# Patient Record
Sex: MALE | Race: OTHER | NOT HISPANIC OR LATINO
[De-identification: names, ages, dates, MRNs, and addresses within clinical notes are randomized per-mention and may not be internally consistent; named-entity substitution may affect disease eponyms.]

---

## 2017-08-11 PROBLEM — Z00.00 ENCOUNTER FOR PREVENTIVE HEALTH EXAMINATION: Status: ACTIVE | Noted: 2017-08-11

## 2017-08-22 ENCOUNTER — APPOINTMENT (OUTPATIENT)
Dept: HOME HEALTH SERVICES | Facility: HOME HEALTH | Age: 82
End: 2017-08-22

## 2017-08-23 ENCOUNTER — APPOINTMENT (OUTPATIENT)
Dept: HOME HEALTH SERVICES | Facility: HOME HEALTH | Age: 82
End: 2017-08-23
Payer: MEDICARE

## 2017-08-23 VITALS
HEART RATE: 96 BPM | SYSTOLIC BLOOD PRESSURE: 110 MMHG | RESPIRATION RATE: 16 BRPM | OXYGEN SATURATION: 99 % | DIASTOLIC BLOOD PRESSURE: 60 MMHG

## 2017-08-23 DIAGNOSIS — Z87.19 PERSONAL HISTORY OF OTHER DISEASES OF THE DIGESTIVE SYSTEM: ICD-10-CM

## 2017-08-23 PROCEDURE — 99345 HOME/RES VST NEW HIGH MDM 75: CPT

## 2017-08-28 DIAGNOSIS — I35.0 NONRHEUMATIC AORTIC (VALVE) STENOSIS: ICD-10-CM

## 2017-09-07 PROBLEM — I35.0 AORTIC STENOSIS, MILD: Status: RESOLVED | Noted: 2017-09-07 | Resolved: 2017-09-07

## 2017-09-27 ENCOUNTER — APPOINTMENT (OUTPATIENT)
Dept: HOME HEALTH SERVICES | Facility: HOME HEALTH | Age: 82
End: 2017-09-27
Payer: MEDICARE

## 2017-09-27 VITALS
HEART RATE: 79 BPM | DIASTOLIC BLOOD PRESSURE: 60 MMHG | TEMPERATURE: 97.8 F | RESPIRATION RATE: 17 BRPM | OXYGEN SATURATION: 98 % | SYSTOLIC BLOOD PRESSURE: 90 MMHG

## 2017-09-27 DIAGNOSIS — Z87.448 PERSONAL HISTORY OF OTHER DISEASES OF URINARY SYSTEM: ICD-10-CM

## 2017-09-27 PROCEDURE — 99349 HOME/RES VST EST MOD MDM 40: CPT | Mod: 25

## 2017-09-27 PROCEDURE — 90686 IIV4 VACC NO PRSV 0.5 ML IM: CPT

## 2017-09-27 PROCEDURE — G0438: CPT

## 2017-09-27 PROCEDURE — G0506: CPT

## 2017-09-27 PROCEDURE — G0008: CPT

## 2017-09-28 ENCOUNTER — LABORATORY RESULT (OUTPATIENT)
Age: 82
End: 2017-09-28

## 2017-10-30 ENCOUNTER — APPOINTMENT (OUTPATIENT)
Dept: HOME HEALTH SERVICES | Facility: HOME HEALTH | Age: 82
End: 2017-10-30

## 2017-11-22 ENCOUNTER — CLINICAL ADVICE (OUTPATIENT)
Age: 82
End: 2017-11-22

## 2017-12-14 ENCOUNTER — APPOINTMENT (OUTPATIENT)
Dept: HOME HEALTH SERVICES | Facility: HOME HEALTH | Age: 82
End: 2017-12-14
Payer: MEDICARE

## 2017-12-14 VITALS
RESPIRATION RATE: 18 BRPM | SYSTOLIC BLOOD PRESSURE: 110 MMHG | TEMPERATURE: 97.2 F | OXYGEN SATURATION: 97 % | DIASTOLIC BLOOD PRESSURE: 70 MMHG | HEART RATE: 75 BPM

## 2017-12-14 PROCEDURE — 99349 HOME/RES VST EST MOD MDM 40: CPT

## 2017-12-15 ENCOUNTER — LABORATORY RESULT (OUTPATIENT)
Age: 82
End: 2017-12-15

## 2017-12-15 LAB
BASOPHILS # BLD AUTO: 0.04 K/UL
BASOPHILS NFR BLD AUTO: 0.7 %
EOSINOPHIL # BLD AUTO: 0.22 K/UL
EOSINOPHIL NFR BLD AUTO: 3.8 %
HCT VFR BLD CALC: 26.9 %
HGB BLD-MCNC: 8.2 G/DL
IMM GRANULOCYTES NFR BLD AUTO: 0.2 %
LYMPHOCYTES # BLD AUTO: 1.24 K/UL
LYMPHOCYTES NFR BLD AUTO: 21.3 %
MAN DIFF?: NORMAL
MCHC RBC-ENTMCNC: 27.8 PG
MCHC RBC-ENTMCNC: 30.5 GM/DL
MCV RBC AUTO: 91.2 FL
MONOCYTES # BLD AUTO: 0.52 K/UL
MONOCYTES NFR BLD AUTO: 8.9 %
NEUTROPHILS # BLD AUTO: 3.79 K/UL
NEUTROPHILS NFR BLD AUTO: 65.1 %
PLATELET # BLD AUTO: 166 K/UL
RBC # BLD: 2.95 M/UL
RBC # FLD: 15.1 %
WBC # FLD AUTO: 5.82 K/UL

## 2017-12-18 LAB
ALBUMIN SERPL ELPH-MCNC: 3.3 G/DL
ALP BLD-CCNC: 112 U/L
ALT SERPL-CCNC: 5 U/L
ANION GAP SERPL CALC-SCNC: 14 MMOL/L
AST SERPL-CCNC: 13 U/L
BILIRUB SERPL-MCNC: 1.1 MG/DL
BUN SERPL-MCNC: 39 MG/DL
CALCIUM SERPL-MCNC: 8.9 MG/DL
CHLORIDE SERPL-SCNC: 103 MMOL/L
CO2 SERPL-SCNC: 23 MMOL/L
CREAT SERPL-MCNC: 2.02 MG/DL
POTASSIUM SERPL-SCNC: 4.9 MMOL/L
PROT SERPL-MCNC: 6.6 G/DL
SODIUM SERPL-SCNC: 140 MMOL/L
TSH SERPL-ACNC: 1.5 UIU/ML

## 2018-01-04 ENCOUNTER — RX RENEWAL (OUTPATIENT)
Age: 83
End: 2018-01-04

## 2018-01-16 ENCOUNTER — INPATIENT (INPATIENT)
Facility: HOSPITAL | Age: 83
LOS: 16 days | Discharge: HOME | DRG: 698 | End: 2018-02-02
Attending: STUDENT IN AN ORGANIZED HEALTH CARE EDUCATION/TRAINING PROGRAM | Admitting: INTERNAL MEDICINE
Payer: MEDICARE

## 2018-01-16 ENCOUNTER — CLINICAL ADVICE (OUTPATIENT)
Age: 83
End: 2018-01-16

## 2018-01-16 ENCOUNTER — APPOINTMENT (OUTPATIENT)
Dept: HOME HEALTH SERVICES | Facility: HOME HEALTH | Age: 83
End: 2018-01-16

## 2018-01-16 VITALS
HEART RATE: 67 BPM | OXYGEN SATURATION: 90 % | SYSTOLIC BLOOD PRESSURE: 113 MMHG | DIASTOLIC BLOOD PRESSURE: 62 MMHG | TEMPERATURE: 90 F | RESPIRATION RATE: 10 BRPM

## 2018-01-16 DIAGNOSIS — G40.901 EPILEPSY, UNSPECIFIED, NOT INTRACTABLE, WITH STATUS EPILEPTICUS: ICD-10-CM

## 2018-01-16 LAB
ALBUMIN SERPL ELPH-MCNC: 3.2 G/DL — LOW (ref 3.3–5)
ALBUMIN SERPL ELPH-MCNC: 3.6 G/DL — SIGNIFICANT CHANGE UP (ref 3.3–5)
ALP SERPL-CCNC: 116 U/L — SIGNIFICANT CHANGE UP (ref 40–120)
ALP SERPL-CCNC: 130 U/L — HIGH (ref 40–120)
ALT FLD-CCNC: 11 U/L RC — SIGNIFICANT CHANGE UP (ref 10–45)
ALT FLD-CCNC: 11 U/L RC — SIGNIFICANT CHANGE UP (ref 10–45)
ANION GAP SERPL CALC-SCNC: 16 MMOL/L — SIGNIFICANT CHANGE UP (ref 5–17)
ANION GAP SERPL CALC-SCNC: 22 MMOL/L — HIGH (ref 5–17)
APPEARANCE UR: ABNORMAL
APTT BLD: 33.4 SEC — SIGNIFICANT CHANGE UP (ref 27.5–37.4)
APTT BLD: 34.7 SEC — SIGNIFICANT CHANGE UP (ref 27.5–37.4)
AST SERPL-CCNC: 15 U/L — SIGNIFICANT CHANGE UP (ref 10–40)
AST SERPL-CCNC: 18 U/L — SIGNIFICANT CHANGE UP (ref 10–40)
BACTERIA # UR AUTO: ABNORMAL /HPF
BASE EXCESS BLDV CALC-SCNC: -17.3 MMOL/L — LOW (ref -2–2)
BASOPHILS # BLD AUTO: 0 K/UL — SIGNIFICANT CHANGE UP (ref 0–0.2)
BASOPHILS # BLD AUTO: 0 K/UL — SIGNIFICANT CHANGE UP (ref 0–0.2)
BASOPHILS NFR BLD AUTO: 0.2 % — SIGNIFICANT CHANGE UP (ref 0–2)
BASOPHILS NFR BLD AUTO: 0.6 % — SIGNIFICANT CHANGE UP (ref 0–2)
BILIRUB SERPL-MCNC: 1.7 MG/DL — HIGH (ref 0.2–1.2)
BILIRUB SERPL-MCNC: 1.8 MG/DL — HIGH (ref 0.2–1.2)
BILIRUB UR-MCNC: NEGATIVE — SIGNIFICANT CHANGE UP
BLD GP AB SCN SERPL QL: NEGATIVE — SIGNIFICANT CHANGE UP
BUN SERPL-MCNC: 31 MG/DL — HIGH (ref 7–23)
BUN SERPL-MCNC: 34 MG/DL — HIGH (ref 7–23)
CA-I SERPL-SCNC: 1.35 MMOL/L — HIGH (ref 1.12–1.3)
CALCIUM SERPL-MCNC: 9.6 MG/DL — SIGNIFICANT CHANGE UP (ref 8.4–10.5)
CALCIUM SERPL-MCNC: 9.6 MG/DL — SIGNIFICANT CHANGE UP (ref 8.4–10.5)
CHLORIDE BLDV-SCNC: 113 MMOL/L — HIGH (ref 96–108)
CHLORIDE SERPL-SCNC: 108 MMOL/L — SIGNIFICANT CHANGE UP (ref 96–108)
CHLORIDE SERPL-SCNC: 108 MMOL/L — SIGNIFICANT CHANGE UP (ref 96–108)
CK MB CFR SERPL CALC: 5.9 NG/ML — SIGNIFICANT CHANGE UP (ref 0–6.7)
CK SERPL-CCNC: 39 U/L — SIGNIFICANT CHANGE UP (ref 30–200)
CO2 BLDV-SCNC: 18 MMOL/L — LOW (ref 22–30)
CO2 SERPL-SCNC: 16 MMOL/L — LOW (ref 22–31)
CO2 SERPL-SCNC: 19 MMOL/L — LOW (ref 22–31)
COLOR SPEC: YELLOW — SIGNIFICANT CHANGE UP
CREAT SERPL-MCNC: 1.73 MG/DL — HIGH (ref 0.5–1.3)
CREAT SERPL-MCNC: 2.04 MG/DL — HIGH (ref 0.5–1.3)
DIFF PNL FLD: NEGATIVE — SIGNIFICANT CHANGE UP
EOSINOPHIL # BLD AUTO: 0 K/UL — SIGNIFICANT CHANGE UP (ref 0–0.5)
EOSINOPHIL # BLD AUTO: 0.1 K/UL — SIGNIFICANT CHANGE UP (ref 0–0.5)
EOSINOPHIL NFR BLD AUTO: 0.1 % — SIGNIFICANT CHANGE UP (ref 0–6)
EOSINOPHIL NFR BLD AUTO: 1.4 % — SIGNIFICANT CHANGE UP (ref 0–6)
GAS PNL BLDA: SIGNIFICANT CHANGE UP
GAS PNL BLDV: 144 MMOL/L — SIGNIFICANT CHANGE UP (ref 136–145)
GAS PNL BLDV: SIGNIFICANT CHANGE UP
GAS PNL BLDV: SIGNIFICANT CHANGE UP
GLUCOSE BLDV-MCNC: 159 MG/DL — HIGH (ref 70–99)
GLUCOSE SERPL-MCNC: 151 MG/DL — HIGH (ref 70–99)
GLUCOSE SERPL-MCNC: 172 MG/DL — HIGH (ref 70–99)
GLUCOSE UR QL: NEGATIVE — SIGNIFICANT CHANGE UP
GRAM STN FLD: SIGNIFICANT CHANGE UP
HCO3 BLDV-SCNC: 16 MMOL/L — LOW (ref 21–29)
HCT VFR BLD CALC: 25.2 % — LOW (ref 39–50)
HCT VFR BLD CALC: 30.3 % — LOW (ref 39–50)
HCT VFR BLDA CALC: 28 % — LOW (ref 39–50)
HGB BLD CALC-MCNC: 9.1 G/DL — LOW (ref 13–17)
HGB BLD-MCNC: 8.2 G/DL — LOW (ref 13–17)
HGB BLD-MCNC: 9.2 G/DL — LOW (ref 13–17)
INR BLD: 1.27 RATIO — HIGH (ref 0.88–1.16)
INR BLD: 1.29 RATIO — HIGH (ref 0.88–1.16)
KETONES UR-MCNC: NEGATIVE — SIGNIFICANT CHANGE UP
LACTATE BLDV-MCNC: 12.8 MMOL/L — CRITICAL HIGH (ref 0.7–2)
LEUKOCYTE ESTERASE UR-ACNC: ABNORMAL
LYMPHOCYTES # BLD AUTO: 0.3 K/UL — LOW (ref 1–3.3)
LYMPHOCYTES # BLD AUTO: 1.4 K/UL — SIGNIFICANT CHANGE UP (ref 1–3.3)
LYMPHOCYTES # BLD AUTO: 24.4 % — SIGNIFICANT CHANGE UP (ref 13–44)
LYMPHOCYTES # BLD AUTO: 5.2 % — LOW (ref 13–44)
MAGNESIUM SERPL-MCNC: 2.6 MG/DL — SIGNIFICANT CHANGE UP (ref 1.6–2.6)
MCHC RBC-ENTMCNC: 28.8 PG — SIGNIFICANT CHANGE UP (ref 27–34)
MCHC RBC-ENTMCNC: 29.2 PG — SIGNIFICANT CHANGE UP (ref 27–34)
MCHC RBC-ENTMCNC: 30.3 GM/DL — LOW (ref 32–36)
MCHC RBC-ENTMCNC: 32.4 GM/DL — SIGNIFICANT CHANGE UP (ref 32–36)
MCV RBC AUTO: 90.1 FL — SIGNIFICANT CHANGE UP (ref 80–100)
MCV RBC AUTO: 95.2 FL — SIGNIFICANT CHANGE UP (ref 80–100)
MONOCYTES # BLD AUTO: 0.1 K/UL — SIGNIFICANT CHANGE UP (ref 0–0.9)
MONOCYTES # BLD AUTO: 0.4 K/UL — SIGNIFICANT CHANGE UP (ref 0–0.9)
MONOCYTES NFR BLD AUTO: 2.1 % — SIGNIFICANT CHANGE UP (ref 2–14)
MONOCYTES NFR BLD AUTO: 7.7 % — SIGNIFICANT CHANGE UP (ref 2–14)
NEUTROPHILS # BLD AUTO: 3.8 K/UL — SIGNIFICANT CHANGE UP (ref 1.8–7.4)
NEUTROPHILS # BLD AUTO: 6.1 K/UL — SIGNIFICANT CHANGE UP (ref 1.8–7.4)
NEUTROPHILS NFR BLD AUTO: 65.9 % — SIGNIFICANT CHANGE UP (ref 43–77)
NEUTROPHILS NFR BLD AUTO: 92.4 % — HIGH (ref 43–77)
NITRITE UR-MCNC: NEGATIVE — SIGNIFICANT CHANGE UP
NT-PROBNP SERPL-SCNC: 4325 PG/ML — HIGH (ref 0–300)
OTHER CELLS CSF MANUAL: 3 ML/DL — LOW (ref 18–22)
PCO2 BLDV: 85 MMHG — HIGH (ref 35–50)
PH BLDV: 6.9 — CRITICAL LOW (ref 7.35–7.45)
PH UR: 8.5 — HIGH (ref 5–8)
PHOSPHATE SERPL-MCNC: 2.8 MG/DL — SIGNIFICANT CHANGE UP (ref 2.5–4.5)
PLATELET # BLD AUTO: 135 K/UL — LOW (ref 150–400)
PLATELET # BLD AUTO: 146 K/UL — LOW (ref 150–400)
PO2 BLDV: 29 MMHG — SIGNIFICANT CHANGE UP (ref 25–45)
POTASSIUM BLDV-SCNC: 4.1 MMOL/L — SIGNIFICANT CHANGE UP (ref 3.5–5)
POTASSIUM SERPL-MCNC: 4.2 MMOL/L — SIGNIFICANT CHANGE UP (ref 3.5–5.3)
POTASSIUM SERPL-MCNC: 4.4 MMOL/L — SIGNIFICANT CHANGE UP (ref 3.5–5.3)
POTASSIUM SERPL-SCNC: 4.2 MMOL/L — SIGNIFICANT CHANGE UP (ref 3.5–5.3)
POTASSIUM SERPL-SCNC: 4.4 MMOL/L — SIGNIFICANT CHANGE UP (ref 3.5–5.3)
PROT SERPL-MCNC: 7.1 G/DL — SIGNIFICANT CHANGE UP (ref 6–8.3)
PROT SERPL-MCNC: 7.5 G/DL — SIGNIFICANT CHANGE UP (ref 6–8.3)
PROT UR-MCNC: 300 MG/DL
PROTHROM AB SERPL-ACNC: 13.9 SEC — HIGH (ref 9.8–12.7)
PROTHROM AB SERPL-ACNC: 14.1 SEC — HIGH (ref 9.8–12.7)
RAPID RVP RESULT: SIGNIFICANT CHANGE UP
RBC # BLD: 2.79 M/UL — LOW (ref 4.2–5.8)
RBC # BLD: 3.18 M/UL — LOW (ref 4.2–5.8)
RBC # FLD: 13.5 % — SIGNIFICANT CHANGE UP (ref 10.3–14.5)
RBC # FLD: 13.6 % — SIGNIFICANT CHANGE UP (ref 10.3–14.5)
RBC CASTS # UR COMP ASSIST: SIGNIFICANT CHANGE UP /HPF (ref 0–2)
RH IG SCN BLD-IMP: POSITIVE — SIGNIFICANT CHANGE UP
SAO2 % BLDV: 22 % — LOW (ref 67–88)
SODIUM SERPL-SCNC: 143 MMOL/L — SIGNIFICANT CHANGE UP (ref 135–145)
SODIUM SERPL-SCNC: 146 MMOL/L — HIGH (ref 135–145)
SP GR SPEC: 1.02 — SIGNIFICANT CHANGE UP (ref 1.01–1.02)
SPECIMEN SOURCE: SIGNIFICANT CHANGE UP
TRI-PHOS CRY UR QL COMP ASSIST: ABNORMAL
TROPONIN T SERPL-MCNC: <0.01 NG/ML — SIGNIFICANT CHANGE UP (ref 0–0.06)
TSH SERPL-MCNC: 4.5 UIU/ML — HIGH (ref 0.27–4.2)
UROBILINOGEN FLD QL: NEGATIVE — SIGNIFICANT CHANGE UP
WBC # BLD: 5.7 K/UL — SIGNIFICANT CHANGE UP (ref 3.8–10.5)
WBC # BLD: 6.6 K/UL — SIGNIFICANT CHANGE UP (ref 3.8–10.5)
WBC # FLD AUTO: 5.7 K/UL — SIGNIFICANT CHANGE UP (ref 3.8–10.5)
WBC # FLD AUTO: 6.6 K/UL — SIGNIFICANT CHANGE UP (ref 3.8–10.5)
WBC UR QL: SIGNIFICANT CHANGE UP /HPF (ref 0–5)

## 2018-01-16 PROCEDURE — 99291 CRITICAL CARE FIRST HOUR: CPT

## 2018-01-16 PROCEDURE — 71045 X-RAY EXAM CHEST 1 VIEW: CPT | Mod: 26,77

## 2018-01-16 PROCEDURE — 93010 ELECTROCARDIOGRAM REPORT: CPT

## 2018-01-16 PROCEDURE — 70450 CT HEAD/BRAIN W/O DYE: CPT | Mod: 26

## 2018-01-16 PROCEDURE — 71250 CT THORAX DX C-: CPT | Mod: 26

## 2018-01-16 PROCEDURE — 72125 CT NECK SPINE W/O DYE: CPT | Mod: 26

## 2018-01-16 PROCEDURE — 95819 EEG AWAKE AND ASLEEP: CPT | Mod: 26

## 2018-01-16 PROCEDURE — 99291 CRITICAL CARE FIRST HOUR: CPT | Mod: 25

## 2018-01-16 PROCEDURE — 31500 INSERT EMERGENCY AIRWAY: CPT

## 2018-01-16 PROCEDURE — 71045 X-RAY EXAM CHEST 1 VIEW: CPT | Mod: 26

## 2018-01-16 RX ORDER — LEVOTHYROXINE SODIUM 125 MCG
75 TABLET ORAL AT BEDTIME
Qty: 0 | Refills: 0 | Status: COMPLETED | OUTPATIENT
Start: 2018-01-16 | End: 2018-01-16

## 2018-01-16 RX ORDER — ETOMIDATE 2 MG/ML
20 INJECTION INTRAVENOUS ONCE
Qty: 0 | Refills: 0 | Status: COMPLETED | OUTPATIENT
Start: 2018-01-16 | End: 2018-01-16

## 2018-01-16 RX ORDER — AMIODARONE HYDROCHLORIDE 400 MG/1
150 TABLET ORAL ONCE
Qty: 0 | Refills: 0 | Status: COMPLETED | OUTPATIENT
Start: 2018-01-16 | End: 2018-01-16

## 2018-01-16 RX ORDER — VALPROIC ACID (AS SODIUM SALT) 250 MG/5ML
500 SOLUTION, ORAL ORAL EVERY 12 HOURS
Qty: 0 | Refills: 0 | Status: DISCONTINUED | OUTPATIENT
Start: 2018-01-16 | End: 2018-01-17

## 2018-01-16 RX ORDER — CEFEPIME 1 G/1
2000 INJECTION, POWDER, FOR SOLUTION INTRAMUSCULAR; INTRAVENOUS ONCE
Qty: 0 | Refills: 0 | Status: COMPLETED | OUTPATIENT
Start: 2018-01-16 | End: 2018-01-16

## 2018-01-16 RX ORDER — HEPARIN SODIUM 5000 [USP'U]/ML
5000 INJECTION INTRAVENOUS; SUBCUTANEOUS EVERY 8 HOURS
Qty: 0 | Refills: 0 | Status: DISCONTINUED | OUTPATIENT
Start: 2018-01-16 | End: 2018-02-02

## 2018-01-16 RX ORDER — CALCIUM GLUCONATE 100 MG/ML
2 VIAL (ML) INTRAVENOUS ONCE
Qty: 0 | Refills: 0 | Status: DISCONTINUED | OUTPATIENT
Start: 2018-01-16 | End: 2018-01-16

## 2018-01-16 RX ORDER — MAGNESIUM SULFATE 500 MG/ML
2 VIAL (ML) INJECTION ONCE
Qty: 0 | Refills: 0 | Status: COMPLETED | OUTPATIENT
Start: 2018-01-16 | End: 2018-01-16

## 2018-01-16 RX ORDER — HYDROCORTISONE 20 MG
100 TABLET ORAL ONCE
Qty: 0 | Refills: 0 | Status: COMPLETED | OUTPATIENT
Start: 2018-01-16 | End: 2018-01-16

## 2018-01-16 RX ORDER — DIVALPROEX SODIUM 500 MG/1
500 TABLET, DELAYED RELEASE ORAL EVERY 12 HOURS
Qty: 0 | Refills: 0 | Status: DISCONTINUED | OUTPATIENT
Start: 2018-01-16 | End: 2018-01-16

## 2018-01-16 RX ORDER — PROPOFOL 10 MG/ML
5 INJECTION, EMULSION INTRAVENOUS
Qty: 500 | Refills: 0 | Status: DISCONTINUED | OUTPATIENT
Start: 2018-01-16 | End: 2018-01-16

## 2018-01-16 RX ORDER — NOREPINEPHRINE BITARTRATE/D5W 8 MG/250ML
0.05 PLASTIC BAG, INJECTION (ML) INTRAVENOUS
Qty: 8 | Refills: 0 | Status: DISCONTINUED | OUTPATIENT
Start: 2018-01-16 | End: 2018-01-17

## 2018-01-16 RX ORDER — SODIUM BICARBONATE 1 MEQ/ML
50 SYRINGE (ML) INTRAVENOUS ONCE
Qty: 0 | Refills: 0 | Status: COMPLETED | OUTPATIENT
Start: 2018-01-16 | End: 2018-01-16

## 2018-01-16 RX ORDER — PIPERACILLIN AND TAZOBACTAM 4; .5 G/20ML; G/20ML
3.38 INJECTION, POWDER, LYOPHILIZED, FOR SOLUTION INTRAVENOUS ONCE
Qty: 0 | Refills: 0 | Status: DISCONTINUED | OUTPATIENT
Start: 2018-01-16 | End: 2018-01-16

## 2018-01-16 RX ORDER — SODIUM CHLORIDE 9 MG/ML
500 INJECTION INTRAMUSCULAR; INTRAVENOUS; SUBCUTANEOUS ONCE
Qty: 0 | Refills: 0 | Status: COMPLETED | OUTPATIENT
Start: 2018-01-16 | End: 2018-01-16

## 2018-01-16 RX ORDER — VALPROIC ACID (AS SODIUM SALT) 250 MG/5ML
1500 SOLUTION, ORAL ORAL ONCE
Qty: 0 | Refills: 0 | Status: DISCONTINUED | OUTPATIENT
Start: 2018-01-16 | End: 2018-01-16

## 2018-01-16 RX ORDER — AMIODARONE HYDROCHLORIDE 400 MG/1
1 TABLET ORAL
Qty: 900 | Refills: 0 | Status: DISCONTINUED | OUTPATIENT
Start: 2018-01-16 | End: 2018-01-16

## 2018-01-16 RX ORDER — LEVOTHYROXINE SODIUM 125 MCG
150 TABLET ORAL
Qty: 0 | Refills: 0 | Status: DISCONTINUED | OUTPATIENT
Start: 2018-01-16 | End: 2018-01-17

## 2018-01-16 RX ORDER — SODIUM CHLORIDE 9 MG/ML
2000 INJECTION INTRAMUSCULAR; INTRAVENOUS; SUBCUTANEOUS ONCE
Qty: 0 | Refills: 0 | Status: COMPLETED | OUTPATIENT
Start: 2018-01-16 | End: 2018-01-16

## 2018-01-16 RX ORDER — VANCOMYCIN HCL 1 G
1000 VIAL (EA) INTRAVENOUS EVERY 24 HOURS
Qty: 0 | Refills: 0 | Status: DISCONTINUED | OUTPATIENT
Start: 2018-01-16 | End: 2018-01-17

## 2018-01-16 RX ORDER — MIDAZOLAM HYDROCHLORIDE 1 MG/ML
1 INJECTION, SOLUTION INTRAMUSCULAR; INTRAVENOUS
Qty: 100 | Refills: 0 | Status: DISCONTINUED | OUTPATIENT
Start: 2018-01-16 | End: 2018-01-16

## 2018-01-16 RX ORDER — ROCURONIUM BROMIDE 10 MG/ML
80 VIAL (ML) INTRAVENOUS ONCE
Qty: 0 | Refills: 0 | Status: COMPLETED | OUTPATIENT
Start: 2018-01-16 | End: 2018-01-16

## 2018-01-16 RX ORDER — VALPROIC ACID (AS SODIUM SALT) 250 MG/5ML
500 SOLUTION, ORAL ORAL EVERY 12 HOURS
Qty: 0 | Refills: 0 | Status: DISCONTINUED | OUTPATIENT
Start: 2018-01-16 | End: 2018-01-16

## 2018-01-16 RX ORDER — DEXAMETHASONE 0.5 MG/5ML
2 ELIXIR ORAL EVERY 6 HOURS
Qty: 0 | Refills: 0 | Status: DISCONTINUED | OUTPATIENT
Start: 2018-01-16 | End: 2018-01-17

## 2018-01-16 RX ORDER — DIVALPROEX SODIUM 500 MG/1
1500 TABLET, DELAYED RELEASE ORAL ONCE
Qty: 0 | Refills: 0 | Status: DISCONTINUED | OUTPATIENT
Start: 2018-01-16 | End: 2018-01-16

## 2018-01-16 RX ORDER — CEFEPIME 1 G/1
2000 INJECTION, POWDER, FOR SOLUTION INTRAMUSCULAR; INTRAVENOUS EVERY 12 HOURS
Qty: 0 | Refills: 0 | Status: DISCONTINUED | OUTPATIENT
Start: 2018-01-16 | End: 2018-01-18

## 2018-01-16 RX ORDER — CALCIUM CHLORIDE
1000 POWDER (GRAM) MISCELLANEOUS ONCE
Qty: 0 | Refills: 0 | Status: COMPLETED | OUTPATIENT
Start: 2018-01-16 | End: 2018-01-16

## 2018-01-16 RX ORDER — VANCOMYCIN HCL 1 G
1000 VIAL (EA) INTRAVENOUS ONCE
Qty: 0 | Refills: 0 | Status: COMPLETED | OUTPATIENT
Start: 2018-01-16 | End: 2018-01-16

## 2018-01-16 RX ORDER — VALPROIC ACID (AS SODIUM SALT) 250 MG/5ML
1500 SOLUTION, ORAL ORAL ONCE
Qty: 0 | Refills: 0 | Status: COMPLETED | OUTPATIENT
Start: 2018-01-16 | End: 2018-01-16

## 2018-01-16 RX ADMIN — ETOMIDATE 20 MILLIGRAM(S): 2 INJECTION INTRAVENOUS at 12:20

## 2018-01-16 RX ADMIN — Medication 50 MILLIEQUIVALENT(S): at 13:20

## 2018-01-16 RX ADMIN — MIDAZOLAM HYDROCHLORIDE 1 MG/HR: 1 INJECTION, SOLUTION INTRAMUSCULAR; INTRAVENOUS at 14:47

## 2018-01-16 RX ADMIN — Medication 250 MILLIGRAM(S): at 16:47

## 2018-01-16 RX ADMIN — Medication 80 MILLIGRAM(S): at 12:22

## 2018-01-16 RX ADMIN — AMIODARONE HYDROCHLORIDE 600 MILLIGRAM(S): 400 TABLET ORAL at 12:51

## 2018-01-16 RX ADMIN — PROPOFOL 2.25 MICROGRAM(S)/KG/MIN: 10 INJECTION, EMULSION INTRAVENOUS at 13:00

## 2018-01-16 RX ADMIN — Medication 50 MILLIEQUIVALENT(S): at 13:05

## 2018-01-16 RX ADMIN — Medication 2 MILLIGRAM(S): at 18:07

## 2018-01-16 RX ADMIN — Medication 1500 MILLIGRAM(S): at 18:07

## 2018-01-16 RX ADMIN — SODIUM CHLORIDE 2000 MILLILITER(S): 9 INJECTION INTRAMUSCULAR; INTRAVENOUS; SUBCUTANEOUS at 12:20

## 2018-01-16 RX ADMIN — Medication 100 MILLIGRAM(S): at 13:05

## 2018-01-16 RX ADMIN — HEPARIN SODIUM 5000 UNIT(S): 5000 INJECTION INTRAVENOUS; SUBCUTANEOUS at 22:20

## 2018-01-16 RX ADMIN — SODIUM CHLORIDE 500 MILLILITER(S): 9 INJECTION INTRAMUSCULAR; INTRAVENOUS; SUBCUTANEOUS at 13:30

## 2018-01-16 RX ADMIN — Medication 75 MICROGRAM(S): at 22:19

## 2018-01-16 RX ADMIN — Medication 1000 MILLIGRAM(S): at 13:00

## 2018-01-16 RX ADMIN — AMIODARONE HYDROCHLORIDE 33.33 MG/MIN: 400 TABLET ORAL at 14:46

## 2018-01-16 RX ADMIN — CEFEPIME 100 MILLIGRAM(S): 1 INJECTION, POWDER, FOR SOLUTION INTRAMUSCULAR; INTRAVENOUS at 13:04

## 2018-01-16 RX ADMIN — Medication 50 GRAM(S): at 13:50

## 2018-01-16 RX ADMIN — Medication 50 MILLIEQUIVALENT(S): at 13:00

## 2018-01-16 NOTE — CONSULT NOTE ADULT - SUBJECTIVE AND OBJECTIVE BOX
Neurology Consult    Patient is a 89y old  Male who presents with a chief complaint of seizure. Patient today found unresponsive by EMS had 3 witnessed events of GTC without return to baseline. Patient brought to ED and intubated on arrival, given 10mg IV verset, placed on propfol. Neurology consult called for likely status epilepticus.         PAST MEDICAL & SURGICAL HISTORY:      Allergies    No Known Allergies    Intolerances        MEDICATIONS  (STANDING):  amiodarone IVPB 150 milliGRAM(s) IV Intermittent once  calcium gluconate IVPB 2 Gram(s) IV Intermittent Once  vancomycin  IVPB 1000 milliGRAM(s) IV Intermittent once    MEDICATIONS  (PRN):      Social History: Denies tob, EtOH use     FAMILY HISTORY:      Review of Systems:  patient intubated      Physical Exam:   Vital Signs Last 24 Hrs  T(C): --  T(F): --  HR: --  BP: --  BP(mean): --  RR: --  SpO2: --    General Exam:     Labs:     CBC Full  -  ( 16 Jan 2018 12:34 )  WBC Count : 5.7 K/uL  Hemoglobin : 9.2 g/dL  Hematocrit : 30.3 %  Platelet Count - Automated : 146 K/uL  Mean Cell Volume : 95.2 fl  Mean Cell Hemoglobin : 28.8 pg  Mean Cell Hemoglobin Concentration : 30.3 gm/dL  Auto Neutrophil # : 3.8 K/uL  Auto Lymphocyte # : 1.4 K/uL  Auto Monocyte # : 0.4 K/uL  Auto Eosinophil # : 0.1 K/uL  Auto Basophil # : 0.0 K/uL  Auto Neutrophil % : 65.9 %  Auto Lymphocyte % : 24.4 %  Auto Monocyte % : 7.7 %  Auto Eosinophil % : 1.4 %  Auto Basophil % : 0.6 %            PT/INR - ( 16 Jan 2018 12:34 )   PT: 13.9 sec;   INR: 1.27 ratio         PTT - ( 16 Jan 2018 12:34 )  PTT:34.7 sec Neurology Consult    Patient is a 89 yo M presenting to ED for possible seizure and unresponsiveness. As per family and ED, patient was doing relatively well until earlier today when he began to complain of SOB. Patient then had total of 5 to 6 witnessed episodes of seizure like activity (3 of which witnessed by EMS and 1 witnessed in ED). Patient was given Versed 10mg x1 by EMS prior to arrival. Witnessed episode in ED was described as 30 seconds of bilateral UE tonic clonic with head turning to Left lasting for about 30 seconds. Patient remained unresponsive afterwards and was intubated. Patient was found to be hypothermic at this point and started on rewarming blanket. Patient was then noted to become bradycardic to 40s-50s with ectopy and was given Amiodarone IVP.         PAST MEDICAL & SURGICAL HISTORY:      Allergies    No Known Allergies    Intolerances        MEDICATIONS  (STANDING):  amiodarone IVPB 150 milliGRAM(s) IV Intermittent once  calcium gluconate IVPB 2 Gram(s) IV Intermittent Once  vancomycin  IVPB 1000 milliGRAM(s) IV Intermittent once    MEDICATIONS  (PRN):      Social History: Denies tob, EtOH use     FAMILY HISTORY:      Review of Systems:  patient intubated      Physical Exam:   Vital Signs Last 24 Hrs  T(C): --  T(F): --  HR: --  BP: --  BP(mean): --  RR: --  SpO2: --    General Exam:   Neurology  Patient non alert, non oriented, intubated, not opening eyes to command, unable to follow commands  Pupils sluggish 3-->2  Flaccid tone  Mute plantar reponse         Labs:     CBC Full  -  ( 16 Jan 2018 12:34 )  WBC Count : 5.7 K/uL  Hemoglobin : 9.2 g/dL  Hematocrit : 30.3 %  Platelet Count - Automated : 146 K/uL  Mean Cell Volume : 95.2 fl  Mean Cell Hemoglobin : 28.8 pg  Mean Cell Hemoglobin Concentration : 30.3 gm/dL  Auto Neutrophil # : 3.8 K/uL  Auto Lymphocyte # : 1.4 K/uL  Auto Monocyte # : 0.4 K/uL  Auto Eosinophil # : 0.1 K/uL  Auto Basophil # : 0.0 K/uL  Auto Neutrophil % : 65.9 %  Auto Lymphocyte % : 24.4 %  Auto Monocyte % : 7.7 %  Auto Eosinophil % : 1.4 %  Auto Basophil % : 0.6 %            PT/INR - ( 16 Jan 2018 12:34 )   PT: 13.9 sec;   INR: 1.27 ratio         PTT - ( 16 Jan 2018 12:34 )  PTT:34.7 sec

## 2018-01-16 NOTE — H&P ADULT - ATTENDING COMMENTS
Acute hypoxemic resp  failure from unclear etiology. Sepsis vs hypothyroidism. Pt  remains hypothermic in MICU.  Continue current AC vent a setting. Check abg.  2. Metabolic acidosis from lactic acidosis of unclear etiology. Sepsis, hypotension. Pt on antibiotics and bld cx drawn. Pt given hydrocortisone . Will change  to decadron so can perform  Cortrosyn stim test . Check TSH.  3.Possible seizure. Start on Depakote. Monitor EEG.  4.Bradycardia from hypothermia most likely Currently on levophed for septic shock. Consider adding dopamine if remains bradycardic.  5. Hypotension requiring levophed. COntinue abx and steroids as mentioned above.   5. Baseline cks 3 with creatine of ~2. SO far stable.    CC time 35 min.

## 2018-01-16 NOTE — H&P ADULT - PMH
Anemia    CKD (chronic kidney disease)    Dementia    Hyperlipidemia    Hypothyroid    Neurogenic bladder

## 2018-01-16 NOTE — ED PROVIDER NOTE - OBJECTIVE STATEMENT
89 y.o. male presents from EMS after multiple seizures at home and enroute, lasting 30 sec and then resolving, given 10mg versed IM by EMS enroute for seziure. Episodes include tonic-clonic movement with LOC. Family states he has been feeling well in [facility] until today when he complained of SOB and then started seizing. No recent illness.

## 2018-01-16 NOTE — H&P ADULT - ASSESSMENT
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 CTH showed extensive microvascular disease but no acute pathology. On Trazadone at home for sleep  - Will order 24h EEG to monitor  - Seizure precautions  - f/u Neuro recs  - Neuro check q1h  - MRI/MRA when stable  - Will check TSH/Free T4    #CV - Bradycardic to 30s to 50s. Cardiac enzymes negative x1. BNP elevated to 4k. EKG with prolonged QTc, interpreted as afib with slow ventricular response and bifascicular block. Maintaining BP  - Trend cardiac enzymes  - Monitor on Tele  - Transcutaneous pacer pads at bedside  - Chronotropic support as needed    #Resp - Intubated for airway protection in setting of possible status epilepticus/myxedema coma. CT chest showed moderate b/l pleural effusions  - Daily CPAP trials  - Check RVP    #GI  - Monitor BM    #Endo - History of hypothyroid, on Synthroid 150mcg daily at home. Last TSH in 12/2017 1.2. Concern for myxedema coma given AMS, hypothermia, bradycardia, peripheral edema and b/l pleural effusions. 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 CTH showed extensive microvascular disease but no acute pathology. On Trazadone at home for sleep  - Will order 24h EEG to monitor  - Seizure precautions  - f/u Neuro recs  - Neuro check q1h  - MRI/MRA when stable  - Will check TSH/Free T4    #CV - Bradycardic to 30s to 50s. Cardiac enzymes negative x1. BNP elevated to 4k. EKG with prolonged QTc, interpreted as afib with slow ventricular response and bifascicular block. Maintaining BP  - Trend cardiac enzymes  - Monitor on Tele  - Transcutaneous pacer pads and atropine at bedside  - Chronotropic support as needed    #Resp - Intubated for airway protection in setting of possible status epilepticus/myxedema coma. CT chest showed moderate b/l pleural effusions  - Daily CPAP trials  - Check RVP    #GI  - Monitor BM    #Endo - History of hypothyroid, on Synthroid 150mcg daily at home. Last TSH in 12/2017 1.2. Concern for myxedema coma given AMS, hypothermia, bradycardia, peripheral edema and b/l pleural effusions. Received Hydrocortisone 100mg IV x1 in ED  - Will change to Decadron 2mg IV q6  - Will check cortisol levels in 24 hours  - f/u TSH and free T4    #ID - Will need to rule out underlying infection/sepsis  - f/u blood cultures and UA  - Will c/w empiric Vancomycin and Cefepime    #Renal - Creatinine at baseline  - Monitor BMP and UOP    # - Suprapubic catheter  - Monitor UOP    #Heme - Anemia at baseline Hb  - Monitor CBC    #DVT ppx  - HSQ

## 2018-01-16 NOTE — ED PROVIDER NOTE - CARE PLAN
Principal Discharge DX:	Status epilepticus Principal Discharge DX:	Status epilepticus  Secondary Diagnosis:	Acidosis, lactic  Secondary Diagnosis:	Hypothermia

## 2018-01-16 NOTE — ED PROVIDER NOTE - MEDICAL DECISION MAKING DETAILS
89 y.o. male pw multiple seizures at home. Ill appearing, unresponsive. Will check labs, rectal temp, cultures, xray, ct head, intubation and admission to ICU.

## 2018-01-16 NOTE — H&P ADULT - HISTORY OF PRESENT ILLNESS
Patient is a 89 yo M presenting to ED for possible seizure and unresponsiveness.     On arrival to the ED, VS were  Labs notable for no leukocytosis WBC 5.7, Hb 9.2, platelets 146, INR 1.27, Creatinine 2.04, , T Bili 1.7, Alk P 130, , CE negative x1, BNP 4325. Initial VBG 6.9/85/29/16 with Lactate of 12.8. Most recent ABG was 7.29/42/297/19 with Lactate of 7.3.   In the ED, patient received Vanco x1, Cefepime x1, Bicarb 50mEQ x2, Hydrocortisone 100mg x1, Calcium chloride 1000mg x1, Amiodarone 150mg IV x1 and started on Amiodarone gtt. Patient is a 89 yo M presenting to ED for possible seizure and unresponsiveness. As per family and ED, patient was doing relatively well until earlier today when he began to complain of SOB. Patient then had total of 5 to 6 witnessed episodes of seizure like activity (3 of which witnessed by EMS and 1 witnessed in ED). Patient was given Versed 10mg x1 by EMS prior to arrival. Witnessed episode in ED was described as 30 seconds of bilateral UE tonic clonic with head turning to Left lasting for about 30 seconds. Patient remained unresponsive afterwards and was intubated. Patient was found to be hypothermic at this point and started on rewarming blanket. Patient was then noted to become bradycardic to 40s-50s with ectopy and was given Amiodarone IVP.     On arrival to the ED, VS were  Labs notable for no leukocytosis WBC 5.7, Hb 9.2, platelets 146, INR 1.27, Creatinine 2.04, , T Bili 1.7, Alk P 130, , CE negative x1, BNP 4325. Initial VBG 6.9/85/29/16 with Lactate of 12.8. Most recent ABG was 7.29/42/297/19 with Lactate of 7.3. In the ED, patient received Vanco x1, Cefepime x1, Bicarb 50mEQ x2, Hydrocortisone 100mg x1, Calcium chloride 1000mg x1, Amiodarone 150mg IV x1 and started on Amiodarone gtt. 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 As per family and ED, patient was doing relatively well until earlier today when he was noted to be very lethargic. Patient then had total of 4 to 5 witnessed episodes of seizure like activity (3 of which witnessed by EMS and 1 witnessed in ED). Patient was given Versed 10mg x1 by EMS prior to arrival. Witnessed episode in ED was described as 30 seconds of bilateral UE tonic clonic with head turning to Left lasting for about 30 seconds. Family also reports that patient turned red in the face during these episodes until shaking stopped. Also noted violent coughing after these episodes. Patient remained unresponsive afterwards and was intubated. Patient was found to be hypothermic at this point and started on rewarming blanket. Patient was then noted to become bradycardic to 40s-50s with ectopy and was given Amiodarone IVP.     Labs notable for no leukocytosis WBC 5.7, Hb 9.2, platelets 146, INR 1.27, Creatinine 2.04, , T Bili 1.7, Alk P 130, , CE negative x1, BNP 4325. Initial VBG 6.9/85/29/16 with Lactate of 12.8. Most recent ABG was 7.29/42/297/19 with Lactate of 7.3. In the ED, patient received Vanco x1, Cefepime x1, Bicarb 50mEQ x2, Hydrocortisone 100mg x1, Calcium chloride 1000mg x1, Amiodarone 150mg IV x1 and started on Amiodarone gtt.     On chart review, patient was last sen by house calls doctor on 12/14/17 during which it was noted that patient had been very confused for the past 3 weeks as per regular HHA with improvement of symptoms after changing of suprapubic catheter. 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 As per family and ED, patient was doing relatively well until earlier today when he was noted to be very lethargic. Patient then had total of 4 to 5 witnessed episodes of seizure like activity (3 of which witnessed by EMS and 1 witnessed in ED). Patient was given Versed 10mg x1 by EMS prior to arrival. Witnessed episode in ED was described as 30 seconds of bilateral UE tonic clonic with head turning to Left lasting for about 30 seconds. Family also reports that patient turned red in the face during these episodes until shaking stopped. Also noted violent coughing after these episodes. Patient remained unresponsive afterwards and was intubated. Patient was found to be hypothermic at this point and started on rewarming blanket. Patient was then noted to become bradycardic to 40s-50s with ectopy and was given Amiodarone IVP.     Labs notable for no leukocytosis WBC 5.7, Hb 9.2, platelets 146, INR 1.27, Creatinine 2.04, , T Bili 1.7, Alk P 130, , CE negative x1, BNP 4325. Initial VBG 6.9/85/29/16 with Lactate of 12.8. Most recent ABG was 7.29/42/297/19 with Lactate of 7.3. In the ED, patient received Vanco x1, Cefepime x1, Bicarb 50mEQ x2, Hydrocortisone 100mg x1, Calcium chloride 1000mg x1, Amiodarone 150mg IV x1 and started on Amiodarone gtt.     On chart review, patient was last sen by house calls doctor on 12/14/17 during which it was noted that patient had been very confused for the past 3 weeks prior to that visit as per regular HHA with improvement of symptoms after changing of suprapubic catheter.

## 2018-01-16 NOTE — ED PROVIDER NOTE - ATTENDING CONTRIBUTION TO CARE
89M relatively healthy as per family on trazadone, suprapubic catheter in place presents via EMS from home after multiple seizures both at home and en route.  Seizures witnessed both by grandson and EMS.  Lasting approx 30 sec and then resolving, given 10mg versed IM by EMS en route for second seizure.  According to EMS, pt had a third episode that self-resolved.  Episodes include tonic-clonic movement with LOC.  As per family, pt was not feeling well today was in [facility] prior to today.  C/o sob shortly before first episode.  Decreased mental status after episodes.  No known recent illnesses.  No known recent trauma.    Hypoxic to mid-80s on RA improvement with NRB.  Hemodynamically stable  (Attending - Faith) Pt unresponsive to painful and verbal stimuli, NCAT, sluggish 3mm pupils b/l, Trachea midline, CTAB, Non-tachy, Normal perfusion, soft, ND, suprapubic catheter in place draining.  No deformity of extremities.  Pt intubated for airway protection and to be begin propofol for status epilepticus.  Neurology consulted and EEG requested/ordered.  Hypotensive s/p intubation requiring levophed and simultaneous propofol treatment.  pt found to be hypothermic to 90F NE.  External warming began with subsequent bradycardia and transient runs of wide comple tachycardia.  amiodarone load administered.  Calcium and magensium given for prolonged QT.  Thyroid panel sent.  Hydrocortisone administered.  No electrolyte abn.  pt acidotic with pH <7 with lactate >12.  multiple amps of bicarb given with improvement in pH, lactate and hemodynamics.  MICU consulted and will accept patient with CTH to be performed during transport to MICU.

## 2018-01-16 NOTE — CONSULT NOTE ADULT - PROBLEM SELECTOR RECOMMENDATION 9
CT Head w/o contrast  MRI brain w and w/o contrast   C/w propofol/sedation  Once CT head performed will determine further medications  Neurochecks q1h, will need ICU level of care  fall/seizure precautions advised CT Head w/o contrast  MRI brain w and w/o contrast   VEEG monitoring  C/w propofol/sedation  Once CT head performed will determine further medications  Neurochecks q1h, will need ICU level of care  fall/seizure precautions advised

## 2018-01-16 NOTE — ED ADULT NURSE NOTE - OBJECTIVE STATEMENT
89 y/o male brought in by EMS from home "c/o seizures". On arrival pt was unresponsive, pale, on 15Liters O2 on NRB. Pt was intubated in ED with 7.5 ETT and 23 lipline, confirmed via xray and capnometry, +bilateral breath sounds with equal chest rise. Pt has a lower suprapubic catheter in place attached to a leg bag on the left leg. +bilateral pedal edema +1. 4 peripheral IV's established in the ED, no signs of infiltration, +blood return. Vitals- pt became bradycardic and had several runs of VTACH, started on Amiodarone. Pt had a rectal temp of 90.3F, was started on warm normal saline x2 liters. Upon repeat EKG's - found prolonged QTC, was started on Magnesium. Pt is a full code as per Family at the bed side, no DNR in place.

## 2018-01-16 NOTE — H&P ADULT - NSHPPHYSICALEXAM_GEN_ALL_CORE
Gen: Sedated  HEENT: PERRLA  Neck: Soft, Supple  Resp: Intubated, diffuse coarse breath sounds  GI: Soft, NTND, BS+   MSK: b/l +2 LE edema  Neuro: Sedated Gen: Sedated  HEENT: PERRLA  Neck: Soft, Supple  Resp: Intubated, diffuse coarse breath sounds  Cards: Bradycardic irregular rhythym. S1, soft S2, 3/6 Systolic murmur  GI: Soft, NTND, BS+   MSK: b/l +2 LE edema  Neuro: Sedated

## 2018-01-16 NOTE — ED PROVIDER NOTE - PHYSICAL EXAMINATION
Gen: Ill appearing, unresponsive   Head: NCAT  HEENT: MM dry, Normal conjunctiva  Lung: CTAB, no rales, rhonchi or wheezing  CV: RRR, systolic murmur noted   Abd: soft, NTND, no rebound or guarding  MSK: B/L 2+ pitting edema of LE  Neuro: moving all extremities, not responsive to pain or voice  Skin: Warm and dry, no evidence of rash  Psych: unresponsive

## 2018-01-17 LAB
ALBUMIN SERPL ELPH-MCNC: 3.1 G/DL — LOW (ref 3.3–5)
ALP SERPL-CCNC: 105 U/L — SIGNIFICANT CHANGE UP (ref 40–120)
ALT FLD-CCNC: 11 U/L RC — SIGNIFICANT CHANGE UP (ref 10–45)
ANION GAP SERPL CALC-SCNC: 10 MMOL/L — SIGNIFICANT CHANGE UP (ref 5–17)
ANION GAP SERPL CALC-SCNC: 12 MMOL/L — SIGNIFICANT CHANGE UP (ref 5–17)
AST SERPL-CCNC: 25 U/L — SIGNIFICANT CHANGE UP (ref 10–40)
BILIRUB SERPL-MCNC: 1.3 MG/DL — HIGH (ref 0.2–1.2)
BUN SERPL-MCNC: 35 MG/DL — HIGH (ref 7–23)
BUN SERPL-MCNC: 36 MG/DL — HIGH (ref 7–23)
CALCIUM SERPL-MCNC: 9.3 MG/DL — SIGNIFICANT CHANGE UP (ref 8.4–10.5)
CALCIUM SERPL-MCNC: 9.4 MG/DL — SIGNIFICANT CHANGE UP (ref 8.4–10.5)
CHLORIDE SERPL-SCNC: 109 MMOL/L — HIGH (ref 96–108)
CHLORIDE SERPL-SCNC: 109 MMOL/L — HIGH (ref 96–108)
CK SERPL-CCNC: 66 U/L — SIGNIFICANT CHANGE UP (ref 30–200)
CO2 SERPL-SCNC: 21 MMOL/L — LOW (ref 22–31)
CO2 SERPL-SCNC: 24 MMOL/L — SIGNIFICANT CHANGE UP (ref 22–31)
CREAT SERPL-MCNC: 1.86 MG/DL — HIGH (ref 0.5–1.3)
CREAT SERPL-MCNC: 2.03 MG/DL — HIGH (ref 0.5–1.3)
GAS PNL BLDA: SIGNIFICANT CHANGE UP
GLUCOSE BLDC GLUCOMTR-MCNC: 125 MG/DL — HIGH (ref 70–99)
GLUCOSE SERPL-MCNC: 118 MG/DL — HIGH (ref 70–99)
GLUCOSE SERPL-MCNC: 133 MG/DL — HIGH (ref 70–99)
MAGNESIUM SERPL-MCNC: 2.3 MG/DL — SIGNIFICANT CHANGE UP (ref 1.6–2.6)
MAGNESIUM SERPL-MCNC: 2.4 MG/DL — SIGNIFICANT CHANGE UP (ref 1.6–2.6)
PHOSPHATE SERPL-MCNC: 3.7 MG/DL — SIGNIFICANT CHANGE UP (ref 2.5–4.5)
PHOSPHATE SERPL-MCNC: 4.2 MG/DL — SIGNIFICANT CHANGE UP (ref 2.5–4.5)
POTASSIUM SERPL-MCNC: 5.2 MMOL/L — SIGNIFICANT CHANGE UP (ref 3.5–5.3)
POTASSIUM SERPL-MCNC: 5.7 MMOL/L — HIGH (ref 3.5–5.3)
POTASSIUM SERPL-SCNC: 5.2 MMOL/L — SIGNIFICANT CHANGE UP (ref 3.5–5.3)
POTASSIUM SERPL-SCNC: 5.7 MMOL/L — HIGH (ref 3.5–5.3)
PROT SERPL-MCNC: 6.5 G/DL — SIGNIFICANT CHANGE UP (ref 6–8.3)
SODIUM SERPL-SCNC: 142 MMOL/L — SIGNIFICANT CHANGE UP (ref 135–145)
SODIUM SERPL-SCNC: 143 MMOL/L — SIGNIFICANT CHANGE UP (ref 135–145)
TROPONIN T SERPL-MCNC: <0.01 NG/ML — SIGNIFICANT CHANGE UP (ref 0–0.06)

## 2018-01-17 PROCEDURE — 95951: CPT | Mod: 26

## 2018-01-17 PROCEDURE — 93010 ELECTROCARDIOGRAM REPORT: CPT

## 2018-01-17 PROCEDURE — 76775 US EXAM ABDO BACK WALL LIM: CPT | Mod: 26

## 2018-01-17 PROCEDURE — 93010 ELECTROCARDIOGRAM REPORT: CPT | Mod: 77

## 2018-01-17 PROCEDURE — 99291 CRITICAL CARE FIRST HOUR: CPT

## 2018-01-17 RX ORDER — SODIUM CHLORIDE 9 MG/ML
250 INJECTION INTRAMUSCULAR; INTRAVENOUS; SUBCUTANEOUS ONCE
Qty: 0 | Refills: 0 | Status: COMPLETED | OUTPATIENT
Start: 2018-01-17 | End: 2018-01-17

## 2018-01-17 RX ORDER — VALPROIC ACID (AS SODIUM SALT) 250 MG/5ML
500 SOLUTION, ORAL ORAL EVERY 12 HOURS
Qty: 0 | Refills: 0 | Status: DISCONTINUED | OUTPATIENT
Start: 2018-01-17 | End: 2018-01-18

## 2018-01-17 RX ORDER — LEVOTHYROXINE SODIUM 125 MCG
1 TABLET ORAL
Qty: 0 | Refills: 0 | COMMUNITY

## 2018-01-17 RX ORDER — LEVOTHYROXINE SODIUM 125 MCG
150 TABLET ORAL
Qty: 0 | Refills: 0 | Status: DISCONTINUED | OUTPATIENT
Start: 2018-01-17 | End: 2018-01-21

## 2018-01-17 RX ORDER — ASPIRIN/CALCIUM CARB/MAGNESIUM 324 MG
81 TABLET ORAL DAILY
Qty: 0 | Refills: 0 | Status: DISCONTINUED | OUTPATIENT
Start: 2018-01-17 | End: 2018-01-29

## 2018-01-17 RX ORDER — DOPAMINE HYDROCHLORIDE 40 MG/ML
5 INJECTION, SOLUTION, CONCENTRATE INTRAVENOUS
Qty: 800 | Refills: 0 | Status: DISCONTINUED | OUTPATIENT
Start: 2018-01-17 | End: 2018-01-17

## 2018-01-17 RX ORDER — DOPAMINE HYDROCHLORIDE 40 MG/ML
2.5 INJECTION, SOLUTION, CONCENTRATE INTRAVENOUS
Qty: 400 | Refills: 0 | Status: DISCONTINUED | OUTPATIENT
Start: 2018-01-17 | End: 2018-01-18

## 2018-01-17 RX ADMIN — CEFEPIME 100 MILLIGRAM(S): 1 INJECTION, POWDER, FOR SOLUTION INTRAMUSCULAR; INTRAVENOUS at 12:14

## 2018-01-17 RX ADMIN — Medication 150 MICROGRAM(S): at 05:43

## 2018-01-17 RX ADMIN — DOPAMINE HYDROCHLORIDE 18.21 MICROGRAM(S)/KG/MIN: 40 INJECTION, SOLUTION, CONCENTRATE INTRAVENOUS at 22:50

## 2018-01-17 RX ADMIN — Medication 2 MILLIGRAM(S): at 00:40

## 2018-01-17 RX ADMIN — SODIUM CHLORIDE 250 MILLILITER(S): 9 INJECTION INTRAMUSCULAR; INTRAVENOUS; SUBCUTANEOUS at 22:05

## 2018-01-17 RX ADMIN — HEPARIN SODIUM 5000 UNIT(S): 5000 INJECTION INTRAVENOUS; SUBCUTANEOUS at 21:54

## 2018-01-17 RX ADMIN — Medication 81 MILLIGRAM(S): at 13:41

## 2018-01-17 RX ADMIN — CEFEPIME 100 MILLIGRAM(S): 1 INJECTION, POWDER, FOR SOLUTION INTRAMUSCULAR; INTRAVENOUS at 00:40

## 2018-01-17 RX ADMIN — Medication 500 MILLIGRAM(S): at 05:43

## 2018-01-17 RX ADMIN — Medication 500 MILLIGRAM(S): at 18:43

## 2018-01-17 RX ADMIN — HEPARIN SODIUM 5000 UNIT(S): 5000 INJECTION INTRAVENOUS; SUBCUTANEOUS at 13:41

## 2018-01-17 RX ADMIN — Medication 2 MILLIGRAM(S): at 05:43

## 2018-01-17 RX ADMIN — HEPARIN SODIUM 5000 UNIT(S): 5000 INJECTION INTRAVENOUS; SUBCUTANEOUS at 05:44

## 2018-01-17 NOTE — CHART NOTE - NSCHARTNOTEFT_GEN_A_CORE
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 As per family and ED, patient was doing relatively well until earlier today when he was noted to be very lethargic. Patient then had total of 4 to 5 witnessed episodes of seizure like activity (3 of which witnessed by EMS and 1 witnessed in ED). Patient was given Versed 10mg x1 by EMS prior to arrival. Witnessed episode in ED was described as 30 seconds of bilateral UE tonic clonic with head turning to Left lasting for about 30 seconds. Family also reports that patient turned red in the face during these episodes until shaking stopped. Also noted violent coughing after these episodes. Patient remained unresponsive afterwards and was intubated. Patient was found to be hypothermic at this point and started on rewarming blanket. Patient was then noted to become bradycardic to 40s-50s with ectopy and was given Amiodarone IVP.     Labs notable for no leukocytosis WBC 5.7, Hb 9.2, platelets 146, INR 1.27, Creatinine 2.04, , T Bili 1.7, Alk P 130, , CE negative x1, BNP 4325. Initial VBG 6.9/85/29/16 with Lactate of 12.8. Most recent ABG was 7.29/42/297/19 with Lactate of 7.3. In the ED, patient received Vanco x1, Cefepime x1, Bicarb 50mEQ x2, Hydrocortisone 100mg x1, Calcium chloride 1000mg x1, Amiodarone 150mg IV x1 and started on Amiodarone gtt.     On chart review, patient was last sen by house calls doctor on 12/14/17 during which it was noted that patient had been very confused for the past 3 weeks prior to that visit as per regular HHA with improvement of symptoms after changing of suprapubic catheter.     Extubated 1/17 AM, quickly weaned to room air. Generally bradycardic as low as 30s-50s -- A-fib per EKG. During preliminary GOC conversation, daughter Rosas reaffirmed full-code status.     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 Extubated 1/17. Intermittently bradycardic as low as 30s, EKG suggesting Afib w slow ventricular response (monitor sometimes looks like Aflutter). Stable for transfer to floor.    #Neuro - Baseline Dementia with new onset ?seizures described as b/l UE tonic clonic lasting 20 to 30 seconds total 4 to 5 episodes. CTH showed extensive microvascular disease but no acute pathology. On Trazadone at home for sleep  - d/c'd EEG (report 1/17 am w no epileptic activity)  - d/c valproic acid given low suspicion for ongoing seizure risk  - f/u Neuro recs  - Neuro check q1h  - MRI/MRA when stable    #CV - Bradycardic to 30s to 50s. Cardiac enzymes negative x1. BNP elevated to 4k. EKG with prolonged QTc, interpreted as afib with slow ventricular response and bifascicular block. Maintaining BP  - cardiac enzymes negative x2  - Monitor on Tele  - Transcutaneous pacer pads and atropine at bedside  - Chronotropic support as needed  - Consult patient's outpatient cardiologist, Anselmo Pickard of [facility] [phone]  - Consider electrophysiology consult    #Resp - Intubated for airway protection in setting of possible status epilepticus/myxedema coma. CT chest showed moderate b/l pleural effusions  - Breathing comfortably on room air  - RVP negative     #GI  - Monitor BM    #Endo - History of hypothyroid, on Synthroid 150mcg daily at home. Last TSH in 12/2017 1.2. Concern for myxedema coma given AMS, hypothermia, bradycardia, peripheral edema and b/l pleural effusions. Received Hydrocortisone 100mg IV x1 in ED  - TSH 4.50 (only mildly elevated, suggesting against myxedema coma)  - f/u free T3 and free T4    #ID - Will need to rule out underlying infection/sepsis  - f/u blood cultures (NGTD), bronchial cultures (Gram stain w/o organisms), UCx (no growth)  - Suspect Proteus mirabilis or Klebsiella given triple phosphate crystals  - c/w cefepime    #Renal - Creatinine approx at baseline  - Monitor BMP and UOP    # - Suprapubic catheter  - Monitor UOP  - Consult patient's outpatient urologist Gary Goldberg [phone], who was due to change suprapubic catheter soon as outpt    #Heme - Anemia at baseline Hb, PMHx of transfusions most recently 6/2017, no history of transfusion reactions per daughter Rosas  - Monitor CBC    # Podiatry - thick, overgrown toenails; pt in pain when family tries to trim  - Consult podiatry to trim nails    #DVT ppx  - HSQ      For Followup:  - Call patient's outpatient cardiologistAnselmo of [facility] [phone]  --- ask group whether they provide their own EP consults or if primary team should call house electrophysiology  - Call outpatient urologist Gary Goldberg [phone], who was due to change suprapubic catheter soon as outpt  --- will he/someone from his group see Mr. Hays inpatient?   - Consult podiatry to trim nails  - f/u free T3, free T4  - confirm healthcare proxy paperwork; revisit goals of care      Vital Signs Last 24 Hrs  T(C): 36.5 (17 Jan 2018 19:00), Max: 36.7 (17 Jan 2018 06:00)  T(F): 97.7 (17 Jan 2018 19:00), Max: 98.1 (17 Jan 2018 06:00)  HR: 40 (17 Jan 2018 20:00) (38 - 77)  BP: 107/54 (17 Jan 2018 20:00) (91/50 - 167/58)  BP(mean): 74 (17 Jan 2018 20:00) (68 - 95)  RR: 26 (17 Jan 2018 20:00) (16 - 40)  SpO2: 100% (17 Jan 2018 20:00) (92% - 100%)  I&O's Summary    16 Jan 2018 07:01  -  17 Jan 2018 07:00  --------------------------------------------------------  IN: 416.4 mL / OUT: 115 mL / NET: 301.4 mL    17 Jan 2018 07:01  -  17 Jan 2018 20:39  --------------------------------------------------------  IN: 0 mL / OUT: 5 mL / NET: -5 mL        MEDICATIONS  (STANDING):  aspirin enteric coated 81 milliGRAM(s) Oral daily  cefepime  IVPB 2000 milliGRAM(s) IV Intermittent every 12 hours  heparin  Injectable 5000 Unit(s) SubCutaneous every 8 hours  levothyroxine 150 MICROGram(s) Oral <User Schedule>  valproic  acid Syrup 500 milliGRAM(s) Oral every 12 hours    MEDICATIONS  (PRN):        LABS                                            8.2                   Neurophils% (auto):   92.4   (01-16 @ 23:28):    6.6  )-----------(135          Lymphocytes% (auto):  5.2                                           25.2                   Eosinphils% (auto):   0.1      Manual%: Neutrophils x    ; Lymphocytes x    ; Eosinophils x    ; Bands%: x    ; Blasts x                                    143    |  109    |  36                  Calcium: 9.3   / iCa: x      (01-17 @ 09:22)    ----------------------------<  133       Magnesium: 2.4                              5.2     |  24     |  2.03             Phosphorous: 4.2      TPro  6.5    /  Alb  3.1    /  TBili  1.3    /  DBili  x      /  AST  25     /  ALT  11     /  AlkPhos  105    16 Jan 2018 23:28 MICU Transfer Note    Transfer from: MICU    Transfer to: (x) Telemetry        Accepting physician: Eduarda Wild  Signout given to:    MICU COURSE:  Patient is a 89 yo M with ?Afib, Dementia, Hypothyroid, HLD, CKD, anemia w/ history of 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 As per family and ED, patient was doing relatively well until earlier today when he was noted to be very lethargic. Patient then had total of 4 to 5 witnessed episodes of seizure like activity (3 of which witnessed by EMS and 1 witnessed in ED). Patient was given Versed 10mg x1 by EMS prior to arrival. Witnessed episode in ED was described as 30 seconds of bilateral UE tonic clonic with head turning to Left lasting for about 30 seconds. Family also reports that patient turned red in the face during these episodes until shaking stopped. Also noted violent coughing after these episodes. Patient remained unresponsive afterwards and was intubated. Patient was found to be hypothermic at this point and started on rewarming blanket. Patient was then noted to become bradycardic to 40s-50s with ectopy and was given Amiodarone IVP.     Labs notable for no leukocytosis WBC 5.7, Hb 9.2, platelets 146, INR 1.27, Creatinine 2.04, , T Bili 1.7, Alk P 130, , CE negative x1, BNP 4325. Initial VBG 6.9/85/29/16 with Lactate of 12.8. Most recent ABG was 7.29/42/297/19 with Lactate of 7.3. In the ED, patient received Vanco x1, Cefepime x1, Bicarb 50mEQ x2, Hydrocortisone 100mg x1, Calcium chloride 1000mg x1, Amiodarone 150mg IV x1 and started on Amiodarone gtt.     On chart review, patient was last sen by house calls doctor on 12/14/17 during which it was noted that patient had been very confused for the past 3 weeks prior to that visit as per regular HHA with improvement of symptoms after changing of suprapubic catheter.     Extubated 1/17 AM, quickly weaned to room air. Generally bradycardic as low as 30s-50s -- A-fib per EKG. During preliminary GOC conversation, daughter Rosas reaffirmed full-code status.     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 Extubated 1/17. Intermittently bradycardic as low as 30s, EKG suggesting Afib w slow ventricular response (monitor sometimes looks like Aflutter). Stable for transfer to floor.    #Neuro - Baseline Dementia with new onset ?seizures described as b/l UE tonic clonic lasting 20 to 30 seconds total 4 to 5 episodes. CTH showed extensive microvascular disease but no acute pathology. On Trazadone at home for sleep  - d/c'd EEG (report 1/17 am w no epileptic activity)  - d/c valproic acid given low suspicion for ongoing seizure risk  - f/u Neuro recs  - Neuro check q1h  - MRI/MRA when stable    #CV - Bradycardic to 30s to 50s. Cardiac enzymes negative x1. BNP elevated to 4k. EKG with prolonged QTc, interpreted as afib with slow ventricular response and bifascicular block. Maintaining BP  - cardiac enzymes negative x2  - Monitor on Tele  - Transcutaneous pacer pads and atropine at bedside  - Chronotropic support as needed  - Consult patient's outpatient cardiologist, Anselmo Pickard of [facility] [phone]  - Consider electrophysiology consult    #Resp - Intubated for airway protection in setting of possible status epilepticus/myxedema coma. CT chest showed moderate b/l pleural effusions  - Breathing comfortably on room air  - RVP negative     #GI  - Monitor BM    #Endo - History of hypothyroid, on Synthroid 150mcg daily at home. Last TSH in 12/2017 1.2. Concern for myxedema coma given AMS, hypothermia, bradycardia, peripheral edema and b/l pleural effusions. Received Hydrocortisone 100mg IV x1 in ED  - TSH 4.50 (only mildly elevated, suggesting against myxedema coma)  - f/u free T3 and free T4    #ID - Will need to rule out underlying infection/sepsis  - f/u blood cultures (NGTD), bronchial cultures (Gram stain w/o organisms), UCx (no growth)  - Suspect Proteus mirabilis or Klebsiella given triple phosphate crystals  - c/w cefepime    #Renal - Creatinine approx at baseline  - Monitor BMP and UOP    # - Suprapubic catheter  - Monitor UOP  - Renal US 1/17 with no hydronephrosis  - Consult patient's outpatient urologist Gary Goldberg [phone], who was due to change suprapubic catheter soon as outpt    #Heme - Anemia at baseline Hb, PMHx of transfusions most recently 6/2017, no history of transfusion reactions per daughter Rosas  - Monitor CBC    # Podiatry - thick, overgrown toenails; pt in pain when family tries to trim  - Consult podiatry to trim nails    #DVT ppx  - HSQ      For Followup:  - Call patient's outpatient cardiologistAnselmo of [facility] [phone]  --- ask group whether they provide their own EP consults or if primary team should call house electrophysiology  - Call outpatient urologist Gary Goldberg [phone], who was due to change suprapubic catheter soon as outpt  --- will he/someone from his group see Mr. Hays inpatient?   - Consult podiatry to trim nails  - f/u free T3, free T4  - confirm healthcare proxy paperwork; revisit goals of care      Vital Signs Last 24 Hrs  T(C): 36.5 (17 Jan 2018 19:00), Max: 36.7 (17 Jan 2018 06:00)  T(F): 97.7 (17 Jan 2018 19:00), Max: 98.1 (17 Jan 2018 06:00)  HR: 40 (17 Jan 2018 20:00) (38 - 77)  BP: 107/54 (17 Jan 2018 20:00) (91/50 - 167/58)  BP(mean): 74 (17 Jan 2018 20:00) (68 - 95)  RR: 26 (17 Jan 2018 20:00) (16 - 40)  SpO2: 100% (17 Jan 2018 20:00) (92% - 100%)  I&O's Summary    16 Jan 2018 07:01  -  17 Jan 2018 07:00  --------------------------------------------------------  IN: 416.4 mL / OUT: 115 mL / NET: 301.4 mL    17 Jan 2018 07:01  -  17 Jan 2018 20:39  --------------------------------------------------------  IN: 0 mL / OUT: 5 mL / NET: -5 mL        MEDICATIONS  (STANDING):  aspirin enteric coated 81 milliGRAM(s) Oral daily  cefepime  IVPB 2000 milliGRAM(s) IV Intermittent every 12 hours  heparin  Injectable 5000 Unit(s) SubCutaneous every 8 hours  levothyroxine 150 MICROGram(s) Oral <User Schedule>  valproic  acid Syrup 500 milliGRAM(s) Oral every 12 hours    MEDICATIONS  (PRN):        LABS                                            8.2                   Neurophils% (auto):   92.4   (01-16 @ 23:28):    6.6  )-----------(135          Lymphocytes% (auto):  5.2                                           25.2                   Eosinphils% (auto):   0.1      Manual%: Neutrophils x    ; Lymphocytes x    ; Eosinophils x    ; Bands%: x    ; Blasts x                                    143    |  109    |  36                  Calcium: 9.3   / iCa: x      (01-17 @ 09:22)    ----------------------------<  133       Magnesium: 2.4                              5.2     |  24     |  2.03             Phosphorous: 4.2      TPro  6.5    /  Alb  3.1    /  TBili  1.3    /  DBili  x      /  AST  25     /  ALT  11     /  AlkPhos  105    16 Jan 2018 23:28 MICU Transfer Note    Transfer from: MICU    Transfer to: (x) Telemetry        Accepting physician: Eduarda Wild  Signout given to:    MICU COURSE:  Patient is a 89 yo M with ?Afib, Dementia, Hypothyroid, HLD, CKD, anemia w/ history of 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 As per family and ED, patient was doing relatively well until earlier today when he was noted to be very lethargic. Patient then had total of 4 to 5 witnessed episodes of seizure like activity (3 of which witnessed by EMS and 1 witnessed in ED). Patient was given Versed 10mg x1 by EMS prior to arrival. Witnessed episode in ED was described as 30 seconds of bilateral UE tonic clonic with head turning to Left lasting for about 30 seconds. Family also reports that patient turned red in the face during these episodes until shaking stopped. Also noted violent coughing after these episodes. Patient remained unresponsive afterwards and was intubated. Patient was found to be hypothermic at this point and started on rewarming blanket. Patient was then noted to become bradycardic to 40s-50s with ectopy and was given Amiodarone IVP.     Labs notable for no leukocytosis WBC 5.7, Hb 9.2, platelets 146, INR 1.27, Creatinine 2.04, , T Bili 1.7, Alk P 130, , CE negative x1, BNP 4325. Initial VBG 6.9/85/29/16 with Lactate of 12.8. Most recent ABG was 7.29/42/297/19 with Lactate of 7.3. In the ED, patient received Vanco x1, Cefepime x1, Bicarb 50mEQ x2, Hydrocortisone 100mg x1, Calcium chloride 1000mg x1, Amiodarone 150mg IV x1 and started on Amiodarone gtt.     On chart review, patient was last sen by house calls doctor on 12/14/17 during which it was noted that patient had been very confused for the past 3 weeks prior to that visit as per regular HHA with improvement of symptoms after changing of suprapubic catheter.     Extubated 1/17 AM, quickly weaned to room air. Generally bradycardic as low as 30s-50s -- A-fib per EKG. During preliminary GOC conversation, daughter Rosas reaffirmed full-code status.     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 Extubated 1/17. Intermittently bradycardic as low as 30s, EKG suggesting Afib w slow ventricular response (monitor sometimes looks like Aflutter). Stable for transfer to floor.    #Neuro - Baseline Dementia with new onset ?seizures described as b/l UE tonic clonic lasting 20 to 30 seconds total 4 to 5 episodes. CTH showed extensive microvascular disease but no acute pathology. On Trazadone at home for sleep  - d/c'd EEG (report 1/17 am w no epileptic activity)  - d/c valproic acid given low suspicion for ongoing seizure risk  - f/u Neuro recs  - Neuro check q4h  - MRI/MRA when stable    #CV - Bradycardic to 30s to 50s. Cardiac enzymes negative x1. BNP elevated to 4k. EKG with prolonged QTc, interpreted as afib with slow ventricular response and bifascicular block. Maintaining BP  - cardiac enzymes negative x2  - Monitor on Tele  - Transcutaneous pacer pads and atropine at bedside  - Chronotropic support as needed  - Consult patient's outpatient cardiologist, Anselmo Pickard of [facility] [phone]  - Consider electrophysiology consult    #Resp - Intubated for airway protection in setting of possible status epilepticus/myxedema coma. CT chest showed moderate b/l pleural effusions  - Breathing comfortably on room air  - RVP negative     #GI  - Monitor BM    #Endo - History of hypothyroid, on Synthroid 150mcg daily at home. Last TSH in 12/2017 1.2. Concern for myxedema coma given AMS, hypothermia, bradycardia, peripheral edema and b/l pleural effusions. Received Hydrocortisone 100mg IV x1 in ED  - TSH 4.50 (only mildly elevated, suggesting against myxedema coma)  - f/u free T3 and free T4    #ID - Will need to rule out underlying infection/sepsis  - f/u blood cultures (NGTD), bronchial cultures (Gram stain w/o organisms), UCx (no growth)  - Suspect Proteus mirabilis or Klebsiella given triple phosphate crystals  - c/w cefepime    #Renal - Creatinine approx at baseline  - Monitor BMP and UOP    # - Suprapubic catheter  - Monitor UOP  - Renal US 1/17 with no hydronephrosis  - Consult patient's outpatient urologist Gary Goldberg [phone], who was due to change suprapubic catheter soon as outpt    #Heme - Anemia at baseline Hb, PMHx of transfusions most recently 6/2017, no history of transfusion reactions per daughter Rosas  - Monitor CBC    # Podiatry - thick, overgrown toenails; pt in pain when family tries to trim  - Consult podiatry to trim nails    #DVT ppx  - HSQ      For Followup:  - [phone]  --- ask group whether they provide their own EP consults or if primary team should call house electrophysiology  - Call outpatient urologist Gary Goldberg [phone], who was due to change suprapubic catheter soon as outpt  --- will he/someone from his group see Mr. Hays inpatient?   - Consult podiatry to trim nails  - f/u free T3, free T4  - confirm healthcare proxy paperwork; revisit goals of care      Vital Signs Last 24 Hrs  T(C): 36.5 (17 Jan 2018 19:00), Max: 36.7 (17 Jan 2018 06:00)  T(F): 97.7 (17 Jan 2018 19:00), Max: 98.1 (17 Jan 2018 06:00)  HR: 40 (17 Jan 2018 20:00) (38 - 77)  BP: 107/54 (17 Jan 2018 20:00) (91/50 - 167/58)  BP(mean): 74 (17 Jan 2018 20:00) (68 - 95)  RR: 26 (17 Jan 2018 20:00) (16 - 40)  SpO2: 100% (17 Jan 2018 20:00) (92% - 100%)  I&O's Summary    16 Jan 2018 07:01  -  17 Jan 2018 07:00  --------------------------------------------------------  IN: 416.4 mL / OUT: 115 mL / NET: 301.4 mL    17 Jan 2018 07:01  -  17 Jan 2018 20:39  --------------------------------------------------------  IN: 0 mL / OUT: 5 mL / NET: -5 mL        MEDICATIONS  (STANDING):  aspirin enteric coated 81 milliGRAM(s) Oral daily  cefepime  IVPB 2000 milliGRAM(s) IV Intermittent every 12 hours  heparin  Injectable 5000 Unit(s) SubCutaneous every 8 hours  levothyroxine 150 MICROGram(s) Oral <User Schedule>  valproic  acid Syrup 500 milliGRAM(s) Oral every 12 hours    MEDICATIONS  (PRN):        LABS                                            8.2                   Neurophils% (auto):   92.4   (01-16 @ 23:28):    6.6  )-----------(135          Lymphocytes% (auto):  5.2                                           25.2                   Eosinphils% (auto):   0.1      Manual%: Neutrophils x    ; Lymphocytes x    ; Eosinophils x    ; Bands%: x    ; Blasts x                                    143    |  109    |  36                  Calcium: 9.3   / iCa: x      (01-17 @ 09:22)    ----------------------------<  133       Magnesium: 2.4                              5.2     |  24     |  2.03             Phosphorous: 4.2      TPro  6.5    /  Alb  3.1    /  TBili  1.3    /  DBili  x      /  AST  25     /  ALT  11     /  AlkPhos  105    16 Jan 2018 23:28 MICU Transfer Note    Transfer from: MICU    Transfer to: (x) Telemetry        Accepting physician: Eduarda Wild  Signout given to:    MICU COURSE:  Patient is a 89 yo M with ?Afib, Dementia, Hard of Hearing, Hypothyroid, HLD, CKD, anemia w/ history of 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 As per family and ED, patient was doing relatively well until earlier today when he was noted to be very lethargic. Patient then had total of 4 to 5 witnessed episodes of seizure like activity (3 of which witnessed by EMS and 1 witnessed in ED). Patient was given Versed 10mg x1 by EMS prior to arrival. Witnessed episode in ED was described as 30 seconds of bilateral UE tonic clonic with head turning to Left lasting for about 30 seconds. Family also reports that patient turned red in the face during these episodes until shaking stopped. Also noted violent coughing after these episodes. Patient remained unresponsive afterwards and was intubated. Patient was found to be hypothermic at this point and started on rewarming blanket. Patient was then noted to become bradycardic to 40s-50s with ectopy and was given Amiodarone IVP.     Labs notable for no leukocytosis WBC 5.7, Hb 9.2, platelets 146, INR 1.27, Creatinine 2.04, , T Bili 1.7, Alk P 130, , CE negative x1, BNP 4325. Initial VBG 6.9/85/29/16 with Lactate of 12.8. Most recent ABG was 7.29/42/297/19 with Lactate of 7.3. In the ED, patient received Vanco x1, Cefepime x1, Bicarb 50mEQ x2, Hydrocortisone 100mg x1, Calcium chloride 1000mg x1, Amiodarone 150mg IV x1 and started on Amiodarone gtt.     On chart review, patient was last sen by house calls doctor on 12/14/17 during which it was noted that patient had been very confused for the past 3 weeks prior to that visit as per regular HHA with improvement of symptoms after changing of suprapubic catheter.     Extubated 1/17 AM, quickly weaned to room air. Generally bradycardic as low as 30s-50s -- A-fib per EKG. During preliminary GOC conversation, daughter Rosas reaffirmed full-code status.     ASSESSMENT & PLAN:   Patient is a 89 yo M with ?Afib, Dementia, Hypothyroid, hard of hearing, HLD, CKD, anemia w/ history of GIB 2/2 H Pylori Gastritis, Urinary retention d/t neurogenic bladder s/p suprapubic catheter presenting to ED for possible seizure and unresponsiveness in setting of sepsis (more likely in setting of UA w Leuk Est large, Triple Phosphate Crystals moderate, US Renal w stones) vs myxedema coma (less likely in setting of TSH 4.50). Extubated 1/17. Intermittently bradycardic as low as 30s, EKG suggesting Afib w slow ventricular response (monitor sometimes looks like Aflutter). Stable for transfer to floor.    #Neuro - Baseline Dementia with new onset ?seizures described as b/l UE tonic clonic lasting 20 to 30 seconds total 4 to 5 episodes. CTH showed extensive microvascular disease but no acute pathology. On Trazadone at home for sleep  - d/c'd EEG (report 1/17 am w no epileptic activity)  - d/c valproic acid given low suspicion for ongoing seizure risk  - f/u Neuro recs  - Neuro check q4h  - MRI/MRA when stable    #CV - Bradycardic to 30s to 50s. Cardiac enzymes negative x1. BNP elevated to 4k. EKG with prolonged QTc, interpreted as afib with slow ventricular response and bifascicular block. Maintaining BP  - cardiac enzymes negative x2  - Monitor on Tele  - Transcutaneous pacer pads and atropine at bedside  - Chronotropic support as needed  - Consult patient's outpatient cardiologist, Anselmo Pickard of [facility] [phone]  - Consider electrophysiology consult    #Resp - Intubated for airway protection in setting of possible status epilepticus/myxedema coma. CT chest showed moderate b/l pleural effusions  - Breathing comfortably on room air  - RVP negative     #GI  - Monitor BM    #Endo - History of hypothyroid, on Synthroid 150mcg daily at home. Last TSH in 12/2017 1.2. Concern for myxedema coma given AMS, hypothermia, bradycardia, peripheral edema and b/l pleural effusions. Received Hydrocortisone 100mg IV x1 in ED  - TSH 4.50 (only mildly elevated, suggesting against myxedema coma)  - f/u free T3 and free T4    #ID - Will need to rule out underlying infection/sepsis  - f/u blood cultures (NGTD), bronchial cultures (Gram stain w/o organisms), UCx (no growth)  - Suspect Proteus mirabilis or Klebsiella given triple phosphate crystals  - c/w cefepime    #Renal - Creatinine approx at baseline  - Monitor BMP and UOP    # - Suprapubic catheter  - Monitor UOP  - Renal US 1/17 with no hydronephrosis  - Consult patient's outpatient urologist Gary Goldberg [phone], who was due to change suprapubic catheter soon as outpt    #Heme - Anemia at baseline Hb, PMHx of transfusions most recently 6/2017, no history of transfusion reactions per daughter Rosas  - Monitor CBC    # Podiatry - thick, overgrown toenails; pt in pain when family tries to trim  - Consult podiatry to trim nails    #DVT ppx  - HSQ      For Followup:  - [facility] [phone]  --- ask group whether they provide their own EP consults or if primary team should call house electrophysiology  - Call outpatient urologist Gary Goldberg [phone], who was due to change suprapubic catheter soon as outpt  --- will he/someone from his group see Mr. Hays inpatient?   - Consult podiatry to trim nails  - f/u free T3, free T4  - confirm healthcare proxy paperwork; revisit goals of care      Vital Signs Last 24 Hrs  T(C): 36.5 (17 Jan 2018 19:00), Max: 36.7 (17 Jan 2018 06:00)  T(F): 97.7 (17 Jan 2018 19:00), Max: 98.1 (17 Jan 2018 06:00)  HR: 40 (17 Jan 2018 20:00) (38 - 77)  BP: 107/54 (17 Jan 2018 20:00) (91/50 - 167/58)  BP(mean): 74 (17 Jan 2018 20:00) (68 - 95)  RR: 26 (17 Jan 2018 20:00) (16 - 40)  SpO2: 100% (17 Jan 2018 20:00) (92% - 100%)  I&O's Summary    16 Jan 2018 07:01 - 17 Jan 2018 07:00  --------------------------------------------------------  IN: 416.4 mL / OUT: 115 mL / NET: 301.4 mL    17 Jan 2018 07:01  -  17 Jan 2018 20:39  --------------------------------------------------------  IN: 0 mL / OUT: 5 mL / NET: -5 mL        MEDICATIONS  (STANDING):  aspirin enteric coated 81 milliGRAM(s) Oral daily  cefepime  IVPB 2000 milliGRAM(s) IV Intermittent every 12 hours  heparin  Injectable 5000 Unit(s) SubCutaneous every 8 hours  levothyroxine 150 MICROGram(s) Oral <User Schedule>  valproic  acid Syrup 500 milliGRAM(s) Oral every 12 hours    MEDICATIONS  (PRN):        LABS                                            8.2                   Neurophils% (auto):   92.4   (01-16 @ 23:28):    6.6  )-----------(135          Lymphocytes% (auto):  5.2                                           25.2                   Eosinphils% (auto):   0.1      Manual%: Neutrophils x    ; Lymphocytes x    ; Eosinophils x    ; Bands%: x    ; Blasts x                                    143    |  109    |  36                  Calcium: 9.3   / iCa: x      (01-17 @ 09:22)    ----------------------------<  133       Magnesium: 2.4                              5.2     |  24     |  2.03             Phosphorous: 4.2      TPro  6.5    /  Alb  3.1    /  TBili  1.3    /  DBili  x      /  AST  25     /  ALT  11     /  AlkPhos  105    16 Jan 2018 23:28 MICU Transfer Note    Transfer from: MICU    Transfer to: (x) Telemetry        Accepting physician: Eduarda Wild  Signout given to:    MICU COURSE:  Patient is a 89 yo M with ?Afib, Dementia, Hard of Hearing, Hypothyroid, HLD, CKD, anemia w/ history of 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 As per family and ED, patient was doing relatively well until earlier today when he was noted to be very lethargic. Patient then had total of 4 to 5 witnessed episodes of seizure like activity (3 of which witnessed by EMS and 1 witnessed in ED). Patient was given Versed 10mg x1 by EMS prior to arrival. Witnessed episode in ED was described as 30 seconds of bilateral UE tonic clonic with head turning to Left lasting for about 30 seconds. Family also reports that patient turned red in the face during these episodes until shaking stopped. Also noted violent coughing after these episodes. Patient remained unresponsive afterwards and was intubated. Patient was found to be hypothermic at this point and started on rewarming blanket. Patient was then noted to become bradycardic to 40s-50s with ectopy and was given Amiodarone IVP.     Labs notable for no leukocytosis WBC 5.7, Hb 9.2, platelets 146, INR 1.27, Creatinine 2.04, , T Bili 1.7, Alk P 130, , CE negative x1, BNP 4325. Initial VBG 6.9/85/29/16 with Lactate of 12.8. Most recent ABG was 7.29/42/297/19 with Lactate of 7.3. In the ED, patient received Vanco x1, Cefepime x1, Bicarb 50mEQ x2, Hydrocortisone 100mg x1, Calcium chloride 1000mg x1, Amiodarone 150mg IV x1 and started on Amiodarone gtt.     On chart review, patient was last sen by house calls doctor on 12/14/17 during which it was noted that patient had been very confused for the past 3 weeks prior to that visit as per regular HHA with improvement of symptoms after changing of suprapubic catheter.     Extubated 1/17 AM, quickly weaned to room air. Generally bradycardic as low as 30s-50s -- A-fib per EKG. During preliminary GOC conversation, daughter Rosas reaffirmed full-code status.     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 Extubated 1/17. Intermittently bradycardic as low as 30s, EKG suggesting Afib w slow ventricular response (monitor sometimes looks like Aflutter). Stable for transfer to floor.    #Neuro - Baseline Dementia with new onset ?seizures described as b/l UE tonic clonic lasting 20 to 30 seconds total 4 to 5 episodes. CTH showed extensive microvascular disease but no acute pathology. On Trazadone at home for sleep  - d/c'd EEG (report 1/17 am w no epileptic activity)  - d/c valproic acid given low suspicion for ongoing seizure risk  - f/u Neuro recs  - Neuro check q4h  - MRI/MRA when stable    #CV - Bradycardic to 30s to 50s. Cardiac enzymes negative x1. BNP elevated to 4k. EKG with prolonged QTc, interpreted as afib with slow ventricular response and bifascicular block. Maintaining BP  - cardiac enzymes negative x2  - Monitor on Tele  - Transcutaneous pacer pads and atropine at bedside  - Chronotropic support as needed  - Consult patient's outpatient cardiologist, Anselmo Pickard of [facility] [phone]  - Consider electrophysiology consult    #Resp - Intubated for airway protection in setting of possible status epilepticus/myxedema coma. CT chest showed moderate b/l pleural effusions  - Breathing comfortably on room air  - RVP negative     #GI  - Monitor BM    #Endo - History of hypothyroid, on Synthroid 150mcg daily at home. Last TSH in 12/2017 1.2. Concern for myxedema coma given AMS, hypothermia, bradycardia, peripheral edema and b/l pleural effusions. Received Hydrocortisone 100mg IV x1 in ED  - TSH 4.50 (only mildly elevated, suggesting against myxedema coma)  - f/u free T3 and free T4    #ID - Will need to rule out underlying infection/sepsis  - f/u blood cultures (NGTD), bronchial cultures (Gram stain w/o organisms), UCx (no growth)  - Suspect Proteus mirabilis or Klebsiella given triple phosphate crystals  - c/w cefepime    #Renal - Creatinine approx at baseline  - Monitor BMP and UOP    # - Suprapubic catheter  - Monitor UOP  - Renal US 1/17 with no hydronephrosis  - Consult patient's outpatient urologist Gary Goldberg [phone], who was due to change suprapubic catheter soon as outpt    #Heme - Anemia at baseline Hb, PMHx of transfusions most recently 6/2017, no history of transfusion reactions per daughter Rosas  - Monitor CBC  - Keep type and screen up to date (next due 1/19)    # Podiatry - thick, overgrown toenails; pt in pain when family tries to trim  - Consult podiatry to trim nails    #DVT ppx  - HSQ      For Followup:  - Call patient's outpatient cardiologistAnselmo of [facility] [phone]  --- ask group whether they provide their own EP consults or if primary team should call house electrophysiology  - Call outpatient urologist Gary Goldberg [phone], who was due to change suprapubic catheter soon as outpt  --- will he/someone from his group see Mr. Hays inpatient?   - Consult podiatry to trim nails  - f/u free T3, free T4  - confirm healthcare proxy paperwork; revisit goals of care      Vital Signs Last 24 Hrs  T(C): 36.5 (17 Jan 2018 19:00), Max: 36.7 (17 Jan 2018 06:00)  T(F): 97.7 (17 Jan 2018 19:00), Max: 98.1 (17 Jan 2018 06:00)  HR: 40 (17 Jan 2018 20:00) (38 - 77)  BP: 107/54 (17 Jan 2018 20:00) (91/50 - 167/58)  BP(mean): 74 (17 Jan 2018 20:00) (68 - 95)  RR: 26 (17 Jan 2018 20:00) (16 - 40)  SpO2: 100% (17 Jan 2018 20:00) (92% - 100%)  I&O's Summary    16 Jan 2018 07:01  -  17 Jan 2018 07:00  --------------------------------------------------------  IN: 416.4 mL / OUT: 115 mL / NET: 301.4 mL    17 Jan 2018 07:01  -  17 Jan 2018 20:39  --------------------------------------------------------  IN: 0 mL / OUT: 5 mL / NET: -5 mL        MEDICATIONS  (STANDING):  aspirin enteric coated 81 milliGRAM(s) Oral daily  cefepime  IVPB 2000 milliGRAM(s) IV Intermittent every 12 hours  heparin  Injectable 5000 Unit(s) SubCutaneous every 8 hours  levothyroxine 150 MICROGram(s) Oral <User Schedule>  valproic  acid Syrup 500 milliGRAM(s) Oral every 12 hours    MEDICATIONS  (PRN):        LABS                                            8.2                   Neurophils% (auto):   92.4   (01-16 @ 23:28):    6.6  )-----------(135          Lymphocytes% (auto):  5.2                                           25.2                   Eosinphils% (auto):   0.1      Manual%: Neutrophils x    ; Lymphocytes x    ; Eosinophils x    ; Bands%: x    ; Blasts x                                    143    |  109    |  36                  Calcium: 9.3   / iCa: x      (01-17 @ 09:22)    ----------------------------<  133       Magnesium: 2.4                              5.2     |  24     |  2.03             Phosphorous: 4.2      TPro  6.5    /  Alb  3.1    /  TBili  1.3    /  DBili  x      /  AST  25     /  ALT  11     /  AlkPhos  105    16 Jan 2018 23:28

## 2018-01-17 NOTE — CHART NOTE - NSCHARTNOTEFT_GEN_A_CORE
Briefly discussed [facility] w daughter Rosas, who says that she is healthcare proxy (she or  will bring documents tmrw) and says that patient for now remains full code.

## 2018-01-17 NOTE — PROGRESS NOTE ADULT - SUBJECTIVE AND OBJECTIVE BOX
CHIEF COMPLAINT:    Interval Events:    REVIEW OF SYSTEMS:  Constitutional: [ ] negative [ ] fevers [ ] chills [ ] weight loss [ ] weight gain  HEENT: [ ] negative [ ] dry eyes [ ] eye irritation [ ] postnasal drip [ ] nasal congestion  CV: [ ] negative  [ ] chest pain [ ] orthopnea [ ] palpitations [ ] murmur  Resp: [ ] negative [ ] cough [ ] shortness of breath [ ] dyspnea [ ] wheezing [ ] sputum [ ] hemoptysis  GI: [ ] negative [ ] nausea [ ] vomiting [ ] diarrhea [ ] constipation [ ] abd pain [ ] dysphagia   : [ ] negative [ ] dysuria [ ] nocturia [ ] hematuria [ ] increased urinary frequency  Musculoskeletal: [ ] negative [ ] back pain [ ] myalgias [ ] arthralgias [ ] fracture  Skin: [ ] negative [ ] rash [ ] itch  Neurological: [ ] negative [ ] headache [ ] dizziness [ ] syncope [ ] weakness [ ] numbness  Psychiatric: [ ] negative [ ] anxiety [ ] depression  Endocrine: [ ] negative [ ] diabetes [ ] thyroid problem  Hematologic/Lymphatic: [ ] negative [ ] anemia [ ] bleeding problem  Allergic/Immunologic: [ ] negative [ ] itchy eyes [ ] nasal discharge [ ] hives [ ] angioedema  [ ] All other systems negative  [ ] Unable to assess ROS because ________    OBJECTIVE:  ICU Vital Signs Last 24 Hrs  T(C): 36.7 (2018 06:00), Max: 36.7 (2018 06:00)  T(F): 98.1 (2018 06:00), Max: 98.1 (2018 06:00)  HR: 70 (2018 06:00) (42 - 75)  BP: 115/64 (2018 06:00) (66/40 - 162/75)  BP(mean): 85 (2018 06:00) (68 - 108)  ABP: --  ABP(mean): --  RR: 21 (2018 06:00) (10 - 40)  SpO2: 100% (2018 06:00) (90% - 100%)    Mode: CPAP with PS, FiO2: 40, PEEP: 5, PS: 5, MAP: 8, PIP: 11  I&O's Detail    2018 07:01  -  2018 06:56  --------------------------------------------------------  IN:    Enteral Tube Flush: 100 mL    norepinephrine Infusion: 16.4 mL    Solution: 250 mL    Solution: 50 mL  Total IN: 416.4 mL    OUT:    Indwelling Catheter - Suprapubic: 110 mL  Total OUT: 110 mL    Total NET: 306.4 mL        CAPILLARY BLOOD GLUCOSE      POCT Blood Glucose.: 125 mg/dL (2018 05:46)      PHYSICAL EXAM:  General:   HEENT:   Lymph Nodes:  Neck:   Respiratory:   Cardiovascular:   Abdomen:   Extremities:   Skin:   Neurological:  Psychiatry:    LINES:    HOSPITAL MEDICATIONS:  heparin  Injectable 5000 Unit(s) SubCutaneous every 8 hours    cefepime  IVPB 2000 milliGRAM(s) IV Intermittent every 12 hours  vancomycin  IVPB 1000 milliGRAM(s) IV Intermittent every 24 hours    norepinephrine Infusion 0.05 MICROgram(s)/kG/Min IV Continuous <Continuous>    dexamethasone  Injectable 2 milliGRAM(s) IV Push every 6 hours  levothyroxine 150 MICROGram(s) Oral <User Schedule>      valproic  acid Syrup 500 milliGRAM(s) Oral every 12 hours                    LABS:                        8.2    6.6   )-----------( 135      ( 2018 23:28 )             25.2     Hgb Trend: 8.2<--, 9.2<--      142  |  109<H>  |  35<H>  ----------------------------<  118<H>  5.7<H>   |  21<L>  |  1.86<H>    Ca    9.4      2018 23:28  Phos  3.7       Mg     2.3         TPro  6.5  /  Alb  3.1<L>  /  TBili  1.3<H>  /  DBili  x   /  AST  25  /  ALT  11  /  AlkPhos  105      Creatinine Trend: 1.86<--, 1.73<--, 2.04<--  PT/INR - ( 2018 15:10 )   PT: 14.1 sec;   INR: 1.29 ratio         PTT - ( 2018 15:10 )  PTT:33.4 sec  Urinalysis Basic - ( 2018 16:14 )    Color: Yellow / Appearance: SL Turbid / S.017 / pH: x  Gluc: x / Ketone: Negative  / Bili: Negative / Urobili: Negative   Blood: x / Protein: 300 mg/dL / Nitrite: Negative   Leuk Esterase: Large / RBC: 0-2 /HPF / WBC 0-2 /HPF   Sq Epi: x / Non Sq Epi: x / Bacteria: Few /HPF      Arterial Blood Gas:   @ 23:08  7.41/36/181/23/100/-1.3  ABG lactate: --  Arterial Blood Gas:   @ 15:02  7.37/37/317/20/100/-3.8  ABG lactate: --  Arterial Blood Gas:   @ 13:16  7.29/42/297/19/100/-6.4  ABG lactate: --  Arterial Blood Gas:   @ 13:06  7.10/50/167/15/98/-13.6  ABG lactate: --    Venous Blood Gas:   @ 12:34  6.90/85/29/16/22  VBG Lactate: 12.8      MICROBIOLOGY:     RADIOLOGY:  [ ] Reviewed and interpreted by me    EKG: CHIEF COMPLAINT: seizure-like activity, unresponsiveness    Interval Events:  extubated this AM, quickly weaned to room air    REVIEW OF SYSTEMS:  Unable to assess ROS because hard of hearing, groggy/demented    OBJECTIVE:  ICU Vital Signs Last 24 Hrs  T(C): 36.7 (2018 06:00), Max: 36.7 (2018 06:00)  T(F): 98.1 (2018 06:00), Max: 98.1 (2018 06:00)  HR: 70 (2018 06:00) (42 - 75)  BP: 115/64 (2018 06:00) (66/40 - 162/75)  BP(mean): 85 (2018 06:00) (68 - 108)  ABP: --  ABP(mean): --  RR: 21 (2018 06:00) (10 - 40)  SpO2: 100% (2018 06:00) (90% - 100%)    Mode: CPAP with PS, FiO2: 40, PEEP: 5, PS: 5, MAP: 8, PIP: 11  I&O's Detail    2018 07:01  -  2018 06:56  --------------------------------------------------------  IN:    Enteral Tube Flush: 100 mL    norepinephrine Infusion: 16.4 mL    Solution: 250 mL    Solution: 50 mL  Total IN: 416.4 mL    OUT:    Indwelling Catheter - Suprapubic: 110 mL  Total OUT: 110 mL    Total NET: 306.4 mL        CAPILLARY BLOOD GLUCOSE      POCT Blood Glucose.: 125 mg/dL (2018 05:46)      PHYSICAL EXAM:  General: lying in bed calmly, breathing comfortably on room air  Respiratory: coarse breath sounds b/l  Cardiovascular: RRR  Back: No dramatic CVAT  Abdomen: soft. non-tender, including around suprapubic catheter  Extremities: toenails long, thick  Neurological: moves all four extremities  Psychiatry: pleasant, follows basic commands    LINES: no central lines    HOSPITAL MEDICATIONS:  heparin  Injectable 5000 Unit(s) SubCutaneous every 8 hours    cefepime  IVPB 2000 milliGRAM(s) IV Intermittent every 12 hours  vancomycin  IVPB 1000 milliGRAM(s) IV Intermittent every 24 hours    norepinephrine Infusion 0.05 MICROgram(s)/kG/Min IV Continuous <Continuous>    dexamethasone  Injectable 2 milliGRAM(s) IV Push every 6 hours  levothyroxine 150 MICROGram(s) Oral <User Schedule>      valproic  acid Syrup 500 milliGRAM(s) Oral every 12 hours                    LABS:                        8.2    6.6   )-----------( 135      ( 2018 23:28 )             25.2     Hgb Trend: 8.2<--, 9.2<--      142  |  109<H>  |  35<H>  ----------------------------<  118<H>  5.7<H>   |  21<L>  |  1.86<H>    Ca    9.4      2018 23:28  Phos  3.7       Mg     2.3         TPro  6.5  /  Alb  3.1<L>  /  TBili  1.3<H>  /  DBili  x   /  AST  25  /  ALT  11  /  AlkPhos  105      Creatinine Trend: 1.86<--, 1.73<--, 2.04<--  PT/INR - ( 2018 15:10 )   PT: 14.1 sec;   INR: 1.29 ratio         PTT - ( 2018 15:10 )  PTT:33.4 sec  Urinalysis Basic - ( 2018 16:14 )    Color: Yellow / Appearance: SL Turbid / S.017 / pH: x  Gluc: x / Ketone: Negative  / Bili: Negative / Urobili: Negative   Blood: x / Protein: 300 mg/dL / Nitrite: Negative   Leuk Esterase: Large / RBC: 0-2 /HPF / WBC 0-2 /HPF   Sq Epi: x / Non Sq Epi: x / Bacteria: Few /HPF  Triple Phosphate Crystals: Moderate (18 @ 16:14)        Arterial Blood Gas:   @ 23:08  7.41/36/181/23/100/-1.3  ABG lactate: --  Arterial Blood Gas:   15:02  7.37/37/317/20/100/-3.8  ABG lactate: --  Arterial Blood Gas:   @ 13:16  7.29/42/297/19/100/-6.4  ABG lactate: --  Arterial Blood Gas:   @ 13:06  7.10/50/167/15/98/-13.6  ABG lactate: --    Venous Blood Gas:   @ 12:34  6.90/85/29/16/22  VBG Lactate: 12.8      MICROBIOLOGY:     RADIOLOGY:  [ ] Reviewed and interpreted by me  IMPRESSION:   Limited portable ultrasound. The left kidney is not well seen and   evaluation of the bladder is limited.  No hydronephrosis. Increased cortical echogenicity bilaterally, which may   be secondary to medical renal disease.  Incidental note is made of cholelithiasis.      EKG: CHIEF COMPLAINT: seizure-like activity, unresponsiveness    Interval Events:  extubated this AM, quickly weaned to room air    REVIEW OF SYSTEMS:  Unable to assess ROS because hard of hearing, groggy/demented    OBJECTIVE:  ICU Vital Signs Last 24 Hrs  T(C): 36.7 (2018 06:00), Max: 36.7 (2018 06:00)  T(F): 98.1 (2018 06:00), Max: 98.1 (2018 06:00)  HR: 70 (2018 06:00) (42 - 75)  BP: 115/64 (2018 06:00) (66/40 - 162/75)  BP(mean): 85 (2018 06:00) (68 - 108)  ABP: --  ABP(mean): --  RR: 21 (2018 06:00) (10 - 40)  SpO2: 100% (2018 06:00) (90% - 100%)    Mode: CPAP with PS, FiO2: 40, PEEP: 5, PS: 5, MAP: 8, PIP: 11  I&O's Detail    2018 07:01  -  2018 06:56  --------------------------------------------------------  IN:    Enteral Tube Flush: 100 mL    norepinephrine Infusion: 16.4 mL    Solution: 250 mL    Solution: 50 mL  Total IN: 416.4 mL    OUT:    Indwelling Catheter - Suprapubic: 110 mL  Total OUT: 110 mL    Total NET: 306.4 mL        CAPILLARY BLOOD GLUCOSE      POCT Blood Glucose.: 125 mg/dL (2018 05:46)      PHYSICAL EXAM:  General: lying in bed calmly, breathing comfortably on room air  Respiratory: coarse breath sounds b/l  Cardiovascular: RRR  Back: No CVAT  Abdomen: soft. non-tender, including around suprapubic catheter  Extremities: toenails long, thick  Neurological: moves all four extremities  Psychiatry: pleasant, follows basic commands    LINES: no central lines    HOSPITAL MEDICATIONS:  heparin  Injectable 5000 Unit(s) SubCutaneous every 8 hours    cefepime  IVPB 2000 milliGRAM(s) IV Intermittent every 12 hours  vancomycin  IVPB 1000 milliGRAM(s) IV Intermittent every 24 hours    norepinephrine Infusion 0.05 MICROgram(s)/kG/Min IV Continuous <Continuous>    dexamethasone  Injectable 2 milliGRAM(s) IV Push every 6 hours  levothyroxine 150 MICROGram(s) Oral <User Schedule>      valproic  acid Syrup 500 milliGRAM(s) Oral every 12 hours                    LABS:                        8.2    6.6   )-----------( 135      ( 2018 23:28 )             25.2     Hgb Trend: 8.2<--, 9.2<--      142  |  109<H>  |  35<H>  ----------------------------<  118<H>  5.7<H>   |  21<L>  |  1.86<H>    Ca    9.4      2018 23:28  Phos  3.7       Mg     2.3         TPro  6.5  /  Alb  3.1<L>  /  TBili  1.3<H>  /  DBili  x   /  AST  25  /  ALT  11  /  AlkPhos  105      Creatinine Trend: 1.86<--, 1.73<--, 2.04<--  PT/INR - ( 2018 15:10 )   PT: 14.1 sec;   INR: 1.29 ratio         PTT - ( 2018 15:10 )  PTT:33.4 sec  Urinalysis Basic - ( 2018 16:14 )    Color: Yellow / Appearance: SL Turbid / S.017 / pH: x  Gluc: x / Ketone: Negative  / Bili: Negative / Urobili: Negative   Blood: x / Protein: 300 mg/dL / Nitrite: Negative   Leuk Esterase: Large / RBC: 0-2 /HPF / WBC 0-2 /HPF   Sq Epi: x / Non Sq Epi: x / Bacteria: Few /HPF  Triple Phosphate Crystals: Moderate (18 @ 16:14)        Arterial Blood Gas:   @ 23:08  7.41/36/181/23/100/-1.3  ABG lactate: --  Arterial Blood Gas:   @ 15:02  7.37/37/317/20/100/-3.8  ABG lactate: --  Arterial Blood Gas:   @ 13:16  7.29/42/297/19/100/-6.4  ABG lactate: --  Arterial Blood Gas:   @ 13:06  7.10/50/167/15/98/-13.6  ABG lactate: --    Venous Blood Gas:   @ 12:34  6.90/85/29/16/22  VBG Lactate: 12.8      MICROBIOLOGY:     RADIOLOGY:  [ ] Reviewed and interpreted by me  IMPRESSION:   Limited portable ultrasound. The left kidney is not well seen and   evaluation of the bladder is limited.  No hydronephrosis. Increased cortical echogenicity bilaterally, which may   be secondary to medical renal disease.  Incidental note is made of cholelithiasis.      EKG: CHIEF COMPLAINT: convulsions, unresponsiveness    Interval Events:  extubated this AM, quickly weaned to room air    REVIEW OF SYSTEMS:  Unable to assess ROS because hard of hearing, groggy/demented    OBJECTIVE:  ICU Vital Signs Last 24 Hrs  T(C): 36.7 (2018 06:00), Max: 36.7 (2018 06:00)  T(F): 98.1 (2018 06:00), Max: 98.1 (2018 06:00)  HR: 70 (2018 06:00) (42 - 75)  BP: 115/64 (2018 06:00) (66/40 - 162/75)  BP(mean): 85 (2018 06:00) (68 - 108)  ABP: --  ABP(mean): --  RR: 21 (2018 06:00) (10 - 40)  SpO2: 100% (2018 06:00) (90% - 100%)    Mode: CPAP with PS, FiO2: 40, PEEP: 5, PS: 5, MAP: 8, PIP: 11  I&O's Detail    2018 07:01  -  2018 06:56  --------------------------------------------------------  IN:    Enteral Tube Flush: 100 mL    norepinephrine Infusion: 16.4 mL    Solution: 250 mL    Solution: 50 mL  Total IN: 416.4 mL    OUT:    Indwelling Catheter - Suprapubic: 110 mL  Total OUT: 110 mL    Total NET: 306.4 mL        CAPILLARY BLOOD GLUCOSE      POCT Blood Glucose.: 125 mg/dL (2018 05:46)      PHYSICAL EXAM:  General: lying in bed calmly, breathing comfortably on room air  Respiratory: coarse breath sounds b/l  Cardiovascular: RRR  Back: No CVAT  Abdomen: soft. non-tender, including around suprapubic catheter  Extremities: toenails long, thick  Neurological: moves all four extremities  Psychiatry: pleasant, follows basic commands    LINES: no central lines    HOSPITAL MEDICATIONS:  heparin  Injectable 5000 Unit(s) SubCutaneous every 8 hours    cefepime  IVPB 2000 milliGRAM(s) IV Intermittent every 12 hours  vancomycin  IVPB 1000 milliGRAM(s) IV Intermittent every 24 hours    norepinephrine Infusion 0.05 MICROgram(s)/kG/Min IV Continuous <Continuous>    dexamethasone  Injectable 2 milliGRAM(s) IV Push every 6 hours  levothyroxine 150 MICROGram(s) Oral <User Schedule>      valproic  acid Syrup 500 milliGRAM(s) Oral every 12 hours                    LABS:                        8.2    6.6   )-----------( 135      ( 2018 23:28 )             25.2     Hgb Trend: 8.2<--, 9.2<--      142  |  109<H>  |  35<H>  ----------------------------<  118<H>  5.7<H>   |  21<L>  |  1.86<H>    Ca    9.4      2018 23:28  Phos  3.7       Mg     2.3         TPro  6.5  /  Alb  3.1<L>  /  TBili  1.3<H>  /  DBili  x   /  AST  25  /  ALT  11  /  AlkPhos  105      Creatinine Trend: 1.86<--, 1.73<--, 2.04<--  PT/INR - ( 2018 15:10 )   PT: 14.1 sec;   INR: 1.29 ratio         PTT - ( 2018 15:10 )  PTT:33.4 sec  Urinalysis Basic - ( 2018 16:14 )    Color: Yellow / Appearance: SL Turbid / S.017 / pH: x  Gluc: x / Ketone: Negative  / Bili: Negative / Urobili: Negative   Blood: x / Protein: 300 mg/dL / Nitrite: Negative   Leuk Esterase: Large / RBC: 0-2 /HPF / WBC 0-2 /HPF   Sq Epi: x / Non Sq Epi: x / Bacteria: Few /HPF  Triple Phosphate Crystals: Moderate (18 @ 16:14)        Arterial Blood Gas:   @ 23:08  7.41/36/181/23/100/-1.3  ABG lactate: --  Arterial Blood Gas:   @ 15:02  7.37/37/317/20/100/-3.8  ABG lactate: --  Arterial Blood Gas:  01-16 @ 13:16  7.29/42/297/19/100/-6.4  ABG lactate: --  Arterial Blood Gas:   @ 13:06  7.10/50/167/15/98/-13.6  ABG lactate: --    Venous Blood Gas:   @ 12:34  6.90/85/29/16/22  VBG Lactate: 12.8      MICROBIOLOGY:     RADIOLOGY:  [ ] Reviewed and interpreted by me  IMPRESSION:   Limited portable ultrasound. The left kidney is not well seen and   evaluation of the bladder is limited.  No hydronephrosis. Increased cortical echogenicity bilaterally, which may   be secondary to medical renal disease.  Incidental note is made of cholelithiasis.      EKG:

## 2018-01-17 NOTE — PROGRESS NOTE ADULT - ATTENDING COMMENTS
1. Acute hypoxemia respiratory failure  due to severe sepsis with septic shock. Pt recovering from shock. Ph back to normal and lactic acid has cleared. Pt extubated this am and doing well.  2. Severe sepsis with shock resolving. Pt no longer on pressors. Pt with UTI  with suprapubic tube in place. Continue cefepime and vancomycin.  3. Hypothermia resolved. TSH WNL. Hypothermia from sepsis. Resolved now with temp 97.1  4. Dementia moderate at baseline.  4. Cardiac. Bradycardia due to hypothermia has resolved.    cc time 35 min

## 2018-01-17 NOTE — AIRWAY REMOVAL NOTE  ADULT & PEDS - ARTIFICAL AIRWAY REMOVAL COMMENTS
Written order for extubation verified.The patient was identified by full name and birth date compared to the identification band. Present during the procedure was Jacquelnie SIMPSON.

## 2018-01-17 NOTE — EEG REPORT - NS EEG TEXT BOX
1/16/2018    Hx: Concern for Seizures      Study Interpretation:    FINDINGS:  The background was continuous, spontaneously variable and reactive.  The fastest prevalent posterior dominant rhythm was 6-7 Hz activity, with an amplitude to 40 uV.  The background mainly consisted of diffuse irregular theta and polymorphic delta activity with intermixed faster frequencies.    Sleep Background:  Stages of sleep not delineated.    Other Paroxysmal Non-Epileptiform Findings:    None.    Epileptiform Activity:   No epileptiform discharges were present.    Events:  No clinical events were recorded.  No seizures were recorded.    Activation Procedures:   -Hyperventilation was not performed.    -Photic stimulation was not performed.    Artifacts:  Intermittent myogenic and movement artifacts were noted.    EEG Classification:  Abnormal study  - moderate diffuse slowing    Impression:  Findings indicate non-specific moderate diffuse or multifocal cerebral dysfunction. There were no epileptiform abnormalities recorded
1/16-1/17/2018    Hx: Concern for Seizures      Study Interpretation:    FINDINGS:  The background was continuous, spontaneously variable and reactive.  The fastest prevalent posterior dominant rhythm was 6-7 Hz activity, with an amplitude to 40 uV.  The background mainly consisted of diffuse irregular theta and polymorphic delta activity with intermixed faster frequencies.    Sleep Background:  Stages of sleep not delineated.    Other Paroxysmal Non-Epileptiform Findings:    None.    Epileptiform Activity:   No epileptiform discharges were present.    Events:  No clinical events were recorded.  No seizures were recorded.    Activation Procedures:   -Hyperventilation was not performed.    -Photic stimulation was not performed.    Artifacts:  Intermittent myogenic and movement artifacts were noted.    EEG Classification:  Abnormal study  - moderate diffuse slowing    Impression:  Findings indicate non-specific moderate diffuse or multifocal cerebral dysfunction. There were no epileptiform abnormalities recorded

## 2018-01-17 NOTE — PROCEDURE NOTE - ADDITIONAL PROCEDURE DETAILS
Post extubation a pediatric bronchoscope was inserted through the right naris. Purulent secretions were noted in the trachea.   Narrowing of the trachea was noted at the level of the 2nd tracheal ring

## 2018-01-17 NOTE — PROGRESS NOTE ADULT - ASSESSMENT
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 CTH showed extensive microvascular disease but no acute pathology. On Trazadone at home for sleep  - Will order 24h EEG to monitor  - Seizure precautions  - f/u Neuro recs  - Neuro check q1h  - MRI/MRA when stable  - Will check TSH/Free T4    #CV - Bradycardic to 30s to 50s. Cardiac enzymes negative x1. BNP elevated to 4k. EKG with prolonged QTc, interpreted as afib with slow ventricular response and bifascicular block. Maintaining BP  - Trend cardiac enzymes  - Monitor on Tele  - Transcutaneous pacer pads and atropine at bedside  - Chronotropic support as needed    #Resp - Intubated for airway protection in setting of possible status epilepticus/myxedema coma. CT chest showed moderate b/l pleural effusions  - Daily CPAP trials  - Check RVP    #GI  - Monitor BM    #Endo - History of hypothyroid, on Synthroid 150mcg daily at home. Last TSH in 12/2017 1.2. Concern for myxedema coma given AMS, hypothermia, bradycardia, peripheral edema and b/l pleural effusions. 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 CTH showed extensive microvascular disease but no acute pathology. On Trazadone at home for sleep  - Will order 24h EEG to monitor  - Seizure precautions  - f/u Neuro recsontinue   - Neuro check q1h  - MRI/MRA when stable  - Will check TSH/Free T4    #CV - Bradycardic to 30s to 50s. Cardiac enzymes negative x1. BNP elevated to 4k. EKG with prolonged QTc, interpreted as afib with slow ventricular response and bifascicular block. Maintaining BP  - Trend cardiac enzymes  - Monitor on Tele  - Transcutaneous pacer pads and atropine at bedside  - Chronotropic support as needed    #Resp - Intubated for airway protection in setting of possible status epilepticus/myxedema coma. CT chest showed moderate b/l pleural effusions  - Daily CPAP trials  - Check RVP    #GI  - Monitor BM    #Endo - History of hypothyroid, on Synthroid 150mcg daily at home. Last TSH in 12/2017 1.2. Concern for myxedema coma given AMS, hypothermia, bradycardia, peripheral edema and b/l pleural effusions. Received Hydrocortisone 100mg IV x1 in ED  - Will change to Decadron 2mg IV q6  - Will check cortisol levels in 24 hours  - f/u TSH and free T4    #ID - Will need to rule out underlying infection/sepsis  - f/u blood cultures and UA  - Will c/w empiric Vancomycin and Cefepime    #Renal - Creatinine at baseline  - Monitor BMP and UOP    # - Suprapubic catheter  - Monitor UOP    #Heme - Anemia at baseline Hb  - Monitor CBC    #DVT ppx  - HSQ Patient is a 89 yo M with ?Afib, Dementia, Hypothyroid, HLD, CKD, anemia w/ history of GIB 2/2 H Pylori Gastritis, Urinary retention d/t neurogenic bladder s/p suprapubic catheter presenting to ED for possible seizure and unresponsiveness in setting of sepsis (more likely in setting of UA w Leuk Est large, Triple Phosphate Crystals moderate, US Renal w stones) vs myxedema coma (less likely in setting of TSH 4.50). Extubated 1/17. Intermittently bradycardic as low as 30s, EKG suggesting Afib w slow ventricular response (monitor sometimes looks like Aflutter). Stable for transfer to floor.    #Neuro - Baseline Dementia with new onset ?seizures described as b/l UE tonic clonic lasting 20 to 30 seconds total 4 to 5 episodes. CTH showed extensive microvascular disease but no acute pathology. On Trazadone at home for sleep  - d/c'd EEG (report 1/17 am w no epileptic activity)  - d/c valproic acid given low suspicion for ongoing seizure risk  - f/u Neuro recs  - Neuro check q1h  - MRI/MRA when stable      #CV - Bradycardic to 30s to 50s. Cardiac enzymes negative x1. BNP elevated to 4k. EKG with prolonged QTc, interpreted as afib with slow ventricular response and bifascicular block. Maintaining BP  - cardiac enzymes negative x2  - Monitor on Tele  - Transcutaneous pacer pads and atropine at bedside  - Chronotropic support as needed  - Consult patient's outpatient cardiologist, Anselmo Pickard of [facility] [phone]  - Consider electrophysiology consult    #Resp - Intubated for airway protection in setting of possible status epilepticus/myxedema coma. CT chest showed moderate b/l pleural effusions  - Breathing comfortably on room air  - RVP negative     #GI  - Monitor BM    #Endo - History of hypothyroid, on Synthroid 150mcg daily at home. Last TSH in 12/2017 1.2. Concern for myxedema coma given AMS, hypothermia, bradycardia, peripheral edema and b/l pleural effusions. Received Hydrocortisone 100mg IV x1 in ED  - TSH 4.50 (only mildly elevated, suggesting against myxedema coma)  - D/c steroids  - f/u free T3 and free T4    #ID - Will need to rule out underlying infection/sepsis  - f/u blood cultures (NGTD), bronchial cultures (Gram stain neg), UCx (no growth)  - Suspect Proteus mirabilis or Klebsiella given triple phosphate crystals  - d/c vanco  - c/w Cefepime    #Renal - Creatinine approx at baseline  - Monitor BMP and UOP    # - Suprapubic catheter  - Monitor UOP  - Consult patient's outpatient urologist Gary Goldberg [phone]    #Heme - Anemia at baseline Hb, PMHx of transfusions most recently 6/2017, no history of transfusion reactions per daughter Rosas  - Monitor CBC    # Podiatry - thick, overgrown toenails; pt in pain when family tries to trim  - Consult podiatry to trim nails    #DVT ppx  - HSQ Patient is a 89 yo M with ?Afib, Dementia, Hypothyroid, HLD, CKD, anemia w/ history of GIB 2/2 H Pylori Gastritis, Urinary retention d/t neurogenic bladder s/p suprapubic catheter presenting to ED for possible seizure and unresponsiveness in setting of sepsis (more likely in setting of UA w Leuk Est large, Triple Phosphate Crystals moderate, US Renal w stones) vs myxedema coma (less likely in setting of TSH 4.50). Extubated 1/17. Intermittently bradycardic as low as 30s, EKG suggesting Afib w slow ventricular response (monitor sometimes looks like Aflutter). Stable for transfer to floor.    #Neuro - Baseline Dementia with new onset ?seizures described as b/l UE tonic clonic lasting 20 to 30 seconds total 4 to 5 episodes. CTH showed extensive microvascular disease but no acute pathology. On Trazadone at home for sleep  - d/c'd EEG (report 1/17 am w no epileptic activity)  - d/c valproic acid given low suspicion for ongoing seizure risk  - f/u Neuro recs  - Neuro check q1h  - MRI/MRA when stable    #CV - Bradycardic to 30s to 50s. Cardiac enzymes negative x1. BNP elevated to 4k. EKG with prolonged QTc, interpreted as afib with slow ventricular response and bifascicular block. Maintaining BP  - cardiac enzymes negative x2  - Monitor on Tele  - Transcutaneous pacer pads and atropine at bedside  - Chronotropic support as needed  - Consult patient's outpatient cardiologist, Anselmo Pickard of [facility] [phone]  - Consider electrophysiology consult    #Resp - Intubated for airway protection in setting of possible status epilepticus/myxedema coma. CT chest showed moderate b/l pleural effusions  - Breathing comfortably on room air  - RVP negative     #GI  - Monitor BM    #Endo - History of hypothyroid, on Synthroid 150mcg daily at home. Last TSH in 12/2017 1.2. Concern for myxedema coma given AMS, hypothermia, bradycardia, peripheral edema and b/l pleural effusions. Received Hydrocortisone 100mg IV x1 in ED  - TSH 4.50 (only mildly elevated, suggesting against myxedema coma)  - D/c steroids  - f/u free T3 and free T4    #ID - Will need to rule out underlying infection/sepsis  - f/u blood cultures (NGTD), bronchial cultures (Gram stain neg), UCx (no growth)  - Suspect Proteus mirabilis or Klebsiella given triple phosphate crystals  - d/c vanco  - c/w Cefepime    #Renal - Creatinine approx at baseline  - Monitor BMP and UOP    # - Suprapubic catheter  - Monitor UOP  - Consult patient's outpatient urologist Gary Goldberg [phone], who was due to change suprapubic catheter soon as outpt    #Heme - Anemia at baseline Hb, PMHx of transfusions most recently 6/2017, no history of transfusion reactions per daughter Rosas  - Monitor CBC    # Podiatry - thick, overgrown toenails; pt in pain when family tries to trim  - Consult podiatry to trim nails    #DVT ppx  - HSQ Patient is a 89 yo M with ?Afib, Dementia, Hypothyroid, HLD, CKD, anemia w/ history of GIB 2/2 H Pylori Gastritis, Urinary retention d/t neurogenic bladder s/p suprapubic catheter presenting to ED for possible seizure and unresponsiveness in setting of sepsis (more likely in setting of UA w Leuk Est large, Triple Phosphate Crystals moderate, US Renal w stones) vs myxedema coma (less likely in setting of TSH 4.50). Extubated 1/17. Intermittently bradycardic as low as 30s, EKG suggesting Afib w slow ventricular response (monitor sometimes looks like Aflutter). Stable for transfer to floor.    #Neuro - Baseline Dementia with new onset ?seizures described as b/l UE tonic clonic lasting 20 to 30 seconds total 4 to 5 episodes. CTH showed extensive microvascular disease but no acute pathology. On Trazadone at home for sleep  - d/c'd EEG (report 1/17 am w no epileptic activity)  - d/c valproic acid given low suspicion for ongoing seizure risk  - f/u Neuro recs  - Neuro check q4h  - MRI/MRA when stable    #CV - Bradycardic to 30s to 50s. Cardiac enzymes negative x1. BNP elevated to 4k. EKG with prolonged QTc, interpreted as afib with slow ventricular response and bifascicular block. Maintaining BP  - cardiac enzymes negative x2  - Monitor on Tele  - Transcutaneous pacer pads and atropine at bedside  - Chronotropic support as needed  - Consult patient's outpatient cardiologist, Anselmo Pickard of [facility] [phone]  - Consider electrophysiology consult    #Resp - Intubated for airway protection in setting of possible status epilepticus/myxedema coma. CT chest showed moderate b/l pleural effusions  - Breathing comfortably on room air  - RVP negative     #GI  - Monitor BM    #Endo - History of hypothyroid, on Synthroid 150mcg daily at home. Last TSH in 12/2017 1.2. Concern for myxedema coma given AMS, hypothermia, bradycardia, peripheral edema and b/l pleural effusions. Received Hydrocortisone 100mg IV x1 in ED  - TSH 4.50 (only mildly elevated, suggesting against myxedema coma)  - D/c steroids  - f/u free T3 and free T4    #ID - Will need to rule out underlying infection/sepsis  - f/u blood cultures (NGTD), bronchial cultures (Gram stain neg), UCx (no growth)  - Suspect Proteus mirabilis or Klebsiella given triple phosphate crystals  - d/c vanco  - c/w Cefepime    #Renal - Creatinine approx at baseline  - Monitor BMP and UOP    # - Suprapubic catheter  - Monitor UOP  - Consult patient's outpatient urologist Gary Goldberg [phone], who was due to change suprapubic catheter soon as outpt    #Heme - Anemia at baseline Hb, PMHx of transfusions most recently 6/2017, no history of transfusion reactions per daughter Rosas  - Monitor CBC    # Podiatry - thick, overgrown toenails; pt in pain when family tries to trim  - Consult podiatry to trim nails    #DVT ppx  - HSQ Patient is a 89 yo M with ?Afib, Dementia, Hypothyroid, HLD, CKD, anemia w/ history of GIB 2/2 H Pylori Gastritis, Urinary retention d/t neurogenic bladder s/p suprapubic catheter presenting to ED for possible seizure and unresponsiveness in setting of sepsis (more likely in setting of UA w Leuk Est large, Triple Phosphate Crystals moderate, US Renal w stones) vs myxedema coma (less likely in setting of TSH 4.50). Extubated 1/17. Intermittently bradycardic as low as 30s, EKG suggesting Afib w slow ventricular response (monitor sometimes looks like Aflutter). Stable for transfer to floor.    #Neuro - Baseline Dementia with new onset ?seizures described as b/l UE tonic clonic lasting 20 to 30 seconds total 4 to 5 episodes. CTH showed extensive microvascular disease but no acute pathology. On Trazadone at home for sleep  - d/c'd EEG (report 1/17 am w no epileptic activity)  - d/c valproic acid given low suspicion for ongoing seizure risk  - f/u Neuro recs  - Neuro check q4h  - MRI/MRA when stable    #CV - Bradycardic to 30s to 50s. Cardiac enzymes negative x1. BNP elevated to 4k. EKG with prolonged QTc, interpreted as afib with slow ventricular response and bifascicular block. Maintaining BP  - cardiac enzymes negative x2  - Monitor on Tele  - Transcutaneous pacer pads and atropine at bedside  - Chronotropic support as needed  - Consult patient's outpatient cardiologist, Anselmo Pickard of [facility] [phone]  - Consider electrophysiology consult    #Resp - Intubated for airway protection in setting of possible status epilepticus/myxedema coma. CT chest showed moderate b/l pleural effusions  - Breathing comfortably on room air  - RVP negative     #GI  - Monitor BM    #Endo - History of hypothyroid, on Synthroid 150mcg daily at home. Last TSH in 12/2017 1.2. Concern for myxedema coma given AMS, hypothermia, bradycardia, peripheral edema and b/l pleural effusions. Received Hydrocortisone 100mg IV x1 in ED  - TSH 4.50 (only mildly elevated, suggesting against myxedema coma)  - D/c steroids  - f/u free T3 and free T4    #ID - Will need to rule out underlying infection/sepsis  - f/u blood cultures (NGTD), bronchial cultures (Gram stain neg), UCx (no growth)  - Suspect Proteus mirabilis or Klebsiella given triple phosphate crystals  - d/c vanco  - c/w Cefepime    #Renal - Creatinine approx at baseline  - Monitor BMP and UOP    # - Suprapubic catheter  - Monitor UOP  - Consult patient's outpatient urologist Gary Goldberg [phone], who was due to change suprapubic catheter soon as outpt    #Heme - Anemia at baseline Hb, PMHx of transfusions most recently 6/2017, no history of transfusion reactions per daughter Rosas  - Monitor CBC  - keep type and screen up to date (next due 1/19)    # Podiatry - thick, overgrown toenails; pt in pain when family tries to trim  - Consult podiatry to trim nails    #DVT ppx  - HSQ

## 2018-01-18 DIAGNOSIS — I48.91 UNSPECIFIED ATRIAL FIBRILLATION: ICD-10-CM

## 2018-01-18 LAB
ALBUMIN SERPL ELPH-MCNC: 3.8 G/DL — SIGNIFICANT CHANGE UP (ref 3.3–5)
ALP SERPL-CCNC: 118 U/L — SIGNIFICANT CHANGE UP (ref 40–120)
ALT FLD-CCNC: 10 U/L RC — SIGNIFICANT CHANGE UP (ref 10–45)
ANION GAP SERPL CALC-SCNC: 16 MMOL/L — SIGNIFICANT CHANGE UP (ref 5–17)
APTT BLD: 33.3 SEC — SIGNIFICANT CHANGE UP (ref 27.5–37.4)
AST SERPL-CCNC: 16 U/L — SIGNIFICANT CHANGE UP (ref 10–40)
BASOPHILS # BLD AUTO: 0 K/UL — SIGNIFICANT CHANGE UP (ref 0–0.2)
BASOPHILS NFR BLD AUTO: 0.1 % — SIGNIFICANT CHANGE UP (ref 0–2)
BILIRUB SERPL-MCNC: 1.3 MG/DL — HIGH (ref 0.2–1.2)
BUN SERPL-MCNC: 39 MG/DL — HIGH (ref 7–23)
CALCIUM SERPL-MCNC: 9.5 MG/DL — SIGNIFICANT CHANGE UP (ref 8.4–10.5)
CHLORIDE SERPL-SCNC: 105 MMOL/L — SIGNIFICANT CHANGE UP (ref 96–108)
CO2 SERPL-SCNC: 21 MMOL/L — LOW (ref 22–31)
CREAT SERPL-MCNC: 2.08 MG/DL — HIGH (ref 0.5–1.3)
CULTURE RESULTS: SIGNIFICANT CHANGE UP
EOSINOPHIL # BLD AUTO: 0 K/UL — SIGNIFICANT CHANGE UP (ref 0–0.5)
EOSINOPHIL NFR BLD AUTO: 0 % — SIGNIFICANT CHANGE UP (ref 0–6)
GLUCOSE SERPL-MCNC: 110 MG/DL — HIGH (ref 70–99)
HCT VFR BLD CALC: 27.6 % — LOW (ref 39–50)
HGB BLD-MCNC: 9.4 G/DL — LOW (ref 13–17)
INR BLD: 1.24 RATIO — HIGH (ref 0.88–1.16)
LYMPHOCYTES # BLD AUTO: 0.7 K/UL — LOW (ref 1–3.3)
LYMPHOCYTES # BLD AUTO: 5.6 % — LOW (ref 13–44)
MAGNESIUM SERPL-MCNC: 2.4 MG/DL — SIGNIFICANT CHANGE UP (ref 1.6–2.6)
MCHC RBC-ENTMCNC: 30.3 PG — SIGNIFICANT CHANGE UP (ref 27–34)
MCHC RBC-ENTMCNC: 33.9 GM/DL — SIGNIFICANT CHANGE UP (ref 32–36)
MCV RBC AUTO: 89.5 FL — SIGNIFICANT CHANGE UP (ref 80–100)
MONOCYTES # BLD AUTO: 0.7 K/UL — SIGNIFICANT CHANGE UP (ref 0–0.9)
MONOCYTES NFR BLD AUTO: 5.7 % — SIGNIFICANT CHANGE UP (ref 2–14)
NEUTROPHILS # BLD AUTO: 10.8 K/UL — HIGH (ref 1.8–7.4)
NEUTROPHILS NFR BLD AUTO: 88.6 % — HIGH (ref 43–77)
PHOSPHATE SERPL-MCNC: 4.3 MG/DL — SIGNIFICANT CHANGE UP (ref 2.5–4.5)
PLATELET # BLD AUTO: 166 K/UL — SIGNIFICANT CHANGE UP (ref 150–400)
POTASSIUM SERPL-MCNC: 5.1 MMOL/L — SIGNIFICANT CHANGE UP (ref 3.5–5.3)
POTASSIUM SERPL-SCNC: 5.1 MMOL/L — SIGNIFICANT CHANGE UP (ref 3.5–5.3)
PROT SERPL-MCNC: 7.7 G/DL — SIGNIFICANT CHANGE UP (ref 6–8.3)
PROTHROM AB SERPL-ACNC: 13.5 SEC — HIGH (ref 9.8–12.7)
RBC # BLD: 3.09 M/UL — LOW (ref 4.2–5.8)
RBC # FLD: 13.9 % — SIGNIFICANT CHANGE UP (ref 10.3–14.5)
SODIUM SERPL-SCNC: 142 MMOL/L — SIGNIFICANT CHANGE UP (ref 135–145)
SPECIMEN SOURCE: SIGNIFICANT CHANGE UP
T3FREE SERPL-MCNC: 1.11 PG/ML — LOW (ref 1.8–4.6)
T4 FREE SERPL-MCNC: 1.5 NG/DL — SIGNIFICANT CHANGE UP (ref 0.9–1.8)
WBC # BLD: 12.1 K/UL — HIGH (ref 3.8–10.5)
WBC # FLD AUTO: 12.1 K/UL — HIGH (ref 3.8–10.5)

## 2018-01-18 PROCEDURE — 99223 1ST HOSP IP/OBS HIGH 75: CPT | Mod: GC

## 2018-01-18 PROCEDURE — 99233 SBSQ HOSP IP/OBS HIGH 50: CPT | Mod: GC

## 2018-01-18 PROCEDURE — 99223 1ST HOSP IP/OBS HIGH 75: CPT | Mod: AI

## 2018-01-18 RX ORDER — CEFEPIME 1 G/1
2000 INJECTION, POWDER, FOR SOLUTION INTRAMUSCULAR; INTRAVENOUS EVERY 12 HOURS
Qty: 0 | Refills: 0 | Status: DISCONTINUED | OUTPATIENT
Start: 2018-01-18 | End: 2018-01-20

## 2018-01-18 RX ORDER — LEVETIRACETAM 250 MG/1
500 TABLET, FILM COATED ORAL
Qty: 0 | Refills: 0 | Status: DISCONTINUED | OUTPATIENT
Start: 2018-01-18 | End: 2018-01-21

## 2018-01-18 RX ADMIN — CEFEPIME 100 MILLIGRAM(S): 1 INJECTION, POWDER, FOR SOLUTION INTRAMUSCULAR; INTRAVENOUS at 01:39

## 2018-01-18 RX ADMIN — HEPARIN SODIUM 5000 UNIT(S): 5000 INJECTION INTRAVENOUS; SUBCUTANEOUS at 22:08

## 2018-01-18 RX ADMIN — LEVETIRACETAM 500 MILLIGRAM(S): 250 TABLET, FILM COATED ORAL at 17:50

## 2018-01-18 RX ADMIN — Medication 150 MICROGRAM(S): at 05:07

## 2018-01-18 RX ADMIN — Medication 500 MILLIGRAM(S): at 05:07

## 2018-01-18 RX ADMIN — CEFEPIME 100 MILLIGRAM(S): 1 INJECTION, POWDER, FOR SOLUTION INTRAMUSCULAR; INTRAVENOUS at 13:07

## 2018-01-18 RX ADMIN — HEPARIN SODIUM 5000 UNIT(S): 5000 INJECTION INTRAVENOUS; SUBCUTANEOUS at 05:07

## 2018-01-18 RX ADMIN — Medication 81 MILLIGRAM(S): at 13:03

## 2018-01-18 RX ADMIN — HEPARIN SODIUM 5000 UNIT(S): 5000 INJECTION INTRAVENOUS; SUBCUTANEOUS at 14:45

## 2018-01-18 NOTE — SWALLOW BEDSIDE ASSESSMENT ADULT - SLP PERTINENT HISTORY OF CURRENT PROBLEM
89 yo M with ?Afib, Dementia, Hypothyroid, HLD, CKD, anemia w/ h/o 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 As per family and ED, pt was doing relatively well until earlier today when he was noted to be very lethargic. Pt then had total of 4 to 5 witnessed episodes of seizure like activity (3 of which witnessed by EMS and 1 witnessed in ED). Pt was given Versed 10mg x1 by EMS prior to arrival. Witnessed episode in ED was described as 30 seconds of b/l UE tonic clonic with head turning to L lasting for ~30 seconds. Family also reports that pt turned red in the face during these episodes until shaking stopped. Also noted violent coughing after these episodes. Pt remained unresponsive afterwards and was intubated. Pt found to be hypothermic at this point and started on rewarming blanket. Pt then noted to become bradycardic to 40s-50s with ectopy and was given Amiodarone IVP.

## 2018-01-18 NOTE — DIETITIAN INITIAL EVALUATION ADULT. - ENERGY NEEDS
Ht: 72"  Wt: 214  BMI: 29 kg/m2   IBW: 178  (+/-10%)     120% IBW  Edema: 2+ generalized, dependent   Skin: Ht: 72"  Wt: 214  BMI: 29 kg/m2   IBW: 178  (+/-10%)     120% IBW  Edema: 2+ generalized, dependent   Skin: no pressure injuries

## 2018-01-18 NOTE — PROCEDURE NOTE - NSURITECHNIQUE_GU_A_CORE
Proper hand hygiene was performed/Sterile gloves were worn for the duration of the procedure/The catheter was appropriately lubricated/The catheter was secured with a securement device (e.g. StatLock)

## 2018-01-18 NOTE — PROGRESS NOTE ADULT - SUBJECTIVE AND OBJECTIVE BOX
CHIEF COMPLAINT: convulsions, unresponsive    Interval Events: persistent bradycardia to 30's throughout day yesterady into last night. Became hypotensive overnight sbp 90's down to 80's, received 250 cc bolus, started on dopamine.     REVIEW OF SYSTEMS:  Constitutional: [ ] negative [ ] fevers [ ] chills [ ] weight loss [ ] weight gain  HEENT: [ ] negative [ ] dry eyes [ ] eye irritation [ ] postnasal drip [ ] nasal congestion  CV: [ ] negative  [ ] chest pain [ ] orthopnea [ ] palpitations [ ] murmur  Resp: [ ] negative [ ] cough [ ] shortness of breath [ ] dyspnea [ ] wheezing [ ] sputum [ ] hemoptysis  GI: [ ] negative [ ] nausea [ ] vomiting [ ] diarrhea [ ] constipation [ ] abd pain [ ] dysphagia   : [ ] negative [ ] dysuria [ ] nocturia [ ] hematuria [ ] increased urinary frequency  Musculoskeletal: [ ] negative [ ] back pain [ ] myalgias [ ] arthralgias [ ] fracture  Skin: [ ] negative [ ] rash [ ] itch  Neurological: [ ] negative [ ] headache [ ] dizziness [ ] syncope [ ] weakness [ ] numbness  Psychiatric: [ ] negative [ ] anxiety [ ] depression  Endocrine: [ ] negative [ ] diabetes [ ] thyroid problem  Hematologic/Lymphatic: [ ] negative [ ] anemia [ ] bleeding problem  Allergic/Immunologic: [ ] negative [ ] itchy eyes [ ] nasal discharge [ ] hives [ ] angioedema  [ ] All other systems negative  [ ] Unable to assess ROS because ________    OBJECTIVE:  ICU Vital Signs Last 24 Hrs  T(C): 36.4 (2018 04:00), Max: 36.7 (2018 12:00)  T(F): 97.6 (2018 04:00), Max: 98.1 (2018 12:00)  HR: 57 (2018 06:00) (38 - 91)  BP: 145/67 (2018 06:00) (84/46 - 167/58)  BP(mean): 97 (2018 06:00) (59 - 102)  ABP: --  ABP(mean): --  RR: 20 (2018 06:00) (17 - 34)  SpO2: 97% (2018 06:00) (92% - 100%)    Mode: CPAP with PS, FiO2: 40, PEEP: 5, PS: 5, MAP: 8     @ 07: @ 07:00  --------------------------------------------------------  IN: 416.4 mL / OUT: 115 mL / NET: 301.4 mL     @ 07: @ 06:57  --------------------------------------------------------  IN: 520.1 mL / OUT: 50 mL / NET: 470.1 mL      CAPILLARY BLOOD GLUCOSE      POCT Blood Glucose.: 125 mg/dL (2018 05:46)      PHYSICAL EXAM:  General:   HEENT:   Lymph Nodes:  Neck:   Respiratory:   Cardiovascular:   Abdomen:   Extremities:   Skin:   Neurological:  Psychiatry:    LINES:    HOSPITAL MEDICATIONS:  Standing Meds:  aspirin enteric coated 81 milliGRAM(s) Oral daily  cefepime  IVPB 2000 milliGRAM(s) IV Intermittent every 12 hours  DOPamine Infusion 2.5 MICROgram(s)/kG/Min IV Continuous <Continuous>  heparin  Injectable 5000 Unit(s) SubCutaneous every 8 hours  levothyroxine 150 MICROGram(s) Oral <User Schedule>  valproic  acid Syrup 500 milliGRAM(s) Oral every 12 hours      PRN Meds:      LABS:                        9.4    12.1  )-----------( 166      ( 2018 01:01 )             27.6     Hgb Trend: 9.4<--, 8.2<--, 9.2<--  18    142  |  105  |  39<H>  ----------------------------<  110<H>  5.1   |  21<L>  |  2.08<H>    Ca    9.5      2018 01:01  Phos  4.3     18  Mg     2.4         TPro  7.7  /  Alb  3.8  /  TBili  1.3<H>  /  DBili  x   /  AST  16  /  ALT  10  /  AlkPhos  118      Creatinine Trend: 2.08<--, 2.03<--, 1.86<--, 1.73<--, 2.04<--  PT/INR - ( 2018 01:01 )   PT: 13.5 sec;   INR: 1.24 ratio         PTT - ( 2018 01:01 )  PTT:33.3 sec  Urinalysis Basic - ( 2018 16:14 )    Color: Yellow / Appearance: SL Turbid / S.017 / pH: x  Gluc: x / Ketone: Negative  / Bili: Negative / Urobili: Negative   Blood: x / Protein: 300 mg/dL / Nitrite: Negative   Leuk Esterase: Large / RBC: 0-2 /HPF / WBC 0-2 /HPF   Sq Epi: x / Non Sq Epi: x / Bacteria: Few /HPF      Arterial Blood Gas:   @ 07:08  7.39/37/146/22/100/-1.8  ABG lactate: --  Arterial Blood Gas:   @ 23:08  7.41/36/181/23/100/-1.3  ABG lactate: --  Arterial Blood Gas:   @ 15:02  7.37/37/317/20/100/-3.8  ABG lactate: --  Arterial Blood Gas:   @ 13:16  7.29/42/297/19/100/-6.4  ABG lactate: --  Arterial Blood Gas:   @ 13:06  7.10/50/167/15/98/-13.6  ABG lactate: --    Venous Blood Gas:   @ 12:34  6.90/85/29/16/22  VBG Lactate: 12.8      MICROBIOLOGY:     Culture - Urine (collected 2018 21:45)  Source: .Urine Suprapubic  Preliminary Report (2018 16:23):    No growth    Culture - Bronchial (collected 2018 21:32)  Source: .Broncial Combicath  Gram Stain (2018 23:17):    No polymorphonuclear cells seen    No squamous epithelial cells    No organisms seen  Preliminary Report (2018 22:23):    No growth to date.    Culture - Blood (collected 2018 16:47)  Source: .Blood Blood  Preliminary Report (2018 17:01):    No growth to date.    Culture - Blood (collected 2018 16:47)  Source: .Blood Blood  Preliminary Report (2018 17:01):    No growth to date.      RADIOLOGY:  [ ] Reviewed and interpreted by me    EKG: CHIEF COMPLAINT: convulsions, unresponsive    Interval Events: persistent bradycardia to 30's throughout day yesterady into last night. Became hypotensive overnight sbp 90's down to 80's, received 250 cc bolus, started on dopamine.     REVIEW OF SYSTEMS:  Constitutional: [ ] negative [ ] fevers [ ] chills [ ] weight loss [ ] weight gain  HEENT: [ ] negative [ ] dry eyes [ ] eye irritation [ ] postnasal drip [ ] nasal congestion  CV: [ ] negative  [ ] chest pain [ ] orthopnea [ ] palpitations [ ] murmur  Resp: [ ] negative [ ] cough [ ] shortness of breath [ ] dyspnea [ ] wheezing [ ] sputum [ ] hemoptysis  GI: [ ] negative [ ] nausea [ ] vomiting [ ] diarrhea [ ] constipation [ ] abd pain [ ] dysphagia   : [ ] negative [ ] dysuria [ ] nocturia [ ] hematuria [ ] increased urinary frequency  Musculoskeletal: [ ] negative [ ] back pain [ ] myalgias [ ] arthralgias [ ] fracture  Skin: [ ] negative [ ] rash [ ] itch  Neurological: [ ] negative [ ] headache [ ] dizziness [ ] syncope [ ] weakness [ ] numbness  Psychiatric: [ ] negative [ ] anxiety [ ] depression  Endocrine: [ ] negative [ ] diabetes [ ] thyroid problem  Hematologic/Lymphatic: [ ] negative [ ] anemia [ ] bleeding problem  Allergic/Immunologic: [ ] negative [ ] itchy eyes [ ] nasal discharge [ ] hives [ ] angioedema  [ ] All other systems negative  [ ] Unable to assess ROS because ___intubated_____    OBJECTIVE:  ICU Vital Signs Last 24 Hrs  T(C): 36.4 (2018 04:00), Max: 36.7 (2018 12:00)  T(F): 97.6 (2018 04:00), Max: 98.1 (2018 12:00)  HR: 57 (2018 06:00) (38 - 91)  BP: 145/67 (2018 06:00) (84/46 - 167/58)  BP(mean): 97 (2018 06:00) (59 - 102)  ABP: --  ABP(mean): --  RR: 20 (2018 06:00) (17 - 34)  SpO2: 97% (2018 06:00) (92% - 100%)    Mode: CPAP with PS, FiO2: 40, PEEP: 5, PS: 5, MAP: 8     @ 07: @ 07:00  --------------------------------------------------------  IN: 416.4 mL / OUT: 115 mL / NET: 301.4 mL     @ 07: @ 06:57  --------------------------------------------------------  IN: 520.1 mL / OUT: 50 mL / NET: 470.1 mL      CAPILLARY BLOOD GLUCOSE      POCT Blood Glucose.: 125 mg/dL (2018 05:46)      PHYSICAL EXAM:  General:   HEENT:   Lymph Nodes:  Neck:   Respiratory:   Cardiovascular:   Abdomen:   Extremities:   Skin:   Neurological:  Psychiatry:    LINES:    HOSPITAL MEDICATIONS:  Standing Meds:  aspirin enteric coated 81 milliGRAM(s) Oral daily  cefepime  IVPB 2000 milliGRAM(s) IV Intermittent every 12 hours  DOPamine Infusion 2.5 MICROgram(s)/kG/Min IV Continuous <Continuous>  heparin  Injectable 5000 Unit(s) SubCutaneous every 8 hours  levothyroxine 150 MICROGram(s) Oral <User Schedule>  valproic  acid Syrup 500 milliGRAM(s) Oral every 12 hours      PRN Meds:      LABS:                        9.4    12.1  )-----------( 166      ( 2018 01:01 )             27.6     Hgb Trend: 9.4<--, 8.2<--, 9.2<--      142  |  105  |  39<H>  ----------------------------<  110<H>  5.1   |  21<L>  |  2.08<H>    Ca    9.5      2018 01:01  Phos  4.3       Mg     2.4         TPro  7.7  /  Alb  3.8  /  TBili  1.3<H>  /  DBili  x   /  AST  16  /  ALT  10  /  AlkPhos  118      Creatinine Trend: 2.08<--, 2.03<--, 1.86<--, 1.73<--, 2.04<--  PT/INR - ( 2018 01:01 )   PT: 13.5 sec;   INR: 1.24 ratio         PTT - ( 2018 01:01 )  PTT:33.3 sec  Urinalysis Basic - ( 2018 16:14 )    Color: Yellow / Appearance: SL Turbid / S.017 / pH: x  Gluc: x / Ketone: Negative  / Bili: Negative / Urobili: Negative   Blood: x / Protein: 300 mg/dL / Nitrite: Negative   Leuk Esterase: Large / RBC: 0-2 /HPF / WBC 0-2 /HPF   Sq Epi: x / Non Sq Epi: x / Bacteria: Few /HPF      Arterial Blood Gas:   @ 07:08  7.39/37/146/22/100/-1.8  ABG lactate: --  Arterial Blood Gas:   @ 23:08  7.41/36/181/23/100/-1.3  ABG lactate: --  Arterial Blood Gas:   @ 15:02  7.37/37/317/20/100/-3.8  ABG lactate: --  Arterial Blood Gas:   @ 13:16  7.29/42/297/19/100/-6.4  ABG lactate: --  Arterial Blood Gas:   @ 13:06  7.10/50/167/15/98/-13.6  ABG lactate: --    Venous Blood Gas:   @ 12:34  6.90/85/29/16/22  VBG Lactate: 12.8      MICROBIOLOGY:     Culture - Urine (collected 2018 21:45)  Source: .Urine Suprapubic  Preliminary Report (2018 16:23):    No growth    Culture - Bronchial (collected 2018 21:32)  Source: .Broncial Combicath  Gram Stain (2018 23:17):    No polymorphonuclear cells seen    No squamous epithelial cells    No organisms seen  Preliminary Report (2018 22:23):    No growth to date.    Culture - Blood (collected 2018 16:47)  Source: .Blood Blood  Preliminary Report (2018 17:01):    No growth to date.    Culture - Blood (collected 2018 16:47)  Source: .Blood Blood  Preliminary Report (2018 17:01):    No growth to date.      RADIOLOGY:  [ ] Reviewed and interpreted by me    EKG: CHIEF COMPLAINT: convulsions, unresponsive    Interval Events: persistent bradycardia to 30's throughout day yesterady into last night. Became hypotensive overnight sbp 90's down to 80's, received 250 cc bolus, started on dopamine.     REVIEW OF SYSTEMS:  Constitutional: [x ] negative [ ] fevers [ ] chills [ ] weight loss [ ] weight gain  HEENT: [x ] negative [ ] dry eyes [ ] eye irritation [ ] postnasal drip [ ] nasal congestion  CV: [x ] negative  [ ] chest pain [ ] orthopnea [ ] palpitations [ ] murmur  Resp: [x ] negative [ ] cough [ ] shortness of breath [ ] dyspnea [ ] wheezing [ ] sputum [ ] hemoptysis  GI: [x ] negative [ ] nausea [ ] vomiting [ ] diarrhea [ ] constipation [ ] abd pain [ ] dysphagia   : [ ] negative [ ] dysuria [ ] nocturia [ ] hematuria [ ] increased urinary frequency  Musculoskeletal: [x ] negative [ ] back pain [ ] myalgias [ ] arthralgias [ ] fracture  Skin: [x ] negative [ ] rash [ ] itch  Neurological: [x ] negative [ ] headache [ ] dizziness [ ] syncope [ ] weakness [ ] numbness  Psychiatric: [x ] negative [ ] anxiety [ ] depression  Endocrine: [x ] negative [ ] diabetes [ ] thyroid problem  Hematologic/Lymphatic: [x ] negative [ ] anemia [ ] bleeding problem  Allergic/Immunologic: [ x] negative [ ] itchy eyes [ ] nasal discharge [ ] hives [ ] angioedema  [ ] All other systems negative  [ ] Unable to assess ROS because ________    OBJECTIVE:  ICU Vital Signs Last 24 Hrs  T(C): 36.4 (2018 04:00), Max: 36.7 (2018 12:00)  T(F): 97.6 (2018 04:00), Max: 98.1 (2018 12:00)  HR: 57 (2018 06:00) (38 - 91)  BP: 145/67 (2018 06:00) (84/46 - 167/58)  BP(mean): 97 (2018 06:00) (59 - 102)  ABP: --  ABP(mean): --  RR: 20 (2018 06:00) (17 - 34)  SpO2: 97% (2018 06:00) (92% - 100%)    Mode: CPAP with PS, FiO2: 40, PEEP: 5, PS: 5, MAP: 8     @ 07: @ 07:00  --------------------------------------------------------  IN: 416.4 mL / OUT: 115 mL / NET: 301.4 mL     @ 07: @ 06:57  --------------------------------------------------------  IN: 520.1 mL / OUT: 50 mL / NET: 470.1 mL      CAPILLARY BLOOD GLUCOSE      POCT Blood Glucose.: 125 mg/dL (2018 05:46)      PHYSICAL EXAM:   General: NAD, well appearing  HEENT: NC/AT, PERRL  Neck: supple  Respiratory: CTAB  Cardiovascular: systolic murmur, RRR  Abdomen: soft, NT/ND, suprapubic catheter in place  Extremities: 2+ pitting edema, WWP, pulses intact b/l  Skin: warm and dry  Neurological: alert, oriented to place    LINES:    HOSPITAL MEDICATIONS:  Standing Meds:  aspirin enteric coated 81 milliGRAM(s) Oral daily  cefepime  IVPB 2000 milliGRAM(s) IV Intermittent every 12 hours  DOPamine Infusion 2.5 MICROgram(s)/kG/Min IV Continuous <Continuous>  heparin  Injectable 5000 Unit(s) SubCutaneous every 8 hours  levothyroxine 150 MICROGram(s) Oral <User Schedule>  valproic  acid Syrup 500 milliGRAM(s) Oral every 12 hours      PRN Meds:      LABS:                        9.4    12.1  )-----------( 166      ( 2018 01:01 )             27.6     Hgb Trend: 9.4<--, 8.2<--, 9.2<--      142  |  105  |  39<H>  ----------------------------<  110<H>  5.1   |  21<L>  |  2.08<H>    Ca    9.5      2018 01:01  Phos  4.3     18  Mg     2.4     18    TPro  7.7  /  Alb  3.8  /  TBili  1.3<H>  /  DBili  x   /  AST  16  /  ALT  10  /  AlkPhos  118  18    Creatinine Trend: 2.08<--, 2.03<--, 1.86<--, 1.73<--, 2.04<--  PT/INR - ( 2018 01:01 )   PT: 13.5 sec;   INR: 1.24 ratio         PTT - ( 2018 01:01 )  PTT:33.3 sec  Urinalysis Basic - ( 2018 16:14 )    Color: Yellow / Appearance: SL Turbid / S.017 / pH: x  Gluc: x / Ketone: Negative  / Bili: Negative / Urobili: Negative   Blood: x / Protein: 300 mg/dL / Nitrite: Negative   Leuk Esterase: Large / RBC: 0-2 /HPF / WBC 0-2 /HPF   Sq Epi: x / Non Sq Epi: x / Bacteria: Few /HPF      Arterial Blood Gas:   @ 07:08  7.39/37/146/22/100/-1.8  ABG lactate: --  Arterial Blood Gas:   @ 23:08  7.41/36/181/23/100/-1.3  ABG lactate: --  Arterial Blood Gas:   @ 15:02  7.37/37/317/20/100/-3.8  ABG lactate: --  Arterial Blood Gas:   @ 13:16  7.29/42/297/19/100/-6.4  ABG lactate: --  Arterial Blood Gas:   @ 13:06  7.10/50/167/15/98/-13.6  ABG lactate: --    Venous Blood Gas:   @ 12:34  6.90/85/29/16/22  VBG Lactate: 12.8      MICROBIOLOGY:     Culture - Urine (collected 2018 21:45)  Source: .Urine Suprapubic  Preliminary Report (2018 16:23):    No growth    Culture - Bronchial (collected 2018 21:32)  Source: .Broncial Combicath  Gram Stain (2018 23:17):    No polymorphonuclear cells seen    No squamous epithelial cells    No organisms seen  Preliminary Report (2018 22:23):    No growth to date.    Culture - Blood (collected 2018 16:47)  Source: .Blood Blood  Preliminary Report (2018 17:01):    No growth to date.    Culture - Blood (collected 2018 16:47)  Source: .Blood Blood  Preliminary Report (2018 17:01):    No growth to date.      RADIOLOGY:  [ ] Reviewed and interpreted by me    EKG: CHIEF COMPLAINT: convulsions, unresponsive    Interval Events: persistent bradycardia to 30's throughout day yesterady into last night. Became hypotensive overnight sbp 90's down to 80's, received 250 cc bolus, started on dopamine.     REVIEW OF SYSTEMS:  Constitutional: [x ] negative [ ] fevers [ ] chills [ ] weight loss [ ] weight gain  HEENT: [x ] negative [ ] dry eyes [ ] eye irritation [ ] postnasal drip [ ] nasal congestion  CV: [x ] negative  [ ] chest pain [ ] orthopnea [ ] palpitations [ ] murmur  Resp: [x ] negative [ ] cough [ ] shortness of breath [ ] dyspnea [ ] wheezing [ ] sputum [ ] hemoptysis  GI: [x ] negative [ ] nausea [ ] vomiting [ ] diarrhea [ ] constipation [ ] abd pain [ ] dysphagia   : [ ] negative [ ] dysuria [ ] nocturia [ ] hematuria [ ] increased urinary frequency  Musculoskeletal: [x ] negative [ ] back pain [ ] myalgias [ ] arthralgias [ ] fracture  Skin: [x ] negative [ ] rash [ ] itch  Neurological: [x ] negative [ ] headache [ ] dizziness [ ] syncope [ ] weakness [ ] numbness  Psychiatric: [x ] negative [ ] anxiety [ ] depression  Endocrine: [x ] negative [ ] diabetes [ ] thyroid problem  Hematologic/Lymphatic: [x ] negative [ ] anemia [ ] bleeding problem  Allergic/Immunologic: [ x] negative [ ] itchy eyes [ ] nasal discharge [ ] hives [ ] angioedema  [ ] All other systems negative  [ ] Unable to assess ROS because ________    OBJECTIVE:  ICU Vital Signs Last 24 Hrs  T(C): 36.4 (2018 04:00), Max: 36.7 (2018 12:00)  T(F): 97.6 (2018 04:00), Max: 98.1 (2018 12:00)  HR: 57 (2018 06:00) (38 - 91)  BP: 145/67 (2018 06:00) (84/46 - 167/58)  BP(mean): 97 (2018 06:00) (59 - 102)  ABP: --  ABP(mean): --  RR: 20 (2018 06:00) (17 - 34)  SpO2: 97% (2018 06:00) (92% - 100%)    Mode: CPAP with PS, FiO2: 40, PEEP: 5, PS: 5, MAP: 8     @ 07: @ 07:00  --------------------------------------------------------  IN: 416.4 mL / OUT: 115 mL / NET: 301.4 mL     @ 07: @ 06:57  --------------------------------------------------------  IN: 520.1 mL / OUT: 50 mL / NET: 470.1 mL      CAPILLARY BLOOD GLUCOSE      POCT Blood Glucose.: 125 mg/dL (2018 05:46)      PHYSICAL EXAM:   General: NAD, well appearing  HEENT: NC/AT, PERRL  Neck: supple  Respiratory: rales LLL  Cardiovascular: systolic murmur, RRR  Abdomen: soft, NT/ND, suprapubic catheter in place  Extremities: 2+ pitting edema, WWP, pulses intact b/l  Skin: warm and dry  Neurological: alert, oriented to place    LINES:    HOSPITAL MEDICATIONS:  Standing Meds:  aspirin enteric coated 81 milliGRAM(s) Oral daily  cefepime  IVPB 2000 milliGRAM(s) IV Intermittent every 12 hours  DOPamine Infusion 2.5 MICROgram(s)/kG/Min IV Continuous <Continuous>  heparin  Injectable 5000 Unit(s) SubCutaneous every 8 hours  levothyroxine 150 MICROGram(s) Oral <User Schedule>  valproic  acid Syrup 500 milliGRAM(s) Oral every 12 hours      PRN Meds:      LABS:                        9.4    12.1  )-----------( 166      ( 2018 01:01 )             27.6     Hgb Trend: 9.4<--, 8.2<--, 9.2<--      142  |  105  |  39<H>  ----------------------------<  110<H>  5.1   |  21<L>  |  2.08<H>    Ca    9.5      2018 01:01  Phos  4.3     18  Mg     2.4     18    TPro  7.7  /  Alb  3.8  /  TBili  1.3<H>  /  DBili  x   /  AST  16  /  ALT  10  /  AlkPhos  118  18    Creatinine Trend: 2.08<--, 2.03<--, 1.86<--, 1.73<--, 2.04<--  PT/INR - ( 2018 01:01 )   PT: 13.5 sec;   INR: 1.24 ratio         PTT - ( 2018 01:01 )  PTT:33.3 sec  Urinalysis Basic - ( 2018 16:14 )    Color: Yellow / Appearance: SL Turbid / S.017 / pH: x  Gluc: x / Ketone: Negative  / Bili: Negative / Urobili: Negative   Blood: x / Protein: 300 mg/dL / Nitrite: Negative   Leuk Esterase: Large / RBC: 0-2 /HPF / WBC 0-2 /HPF   Sq Epi: x / Non Sq Epi: x / Bacteria: Few /HPF      Arterial Blood Gas:   @ 07:08  7.39/37/146/22/100/-1.8  ABG lactate: --  Arterial Blood Gas:   @ 23:08  7.41/36/181/23/100/-1.3  ABG lactate: --  Arterial Blood Gas:   @ 15:02  7.37/37/317/20/100/-3.8  ABG lactate: --  Arterial Blood Gas:   @ 13:16  7.29/42/297/19/100/-6.4  ABG lactate: --  Arterial Blood Gas:   @ 13:06  7.10/50/167/15/98/-13.6  ABG lactate: --    Venous Blood Gas:   @ 12:34  6.90/85/29/16/22  VBG Lactate: 12.8      MICROBIOLOGY:     Culture - Urine (collected 2018 21:45)  Source: .Urine Suprapubic  Preliminary Report (2018 16:23):    No growth    Culture - Bronchial (collected 2018 21:32)  Source: .Broncial Combicath  Gram Stain (2018 23:17):    No polymorphonuclear cells seen    No squamous epithelial cells    No organisms seen  Preliminary Report (2018 22:23):    No growth to date.    Culture - Blood (collected 2018 16:47)  Source: .Blood Blood  Preliminary Report (2018 17:01):    No growth to date.    Culture - Blood (collected 2018 16:47)  Source: .Blood Blood  Preliminary Report (2018 17:01):    No growth to date.      RADIOLOGY:  [ ] Reviewed and interpreted by me    EKG: CHIEF COMPLAINT: convulsions, unresponsive    Interval Events: suprapubic cathether clotted. persistent bradycardia to 30's throughout day yesterady into last night. Became hypotensive overnight sbp 90's down to 80's, received 250 cc bolus, started on dopamine.     REVIEW OF SYSTEMS:  Constitutional: [x ] negative [ ] fevers [ ] chills [ ] weight loss [ ] weight gain  HEENT: [x ] negative [ ] dry eyes [ ] eye irritation [ ] postnasal drip [ ] nasal congestion  CV: [x ] negative  [ ] chest pain [ ] orthopnea [ ] palpitations [ ] murmur  Resp: [x ] negative [ ] cough [ ] shortness of breath [ ] dyspnea [ ] wheezing [ ] sputum [ ] hemoptysis  GI: [x ] negative [ ] nausea [ ] vomiting [ ] diarrhea [ ] constipation [ ] abd pain [ ] dysphagia   : [ ] negative [ ] dysuria [ ] nocturia [ ] hematuria [ ] increased urinary frequency  Musculoskeletal: [x ] negative [ ] back pain [ ] myalgias [ ] arthralgias [ ] fracture  Skin: [x ] negative [ ] rash [ ] itch  Neurological: [x ] negative [ ] headache [ ] dizziness [ ] syncope [ ] weakness [ ] numbness  Psychiatric: [x ] negative [ ] anxiety [ ] depression  Endocrine: [x ] negative [ ] diabetes [ ] thyroid problem  Hematologic/Lymphatic: [x ] negative [ ] anemia [ ] bleeding problem  Allergic/Immunologic: [ x] negative [ ] itchy eyes [ ] nasal discharge [ ] hives [ ] angioedema  [ ] All other systems negative  [ ] Unable to assess ROS because ________    OBJECTIVE:  ICU Vital Signs Last 24 Hrs  T(C): 36.4 (2018 04:00), Max: 36.7 (2018 12:00)  T(F): 97.6 (2018 04:00), Max: 98.1 (2018 12:00)  HR: 57 (2018 06:00) (38 - 91)  BP: 145/67 (2018 06:00) (84/46 - 167/58)  BP(mean): 97 (2018 06:00) (59 - 102)  ABP: --  ABP(mean): --  RR: 20 (2018 06:00) (17 - 34)  SpO2: 97% (2018 06:00) (92% - 100%)    Mode: CPAP with PS, FiO2: 40, PEEP: 5, PS: 5, MAP: 8     @ 07: @ 07:00  --------------------------------------------------------  IN: 416.4 mL / OUT: 115 mL / NET: 301.4 mL     @ 07: @ 06:57  --------------------------------------------------------  IN: 520.1 mL / OUT: 50 mL / NET: 470.1 mL      CAPILLARY BLOOD GLUCOSE      POCT Blood Glucose.: 125 mg/dL (2018 05:46)      PHYSICAL EXAM:   General: NAD, well appearing  HEENT: NC/AT, PERRL  Neck: supple  Respiratory: rales LLL  Cardiovascular: systolic murmur, RRR  Abdomen: soft, NT/ND, suprapubic catheter in place  Extremities: 2+ pitting edema, WWP, pulses intact b/l  Skin: warm and dry  Neurological: alert, oriented to place    LINES:    HOSPITAL MEDICATIONS:  Standing Meds:  aspirin enteric coated 81 milliGRAM(s) Oral daily  cefepime  IVPB 2000 milliGRAM(s) IV Intermittent every 12 hours  DOPamine Infusion 2.5 MICROgram(s)/kG/Min IV Continuous <Continuous>  heparin  Injectable 5000 Unit(s) SubCutaneous every 8 hours  levothyroxine 150 MICROGram(s) Oral <User Schedule>  valproic  acid Syrup 500 milliGRAM(s) Oral every 12 hours      PRN Meds:      LABS:                        9.4    12.1  )-----------( 166      ( 2018 01:01 )             27.6     Hgb Trend: 9.4<--, 8.2<--, 9.2<--      142  |  105  |  39<H>  ----------------------------<  110<H>  5.1   |  21<L>  |  2.08<H>    Ca    9.5      2018 01:01  Phos  4.3     18  Mg     2.4     18    TPro  7.7  /  Alb  3.8  /  TBili  1.3<H>  /  DBili  x   /  AST  16  /  ALT  10  /  AlkPhos  118      Creatinine Trend: 2.08<--, 2.03<--, 1.86<--, 1.73<--, 2.04<--  PT/INR - ( 2018 01:01 )   PT: 13.5 sec;   INR: 1.24 ratio         PTT - ( 2018 01:01 )  PTT:33.3 sec  Urinalysis Basic - ( 2018 16:14 )    Color: Yellow / Appearance: SL Turbid / S.017 / pH: x  Gluc: x / Ketone: Negative  / Bili: Negative / Urobili: Negative   Blood: x / Protein: 300 mg/dL / Nitrite: Negative   Leuk Esterase: Large / RBC: 0-2 /HPF / WBC 0-2 /HPF   Sq Epi: x / Non Sq Epi: x / Bacteria: Few /HPF      Arterial Blood Gas:   @ 07:08  7.39/37/146/22/100/-1.8  ABG lactate: --  Arterial Blood Gas:   @ 23:08  7.41/36/181/23/100/-1.3  ABG lactate: --  Arterial Blood Gas:   @ 15:02  7.37/37/317/20/100/-3.8  ABG lactate: --  Arterial Blood Gas:   @ 13:16  7.29/42/297/19/100/-6.4  ABG lactate: --  Arterial Blood Gas:   @ 13:06  7.10/50/167/15/98/-13.6  ABG lactate: --    Venous Blood Gas:   @ 12:34  6.90/85/29/16/22  VBG Lactate: 12.8      MICROBIOLOGY:     Culture - Urine (collected 2018 21:45)  Source: .Urine Suprapubic  Preliminary Report (2018 16:23):    No growth    Culture - Bronchial (collected 2018 21:32)  Source: .Broncial Combicath  Gram Stain (2018 23:17):    No polymorphonuclear cells seen    No squamous epithelial cells    No organisms seen  Preliminary Report (2018 22:23):    No growth to date.    Culture - Blood (collected 2018 16:47)  Source: .Blood Blood  Preliminary Report (2018 17:01):    No growth to date.    Culture - Blood (collected 2018 16:47)  Source: .Blood Blood  Preliminary Report (2018 17:01):    No growth to date.      RADIOLOGY:  [ ] Reviewed and interpreted by me    EKG:

## 2018-01-18 NOTE — PROGRESS NOTE ADULT - ASSESSMENT
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 Extubated 1/17. Intermittently bradycardic as low as 30s, EKG suggesting Afib w slow ventricular response (monitor sometimes looks like Aflutter). Stable for transfer to floor.    #Neuro - Baseline Dementia with new onset ?seizures described as b/l UE tonic clonic lasting 20 to 30 seconds total 4 to 5 episodes. CTH showed extensive microvascular disease but no acute pathology. On Trazadone at home for sleep  - d/c'd EEG (report 1/17 am w no epileptic activity)  - d/c valproic acid given low suspicion for ongoing seizure risk  - f/u Neuro recs  - Neuro check q4h  - MRI/MRA when stable    #CV - Bradycardic to 30s to 50s. Cardiac enzymes negative x1. BNP elevated to 4k. EKG with prolonged QTc, interpreted as afib with slow ventricular response and bifascicular block. Maintaining BP  - cardiac enzymes negative x2  - Monitor on Tele  - Transcutaneous pacer pads and atropine at bedside  - Chronotropic support as needed  - Consult patient's outpatient cardiologist, Anselmo Pickard of [facility] [phone]  - Consider electrophysiology consult  - bradycardic and hypotensive, started on dopamine 5 mcg/kg/min now decreased to 2.5mcg/kg/min and stable.    #Resp - Intubated for airway protection in setting of possible status epilepticus/myxedema coma. CT chest showed moderate b/l pleural effusions  - Breathing comfortably on room air  - RVP negative     #GI  - Monitor BM    #Endo - History of hypothyroid, on Synthroid 150mcg daily at home. Last TSH in 12/2017 1.2. Concern for myxedema coma given AMS, hypothermia, bradycardia, peripheral edema and b/l pleural effusions. Received Hydrocortisone 100mg IV x1 in ED  - TSH 4.50 (only mildly elevated, suggesting against myxedema coma)  - D/c steroids  - f/u free T3 and free T4    #ID - Will need to rule out underlying infection/sepsis  - f/u blood cultures (NGTD), bronchial cultures (Gram stain neg), UCx (no growth)  - Suspect Proteus mirabilis or Klebsiella given triple phosphate crystals  - d/c vanco  - c/w Cefepime    #Renal - Creatinine approx at baseline  - Monitor BMP and UOP    # - Suprapubic catheter  - Monitor UOP  - Consult patient's outpatient urologist Gary Goldberg [phone], who was due to change suprapubic catheter soon as outpt    #Heme - Anemia at baseline Hb, PMHx of transfusions most recently 6/2017, no history of transfusion reactions per daughter Rosas  - Monitor CBC  - keep type and screen up to date (next due 1/19)    # Podiatry - thick, overgrown toenails; pt in pain when family tries to trim  - Consult podiatry to trim nails    #DVT ppx  - HSQ    #Dispo:  - transfer to tele

## 2018-01-18 NOTE — CONSULT NOTE ADULT - ATTENDING COMMENTS
89 y/o M here with ams with episodes of shaking movements of UE with head turn to left, s/p versed and depakote load.  Currently already extubated, awake, o to name, follows commands.  Concern for sepsis.  CT head without acute changes with current nonfocal exam.  Though seizures reported it would be unusual to have status epilepticus in first place and to be recovered and extubated already.  Convulsive syncope possible with sepsis, bradycardia documented.      - f/u VEEG  - c/w depakote for now  - depending on clinical course may consider stopping depakote   - can hold on MRI imaging at this time.
seen and examined with NP. I agree with H & P, A & P.  Evidence of advanced conduction disease with RBBB and rate controlled AF off medications.  However he does not have symptoms.  Would defer on pacing for asymptomatic bradycardia at this time.

## 2018-01-18 NOTE — CONSULT NOTE ADULT - PROBLEM SELECTOR RECOMMENDATION 9
-RBBB and slow Afib in the setting of sepsis  -Avoid AVN agent   -Hold off on PPM at this point  -c/w telemetry monitor   -Will follow    [id number]

## 2018-01-18 NOTE — CONSULT NOTE ADULT - SUBJECTIVE AND OBJECTIVE BOX
INTERVAL HPI/OVERNIGHT EVENTS:    MEDICATIONS  (STANDING):  aspirin enteric coated 81 milliGRAM(s) Oral daily  cefepime  IVPB 2000 milliGRAM(s) IV Intermittent every 12 hours  DOPamine Infusion 2.5 MICROgram(s)/kG/Min (9.103 mL/Hr) IV Continuous <Continuous>  heparin  Injectable 5000 Unit(s) SubCutaneous every 8 hours  levETIRAcetam 500 milliGRAM(s) Oral two times a day  levothyroxine 150 MICROGram(s) Oral <User Schedule>    MEDICATIONS  (PRN):      Allergies    No Known Allergies    Intolerances      ROS:  General: Pt denies recent weight loss/fever/chills    Neurological: denies numbness or  sensation loss    HEENT: denies visual changes, no hearing loss, denies sore throat    Cardiovascular: denies chest pain/palpitations/leg edema    Respiratory and Thorax: denies SOB/cough/wheezing    Gastrointestinal: denies abdominal pain/diarrhea/constipation/bloody stool    Genitourinary: denies urinary frequency/urgency/ dysuria    Musculoskeletal: denies joint pain or swelling, denies restricted motion    Skin: denies rashes/sores    Endocrine: denies heat or cold intolerance/excessive thirst    Hematologic: denies abnormal bleeding  	    	  	    		        	    	            Vital Signs Last 24 Hrs  T(C): 36.7 (2018 12:00), Max: 36.7 (2018 12:00)  T(F): 98 (2018 12:00), Max: 98 (2018 12:00)  HR: 63 (2018 14:00) (38 - 91)  BP: 159/70 (2018 14:00) (84/46 - 165/74)  BP(mean): 101 (2018 14:00) (59 - 106)  RR: 23 (2018 14:00) (13 - 33)  SpO2: 94% (2018 14:00) (92% - 100%)  Physical Exam:  Vital Signs : BP        HR       RR    Constitutional: well developed, well nourished, no deformities and no acute distress    Neurological: Alert & Oriented x 3, ROUSSEAU, no focal deficits    HEENT: NC/AT, PERRLA, EOMI,  Neck supple.    Respiratory: CTA B/L, No wheezing/crackles/rhonchi    Cardiovascular: (+) S1 & S2, RRR, No m/r/g    Gastrointestinal: soft, NT, nondistended, (+) BS    Genitourinary: non distended bladder, voiding freely    Extremities: No pedal edema, No clubbing, No cyanosis    Skin:  normal skin color and pigmentation, no skin lesions            LABS:                        9.4    12.1  )-----------( 166      ( 2018 01:01 )             27.6     -18    142  |  105  |  39<H>  ----------------------------<  110<H>  5.1   |  21<L>  |  2.08<H>    Ca    9.5      2018 01:01  Phos  4.3     18  Mg     2.4     18    TPro  7.7  /  Alb  3.8  /  TBili  1.3<H>  /  DBili  x   /  AST  16  /  ALT  10  /  AlkPhos  118  -18    PT/INR - ( 2018 01:01 )   PT: 13.5 sec;   INR: 1.24 ratio         PTT - ( 2018 01:01 )  PTT:33.3 sec  Urinalysis Basic - ( 2018 16:14 )    Color: Yellow / Appearance: SL Turbid / S.017 / pH: x  Gluc: x / Ketone: Negative  / Bili: Negative / Urobili: Negative   Blood: x / Protein: 300 mg/dL / Nitrite: Negative   Leuk Esterase: Large / RBC: 0-2 /HPF / WBC 0-2 /HPF   Sq Epi: x / Non Sq Epi: x / Bacteria: Few /HPF        RADIOLOGY & ADDITIONAL TESTS:    TELE:    EKG: INTERVAL HPI/OVERNIGHT EVENTS: Afib with bradycardia    MEDICATIONS  (STANDING):  aspirin enteric coated 81 milliGRAM(s) Oral daily  cefepime  IVPB 2000 milliGRAM(s) IV Intermittent every 12 hours  heparin  Injectable 5000 Unit(s) SubCutaneous every 8 hours  levETIRAcetam 500 milliGRAM(s) Oral two times a day  levothyroxine 150 MICROGram(s) Oral <User Schedule>      Allergies    No Known Allergies      ROS:  General: Pt denies recent weight loss/fever/chills    Neurological: denies numbness or  sensation loss    HEENT: denies visual changes, no hearing loss, denies sore throat    Cardiovascular: denies chest pain/palpitations/leg edema    Respiratory and Thorax: denies SOB/cough/wheezing    Gastrointestinal: denies abdominal pain/diarrhea/constipation/bloody stool    Genitourinary: denies urinary frequency/urgency/ dysuria    Musculoskeletal: denies joint pain or swelling, denies restricted motion    Skin: denies rashes/sores    Endocrine: denies heat or cold intolerance/excessive thirst    Hematologic: denies abnormal bleeding  	    Vital Signs Last 24 Hrs  T(C): 36.7 (18 Jan 2018 12:00), Max: 36.7 (18 Jan 2018 12:00)  T(F): 98 (18 Jan 2018 12:00), Max: 98 (18 Jan 2018 12:00)  HR: 63 (18 Jan 2018 14:00) (38 - 91)  BP: 159/70 (18 Jan 2018 14:00) (84/46 - 165/74)  BP(mean): 101 (18 Jan 2018 14:00) (59 - 106)  RR: 23 (18 Jan 2018 14:00) (13 - 33)  SpO2: 94% (18 Jan 2018 14:00) (92% - 100%)      Physical Exam:    Constitutional: well developed, well nourished, no deformities and no acute distress    Neurological: Alert & Oriented x 3, ROUSSEAU, no focal deficits    HEENT: NC/AT, PERRLA, EOMI,  Neck supple.    Respiratory: CTA B/L, No wheezing/crackles/rhonchi    Cardiovascular: (+) S1 & S2, RRR, No m/r/g    Gastrointestinal: soft, NT, nondistended, (+) BS    Genitourinary: non distended bladder, voiding freely    Extremities: No pedal edema, No clubbing, No cyanosis    Skin:  normal skin color and pigmentation, no skin lesions      LABS:                        9.4    12.1  )-----------( 166      ( 18 Jan 2018 01:01 )             27.6     01-18    142  |  105  |  39<H>  ----------------------------<  110<H>  5.1   |  21<L>  |  2.08<H>    Ca    9.5      18 Jan 2018 01:01  Phos  4.3     01-18  Mg     2.4     01-18    TPro  7.7  /  Alb  3.8  /  TBili  1.3<H>  /  DBili  x   /  AST  16  /  ALT  10  /  AlkPhos  118  01-18    PT/INR - ( 18 Jan 2018 01:01 )   PT: 13.5 sec;   INR: 1.24 ratio         PTT - ( 18 Jan 2018 01:01 )  PTT:33.3 sec      TELE: Afib with rate 55-70s ([place] to 37 bpm on 1/17/18)    EKG: Afib, RBBB, QRSD 146 ms INTERVAL HPI/OVERNIGHT EVENTS: Afib with bradycardia    MEDICATIONS  (STANDING):  aspirin enteric coated 81 milliGRAM(s) Oral daily  cefepime  IVPB 2000 milliGRAM(s) IV Intermittent every 12 hours  heparin  Injectable 5000 Unit(s) SubCutaneous every 8 hours  levETIRAcetam 500 milliGRAM(s) Oral two times a day  levothyroxine 150 MICROGram(s) Oral <User Schedule>      Allergies    No Known Allergies      ROS:  General: Pt denies recent weight loss/fever/chills    Neurological: denies numbness or  sensation loss    HEENT: denies visual changes, no hearing loss, denies sore throat    Cardiovascular: denies chest pain/palpitations/leg edema    Respiratory and Thorax: denies SOB/cough/wheezing    Gastrointestinal: denies abdominal pain/diarrhea/constipation/bloody stool    Genitourinary: denies urinary frequency/urgency/ dysuria    Musculoskeletal: denies joint pain or swelling, denies restricted motion    Skin: denies rashes/sores    Endocrine: denies heat or cold intolerance/excessive thirst    Hematologic: denies abnormal bleeding  	    Vital Signs Last 24 Hrs  T(C): 36.7 (18 Jan 2018 12:00), Max: 36.7 (18 Jan 2018 12:00)  T(F): 98 (18 Jan 2018 12:00), Max: 98 (18 Jan 2018 12:00)  HR: 63 (18 Jan 2018 14:00) (38 - 91)  BP: 159/70 (18 Jan 2018 14:00) (84/46 - 165/74)  BP(mean): 101 (18 Jan 2018 14:00) (59 - 106)  RR: 23 (18 Jan 2018 14:00) (13 - 33)  SpO2: 94% (18 Jan 2018 14:00) (92% - 100%)      Physical Exam:    Constitutional: well developed, well nourished, no deformities and no acute distress    Neurological: Alert & Oriented x 2-3    HEENT: NC/AT, PERRLA, EOMI,  Neck supple    Respiratory: CTA B/L, No wheezing/crackles/rhonchi    Cardiovascular: (+) S1 & S2, irreg irreg, (+) III/VI murmur     Gastrointestinal: soft, NT, nondistended, (+) BS    Genitourinary: non distended bladder, voiding freely    Extremities: No pedal edema, No clubbing, No cyanosis    Skin:  normal skin color and pigmentation, no skin lesions      LABS:                        9.4    12.1  )-----------( 166      ( 18 Jan 2018 01:01 )             27.6     01-18    142  |  105  |  39<H>  ----------------------------<  110<H>  5.1   |  21<L>  |  2.08<H>    Ca    9.5      18 Jan 2018 01:01  Phos  4.3     01-18  Mg     2.4     01-18    TPro  7.7  /  Alb  3.8  /  TBili  1.3<H>  /  DBili  x   /  AST  16  /  ALT  10  /  AlkPhos  118  01-18    PT/INR - ( 18 Jan 2018 01:01 )   PT: 13.5 sec;   INR: 1.24 ratio         PTT - ( 18 Jan 2018 01:01 )  PTT:33.3 sec      TELE: Afib with rate 55-70s ([place] to 37 bpm on 1/17/18)    EKG: Afib, RBBB, QRSD 146 ms INTERVAL HPI/OVERNIGHT EVENTS: Afib with bradycardia    MEDICATIONS  (STANDING):  aspirin enteric coated 81 milliGRAM(s) Oral daily  cefepime  IVPB 2000 milliGRAM(s) IV Intermittent every 12 hours  heparin  Injectable 5000 Unit(s) SubCutaneous every 8 hours  levETIRAcetam 500 milliGRAM(s) Oral two times a day  levothyroxine 150 MICROGram(s) Oral <User Schedule>      Allergies    No Known Allergies      ROS:  General: Pt denies recent weight loss/fever/chills    Neurological: denies numbness or  sensation loss    HEENT: denies visual changes, no hearing loss, denies sore throat    Cardiovascular: denies chest pain/palpitations/leg edema    Respiratory and Thorax: denies SOB/cough/wheezing    Gastrointestinal: denies abdominal pain/diarrhea/constipation/bloody stool    Genitourinary: denies urinary frequency/urgency/ dysuria    Musculoskeletal: denies joint pain or swelling, denies restricted motion    Skin: denies rashes/sores    Endocrine: denies heat or cold intolerance/excessive thirst    Hematologic: denies abnormal bleeding  	    Vital Signs Last 24 Hrs  T(C): 36.7 (18 Jan 2018 12:00), Max: 36.7 (18 Jan 2018 12:00)  T(F): 98 (18 Jan 2018 12:00), Max: 98 (18 Jan 2018 12:00)  HR: 63 (18 Jan 2018 14:00) (38 - 91)  BP: 159/70 (18 Jan 2018 14:00) (84/46 - 165/74)  BP(mean): 101 (18 Jan 2018 14:00) (59 - 106)  RR: 23 (18 Jan 2018 14:00) (13 - 33)  SpO2: 94% (18 Jan 2018 14:00) (92% - 100%)      Physical Exam:    Constitutional: well developed, well nourished, no deformities and no acute distress    Neurological: Alert & Oriented x 2-3    HEENT: NC/AT, PERRLA, EOMI,  Neck supple    Respiratory: CTA B/L, No wheezing/crackles/rhonchi    Cardiovascular: (+) S1 & S2, irreg irreg, (+) III/VI murmur     Gastrointestinal: soft, NT, nondistended, (+) BS    Genitourinary: non distended bladder, (+) edwards     Extremities: No pedal edema, No clubbing, No cyanosis    Skin:  normal skin color and pigmentation, no skin lesions      LABS:                        9.4    12.1  )-----------( 166      ( 18 Jan 2018 01:01 )             27.6     01-18    142  |  105  |  39<H>  ----------------------------<  110<H>  5.1   |  21<L>  |  2.08<H>    Ca    9.5      18 Jan 2018 01:01  Phos  4.3     01-18  Mg     2.4     01-18    TPro  7.7  /  Alb  3.8  /  TBili  1.3<H>  /  DBili  x   /  AST  16  /  ALT  10  /  AlkPhos  118  01-18    PT/INR - ( 18 Jan 2018 01:01 )   PT: 13.5 sec;   INR: 1.24 ratio         PTT - ( 18 Jan 2018 01:01 )  PTT:33.3 sec      TELE: Afib with rate 55-70s ([place] to 37 bpm on 1/17/18)    EKG: Afib, RBBB, QRSD 146 ms

## 2018-01-18 NOTE — PROGRESS NOTE ADULT - ATTENDING COMMENTS
1.Severe Sepsis from UTI. No longer in shock. Continue abx. Await urine cx results. Continue cefepime.  2. Bradycardia; Hemodynamically stable. D/C dopamine. Await EP input. ? intrinsic  disease. Metabolic acidosis and severe sepsis improved.  3. Dementia at baseline.

## 2018-01-18 NOTE — CHART NOTE - NSCHARTNOTEFT_GEN_A_CORE
R2 Medicine Accept Note    Patient is a 89y old  Male who presents with a chief complaint of ?seizure      HPI:  89M h/o A-fib, dementia, hypothyroidism, HLD, CKD IV, neurogenic bladder c/b urinary retention s/p suprapubic catheter, anemia with prev GIB 2/2 H pylori gastritis adm to MICU 1/16 for unresponsiveness and seizure-like activity, intubated for airway protection now extubated.    Prior to admission, the patient was noted to be lethargic on the day of admission.  He then had 4-5 witnessed episodes of seizure-like activity (3 witnessed by EMS, 1 in ED).  In the ED, the patient had 30 seconds of bilateral UE tonic-clonic movements with head turning to the L, lasting for about 30 seconds.  The family reports that the patient's turned red in the face during these episodes until the shaking stopped, with violent coughing after these episodes.  In the ED, the patient was unresponsive after this seizure-like activity and was intubated for airway protection.  He was then noted to be hypothermic and started on a warming blanket.  He also received hydrocortisone 100mg x1.  CTH with extensive microvascular disease without acute pathology.  The patient was admitted to the MICU.    During his stay in the MICU, the patient underwent vEEG, which showed no epileptiform activity.  The patient was initially started on valproic acid and switched to keppra.  Neurology was consulted and recommended MRI/MRA when stable.  The patient was extubated in the AM on 1/17 and quickly weaned to room air.  The patient's course was c/b bradycardia to the 30s, EKG with A-fib with slow ventricular response and bifascicular block.  The patient was started on IV dopamine, which was discontinued.  There was concern for possible sepsis contributing to his bradycardia, and he was continued on cefepime.  The patient was evaluated by EP, who also believed sepsis was contributing to the patient's slow rate.  They defer PPM placement at this time.    Currently, the patient reports ____.    PMHx/PSHx:   PAST MEDICAL & SURGICAL HISTORY:  Anemia  Neurogenic bladder  Hyperlipidemia  Dementia  Hypothyroid  CKD (chronic kidney disease)      Social Hx:     Allergies: Allergies    No Known Allergies    Intolerances        Medications Standing   MEDICATIONS  (STANDING):  aspirin enteric coated 81 milliGRAM(s) Oral daily  cefepime  IVPB 2000 milliGRAM(s) IV Intermittent every 12 hours  heparin  Injectable 5000 Unit(s) SubCutaneous every 8 hours  levETIRAcetam 500 milliGRAM(s) Oral two times a day  levothyroxine 150 MICROGram(s) Oral <User Schedule>      Medications PRN   MEDICATIONS  (PRN):      PHYSICAL EXAM    T(C): 36.4 (01-18-18 @ 16:30), Max: 36.7 (01-18-18 @ 12:00)  HR: 48 (01-18-18 @ 16:30) (38 - 91)  BP: 102/67 (01-18-18 @ 16:30) (84/46 - 165/74)  RR: 18 (01-18-18 @ 16:30) (13 - 33)  SpO2: 94% (01-18-18 @ 16:30) (92% - 100%)    Gen: ***  HENT: ***  Lymph: ***  Eye:  CV:  Pulm:  Abd:  Skin:  Ext:  Neuro:  Psych:    LABS   CBC                       9.4    12.1  )-----------( 166      ( 18 Jan 2018 01:01 )             27.6     CMP 01-18    142  |  105  |  39<H>  ----------------------------<  110<H>  5.1   |  21<L>  |  2.08<H>    Ca    9.5      18 Jan 2018 01:01  Phos  4.3     01-18  Mg     2.4     01-18    TPro  7.7  /  Alb  3.8  /  TBili  1.3<H>  /  DBili  x   /  AST  16  /  ALT  10  /  AlkPhos  118  01-18    PT/INR - ( 18 Jan 2018 01:01 )   PT: 13.5 sec;   INR: 1.24 ratio         PTT - ( 18 Jan 2018 01:01 )  PTT:33.3 sec    Radiology:     A/P:  89M h/o A-fib, dementia, hypothyroidism, HLD, CKD IV, neurogenic bladder c/b urinary retention s/p suprapubic catheter, anemia with prev GIB 2/2 H pylori gastritis adm to MICU 1/16 for unresponsiveness and seizure-like activity, intubated for airway protection now extubated.    #Seizure Disorder  - Continue keppar 500mg BID.  - Monitor airway.  - Supplemental O2 PRN.  Now comfortable on RA.  RVP negative.  - Consult patient's outpatient cardiologist, Anselmo Pickard ([facility], [phone]) in AM.  - vEEG negative for epileptiform activity.  - Pending MRI/MRA.  - Neuro checks q4h.  - Neuro on board, appreciate involvement.    #A-Fib / Bradycardia  - Pacer pads / atropine at bedside.  - EP consulted, appreciate involvement.  No indication for PPM placement at this time.  - Continue to monitor on tele.  - Hold AVN blockers.  - Brianna negative x2.    #Hypothyroidism  - Continue levothyroxine 150mcg q day.  - TH 4.50.    #CKD IV  - Cr at baseline.  - Avoid nephrotoxic agents.  - Monitor Cr daily.    #Neurogenic Bladder  - Suprapubic catheter in place.  - In AM, consult patient's outpatient urologist Gary Goldberg ([phone]), who was due to change suprapubic catheter soon as outpt.  - BCx / UCx NGTD, continue to monitor.  - On cefepime for poss infection contributing to bradycardia.  Pt noted to have triple phosphate / struvite crystals concerning for poss Proteus infection /  colonization.    #Anemia - chronic, in the setting of multiple medical conditions / prev GIB 2/2 H pylori gastritis  - Monitor CBC daily.  - Keep active T+S.  - Check iron studies.    #Overgrown Toenails  - Consider Podiatry consult in AM for nail trimming.    #FEN  - Soft diet.    #PPX  - HSQ.    Hailee Huffman MD  PGY-2 | Internal Medicine  [phone] / [id number] R2 Medicine Accept Note    Patient is a 89y old  Male who presents with a chief complaint of ?seizure      HPI:  89M h/o A-fib, dementia, hypothyroidism, HLD, CKD IV, neurogenic bladder c/b urinary retention s/p suprapubic catheter, anemia with prev GIB 2/2 H pylori gastritis adm to MICU 1/16 for unresponsiveness and seizure-like activity, intubated for airway protection now extubated.    Prior to admission, the patient was noted to be lethargic on the day of admission.  He then had 4-5 witnessed episodes of seizure-like activity (3 witnessed by EMS, 1 in ED).  In the ED, the patient had 30 seconds of bilateral UE tonic-clonic movements with head turning to the L, lasting for about 30 seconds.  The family reports that the patient's turned red in the face during these episodes until the shaking stopped, with violent coughing after these episodes.  In the ED, the patient was unresponsive after this seizure-like activity and was intubated for airway protection.  He was then noted to be hypothermic and started on a warming blanket.  He also received hydrocortisone 100mg x1.  CTH with extensive microvascular disease without acute pathology.  The patient was admitted to the MICU.    During his stay in the MICU, the patient underwent vEEG, which showed no epileptiform activity.  The patient was initially started on valproic acid and switched to keppra.  Neurology was consulted and recommended MRI/MRA when stable.  The patient was extubated in the AM on 1/17 and quickly weaned to room air.  The patient's course was c/b bradycardia to the 30s, EKG with A-fib with slow ventricular response and bifascicular block.  The patient was started on IV dopamine, which was discontinued.  There was concern for possible sepsis contributing to his bradycardia, and he was continued on cefepime.  The patient was evaluated by EP, who also believed sepsis was contributing to the patient's slow rate.  They defer PPM placement at this time.    Currently, the patient reports ____.    PMHx/PSHx:   PAST MEDICAL & SURGICAL HISTORY:  Anemia  Neurogenic bladder  Hyperlipidemia  Dementia  Hypothyroid  CKD (chronic kidney disease)      Social Hx:     Allergies: Allergies    No Known Allergies    Intolerances        Medications Standing   MEDICATIONS  (STANDING):  aspirin enteric coated 81 milliGRAM(s) Oral daily  cefepime  IVPB 2000 milliGRAM(s) IV Intermittent every 12 hours  heparin  Injectable 5000 Unit(s) SubCutaneous every 8 hours  levETIRAcetam 500 milliGRAM(s) Oral two times a day  levothyroxine 150 MICROGram(s) Oral <User Schedule>      Medications PRN   MEDICATIONS  (PRN):      PHYSICAL EXAM    T(C): 36.4 (01-18-18 @ 16:30), Max: 36.7 (01-18-18 @ 12:00)  HR: 48 (01-18-18 @ 16:30) (38 - 91)  BP: 102/67 (01-18-18 @ 16:30) (84/46 - 165/74)  RR: 18 (01-18-18 @ 16:30) (13 - 33)  SpO2: 94% (01-18-18 @ 16:30) (92% - 100%)    Gen: ***  HENT: ***  Lymph: ***  Eye:  CV:  Pulm:  Abd:  Skin:  Ext:  Neuro:  Psych:    LABS   CBC                       9.4    12.1  )-----------( 166      ( 18 Jan 2018 01:01 )             27.6     CMP 01-18    142  |  105  |  39<H>  ----------------------------<  110<H>  5.1   |  21<L>  |  2.08<H>    Ca    9.5      18 Jan 2018 01:01  Phos  4.3     01-18  Mg     2.4     01-18    TPro  7.7  /  Alb  3.8  /  TBili  1.3<H>  /  DBili  x   /  AST  16  /  ALT  10  /  AlkPhos  118  01-18    PT/INR - ( 18 Jan 2018 01:01 )   PT: 13.5 sec;   INR: 1.24 ratio         PTT - ( 18 Jan 2018 01:01 )  PTT:33.3 sec    Radiology:     A/P:  89M h/o A-fib, dementia, hypothyroidism, HLD, CKD IV, neurogenic bladder c/b urinary retention s/p suprapubic catheter, anemia with prev GIB 2/2 H pylori gastritis adm to MICU 1/16 for unresponsiveness and seizure-like activity, intubated for airway protection now extubated.    #Seizure Disorder  - Continue keppar 500mg BID.  - Monitor airway.  - Supplemental O2 PRN.  Now comfortable on RA.  RVP negative.  - Consult patient's outpatient cardiologist, Anselmo Pickard ([facility], [phone]) in AM.  - vEEG negative for epileptiform activity.  - Pending MRI/MRA.  - Neuro checks q4h.  - Neuro on board, appreciate involvement.    #A-Fib / Bradycardia  - Pacer pads / atropine at bedside.  - EP consulted, appreciate involvement.  No indication for PPM placement at this time.  - Continue to monitor on tele.  - Hold AVN blockers.  - Brianna negative x2.    #Hypothyroidism  - Continue levothyroxine 150mcg q day.  - TH 4.50.    #CKD IV  - Cr at baseline.  - Avoid nephrotoxic agents.  - Monitor Cr daily.    #Neurogenic Bladder  - Suprapubic catheter in place.  - In AM, consult patient's outpatient urologist Gary Goldberg ([phone]), who was due to change suprapubic catheter soon as outpt.  - BCx / UCx NGTD, continue to monitor.  - On dose-reduced cefepime (day 3) for poss infection contributing to bradycardia.  Pt noted to have triple phosphate / struvite crystals concerning for poss Proteus infection /  colonization.    #Anemia - chronic, in the setting of multiple medical conditions / prev GIB 2/2 H pylori gastritis  - Monitor CBC daily.  - Keep active T+S.  - Check iron studies.    #Overgrown Toenails  - Consider Podiatry consult in AM for nail trimming.    #FEN  - Soft diet.    #PPX  - HSQ.    Hailee Huffman MD  PGY-2 | Internal Medicine  [phone] / [id number] R2 Medicine Accept Note    Patient is a 89y old  Male who presents with a chief complaint of ?seizure      HPI:  89M h/o A-fib, dementia, hypothyroidism, HLD, CKD IV, neurogenic bladder c/b urinary retention s/p suprapubic catheter, anemia with prev GIB 2/2 H pylori gastritis adm to MICU 1/16 for unresponsiveness and seizure-like activity, intubated for airway protection now extubated.    Prior to admission, the patient was noted to be lethargic on the day of admission.  He then had 4-5 witnessed episodes of seizure-like activity (3 witnessed by EMS, 1 in ED).  In the ED, the patient had 30 seconds of bilateral UE tonic-clonic movements with head turning to the L, lasting for about 30 seconds.  The family reports that the patient's turned red in the face during these episodes until the shaking stopped, with violent coughing after these episodes.  In the ED, the patient was unresponsive after this seizure-like activity and was intubated for airway protection.  He was then noted to be hypothermic and started on a warming blanket.  He also received hydrocortisone 100mg x1.  CTH with extensive microvascular disease without acute pathology.  The patient was admitted to the MICU.    During his stay in the MICU, the patient underwent vEEG, which showed no epileptiform activity.  The patient was initially started on valproic acid and switched to keppra.  Neurology was consulted and recommended MRI/MRA when stable.  The patient was extubated in the AM on 1/17 and quickly weaned to room air.  The patient's course was c/b bradycardia to the 30s, EKG with A-fib with slow ventricular response and bifascicular block.  The patient was started on IV dopamine, which was discontinued.  There was concern for possible sepsis contributing to his bradycardia, and he was continued on cefepime.  The patient was evaluated by EP, who also believed sepsis was contributing to the patient's slow rate.  They defer PPM placement at this time.    Currently, the patient reports ____.    PMHx/PSHx:   PAST MEDICAL & SURGICAL HISTORY:  Anemia  Neurogenic bladder  Hyperlipidemia  Dementia  Hypothyroid  CKD (chronic kidney disease)      Social Hx:     Allergies: Allergies    No Known Allergies    Intolerances        Medications Standing   MEDICATIONS  (STANDING):  aspirin enteric coated 81 milliGRAM(s) Oral daily  cefepime  IVPB 2000 milliGRAM(s) IV Intermittent every 12 hours  heparin  Injectable 5000 Unit(s) SubCutaneous every 8 hours  levETIRAcetam 500 milliGRAM(s) Oral two times a day  levothyroxine 150 MICROGram(s) Oral <User Schedule>      Medications PRN   MEDICATIONS  (PRN):      PHYSICAL EXAM    T(C): 36.4 (01-18-18 @ 16:30), Max: 36.7 (01-18-18 @ 12:00)  HR: 48 (01-18-18 @ 16:30) (38 - 91)  BP: 102/67 (01-18-18 @ 16:30) (84/46 - 165/74)  RR: 18 (01-18-18 @ 16:30) (13 - 33)  SpO2: 94% (01-18-18 @ 16:30) (92% - 100%)    Gen: ***  HENT: ***  Lymph: ***  Eye:  CV:  Pulm:  Abd:  Skin:  Ext:  Neuro:  Psych:    LABS   CBC                       9.4    12.1  )-----------( 166      ( 18 Jan 2018 01:01 )             27.6     CMP 01-18    142  |  105  |  39<H>  ----------------------------<  110<H>  5.1   |  21<L>  |  2.08<H>    Ca    9.5      18 Jan 2018 01:01  Phos  4.3     01-18  Mg     2.4     01-18    TPro  7.7  /  Alb  3.8  /  TBili  1.3<H>  /  DBili  x   /  AST  16  /  ALT  10  /  AlkPhos  118  01-18    PT/INR - ( 18 Jan 2018 01:01 )   PT: 13.5 sec;   INR: 1.24 ratio         PTT - ( 18 Jan 2018 01:01 )  PTT:33.3 sec    Radiology:     A/P:  89M h/o A-fib, dementia, hypothyroidism, HLD, CKD IV, neurogenic bladder c/b urinary retention s/p suprapubic catheter, anemia with prev GIB 2/2 H pylori gastritis adm to MICU 1/16 for unresponsiveness and seizure-like activity, intubated for airway protection now extubated.    #Seizure Disorder  - Continue keppar 500mg BID.  - Monitor airway.  - Supplemental O2 PRN.  Now comfortable on RA.  RVP negative.  - Consult patient's outpatient cardiologist, Anselmo Pickard ([facility], [phone]) in AM.  - vEEG negative for epileptiform activity.  - Pending MRI/MRA.  - Neuro checks q4h.  - Neuro on board, appreciate involvement.    #A-Fib / Bradycardia  - Pacer pads / atropine at bedside.  - EP consulted, appreciate involvement.  No indication for PPM placement at this time.  - Continue to monitor on tele.  - Hold AVN blockers.  - Brianna negative x2.    #Hypothyroidism  - Continue levothyroxine 150mcg q day.  - TH 4.50.    #CKD IV  - Cr at baseline.  - Avoid nephrotoxic agents.  - Monitor Cr daily.    #Neurogenic Bladder  - Suprapubic catheter in place.  - In AM, consult patient's outpatient urologist Gary Goldberg ([phone]), who was due to change suprapubic catheter soon as outpt.  - BCx / UCx / bronch Cx NGTD, continue to monitor.  - On dose-reduced cefepime (day 3) for poss infection contributing to bradycardia.  Pt noted to have triple phosphate / struvite crystals concerning for poss Proteus infection /  colonization.    #Anemia - chronic, in the setting of multiple medical conditions / prev GIB 2/2 H pylori gastritis  - Monitor CBC daily.  - Keep active T+S.  - Check iron studies.    #Overgrown Toenails  - Consider Podiatry consult in AM for nail trimming.    #FEN  - Soft diet.    #PPX  - HSQ.    Hailee Huffman MD  PGY-2 | Internal Medicine  [phone] / [id number] R2 Medicine Accept Note    Patient is a 89y old  Male who presents with a chief complaint of ?seizure      HPI:  89M h/o A-fib, dementia, hypothyroidism, HLD, CKD IV, neurogenic bladder c/b urinary retention s/p suprapubic catheter, anemia with prev GIB 2/2 H pylori gastritis adm to MICU 1/16 for unresponsiveness and seizure-like activity, intubated for airway protection now extubated.    Prior to admission, the patient was noted to be lethargic on the day of admission.  He then had 4-5 witnessed episodes of seizure-like activity (3 witnessed by EMS, 1 in ED).  In the ED, the patient had 30 seconds of bilateral UE tonic-clonic movements with head turning to the L, lasting for about 30 seconds.  The family reports that the patient's turned red in the face during these episodes until the shaking stopped, with violent coughing after these episodes.  In the ED, the patient was unresponsive after this seizure-like activity and was intubated for airway protection.  He was then noted to be hypothermic and started on a warming blanket.  He also received hydrocortisone 100mg x1.  CTH with extensive microvascular disease without acute pathology.  The patient was admitted to the MICU.    During his stay in the MICU, the patient underwent vEEG, which showed no epileptiform activity.  The patient was initially started on valproic acid and switched to keppra.  Neurology was consulted and recommended MRI/MRA when stable.  The patient was extubated in the AM on 1/17 and quickly weaned to room air.  The patient's course was c/b bradycardia to the 30s, EKG with A-fib with slow ventricular response and bifascicular block.  The patient was started on IV dopamine, which was discontinued.  There was concern for possible sepsis contributing to his bradycardia, and he was continued on cefepime.  The patient was evaluated by EP, who also believed sepsis was contributing to the patient's slow rate.  They defer PPM placement at this time.    Currently, the patient reports ____.    PMHx/PSHx:   PAST MEDICAL & SURGICAL HISTORY:  Anemia  Neurogenic bladder  Hyperlipidemia  Dementia  Hypothyroid  CKD (chronic kidney disease)      Social Hx:     Allergies: Allergies    No Known Allergies    Intolerances        Medications Standing   MEDICATIONS  (STANDING):  aspirin enteric coated 81 milliGRAM(s) Oral daily  cefepime  IVPB 2000 milliGRAM(s) IV Intermittent every 12 hours  heparin  Injectable 5000 Unit(s) SubCutaneous every 8 hours  levETIRAcetam 500 milliGRAM(s) Oral two times a day  levothyroxine 150 MICROGram(s) Oral <User Schedule>      Medications PRN   MEDICATIONS  (PRN):      PHYSICAL EXAM    T(C): 36.4 (01-18-18 @ 16:30), Max: 36.7 (01-18-18 @ 12:00)  HR: 48 (01-18-18 @ 16:30) (38 - 91)  BP: 102/67 (01-18-18 @ 16:30) (84/46 - 165/74)  RR: 18 (01-18-18 @ 16:30) (13 - 33)  SpO2: 94% (01-18-18 @ 16:30) (92% - 100%)    Gen: ***  HENT: ***  Lymph: ***  Eye:  CV:  Pulm:  Abd:  Skin:  Ext:  Neuro:  Psych:    LABS   CBC                       9.4    12.1  )-----------( 166      ( 18 Jan 2018 01:01 )             27.6     CMP 01-18    142  |  105  |  39<H>  ----------------------------<  110<H>  5.1   |  21<L>  |  2.08<H>    Ca    9.5      18 Jan 2018 01:01  Phos  4.3     01-18  Mg     2.4     01-18    TPro  7.7  /  Alb  3.8  /  TBili  1.3<H>  /  DBili  x   /  AST  16  /  ALT  10  /  AlkPhos  118  01-18    PT/INR - ( 18 Jan 2018 01:01 )   PT: 13.5 sec;   INR: 1.24 ratio         PTT - ( 18 Jan 2018 01:01 )  PTT:33.3 sec    Radiology:     A/P:  89M h/o A-fib, dementia, hypothyroidism, HLD, CKD IV, neurogenic bladder c/b urinary retention s/p suprapubic catheter, anemia with prev GIB 2/2 H pylori gastritis adm to MICU 1/16 for unresponsiveness and seizure-like activity, intubated for airway protection now extubated.    #Seizure Disorder  - Continue keppar 500mg BID.  - Monitor airway.  - Supplemental O2 PRN.  Now comfortable on RA.  RVP negative.  - Consult patient's outpatient cardiologist, Anselmo Pickard ([facility], [phone]) in AM.  - vEEG with moderate diffuse slowing, negative for epileptiform activity.  - Pending MRI/MRA.  - Neuro checks q4h.  - Neuro on board, appreciate involvement.    #A-Fib / Bradycardia  - Pacer pads / atropine at bedside.  - EP consulted, appreciate involvement.  No indication for PPM placement at this time.  - Continue to monitor on tele.  - Hold AVN blockers.  - Brianna negative x2.    #Hypothyroidism  - Continue levothyroxine 150mcg q day.  - TH 4.50.    #CKD IV  - Cr at baseline.  - Avoid nephrotoxic agents.  - Monitor Cr daily.    #Neurogenic Bladder  - Suprapubic catheter in place.  - In AM, consult patient's outpatient urologist Gary Goldberg ([phone]), who was due to change suprapubic catheter soon as outpt.  - BCx / UCx / bronch Cx NGTD, continue to monitor.  - On dose-reduced cefepime (day 3) for poss infection contributing to bradycardia.  Pt noted to have triple phosphate / struvite crystals concerning for poss Proteus infection /  colonization.    #Anemia - chronic, in the setting of multiple medical conditions / prev GIB 2/2 H pylori gastritis  - Monitor CBC daily.  - Keep active T+S.  - Check iron studies.    #Overgrown Toenails  - Consider Podiatry consult in AM for nail trimming.    #FEN  - Soft diet with ensure enlive BID per Nutrition recs.    #PPX  - HSQ.    Hailee Huffman MD  PGY-2 | Internal Medicine  [phone] / [id number] R2 Medicine Accept Note    Patient is a 89y old  Male who presents with a chief complaint of ?seizure      HPI:  89M h/o A-fib, dementia, hypothyroidism, HLD, CKD IV, neurogenic bladder c/b urinary retention s/p suprapubic catheter, anemia with prev GIB 2/2 H pylori gastritis adm to MICU 1/16 for unresponsiveness and seizure-like activity, intubated for airway protection now extubated.    Prior to admission, the patient was noted to be lethargic on the day of admission.  He then had 4-5 witnessed episodes of seizure-like activity (3 witnessed by EMS, 1 in ED).  In the ED, the patient had 30 seconds of bilateral UE tonic-clonic movements with head turning to the L, lasting for about 30 seconds.  The family reports that the patient's turned red in the face during these episodes until the shaking stopped, with violent coughing after these episodes.  In the ED, the patient was unresponsive after this seizure-like activity and was intubated for airway protection.  He was then noted to be hypothermic and started on a warming blanket.  He also received hydrocortisone 100mg x1.  CTH with extensive microvascular disease without acute pathology.  The patient was admitted to the MICU.    During his stay in the MICU, the patient underwent vEEG, which showed no epileptiform activity.  The patient was initially started on valproic acid and switched to keppra.  Neurology was consulted and recommended MRI/MRA when stable.  The patient was extubated in the AM on 1/17 and quickly weaned to room air.  The patient's course was c/b bradycardia to the 30s, EKG with A-fib with slow ventricular response and bifascicular block.  The patient was started on IV dopamine, which was discontinued.  There was concern for possible sepsis contributing to his bradycardia, and he was continued on cefepime.  The patient was evaluated by EP, who also believed sepsis was contributing to the patient's slow rate.  They defer PPM placement at this time.    Currently, the patient reports feeling well without pain or discomfort.  Patient's daughter Rosas at bedside.  She states that at baseline, the patient recognizes close family members, occasionally confusing them.  He confuses people with whom he is not as close.  He is oriented to person, usually place, and she is unsure if he is oriented to year / time because she does not ask him.  Rosas reiterates full code status, stating that the patient "still has life to live."    PMHx/PSHx:   PAST MEDICAL & SURGICAL HISTORY:  Anemia  Neurogenic bladder  Hyperlipidemia  Dementia  Hypothyroid  CKD (chronic kidney disease)      Social Hx: no smoking, no EtOH use, no illicit drug use; lives with 24/7 HHA and spouse, daughter and grandson live upstairs    Allergies: Allergies    No Known Allergies    Intolerances        Medications Standing   MEDICATIONS  (STANDING):  aspirin enteric coated 81 milliGRAM(s) Oral daily  cefepime  IVPB 2000 milliGRAM(s) IV Intermittent every 12 hours  heparin  Injectable 5000 Unit(s) SubCutaneous every 8 hours  levETIRAcetam 500 milliGRAM(s) Oral two times a day  levothyroxine 150 MICROGram(s) Oral <User Schedule>      Medications PRN   MEDICATIONS  (PRN):      PHYSICAL EXAM    T(C): 36.4 (01-18-18 @ 16:30), Max: 36.7 (01-18-18 @ 12:00)  HR: 48 (01-18-18 @ 16:30) (38 - 91)  BP: 102/67 (01-18-18 @ 16:30) (84/46 - 165/74)  RR: 18 (01-18-18 @ 16:30) (13 - 33)  SpO2: 94% (01-18-18 @ 16:30) (92% - 100%)    Gen: awake, sitting up comfortably in bed, daughter at bedside assisting patient with feeds  HENT: MMM, neck soft / supple  Lymph: no LAD noted in neck  Eye: PERRL, sclerae anicteric  CV: bradycardic, irregular rhythm  Pulm: CTAB, no w/r/r auscultated  Abd: +BS, soft, NT, ND  Skin: warm, dry, varicose veins  Ext: trace pedal edema at ankle, long toenails  Neuro: following basic commands, answering questions mostly appropriately although circumferential, oriented to person, general place (believed the location was [facility] but was aware he is in a hospital), not oriented to year (thought 2017), unaware of current president  Psych: normal mood / affect    LABS   CBC                       9.4    12.1  )-----------( 166      ( 18 Jan 2018 01:01 )             27.6     CMP 01-18    142  |  105  |  39<H>  ----------------------------<  110<H>  5.1   |  21<L>  |  2.08<H>    Ca    9.5      18 Jan 2018 01:01  Phos  4.3     01-18  Mg     2.4     01-18    TPro  7.7  /  Alb  3.8  /  TBili  1.3<H>  /  DBili  x   /  AST  16  /  ALT  10  /  AlkPhos  118  01-18    PT/INR - ( 18 Jan 2018 01:01 )   PT: 13.5 sec;   INR: 1.24 ratio         PTT - ( 18 Jan 2018 01:01 )  PTT:33.3 sec    Radiology:     A/P:  89M h/o A-fib, dementia, hypothyroidism, HLD, CKD IV, neurogenic bladder c/b urinary retention s/p suprapubic catheter, anemia with prev GIB 2/2 H pylori gastritis adm to MICU 1/16 for unresponsiveness and seizure-like activity, intubated for airway protection now extubated.    #Seizure Disorder  - Continue keppra 500mg BID.  - Monitor airway.  - Supplemental O2 PRN.  Now comfortable on RA.  RVP negative.  - Consult patient's outpatient cardiologist, Anselmo Pickard ([facility], [phone]) in AM.  - vEEG with moderate diffuse slowing, negative for epileptiform activity.  - Pending MRI/MRA.  Per MICU, patient's knee replacement is MRI-compatible according to office of pt's orthopedic surgeon Khalif.  - Neuro checks q8h.  - Neuro on board, appreciate involvement.    #A-Fib / Bradycardia  - Pacer pads / atropine at bedside.  - EP consulted, appreciate involvement.  No indication for PPM placement at this time.  - Continue to monitor on tele.  - Hold AVN blockers.  - Brianna negative x2.    #Hypothyroidism  - Continue levothyroxine 150mcg q day.  - TSH 4.50.    #CKD IV  - Cr at baseline.  - Avoid nephrotoxic agents.  - Monitor Cr daily.    #Neurogenic Bladder  - Suprapubic catheter in place.  - Suprapubic catheter tube exchanged by house Urology, appreciate aid.  - In AM, please consult patient's outpatient urologist Gary Goldberg ([phone]) to make him aware of patient's admission.  - BCx / UCx / bronch Cx NGTD, continue to monitor.  - On dose-reduced cefepime (day 3) for poss infection contributing to bradycardia.  Pt noted to have triple phosphate / struvite crystals concerning for poss Proteus infection /  colonization.  Pt appears non-toxic at this time.    #Anemia - chronic, in the setting of multiple medical conditions / prev GIB 2/2 H pylori gastritis  - Monitor CBC daily.  - Keep active T+S.  - Check iron studies.    #Overgrown Toenails  - Family requesting Podiatry consult in AM for nail trimming.    #FEN  - Soft diet with ensure enlive BID per Nutrition recs.    #PPX  - HSQ.    #Code Status  - Patient remains full code.  - HCP daughter Rosas ([phone]).  Paperwork in chart.    Hailee Huffman MD  PGY-2 | Internal Medicine  [phone] / [id number] R2 Medicine Accept Note    Patient is a 89y old  Male who presents with a chief complaint of ?seizure      HPI:  89M h/o A-fib, dementia, hypothyroidism, HLD, CKD IV, neurogenic bladder c/b urinary retention s/p suprapubic catheter, anemia with prev GIB 2/2 H pylori gastritis adm to MICU 1/16 for unresponsiveness and seizure-like activity, intubated for airway protection now extubated.    Prior to admission, the patient was noted to be lethargic on the day of admission.  He then had 4-5 witnessed episodes of seizure-like activity (3 witnessed by EMS, 1 in ED).  In the ED, the patient had 30 seconds of bilateral UE tonic-clonic movements with head turning to the L, lasting for about 30 seconds.  The family reports that the patient's turned red in the face during these episodes until the shaking stopped, with violent coughing after these episodes.  In the ED, the patient was unresponsive after this seizure-like activity and was intubated for airway protection.  He was then noted to be hypothermic and started on a warming blanket.  He also received hydrocortisone 100mg x1.  CTH with extensive microvascular disease without acute pathology.  The patient was admitted to the MICU.    During his stay in the MICU, the patient underwent vEEG, which showed no epileptiform activity.  The patient was initially started on valproic acid and switched to keppra.  Neurology was consulted and recommended MRI/MRA when stable.  The patient was extubated in the AM on 1/17 and quickly weaned to room air.  The patient's course was c/b bradycardia to the 30s, EKG with A-fib with slow ventricular response and bifascicular block.  The patient was started on IV dopamine, which was discontinued.  There was concern for possible sepsis contributing to his bradycardia, and he was continued on cefepime.  The patient was evaluated by EP, who also believed sepsis was contributing to the patient's slow rate.  They defer PPM placement at this time.    Currently, the patient reports feeling well without pain or discomfort.  Patient's daughter Rosas at bedside.  She states that at baseline, the patient recognizes close family members, occasionally confusing them.  He confuses people with whom he is not as close.  He is oriented to person, usually place, and she is unsure if he is oriented to year / time because she does not ask him.  Rosas reiterates full code status, stating that the patient "still has life to live."    ROS: Attempted to obtain 10 point ROS, unable to obtain accurate ROS due to dementia    PMHx/PSHx:   PAST MEDICAL & SURGICAL HISTORY:  Anemia  Neurogenic bladder  Hyperlipidemia  Dementia  Hypothyroid  CKD (chronic kidney disease)    Social Hx: no smoking, no EtOH use, no illicit drug use; lives with 24/7 HHA and spouse, daughter and grandson live upstairs    Allergies: Allergies  No Known Allergies  Intolerances    Medications Standing   MEDICATIONS  (STANDING):  aspirin enteric coated 81 milliGRAM(s) Oral daily  cefepime  IVPB 2000 milliGRAM(s) IV Intermittent every 12 hours  heparin  Injectable 5000 Unit(s) SubCutaneous every 8 hours  levETIRAcetam 500 milliGRAM(s) Oral two times a day  levothyroxine 150 MICROGram(s) Oral <User Schedule>    Medications PRN   MEDICATIONS  (PRN):    PHYSICAL EXAM  T(C): 36.4 (01-18-18 @ 16:30), Max: 36.7 (01-18-18 @ 12:00)  HR: 48 (01-18-18 @ 16:30) (38 - 91)  BP: 102/67 (01-18-18 @ 16:30) (84/46 - 165/74)  RR: 18 (01-18-18 @ 16:30) (13 - 33)  SpO2: 94% (01-18-18 @ 16:30) (92% - 100%)    Gen: awake, sitting up comfortably in bed, daughter at bedside assisting patient with feeds  HENT: MMM, neck soft / supple  Lymph: no LAD noted in neck  Eye: PERRL, sclerae anicteric  CV: bradycardic, irregular rhythm  Pulm: CTAB, no w/r/r auscultated  Abd: +BS, soft, NT, ND  Skin: warm, dry, varicose veins  Ext: trace pedal edema at ankle, long toenails  Neuro: following basic commands, answering questions mostly appropriately although circumferential, oriented to person, general place (believed the location was [facility] but was aware he is in a hospital), not oriented to year (thought 2017), unaware of current president  Psych: normal mood / affect    LABS   CBC                       9.4    12.1  )-----------( 166      ( 18 Jan 2018 01:01 )             27.6     CMP 01-18    142  |  105  |  39<H>  ----------------------------<  110<H>  5.1   |  21<L>  |  2.08<H>    Ca    9.5      18 Jan 2018 01:01  Phos  4.3     01-18  Mg     2.4     01-18    TPro  7.7  /  Alb  3.8  /  TBili  1.3<H>  /  DBili  x   /  AST  16  /  ALT  10  /  AlkPhos  118  01-18    PT/INR - ( 18 Jan 2018 01:01 )   PT: 13.5 sec;   INR: 1.24 ratio         PTT - ( 18 Jan 2018 01:01 )  PTT:33.3 sec    Radiology: I have reviewed the available imaging    I have reviewed the ekg. Bifasicular block, afib?    A/P:  89M h/o A-fib, dementia, hypothyroidism, HLD, CKD IV, neurogenic bladder c/b urinary retention s/p suprapubic catheter, anemia with prev GIB 2/2 H pylori gastritis adm to MICU 1/16 for unresponsiveness and seizure-like activity, intubated for airway protection now extubated.    #Seizure Disorder  - Continue keppra 500mg BID.  - Monitor airway.  - Supplemental O2 PRN.  Now comfortable on RA.  RVP negative.  - Consult patient's outpatient cardiologist, Anselmo Pickard ([facility], [phone]) in AM.  - vEEG with moderate diffuse slowing, negative for epileptiform activity.  - Pending MRI/MRA.  Per MICU, patient's knee replacement is MRI-compatible according to office of pt's orthopedic surgeon Khalif.  - Neuro checks q8h.  - Neuro on board, appreciate involvement.    #A-Fib / Bradycardia  - Pacer pads / atropine at bedside.  - EP consulted, appreciate involvement.  No indication for PPM placement at this time.  - Continue to monitor on tele.  - Hold AVN blockers.  - Brianna negative x2.  - AC as per cardiology    #Hypothyroidism  - Continue levothyroxine 150mcg q day.  - TSH 4.50.    #CKD IV  - Cr at baseline.  - Avoid nephrotoxic agents.  - Monitor Cr daily.    #Neurogenic Bladder  - Suprapubic catheter in place.  - Suprapubic catheter tube exchanged by house Urology, appreciate aid.  - In AM, please consult patient's outpatient urologist Gary Goldberg ([phone]) to make him aware of patient's admission.  - BCx / UCx / bronch Cx NGTD, continue to monitor.  - On dose-reduced cefepime (day 3) for poss infection contributing to bradycardia.  Pt noted to have triple phosphate / struvite crystals concerning for poss Proteus infection /  colonization.  Pt appears non-toxic at this time.    #Anemia - chronic, in the setting of multiple medical conditions / prev GIB 2/2 H pylori gastritis  - Monitor CBC daily.  - Keep active T+S.  - Check iron studies.    #Overgrown Toenails  - Family requesting Podiatry consult in AM for nail trimming.    #FEN  - Soft diet with ensure enlive BID per Nutrition recs.    #PPX  - HSQ.    #Code Status  - Patient remains full code.  - HCP daughter Rosas ([phone]).  Paperwork in chart.    Hailee Huffman MD  PGY-2 | Internal Medicine  [phone] / [phone]    I have seen and examined the patient. I have reviewed the labs, imaging and ekg. I agree with the above note as written and have edited it where appropriate.

## 2018-01-18 NOTE — SWALLOW BEDSIDE ASSESSMENT ADULT - SWALLOW EVAL: DIAGNOSIS
Consult received and appreciated, chart reviewed. Attempted bedside swallow eval. However, per d/w MICU resident Fritz, Pt passed swallow screen, started on oral diet, formal swallow eval no longer needed. Team to d/c order, will reconsult if clinically indicated.

## 2018-01-18 NOTE — PROGRESS NOTE ADULT - SUBJECTIVE AND OBJECTIVE BOX
SUBJECTIVE: more awake and alert.  doing better      Medications:  MEDICATIONS  (STANDING):  aspirin enteric coated 81 milliGRAM(s) Oral daily  cefepime  IVPB 2000 milliGRAM(s) IV Intermittent every 12 hours  DOPamine Infusion 2.5 MICROgram(s)/kG/Min (9.103 mL/Hr) IV Continuous <Continuous>  heparin  Injectable 5000 Unit(s) SubCutaneous every 8 hours  levETIRAcetam 500 milliGRAM(s) Oral two times a day  levothyroxine 150 MICROGram(s) Oral <User Schedule>    MEDICATIONS  (PRN):      Labs:  CBC Full  -  ( 2018 01:01 )  WBC Count : 12.1 K/uL  Hemoglobin : 9.4 g/dL  Hematocrit : 27.6 %  Platelet Count - Automated : 166 K/uL  Mean Cell Volume : 89.5 fl  Mean Cell Hemoglobin : 30.3 pg  Mean Cell Hemoglobin Concentration : 33.9 gm/dL  Auto Neutrophil # : 10.8 K/uL  Auto Lymphocyte # : 0.7 K/uL  Auto Monocyte # : 0.7 K/uL  Auto Eosinophil # : 0.0 K/uL  Auto Basophil # : 0.0 K/uL  Auto Neutrophil % : 88.6 %  Auto Lymphocyte % : 5.6 %  Auto Monocyte % : 5.7 %  Auto Eosinophil % : 0.0 %  Auto Basophil % : 0.1 %    18    142  |  105  |  39<H>  ----------------------------<  110<H>  5.1   |  21<L>  |  2.08<H>    Ca    9.5      2018 01:01  Phos  4.3     -18  Mg     2.4     18    TPro  7.7  /  Alb  3.8  /  TBili  1.3<H>  /  DBili  x   /  AST  16  /  ALT  10  /  AlkPhos  118  -18    CAPILLARY BLOOD GLUCOSE        LIVER FUNCTIONS - ( 2018 01:01 )  Alb: 3.8 g/dL / Pro: 7.7 g/dL / ALK PHOS: 118 U/L / ALT: 10 U/L RC / AST: 16 U/L / GGT: x           PT/INR - ( 2018 01:01 )   PT: 13.5 sec;   INR: 1.24 ratio         PTT - ( 2018 01:01 )  PTT:33.3 sec  Urinalysis Basic - ( 2018 16:14 )    Color: Yellow / Appearance: SL Turbid / S.017 / pH: x  Gluc: x / Ketone: Negative  / Bili: Negative / Urobili: Negative   Blood: x / Protein: 300 mg/dL / Nitrite: Negative   Leuk Esterase: Large / RBC: 0-2 /HPF / WBC 0-2 /HPF   Sq Epi: x / Non Sq Epi: x / Bacteria: Few /HPF        Vitals:  Vital Signs Last 24 Hrs  T(C): 36.5 (2018 07:32), Max: 36.6 (2018 16:00)  T(F): 97.7 (2018 07:32), Max: 97.9 (2018 16:00)  HR: 62 (2018 11:00) (38 - 91)  BP: 145/75 (2018 11:00) (84/46 - 165/74)  BP(mean): 104 (2018 11:00) (59 - 106)  RR: 18 (2018 11:00) (13 - 34)  SpO2: 93% (2018 11:00) (93% - 100%)      General Exam:   Neurology  MS: awake alert, verbal O to name, place, follows simple commands  CN: PERRL, EOMI, VFF, no facial asymmetry  Motor: 5/5 power.  no drift.  FFM equal  Sensory intact LT  Coordination: FNF intact  Gait deferred      Labs:     CBC Full  -  ( 2018 12:34 )  WBC Count : 5.7 K/uL  Hemoglobin : 9.2 g/dL  Hematocrit : 30.3 %  Platelet Count - Automated : 146 K/uL  Mean Cell Volume : 95.2 fl  Mean Cell Hemoglobin : 28.8 pg  Mean Cell Hemoglobin Concentration : 30.3 gm/dL  Auto Neutrophil # : 3.8 K/uL  Auto Lymphocyte # : 1.4 K/uL  Auto Monocyte # : 0.4 K/uL  Auto Eosinophil # : 0.1 K/uL  Auto Basophil # : 0.0 K/uL  Auto Neutrophil % : 65.9 %  Auto Lymphocyte % : 24.4 %  Auto Monocyte % : 7.7 %  Auto Eosinophil % : 1.4 %  Auto Basophil % : 0.6 %            PT/INR - ( 2018 12:34 )   PT: 13.9 sec;   INR: 1.27 ratio         PTT - ( 2018 12:34 )  PTT:34.7 sec

## 2018-01-18 NOTE — PROGRESS NOTE ADULT - ASSESSMENT
89 y/o M here with ams with episodes of shaking movements of UE with head turn to left, s/p versed and depakote load.  Currently already extubated, awake, o to name, follows commands.  Concern for sepsis.  CT head without acute changes with current nonfocal exam.  Though seizures reported it would be unusual to have status epilepticus in first place and to be recovered and extubated already.  Convulsive syncope possible with sepsis, bradycardia documented.      However, further hx obtained from son more compelling for true tonic epileptic events with whole body stiffening, turning "beet red"  Seizure remains on differential, likely from sepsis with brain ischemic focus    -  VEEG neg  - d/c depakote and start keppra 500 mg bid given seizures are controlled and lower side effect and drug interaction profile of keppra.  - will check MRI brain to look for seizure focus, likely ischemic    pt critically ill and potentially unstable.  total time 35 min greater than 50% coordinating and counseling above.  d/w son and ICU team

## 2018-01-18 NOTE — DIETITIAN INITIAL EVALUATION ADULT. - OTHER INFO
Pt seen for:  MICU length of stay.  Adm dx:  aris is a 89 yo M with ?Afib, Dementia, Hypothyroid, HLD, CKD, anemia w/ history of GIB 2/2 H Pylori Gastritis, Urinary retention d/t neurogenic bladder s/p suprapubic catheter presenting to ED for possible seizure and unresponsiveness in setting of sepsis, extubated 1/17   GI issues: no N/V/D/C  Last BM: none since adm     Food allergies: NKFA    Vit/supplement PTA: none Pt seen for:  MICU length of stay.  Adm dx:  Patient is a 89 yo M with ?Afib, Dementia, Hypothyroid, HLD, CKD, anemia w/ history of GIB 2/2 H Pylori Gastritis, Urinary retention d/t neurogenic bladder s/p suprapubic catheter presenting to ED for possible seizure and unresponsiveness in setting of sepsis, extubated 1/17   GI issues: no N/V/D/C  Last BM: none since adm     Food allergies: NKFA    Vit/supplement PTA: none

## 2018-01-19 DIAGNOSIS — N31.9 NEUROMUSCULAR DYSFUNCTION OF BLADDER, UNSPECIFIED: ICD-10-CM

## 2018-01-19 DIAGNOSIS — E03.9 HYPOTHYROIDISM, UNSPECIFIED: ICD-10-CM

## 2018-01-19 DIAGNOSIS — G40.909 EPILEPSY, UNSPECIFIED, NOT INTRACTABLE, WITHOUT STATUS EPILEPTICUS: ICD-10-CM

## 2018-01-19 DIAGNOSIS — Z29.9 ENCOUNTER FOR PROPHYLACTIC MEASURES, UNSPECIFIED: ICD-10-CM

## 2018-01-19 DIAGNOSIS — T83.510A INFECTION AND INFLAMMATORY REACTION DUE TO CYSTOSTOMY CATHETER, INITIAL ENCOUNTER: ICD-10-CM

## 2018-01-19 DIAGNOSIS — N18.4 CHRONIC KIDNEY DISEASE, STAGE 4 (SEVERE): ICD-10-CM

## 2018-01-19 LAB
-  AMPICILLIN: SIGNIFICANT CHANGE UP
-  CIPROFLOXACIN: SIGNIFICANT CHANGE UP
-  NITROFURANTOIN: SIGNIFICANT CHANGE UP
-  TETRACYCLINE: SIGNIFICANT CHANGE UP
-  VANCOMYCIN: SIGNIFICANT CHANGE UP
ANION GAP SERPL CALC-SCNC: 13 MMOL/L — SIGNIFICANT CHANGE UP (ref 5–17)
ANION GAP SERPL CALC-SCNC: 16 MMOL/L — SIGNIFICANT CHANGE UP (ref 5–17)
APPEARANCE UR: ABNORMAL
APTT BLD: 30.4 SEC — SIGNIFICANT CHANGE UP (ref 27.5–37.4)
BACTERIA # UR AUTO: ABNORMAL /HPF
BILIRUB UR-MCNC: NEGATIVE — SIGNIFICANT CHANGE UP
BLD GP AB SCN SERPL QL: NEGATIVE — SIGNIFICANT CHANGE UP
BUN SERPL-MCNC: 46 MG/DL — HIGH (ref 7–23)
BUN SERPL-MCNC: 46 MG/DL — HIGH (ref 7–23)
CALCIUM SERPL-MCNC: 9.2 MG/DL — SIGNIFICANT CHANGE UP (ref 8.4–10.5)
CALCIUM SERPL-MCNC: 9.4 MG/DL — SIGNIFICANT CHANGE UP (ref 8.4–10.5)
CHLORIDE SERPL-SCNC: 108 MMOL/L — SIGNIFICANT CHANGE UP (ref 96–108)
CHLORIDE SERPL-SCNC: 108 MMOL/L — SIGNIFICANT CHANGE UP (ref 96–108)
CO2 SERPL-SCNC: 18 MMOL/L — LOW (ref 22–31)
CO2 SERPL-SCNC: 22 MMOL/L — SIGNIFICANT CHANGE UP (ref 22–31)
COLOR SPEC: SIGNIFICANT CHANGE UP
CREAT ?TM UR-MCNC: 50 MG/DL — SIGNIFICANT CHANGE UP
CREAT SERPL-MCNC: 2.06 MG/DL — HIGH (ref 0.5–1.3)
CREAT SERPL-MCNC: 2.33 MG/DL — HIGH (ref 0.5–1.3)
CULTURE RESULTS: SIGNIFICANT CHANGE UP
DIFF PNL FLD: ABNORMAL
EPI CELLS # UR: SIGNIFICANT CHANGE UP /HPF
FERRITIN SERPL-MCNC: 165 NG/ML — SIGNIFICANT CHANGE UP (ref 30–400)
GAS PNL BLDV: SIGNIFICANT CHANGE UP
GLUCOSE BLDC GLUCOMTR-MCNC: 100 MG/DL — HIGH (ref 70–99)
GLUCOSE BLDC GLUCOMTR-MCNC: 88 MG/DL — SIGNIFICANT CHANGE UP (ref 70–99)
GLUCOSE BLDC GLUCOMTR-MCNC: 92 MG/DL — SIGNIFICANT CHANGE UP (ref 70–99)
GLUCOSE SERPL-MCNC: 77 MG/DL — SIGNIFICANT CHANGE UP (ref 70–99)
GLUCOSE SERPL-MCNC: 96 MG/DL — SIGNIFICANT CHANGE UP (ref 70–99)
GLUCOSE UR QL: NEGATIVE — SIGNIFICANT CHANGE UP
HCT VFR BLD CALC: 29.4 % — LOW (ref 39–50)
HGB BLD-MCNC: 9 G/DL — LOW (ref 13–17)
INR BLD: 1.15 RATIO — SIGNIFICANT CHANGE UP (ref 0.88–1.16)
IRON SATN MFR SERPL: 23 UG/DL — LOW (ref 45–165)
IRON SATN MFR SERPL: 7 % — LOW (ref 16–55)
KETONES UR-MCNC: ABNORMAL
LEUKOCYTE ESTERASE UR-ACNC: ABNORMAL
MAGNESIUM SERPL-MCNC: 2.2 MG/DL — SIGNIFICANT CHANGE UP (ref 1.6–2.6)
MAGNESIUM SERPL-MCNC: 2.5 MG/DL — SIGNIFICANT CHANGE UP (ref 1.6–2.6)
MCHC RBC-ENTMCNC: 27 PG — SIGNIFICANT CHANGE UP (ref 27–34)
MCHC RBC-ENTMCNC: 30.6 GM/DL — LOW (ref 32–36)
MCV RBC AUTO: 88.3 FL — SIGNIFICANT CHANGE UP (ref 80–100)
METHOD TYPE: SIGNIFICANT CHANGE UP
NITRITE UR-MCNC: NEGATIVE — SIGNIFICANT CHANGE UP
ORGANISM # SPEC MICROSCOPIC CNT: SIGNIFICANT CHANGE UP
ORGANISM # SPEC MICROSCOPIC CNT: SIGNIFICANT CHANGE UP
PH UR: 6 — SIGNIFICANT CHANGE UP (ref 5–8)
PHOSPHATE SERPL-MCNC: 3.2 MG/DL — SIGNIFICANT CHANGE UP (ref 2.5–4.5)
PHOSPHATE SERPL-MCNC: 3.3 MG/DL — SIGNIFICANT CHANGE UP (ref 2.5–4.5)
PLATELET # BLD AUTO: 160 K/UL — SIGNIFICANT CHANGE UP (ref 150–400)
POTASSIUM SERPL-MCNC: 4.7 MMOL/L — SIGNIFICANT CHANGE UP (ref 3.5–5.3)
POTASSIUM SERPL-MCNC: 5.7 MMOL/L — HIGH (ref 3.5–5.3)
POTASSIUM SERPL-SCNC: 4.7 MMOL/L — SIGNIFICANT CHANGE UP (ref 3.5–5.3)
POTASSIUM SERPL-SCNC: 5.7 MMOL/L — HIGH (ref 3.5–5.3)
PROT UR-MCNC: 30 MG/DL
PROTHROM AB SERPL-ACNC: 13 SEC — SIGNIFICANT CHANGE UP (ref 10–13.1)
RBC # BLD: 3.33 M/UL — LOW (ref 4.2–5.8)
RBC # FLD: 15.1 % — HIGH (ref 10.3–14.5)
RBC CASTS # UR COMP ASSIST: ABNORMAL /HPF (ref 0–2)
RH IG SCN BLD-IMP: POSITIVE — SIGNIFICANT CHANGE UP
SODIUM SERPL-SCNC: 142 MMOL/L — SIGNIFICANT CHANGE UP (ref 135–145)
SODIUM SERPL-SCNC: 143 MMOL/L — SIGNIFICANT CHANGE UP (ref 135–145)
SODIUM UR-SCNC: 150 MMOL/L — SIGNIFICANT CHANGE UP
SP GR SPEC: 1.01 — SIGNIFICANT CHANGE UP (ref 1.01–1.02)
SPECIMEN SOURCE: SIGNIFICANT CHANGE UP
TIBC SERPL-MCNC: 323 UG/DL — SIGNIFICANT CHANGE UP (ref 220–430)
UIBC SERPL-MCNC: 300 UG/DL — SIGNIFICANT CHANGE UP (ref 110–370)
UROBILINOGEN FLD QL: NEGATIVE — SIGNIFICANT CHANGE UP
UUN UR-MCNC: 531 MG/DL — SIGNIFICANT CHANGE UP
WBC # BLD: 8.47 K/UL — SIGNIFICANT CHANGE UP (ref 3.8–10.5)
WBC # FLD AUTO: 8.47 K/UL — SIGNIFICANT CHANGE UP (ref 3.8–10.5)
WBC UR QL: >50 /HPF (ref 0–5)

## 2018-01-19 PROCEDURE — 99233 SBSQ HOSP IP/OBS HIGH 50: CPT | Mod: GC

## 2018-01-19 PROCEDURE — 93010 ELECTROCARDIOGRAM REPORT: CPT

## 2018-01-19 PROCEDURE — 70551 MRI BRAIN STEM W/O DYE: CPT | Mod: 26

## 2018-01-19 PROCEDURE — 99231 SBSQ HOSP IP/OBS SF/LOW 25: CPT

## 2018-01-19 RX ORDER — SODIUM POLYSTYRENE SULFONATE 4.1 MEQ/G
15 POWDER, FOR SUSPENSION ORAL ONCE
Qty: 0 | Refills: 0 | Status: COMPLETED | OUTPATIENT
Start: 2018-01-19 | End: 2018-01-19

## 2018-01-19 RX ORDER — SODIUM CHLORIDE 9 MG/ML
1000 INJECTION, SOLUTION INTRAVENOUS
Qty: 0 | Refills: 0 | Status: DISCONTINUED | OUTPATIENT
Start: 2018-01-19 | End: 2018-01-21

## 2018-01-19 RX ADMIN — Medication 81 MILLIGRAM(S): at 11:14

## 2018-01-19 RX ADMIN — LEVETIRACETAM 500 MILLIGRAM(S): 250 TABLET, FILM COATED ORAL at 17:44

## 2018-01-19 RX ADMIN — CEFEPIME 100 MILLIGRAM(S): 1 INJECTION, POWDER, FOR SOLUTION INTRAMUSCULAR; INTRAVENOUS at 12:48

## 2018-01-19 RX ADMIN — HEPARIN SODIUM 5000 UNIT(S): 5000 INJECTION INTRAVENOUS; SUBCUTANEOUS at 22:01

## 2018-01-19 RX ADMIN — HEPARIN SODIUM 5000 UNIT(S): 5000 INJECTION INTRAVENOUS; SUBCUTANEOUS at 12:48

## 2018-01-19 RX ADMIN — Medication 150 MICROGRAM(S): at 05:21

## 2018-01-19 RX ADMIN — CEFEPIME 100 MILLIGRAM(S): 1 INJECTION, POWDER, FOR SOLUTION INTRAMUSCULAR; INTRAVENOUS at 01:00

## 2018-01-19 RX ADMIN — HEPARIN SODIUM 5000 UNIT(S): 5000 INJECTION INTRAVENOUS; SUBCUTANEOUS at 05:22

## 2018-01-19 RX ADMIN — SODIUM CHLORIDE 75 MILLILITER(S): 9 INJECTION, SOLUTION INTRAVENOUS at 19:54

## 2018-01-19 RX ADMIN — SODIUM POLYSTYRENE SULFONATE 15 GRAM(S): 4.1 POWDER, FOR SUSPENSION ORAL at 15:09

## 2018-01-19 RX ADMIN — LEVETIRACETAM 500 MILLIGRAM(S): 250 TABLET, FILM COATED ORAL at 05:21

## 2018-01-19 NOTE — PROGRESS NOTE ADULT - PROBLEM SELECTOR PLAN 3
Asymptomatic, Bradycardic to 30s to 50s with EKG showing prolonged QTc, interpreted as afib with slow ventricular response and bifascicular block.    - Appreciate EP eval Asymptomatic, Bradycardic to 30s to 50s with EKG showing prolonged QTc, interpreted as afib with slow ventricular response and bifascicular block. HR 50-60s overnight.   - Appreciate EP eval, continue to monitor on telemetry. No plans for pacemaker at this time.   - Avoid AVN blockers   - Will follow up with outpatient cardiologist, Anselmo Pickard of [facility] [phone]

## 2018-01-19 NOTE — PROGRESS NOTE ADULT - PROBLEM SELECTOR PLAN 2
Baseline Dementia with new onset ?seizures described as b/l UE tonic clonic lasting 20 to 30 seconds total 4 to 5 episodes. CTH showed extensive microvascular disease but no acute pathology. On Trazadone at home for sleep.   - vEEG negative for seizure activity.   - Continue Keppra 500mg BID per Neuro   - Follow up MRI/MRA Head to evaluate for ischemic focus   - Neuro checks Q4 Baseline Dementia with new onset ?seizures described as b/l UE tonic clonic lasting 20 to 30 seconds total 4 to 5 episodes. CTH showed extensive microvascular disease but no acute pathology. On Trazadone at home for sleep.   - Follow up MRI/MRA Head to evaluate for ischemic focus   - vEEG negative for seizure activity.   - Continue Keppra 500mg BID per Neuro   - Neuro checks Q4

## 2018-01-19 NOTE — CONSULT NOTE ADULT - SUBJECTIVE AND OBJECTIVE BOX
HPI  Patient is a 89 yo M with ?Afib, Dementia, Hypothyroid, HLD, CKD, anemia w/ history of 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 As per family and ED, patient was doing relatively well until earlier today when he was noted to be very lethargic. Patient then had total of 4 to 5 witnessed episodes of seizure like activity (3 of which witnessed by EMS and 1 witnessed in ED). Patient was given Versed 10mg x1 by EMS prior to arrival. Witnessed episode in ED was described as 30 seconds of bilateral UE tonic clonic with head turning to Left lasting for about 30 seconds. Family also reports that patient turned red in the face during these episodes until shaking stopped. Also noted violent coughing after these episodes. Patient remained unresponsive afterwards and was intubated. Patient was found to be hypothermic at this point and started on rewarming blanket. Patient was then noted to become bradycardic to 40s-50s with ectopy and was given Amiodarone IVP.     Labs notable for no leukocytosis WBC 5.7, Hb 9.2, platelets 146, INR 1.27, Creatinine 2.04, , T Bili 1.7, Alk P 130, , CE negative x1, BNP 4325. Initial VBG 6.9/85/29/16 with Lactate of 12.8. Most recent ABG was 7.29/42/297/19 with Lactate of 7.3. In the ED, patient received Vanco x1, Cefepime x1, Bicarb 50mEQ x2, Hydrocortisone 100mg x1, Calcium chloride 1000mg x1, Amiodarone 150mg IV x1 and started on Amiodarone gtt.     On chart review, patient was last sen by house calls doctor on 12/14/17 during which it was noted that patient had been very confused for the past 3 weeks prior to that visit as per regular HHA with improvement of symptoms after changing of suprapubic catheter.       PAST MEDICAL & SURGICAL HISTORY:  Anemia  Neurogenic bladder  Hyperlipidemia  Dementia  Hypothyroid  CKD (chronic kidney disease)      MEDICATIONS  (STANDING):  aspirin enteric coated 81 milliGRAM(s) Oral daily  cefepime  IVPB 2000 milliGRAM(s) IV Intermittent every 12 hours  heparin  Injectable 5000 Unit(s) SubCutaneous every 8 hours  levETIRAcetam 500 milliGRAM(s) Oral two times a day  levothyroxine 150 MICROGram(s) Oral <User Schedule>    MEDICATIONS  (PRN):      FAMILY HISTORY: non contributory      Allergies    No Known Allergies    Intolerances        SOCIAL HISTORY:  no tob etoh     REVIEW OF SYSTEMS: Otherwise negative as stated in HPI    Physical Exam  Vital signs  T(C): 36.3 (01-19-18 @ 08:31), Max: 36.7 (01-18-18 @ 12:00)  HR: 57 (01-19-18 @ 08:31)  BP: 169/81 (01-19-18 @ 08:31)  SpO2: 96% (01-19-18 @ 08:31)  Wt(kg): --      Gen:  elderly male responds to stimuli  heent ncat neck symm cor reg  chest clear  abd with spt urine clear  no cvat no susan  gu 2+ prost  ext no cce    LABS:      01-18 @ 01:01    WBC 12.1  / Hct 27.6  / SCr 2.08     01-17 @ 09:22    WBC --    / Hct --    / SCr 2.03     01-18    142  |  105  |  39<H>  ----------------------------<  110<H>  5.1   |  21<L>  |  2.08<H>    Ca    9.5      18 Jan 2018 01:01  Phos  4.3     01-18  Mg     2.4     01-18    TPro  7.7  /  Alb  3.8  /  TBili  1.3<H>  /  DBili  x   /  AST  16  /  ALT  10  /  AlkPhos  118  01-18    PT/INR - ( 18 Jan 2018 01:01 )   PT: 13.5 sec;   INR: 1.24 ratio         PTT - ( 18 Jan 2018 01:01 )  PTT:33.3 sec

## 2018-01-19 NOTE — PROGRESS NOTE ADULT - ASSESSMENT
Patient is a 89 yo M with PMH Afib (not on A/C), Dementia, Hypothyroid, HLD, CKD, anemia w/ history of GIB 2/2 H Pylori Gastritis, Urinary retention d/t neurogenic bladder s/p suprapubic catheter presenting to ED for possible seizure and unresponsiveness in setting of sepsis (more likely in setting of UA w Leuk Est large, Triple Phosphate Crystals moderate, US Renal w stones) vs myxedema coma (less likely in setting of TSH 4.50). Extubated 1/17. Evidence of advanced conduction disease with RBBB and rate controlled Afib in the setting of sepsis.

## 2018-01-19 NOTE — PROVIDER CONTACT NOTE (OTHER) - SITUATION
LAB REPORT CORRECTION: bronchial combicath culture previously reported growth of pseudomonas aeroginosa. CORRECTION is no growth at 48 hr

## 2018-01-19 NOTE — PROGRESS NOTE ADULT - PROBLEM SELECTOR PLAN 6
DVT PPX - SQH  Diet - Mechanical Soft  Dispo - Consult PT for evaluation.      Maria Luisa Ardon D.O.  Medicine Team 3 Intern  Pager [phone]  [Covered by pager [phone] after 7pm]

## 2018-01-19 NOTE — PROGRESS NOTE ADULT - PROBLEM SELECTOR PLAN 1
-cont' telemetry monitor  -asymptomatic bradycardia  -Defer pacemaker at this time.    DIYA Dillon, ANP  [phone]

## 2018-01-19 NOTE — PROGRESS NOTE ADULT - PROBLEM SELECTOR PLAN 1
Patient has neurogenic bladder s/p suprapubic catheter who presented in sepsis secondary to UTI. Given +Leukocyte Esterase and presence of triple phsophate crystals, suspect causative organism proteus mirabilis or klebsiella.    - Continue Cefepime D2. Patient has neurogenic bladder s/p suprapubic catheter who presented in sepsis secondary to UTI. Given +Leukocyte Esterase and presence of triple phosphate crystals, suspect causative organism proteus mirabilis or klebsiella.    - Continue Cefepime D3/5.

## 2018-01-19 NOTE — PROVIDER CONTACT NOTE (CRITICAL VALUE NOTIFICATION) - BACKGROUND
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 Pt has a hx of neurogenic bladder, anemia, hld, dementia, hypothyroid, and CKD.

## 2018-01-19 NOTE — PROGRESS NOTE ADULT - ASSESSMENT
89 y/o M here with ams with episodes of shaking movements of UE with head turn to left, s/p versed and depakote load.  Currently already extubated, awake, o to name, follows commands.  Concern for sepsis.  CT head without acute changes with current nonfocal exam.  Though seizures reported it would be unusual to have status epilepticus in first place and to be recovered and extubated already.  Convulsive syncope possible with sepsis, bradycardia documented.      However, further hx obtained from son more compelling for true tonic epileptic events with whole body stiffening, turning "beet red"  Seizure remains on differential, likely from sepsis with brain ischemic focus    -  VEEG neg  - d/c'd depakote and started keppra 500 mg bid given seizures are controlled and lower side effect and drug interaction profile of keppra.  - will check MRI brain to look for seizure focus, likely ischemic

## 2018-01-19 NOTE — PROGRESS NOTE ADULT - SUBJECTIVE AND OBJECTIVE BOX
Patient is a 89y old  Male who presents with a chief complaint of ?seizure (16 Jan 2018 13:22)      OVERNIGHT EVENTS: No acute events overnight.  SUBJECTIVE: Patient seen and examined at bedside. Stable, denies current complaints of CP, SOB, abdominal pain/N/V. Tolerating Diet.      MEDICATIONS  (STANDING):  aspirin enteric coated 81 milliGRAM(s) Oral daily  cefepime  IVPB 2000 milliGRAM(s) IV Intermittent every 12 hours  heparin  Injectable 5000 Unit(s) SubCutaneous every 8 hours  levETIRAcetam 500 milliGRAM(s) Oral two times a day  levothyroxine 150 MICROGram(s) Oral <User Schedule>    MEDICATIONS  (PRN):      T(C): 36.3 (01-19-18 @ 08:31), Max: 36.7 (01-18-18 @ 12:00)  HR: 61 (01-19-18 @ 09:00) (48 - 69)  BP: 121/77 (01-19-18 @ 09:00) (102/67 - 169/81)  RR: 18 (01-19-18 @ 08:31) (16 - 23)  SpO2: 96% (01-19-18 @ 08:31) (92% - 98%)  CAPILLARY BLOOD GLUCOSE      POCT Blood Glucose.: 100 mg/dL (19 Jan 2018 07:48)    I&O's Summary    18 Jan 2018 07:01  -  19 Jan 2018 07:00  --------------------------------------------------------  IN: 63.7 mL / OUT: 700 mL / NET: -636.3 mL        PHYSICAL EXAM  GENERAL: NAD, well-developed  HEAD:  Atraumatic, Normocephalic  EYES: EOMI, PERRLA, conjunctiva and sclera clear  CHEST/LUNG: Clear to auscultation bilaterally; No wheeze  HEART: +S1 +S2, Regular rate and rhythm. +Systolic murmur.  ABDOMEN: Soft, Nontender, Nondistended; Bowel sounds present  : +suprapubic catheter, C/D/I with clear urine in bag.   NEURO: No focal neurologic deficits, sensation and motor function grossly intact.   EXTREMITIES:  Pink and warm, Peripheral Pulses intact. +B/L LE Edema.       LABS:                        9.0    8.47  )-----------( 160      ( 19 Jan 2018 09:03 )             29.4     01-19    143  |  108  |  46<H>  ----------------------------<  77  5.7<H>   |  22  |  2.33<H>    Ca    9.4      19 Jan 2018 09:03  Phos  3.3     01-19  Mg     2.5     01-19    TPro  7.7  /  Alb  3.8  /  TBili  1.3<H>  /  DBili  x   /  AST  16  /  ALT  10  /  AlkPhos  118  01-18    PT/INR - ( 19 Jan 2018 09:03 )   PT: 13.0 sec;   INR: 1.15 ratio         PTT - ( 19 Jan 2018 09:03 )  PTT:30.4 sec          RADIOLOGY & ADDITIONAL TESTS:  < from: US Renal (01.17.18 @ 16:12) >  IMPRESSION:     Limited portable ultrasound. The left kidney is not well seen and   evaluation of the bladder is limited.    No hydronephrosis. Increased cortical echogenicity bilaterally, which may   be secondary to medical renal disease.    Incidental note is made of cholelithiasis.    < end of copied text >      Imaging Personally Reviewed:  Consultant(s) Notes Reviewed:    Care Discussed with Consultants/Other Providers:

## 2018-01-19 NOTE — PROGRESS NOTE ADULT - ATTENDING COMMENTS
Corrected Bronch culture report noted. Continue IV Abx for now and monitor cbc- may consider after 5 days completion. Neuro note appreciated. check MRI of brain. continue Keppra and monitor neuro status..

## 2018-01-19 NOTE — PROGRESS NOTE ADULT - ASSESSMENT
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 Extubated 1/17. Intermittently bradycardic as low as 30s, EKG suggesting Afib w slow ventricular response (monitor sometimes looks like Aflutter). Stable for transfer to floor on 1/19.

## 2018-01-19 NOTE — PROGRESS NOTE ADULT - SUBJECTIVE AND OBJECTIVE BOX
SUBJECTIVE: more awake and alert.  doing better.  no new events.  no seizures    Medications:  MEDICATIONS  (STANDING):  aspirin enteric coated 81 milliGRAM(s) Oral daily  cefepime  IVPB 2000 milliGRAM(s) IV Intermittent every 12 hours  DOPamine Infusion 2.5 MICROgram(s)/kG/Min (9.103 mL/Hr) IV Continuous <Continuous>  heparin  Injectable 5000 Unit(s) SubCutaneous every 8 hours  levETIRAcetam 500 milliGRAM(s) Oral two times a day  levothyroxine 150 MICROGram(s) Oral <User Schedule>    MEDICATIONS  (PRN):      Labs:  CBC Full  -  ( 2018 01:01 )  WBC Count : 12.1 K/uL  Hemoglobin : 9.4 g/dL  Hematocrit : 27.6 %  Platelet Count - Automated : 166 K/uL  Mean Cell Volume : 89.5 fl  Mean Cell Hemoglobin : 30.3 pg  Mean Cell Hemoglobin Concentration : 33.9 gm/dL  Auto Neutrophil # : 10.8 K/uL  Auto Lymphocyte # : 0.7 K/uL  Auto Monocyte # : 0.7 K/uL  Auto Eosinophil # : 0.0 K/uL  Auto Basophil # : 0.0 K/uL  Auto Neutrophil % : 88.6 %  Auto Lymphocyte % : 5.6 %  Auto Monocyte % : 5.7 %  Auto Eosinophil % : 0.0 %  Auto Basophil % : 0.1 %        142  |  105  |  39<H>  ----------------------------<  110<H>  5.1   |  21<L>  |  2.08<H>    Ca    9.5      2018 01:01  Phos  4.3     -18  Mg     2.4         TPro  7.7  /  Alb  3.8  /  TBili  1.3<H>  /  DBili  x   /  AST  16  /  ALT  10  /  AlkPhos  118  -18    CAPILLARY BLOOD GLUCOSE        LIVER FUNCTIONS - ( 2018 01:01 )  Alb: 3.8 g/dL / Pro: 7.7 g/dL / ALK PHOS: 118 U/L / ALT: 10 U/L RC / AST: 16 U/L / GGT: x           PT/INR - ( 2018 01:01 )   PT: 13.5 sec;   INR: 1.24 ratio         PTT - ( 2018 01:01 )  PTT:33.3 sec  Urinalysis Basic - ( 2018 16:14 )    Color: Yellow / Appearance: SL Turbid / S.017 / pH: x  Gluc: x / Ketone: Negative  / Bili: Negative / Urobili: Negative   Blood: x / Protein: 300 mg/dL / Nitrite: Negative   Leuk Esterase: Large / RBC: 0-2 /HPF / WBC 0-2 /HPF   Sq Epi: x / Non Sq Epi: x / Bacteria: Few /HPF        Vitals:  Vital Signs Last 24 Hrs  T(C): 36.5 (2018 07:32), Max: 36.6 (2018 16:00)  T(F): 97.7 (2018 07:32), Max: 97.9 (2018 16:00)  HR: 62 (2018 11:00) (38 - 91)  BP: 145/75 (2018 11:00) (84/46 - 165/74)  BP(mean): 104 (2018 11:00) (59 - 106)  RR: 18 (2018 11:00) (13 - 34)  SpO2: 93% (2018 11:00) (93% - 100%)      General Exam:   Neurology  MS: awake alert, verbal O to name, place, follows simple commands  CN: PERRL, EOMI, VFF, no facial asymmetry  Motor: 5/5 power.  no drift.  FFM equal  Sensory intact LT  Coordination: FNF intact  Gait deferred      Labs:     CBC Full  -  ( 2018 12:34 )  WBC Count : 5.7 K/uL  Hemoglobin : 9.2 g/dL  Hematocrit : 30.3 %  Platelet Count - Automated : 146 K/uL  Mean Cell Volume : 95.2 fl  Mean Cell Hemoglobin : 28.8 pg  Mean Cell Hemoglobin Concentration : 30.3 gm/dL  Auto Neutrophil # : 3.8 K/uL  Auto Lymphocyte # : 1.4 K/uL  Auto Monocyte # : 0.4 K/uL  Auto Eosinophil # : 0.1 K/uL  Auto Basophil # : 0.0 K/uL  Auto Neutrophil % : 65.9 %  Auto Lymphocyte % : 24.4 %  Auto Monocyte % : 7.7 %  Auto Eosinophil % : 1.4 %  Auto Basophil % : 0.6 %            PT/INR - ( 2018 12:34 )   PT: 13.9 sec;   INR: 1.27 ratio         PTT - ( 2018 12:34 )  PTT:34.7 sec

## 2018-01-19 NOTE — PROVIDER CONTACT NOTE (CRITICAL VALUE NOTIFICATION) - ASSESSMENT
Pt A&Ox1. Pt has a suprapubic catheter for neurogenic bladder. Pt is on iv cefepime. Pt has a low body temperature. Pt is currently refusing the hyperthermia blanket.

## 2018-01-19 NOTE — PROGRESS NOTE ADULT - SUBJECTIVE AND OBJECTIVE BOX
INTERVAL HPI/OVERNIGHT EVENTS: no acute event overnight    MEDICATIONS  (STANDING):  aspirin enteric coated 81 milliGRAM(s) Oral daily  cefepime  IVPB 2000 milliGRAM(s) IV Intermittent every 12 hours  heparin  Injectable 5000 Unit(s) SubCutaneous every 8 hours  levETIRAcetam 500 milliGRAM(s) Oral two times a day  levothyroxine 150 MICROGram(s) Oral <User Schedule>      Allergies    No Known Allergies      ROS:  General: Pt denies recent weight loss/fever/chills    Neurological: denies numbness or  sensation loss    HEENT: denies visual changes, no hearing loss, denies sore throat    Cardiovascular: denies chest pain/palpitations/leg edema    Respiratory and Thorax: denies SOB/cough/wheezing    Gastrointestinal: denies abdominal pain/diarrhea/constipation/bloody stool    Genitourinary: denies urinary frequency/urgency/ dysuria    Musculoskeletal: denies joint pain or swelling, denies restricted motion    Skin: denies rashes/sores    Endocrine: denies heat or cold intolerance/excessive thirst    Hematologic: denies abnormal bleeding  	    Vital Signs Last 24 Hrs  T(C): 36.3 (19 Jan 2018 08:31), Max: 36.7 (18 Jan 2018 12:00)  T(F): 97.4 (19 Jan 2018 08:31), Max: 98 (18 Jan 2018 12:00)  HR: 61 (19 Jan 2018 09:00) (48 - 69)  BP: 121/77 (19 Jan 2018 09:00) (102/67 - 169/81)  BP(mean): 89 (18 Jan 2018 15:30) (89 - 111)  RR: 18 (19 Jan 2018 08:31) (16 - 23)  SpO2: 96% (19 Jan 2018 08:31) (92% - 98%)      Physical Exam:    Constitutional: well developed, well nourished, no deformities and no acute distress    Neurological: Alert & Oriented x 2-3  -  HEENT: Neck supple    Respiratory: CTA with diminished BS at b/l base     Cardiovascular: (+) S1 & S2, irreg irreg, III/VI murmur     Gastrointestinal: soft, NT, nondistended, (+) BS    Genitourinary: non distended bladder, Donis in place     Extremities: No pedal edema, No clubbing, No cyanosis    Skin:  normal skin color and pigmentation, no skin lesions      LABS:                        9.0    8.47  )-----------( 160      ( 19 Jan 2018 09:03 )             29.4     01-19    143  |  108  |  46<H>  ----------------------------<  77  5.7<H>   |  22  |  2.33<H>    Ca    9.4      19 Jan 2018 09:03  Phos  3.3     01-19  Mg     2.5     01-19    TPro  7.7  /  Alb  3.8  /  TBili  1.3<H>  /  DBili  x   /  AST  16  /  ALT  10  /  AlkPhos  118  01-18    PT/INR - ( 19 Jan 2018 09:03 )   PT: 13.0 sec;   INR: 1.15 ratio         PTT - ( 19 Jan 2018 09:03 )  PTT:30.4 sec      TELE: Afib with ventricular rate 50-80 bpm (emeka briefly to 35 bpm overnight)

## 2018-01-20 LAB
ALBUMIN SERPL ELPH-MCNC: 3.4 G/DL — SIGNIFICANT CHANGE UP (ref 3.3–5)
ALP SERPL-CCNC: 109 U/L — SIGNIFICANT CHANGE UP (ref 40–120)
ALT FLD-CCNC: 14 U/L RC — SIGNIFICANT CHANGE UP (ref 10–45)
ANION GAP SERPL CALC-SCNC: 18 MMOL/L — HIGH (ref 5–17)
APTT BLD: 30.2 SEC — SIGNIFICANT CHANGE UP (ref 27.5–37.4)
AST SERPL-CCNC: 19 U/L — SIGNIFICANT CHANGE UP (ref 10–40)
B BURGDOR C6 AB SER-ACNC: NEGATIVE — SIGNIFICANT CHANGE UP
B BURGDOR IGG+IGM SER-ACNC: 0.06 INDEX — SIGNIFICANT CHANGE UP (ref 0.01–0.89)
BASOPHILS # BLD AUTO: 0 K/UL — SIGNIFICANT CHANGE UP (ref 0–0.2)
BASOPHILS NFR BLD AUTO: 0.3 % — SIGNIFICANT CHANGE UP (ref 0–2)
BILIRUB SERPL-MCNC: 2 MG/DL — HIGH (ref 0.2–1.2)
BUN SERPL-MCNC: 45 MG/DL — HIGH (ref 7–23)
CALCIUM SERPL-MCNC: 9.2 MG/DL — SIGNIFICANT CHANGE UP (ref 8.4–10.5)
CHLORIDE SERPL-SCNC: 109 MMOL/L — HIGH (ref 96–108)
CO2 SERPL-SCNC: 15 MMOL/L — LOW (ref 22–31)
CREAT SERPL-MCNC: 1.97 MG/DL — HIGH (ref 0.5–1.3)
CULTURE RESULTS: NO GROWTH — SIGNIFICANT CHANGE UP
EOSINOPHIL # BLD AUTO: 0.1 K/UL — SIGNIFICANT CHANGE UP (ref 0–0.5)
EOSINOPHIL NFR BLD AUTO: 1.3 % — SIGNIFICANT CHANGE UP (ref 0–6)
GAS PNL BLDA: SIGNIFICANT CHANGE UP
GLUCOSE BLDC GLUCOMTR-MCNC: 100 MG/DL — HIGH (ref 70–99)
GLUCOSE BLDC GLUCOMTR-MCNC: 113 MG/DL — HIGH (ref 70–99)
GLUCOSE BLDC GLUCOMTR-MCNC: 115 MG/DL — HIGH (ref 70–99)
GLUCOSE BLDC GLUCOMTR-MCNC: 86 MG/DL — SIGNIFICANT CHANGE UP (ref 70–99)
GLUCOSE BLDC GLUCOMTR-MCNC: 91 MG/DL — SIGNIFICANT CHANGE UP (ref 70–99)
GLUCOSE BLDC GLUCOMTR-MCNC: 95 MG/DL — SIGNIFICANT CHANGE UP (ref 70–99)
GLUCOSE SERPL-MCNC: 116 MG/DL — HIGH (ref 70–99)
HCT VFR BLD CALC: 27.1 % — LOW (ref 39–50)
HGB BLD-MCNC: 9.1 G/DL — LOW (ref 13–17)
INR BLD: 1.28 RATIO — HIGH (ref 0.88–1.16)
LYMPHOCYTES # BLD AUTO: 1.4 K/UL — SIGNIFICANT CHANGE UP (ref 1–3.3)
LYMPHOCYTES # BLD AUTO: 18.4 % — SIGNIFICANT CHANGE UP (ref 13–44)
MAGNESIUM SERPL-MCNC: 2.1 MG/DL — SIGNIFICANT CHANGE UP (ref 1.6–2.6)
MCHC RBC-ENTMCNC: 29.7 PG — SIGNIFICANT CHANGE UP (ref 27–34)
MCHC RBC-ENTMCNC: 33.6 GM/DL — SIGNIFICANT CHANGE UP (ref 32–36)
MCV RBC AUTO: 88.2 FL — SIGNIFICANT CHANGE UP (ref 80–100)
MONOCYTES # BLD AUTO: 1 K/UL — HIGH (ref 0–0.9)
MONOCYTES NFR BLD AUTO: 13 % — SIGNIFICANT CHANGE UP (ref 2–14)
NEUTROPHILS # BLD AUTO: 5.3 K/UL — SIGNIFICANT CHANGE UP (ref 1.8–7.4)
NEUTROPHILS NFR BLD AUTO: 67 % — SIGNIFICANT CHANGE UP (ref 43–77)
PHOSPHATE SERPL-MCNC: 3.1 MG/DL — SIGNIFICANT CHANGE UP (ref 2.5–4.5)
PLATELET # BLD AUTO: 158 K/UL — SIGNIFICANT CHANGE UP (ref 150–400)
POTASSIUM SERPL-MCNC: 4.6 MMOL/L — SIGNIFICANT CHANGE UP (ref 3.5–5.3)
POTASSIUM SERPL-SCNC: 4.6 MMOL/L — SIGNIFICANT CHANGE UP (ref 3.5–5.3)
PROT SERPL-MCNC: 7.1 G/DL — SIGNIFICANT CHANGE UP (ref 6–8.3)
PROTHROM AB SERPL-ACNC: 14 SEC — HIGH (ref 9.8–12.7)
RBC # BLD: 3.08 M/UL — LOW (ref 4.2–5.8)
RBC # FLD: 13.8 % — SIGNIFICANT CHANGE UP (ref 10.3–14.5)
SODIUM SERPL-SCNC: 142 MMOL/L — SIGNIFICANT CHANGE UP (ref 135–145)
SPECIMEN SOURCE: SIGNIFICANT CHANGE UP
T4 FREE SERPL DIALY-MCNC: 1.4 NG/DL — SIGNIFICANT CHANGE UP
WBC # BLD: 7.9 K/UL — SIGNIFICANT CHANGE UP (ref 3.8–10.5)
WBC # FLD AUTO: 7.9 K/UL — SIGNIFICANT CHANGE UP (ref 3.8–10.5)

## 2018-01-20 PROCEDURE — 99233 SBSQ HOSP IP/OBS HIGH 50: CPT | Mod: GC

## 2018-01-20 RX ORDER — SODIUM CHLORIDE 9 MG/ML
500 INJECTION INTRAMUSCULAR; INTRAVENOUS; SUBCUTANEOUS
Qty: 0 | Refills: 0 | Status: DISCONTINUED | OUTPATIENT
Start: 2018-01-20 | End: 2018-01-21

## 2018-01-20 RX ORDER — LEVETIRACETAM 250 MG/1
500 TABLET, FILM COATED ORAL ONCE
Qty: 0 | Refills: 0 | Status: COMPLETED | OUTPATIENT
Start: 2018-01-20 | End: 2018-01-20

## 2018-01-20 RX ORDER — HALOPERIDOL DECANOATE 100 MG/ML
2.5 INJECTION INTRAMUSCULAR EVERY 6 HOURS
Qty: 0 | Refills: 0 | Status: DISCONTINUED | OUTPATIENT
Start: 2018-01-20 | End: 2018-01-23

## 2018-01-20 RX ORDER — AMPICILLIN TRIHYDRATE 250 MG
1 CAPSULE ORAL EVERY 6 HOURS
Qty: 0 | Refills: 0 | Status: DISCONTINUED | OUTPATIENT
Start: 2018-01-20 | End: 2018-01-25

## 2018-01-20 RX ORDER — LANOLIN ALCOHOL/MO/W.PET/CERES
3 CREAM (GRAM) TOPICAL AT BEDTIME
Qty: 0 | Refills: 0 | Status: DISCONTINUED | OUTPATIENT
Start: 2018-01-20 | End: 2018-01-24

## 2018-01-20 RX ADMIN — Medication 81 MILLIGRAM(S): at 11:45

## 2018-01-20 RX ADMIN — HEPARIN SODIUM 5000 UNIT(S): 5000 INJECTION INTRAVENOUS; SUBCUTANEOUS at 21:05

## 2018-01-20 RX ADMIN — Medication 108 GRAM(S): at 11:45

## 2018-01-20 RX ADMIN — Medication 108 GRAM(S): at 23:13

## 2018-01-20 RX ADMIN — Medication 150 MICROGRAM(S): at 07:08

## 2018-01-20 RX ADMIN — HEPARIN SODIUM 5000 UNIT(S): 5000 INJECTION INTRAVENOUS; SUBCUTANEOUS at 13:28

## 2018-01-20 RX ADMIN — LEVETIRACETAM 500 MILLIGRAM(S): 250 TABLET, FILM COATED ORAL at 07:08

## 2018-01-20 RX ADMIN — LEVETIRACETAM 400 MILLIGRAM(S): 250 TABLET, FILM COATED ORAL at 20:55

## 2018-01-20 RX ADMIN — Medication 108 GRAM(S): at 17:18

## 2018-01-20 RX ADMIN — SODIUM CHLORIDE 100 MILLILITER(S): 9 INJECTION INTRAMUSCULAR; INTRAVENOUS; SUBCUTANEOUS at 17:15

## 2018-01-20 RX ADMIN — Medication 108 GRAM(S): at 07:11

## 2018-01-20 RX ADMIN — HALOPERIDOL DECANOATE 2.5 MILLIGRAM(S): 100 INJECTION INTRAMUSCULAR at 20:24

## 2018-01-20 RX ADMIN — CEFEPIME 100 MILLIGRAM(S): 1 INJECTION, POWDER, FOR SOLUTION INTRAMUSCULAR; INTRAVENOUS at 01:14

## 2018-01-20 RX ADMIN — HALOPERIDOL DECANOATE 2.5 MILLIGRAM(S): 100 INJECTION INTRAMUSCULAR at 22:16

## 2018-01-20 RX ADMIN — Medication 3 MILLIGRAM(S): at 01:18

## 2018-01-20 NOTE — PROVIDER CONTACT NOTE (OTHER) - ASSESSMENT
Pt A&Ox1. Pt is refusing to put the hyperthemia blanket. Rectal temp was 95.6. Other vital signs are stable. Pt does not have any medications ordered for agitation.

## 2018-01-20 NOTE — PROGRESS NOTE ADULT - ATTENDING COMMENTS
-Patient seen/examined and discussed on 1/20/18. -Patient seen/examined and discussed on 1/20/18.  -Patient had RRT after returning from MRI brain. He was unable to complete it due to agitation/lack of cooperation. After he got back to the floor he was agitated and became anxious and tense. Eventually we decided to give him haldol and used soft wrist restraints to calm him down. Appears to be delirium. Please see RRT note for more details. Patient may end up needing more standing anti-psychotic meds.   -Had long bedside discussions with patient's daughter and grandsons.

## 2018-01-20 NOTE — PROGRESS NOTE ADULT - PROBLEM SELECTOR PLAN 6
DVT PPX - SQH  Diet - Mechanical Soft  Dispo - Pending PT evaluation      Maria Luisa Ardon D.O.  Medicine Team 3 Intern  Pager [phone]  [Covered by pager [phone] after 7pm] DVT PPX - SQH  Diet - Mechanical Soft  Dispo - PT recs JESSICA Ardon D.O.  Medicine Team 3 Intern  Pager [phone]  [Covered by pager [phone] after 7pm]

## 2018-01-20 NOTE — PROVIDER CONTACT NOTE (OTHER) - REASON
Pt is currently agitated and is trying to get out of bed and the patient is being combative- hitting, kicking and screaming.- can any medication be given?

## 2018-01-20 NOTE — PROGRESS NOTE ADULT - PROBLEM SELECTOR PLAN 1
Patient has neurogenic bladder s/p suprapubic catheter who presented in sepsis secondary to UTI. Given +Leukocyte Esterase and presence of triple phosphate crystals, suspect causative organism proteus mirabilis or klebsiella.    - DC Cefepime.   - Continue Ampicillin D1, (D4/7 total antibiotics) Patient has neurogenic bladder s/p suprapubic catheter who presented in sepsis secondary to UTI.    - Urine cultures growing VRE   - DC Cefepime.   - Continue Ampicillin D1, (D4/10-14 total antibiotics)

## 2018-01-20 NOTE — CHART NOTE - NSCHARTNOTEFT_GEN_A_CORE
MAR RRT Note    Called to bedside for AMS. Per the nurse, pt more agitated than normal and not speaking as much. Pt is visibly agitated, attempted to redirect but was unable to. Unable to get VS or labs 2/2 agitation. Pt given 5mg Haldol with some improvement and we were able to get VS. Pt afebrile and BPs stable with stable O2sat. Labs drawn and sent. Pt still agitated and 2.5mg of additional haldol given. Pt more cooperative at bedside with stable VS. ABG came back showing lactate of 3.5 and pt was given some IVF. RRT ended and labs to be followed up by primary team.    - F/u labs sent  - PRN Haldol 2.5mg Q6h  - Family at bedside to help redirect pt    Jad Mars - PGY3  [phone] / [phone]  Spectra [phone]

## 2018-01-20 NOTE — PROVIDER CONTACT NOTE (OTHER) - BACKGROUND
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 Pt has a hx of anemia, neurogenic bladder, dementia, hypothyroid, and CKD.

## 2018-01-20 NOTE — PROVIDER CONTACT NOTE (OTHER) - BACKGROUND
Pt came with status epilepsy. Pt has a hx of anemia, neurogenic bladder, dementia, hypothyroid, and CKD.

## 2018-01-20 NOTE — PROGRESS NOTE ADULT - PROBLEM SELECTOR PLAN 3
Asymptomatic, Bradycardic 1/17 to 30s to 50s with EKG showing prolonged QTc, interpreted as afib with slow ventricular response and bifascicular block.    - Afib with HR 60-90s overnight.   - Appreciate EP eval, continue to monitor on telemetry. No plans for pacemaker at this time.   - Avoid AVN blockers   - Will follow up with outpatient cardiologist, Anselmo Pickard of [facility] [phone]

## 2018-01-20 NOTE — PROVIDER CONTACT NOTE (OTHER) - SITUATION
Pt is currently agitated and is trying to get out of bed and the patient is being combative- hitting, kicking and screaming- can any medication be given?

## 2018-01-20 NOTE — PROGRESS NOTE ADULT - SUBJECTIVE AND OBJECTIVE BOX
Patient is a 89y old  Male who presents with a chief complaint of ?seizure (2018 13:22)      OVERNIGHT EVENTS: Agitation/Sundowning overnight, redirected. Hypothermic to 95F, received heating blanket. Started on Ampicillin for +VRE in Urine cultures.   SUBJECTIVE: Patient seen and examined at bedside. Pleasantly demented and denies current complaints. Cooperative to take medications. Denies CP, SOB, abdominal pain/N/V.       MEDICATIONS  (STANDING):  ampicillin  IVPB 1 Gram(s) IV Intermittent every 6 hours  aspirin enteric coated 81 milliGRAM(s) Oral daily  cefepime  IVPB 2000 milliGRAM(s) IV Intermittent every 12 hours  heparin  Injectable 5000 Unit(s) SubCutaneous every 8 hours  lactated ringers. 1000 milliLiter(s) (75 mL/Hr) IV Continuous <Continuous>  levETIRAcetam 500 milliGRAM(s) Oral two times a day  levothyroxine 150 MICROGram(s) Oral <User Schedule>  melatonin 3 milliGRAM(s) Oral at bedtime    MEDICATIONS  (PRN):      T(C): 36.3 (18 @ 09:13), Max: 36.3 (18 @ 09:13)  HR: 77 (18 @ 09:13) (51 - 83)  BP: 152/94 (18 @ 09:13) (110/71 - 157/74)  RR: 18 (18 @ 09:13) (18 - 19)  SpO2: 96% (18 @ 09:13) (93% - 97%)  CAPILLARY BLOOD GLUCOSE      POCT Blood Glucose.: 100 mg/dL (2018 07:39)  POCT Blood Glucose.: 86 mg/dL (2018 04:32)  POCT Blood Glucose.: 92 mg/dL (2018 23:44)  POCT Blood Glucose.: 88 mg/dL (2018 11:11)    I&O's Summary    2018 07:01  -  2018 07:00  --------------------------------------------------------  IN: 1160 mL / OUT: 1250 mL / NET: -90 mL        PHYSICAL EXAM  GENERAL: NAD, well-developed  HEAD:  Atraumatic, Normocephalic  EYES: EOMI, PERRLA, conjunctiva and sclera clear  CHEST/LUNG: Clear to auscultation bilaterally; No wheeze  HEART: +S1 +S2, Regular rate and rhythm. +Systolic murmur.  ABDOMEN: Soft, Nontender, Nondistended; Bowel sounds present  : +suprapubic catheter, C/D/I with clear urine in bag.   NEURO: No focal neurologic deficits, sensation and motor function grossly intact.   EXTREMITIES:  Pink and warm, Peripheral Pulses intact. +B/L LE Edema.       LABS:                        9.0    8.47  )-----------( 160      ( 2018 09:03 )             29.4         142  |  108  |  46<H>  ----------------------------<  96  4.7   |  18<L>  |  2.06<H>    Ca    9.2      2018 18:56  Phos  3.2       Mg     2.2     19      PT/INR - ( 2018 09:03 )   PT: 13.0 sec;   INR: 1.15 ratio         PTT - ( 2018 09:03 )  PTT:30.4 sec      Urinalysis Basic - ( 2018 16:41 )    Color: PL Yellow / Appearance: SL Turbid / S.015 / pH: x  Gluc: x / Ketone: Trace  / Bili: Negative / Urobili: Negative   Blood: x / Protein: 30 mg/dL / Nitrite: Negative   Leuk Esterase: Large / RBC: 5-10 /HPF / WBC >50 /HPF   Sq Epi: x / Non Sq Epi: OCC /HPF / Bacteria: Few /HPF        RADIOLOGY & ADDITIONAL TESTS:    Imaging Personally Reviewed:  Consultant(s) Notes Reviewed:    Care Discussed with Consultants/Other Providers:

## 2018-01-20 NOTE — PROGRESS NOTE ADULT - PROBLEM SELECTOR PLAN 2
Baseline Dementia with new onset ?seizures described as b/l UE tonic clonic lasting 20 to 30 seconds total 4 to 5 episodes. CTH showed extensive microvascular disease but no acute pathology. On Trazadone at home for sleep.   - Follow up MRI/MRA Head to evaluate for ischemic focus   - vEEG negative for seizure activity.   - Continue Keppra 500mg BID per Neuro   - Neuro checks Q4

## 2018-01-20 NOTE — PROVIDER CONTACT NOTE (OTHER) - SITUATION
Pt is agitated and is yelling and screaming. Pt is moving around in the bed. Any medication that we can give to calm down the patient?

## 2018-01-20 NOTE — PROVIDER CONTACT NOTE (OTHER) - SITUATION
pt refusing to let staff & phlebotomy draw AM labs. pt axillary temp 97.3 papo hugger placed need parameters in order.

## 2018-01-20 NOTE — PROVIDER CONTACT NOTE (OTHER) - ASSESSMENT
Pt A&Ox1. Pt has a low body temperature. Pt is currently refusing to wear the hyperthemia blanket. Pt has no medication ordered for agitation. Other VS stable. a fib on tele.

## 2018-01-21 LAB
ANION GAP SERPL CALC-SCNC: 14 MMOL/L — SIGNIFICANT CHANGE UP (ref 5–17)
APTT BLD: 30.8 SEC — SIGNIFICANT CHANGE UP (ref 27.5–37.4)
BUN SERPL-MCNC: 42 MG/DL — HIGH (ref 7–23)
CALCIUM SERPL-MCNC: 9.3 MG/DL — SIGNIFICANT CHANGE UP (ref 8.4–10.5)
CHLORIDE SERPL-SCNC: 108 MMOL/L — SIGNIFICANT CHANGE UP (ref 96–108)
CO2 SERPL-SCNC: 19 MMOL/L — LOW (ref 22–31)
CREAT SERPL-MCNC: 1.85 MG/DL — HIGH (ref 0.5–1.3)
CULTURE RESULTS: SIGNIFICANT CHANGE UP
CULTURE RESULTS: SIGNIFICANT CHANGE UP
GLUCOSE BLDC GLUCOMTR-MCNC: 100 MG/DL — HIGH (ref 70–99)
GLUCOSE BLDC GLUCOMTR-MCNC: 161 MG/DL — HIGH (ref 70–99)
GLUCOSE BLDC GLUCOMTR-MCNC: 86 MG/DL — SIGNIFICANT CHANGE UP (ref 70–99)
GLUCOSE BLDC GLUCOMTR-MCNC: 92 MG/DL — SIGNIFICANT CHANGE UP (ref 70–99)
GLUCOSE BLDC GLUCOMTR-MCNC: 93 MG/DL — SIGNIFICANT CHANGE UP (ref 70–99)
GLUCOSE SERPL-MCNC: 93 MG/DL — SIGNIFICANT CHANGE UP (ref 70–99)
HCT VFR BLD CALC: 26.7 % — LOW (ref 39–50)
HGB BLD-MCNC: 8.2 G/DL — LOW (ref 13–17)
INR BLD: 1.36 RATIO — HIGH (ref 0.88–1.16)
MAGNESIUM SERPL-MCNC: 1.9 MG/DL — SIGNIFICANT CHANGE UP (ref 1.6–2.6)
MCHC RBC-ENTMCNC: 26.9 PG — LOW (ref 27–34)
MCHC RBC-ENTMCNC: 30.7 GM/DL — LOW (ref 32–36)
MCV RBC AUTO: 87.5 FL — SIGNIFICANT CHANGE UP (ref 80–100)
PHOSPHATE SERPL-MCNC: 2.9 MG/DL — SIGNIFICANT CHANGE UP (ref 2.5–4.5)
PLATELET # BLD AUTO: 140 K/UL — LOW (ref 150–400)
POTASSIUM SERPL-MCNC: 4.1 MMOL/L — SIGNIFICANT CHANGE UP (ref 3.5–5.3)
POTASSIUM SERPL-SCNC: 4.1 MMOL/L — SIGNIFICANT CHANGE UP (ref 3.5–5.3)
PROTHROM AB SERPL-ACNC: 14.8 SEC — HIGH (ref 9.8–12.7)
RBC # BLD: 3.05 M/UL — LOW (ref 4.2–5.8)
RBC # FLD: 15.4 % — HIGH (ref 10.3–14.5)
SODIUM SERPL-SCNC: 141 MMOL/L — SIGNIFICANT CHANGE UP (ref 135–145)
SPECIMEN SOURCE: SIGNIFICANT CHANGE UP
SPECIMEN SOURCE: SIGNIFICANT CHANGE UP
WBC # BLD: 5.66 K/UL — SIGNIFICANT CHANGE UP (ref 3.8–10.5)
WBC # FLD AUTO: 5.66 K/UL — SIGNIFICANT CHANGE UP (ref 3.8–10.5)

## 2018-01-21 PROCEDURE — 99233 SBSQ HOSP IP/OBS HIGH 50: CPT | Mod: GC

## 2018-01-21 PROCEDURE — 93010 ELECTROCARDIOGRAM REPORT: CPT

## 2018-01-21 PROCEDURE — 71045 X-RAY EXAM CHEST 1 VIEW: CPT | Mod: 26

## 2018-01-21 RX ORDER — QUETIAPINE FUMARATE 200 MG/1
25 TABLET, FILM COATED ORAL AT BEDTIME
Qty: 0 | Refills: 0 | Status: DISCONTINUED | OUTPATIENT
Start: 2018-01-21 | End: 2018-01-23

## 2018-01-21 RX ORDER — LEVOTHYROXINE SODIUM 125 MCG
75 TABLET ORAL
Qty: 0 | Refills: 0 | Status: DISCONTINUED | OUTPATIENT
Start: 2018-01-21 | End: 2018-01-30

## 2018-01-21 RX ORDER — LEVETIRACETAM 250 MG/1
500 TABLET, FILM COATED ORAL EVERY 12 HOURS
Qty: 0 | Refills: 0 | Status: DISCONTINUED | OUTPATIENT
Start: 2018-01-21 | End: 2018-01-21

## 2018-01-21 RX ADMIN — Medication 75 MICROGRAM(S): at 05:35

## 2018-01-21 RX ADMIN — HEPARIN SODIUM 5000 UNIT(S): 5000 INJECTION INTRAVENOUS; SUBCUTANEOUS at 05:35

## 2018-01-21 RX ADMIN — Medication 108 GRAM(S): at 12:06

## 2018-01-21 RX ADMIN — LEVETIRACETAM 400 MILLIGRAM(S): 250 TABLET, FILM COATED ORAL at 05:36

## 2018-01-21 RX ADMIN — HEPARIN SODIUM 5000 UNIT(S): 5000 INJECTION INTRAVENOUS; SUBCUTANEOUS at 22:30

## 2018-01-21 RX ADMIN — HEPARIN SODIUM 5000 UNIT(S): 5000 INJECTION INTRAVENOUS; SUBCUTANEOUS at 18:12

## 2018-01-21 RX ADMIN — Medication 75 MICROGRAM(S): at 05:37

## 2018-01-21 RX ADMIN — QUETIAPINE FUMARATE 25 MILLIGRAM(S): 200 TABLET, FILM COATED ORAL at 22:27

## 2018-01-21 RX ADMIN — Medication 3 MILLIGRAM(S): at 22:27

## 2018-01-21 RX ADMIN — Medication 108 GRAM(S): at 05:36

## 2018-01-21 RX ADMIN — Medication 108 GRAM(S): at 18:05

## 2018-01-21 NOTE — PROGRESS NOTE ADULT - PROBLEM SELECTOR PLAN 6
DVT PPX - SQH  Diet - Mechanical Soft  Dispo - PT recs SANDRINE.       Mike Noland PGY-3  [Covered by pager [phone] after 7pm]

## 2018-01-21 NOTE — PHYSICAL THERAPY INITIAL EVALUATION ADULT - PRECAUTIONS/LIMITATIONS, REHAB EVAL
Pt with history of Afib, Dementia, Hypothyroid, HLD, CKD, anemia w/ history of 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 As per family and ED, patient was doing relatively well until earlier today when he was noted to be very lethargic. Patient then had total of 4 to 5 witnessed episodes of seizure like activity (3 of which witnessed by EMS and 1 witnessed in ED). Patient was given Versed 10mg x1 by EMS prior to arrival. Witnessed episode in ED was described as 30 seconds of bilateral UE tonic clonic with head turning to Left lasting for about 30 seconds. Family also reports that patient turned red in the face during these episodes until shaking stopped. Also noted violent coughing after these episodes. Patient remained unresponsive afterwards and was intubated. Patient was found to be hypothermic at this point and started on rewarming blanket. Patient was then noted to become bradycardic to 40s-50s with ectopy and was given Amiodarone IVP. MRI head 1/19/18 No acute intracranial findings.Extensive microvascular disease. 1/16/18 CT chest Moderate bilateral pleural effusions with adjacent bibasilar atelectasis. Pt with history of Afib, Dementia, Hypothyroid, HLD, CKD, anemia w/ history of 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 As per family and ED, patient was doing relatively well until earlier today when he was noted to be very lethargic. Patient then had total of 4 to 5 witnessed episodes of seizure like activity (3 of which witnessed by EMS and 1 witnessed in ED). Patient was given Versed 10mg x1 by EMS prior to arrival. Witnessed episode in ED was described as 30 seconds of bilateral UE tonic clonic with head turning to Left lasting for about 30 seconds. Family also reports that patient turned red in the face during these episodes until shaking stopped. Also noted violent coughing after these episodes. Patient remained unresponsive afterwards and was intubated. Patient was found to be hypothermic at this point and started on rewarming blanket. Patient was then noted to become bradycardic to 40s-50s with ectopy and was given Amiodarone IVP. MRI head 1/19/18 No acute intracranial findings.Extensive microvascular disease. 1/16/18 CT chest Moderate bilateral pleural effusions with adjacent bibasilar atelectasis./no known precautions/limitations

## 2018-01-21 NOTE — PHYSICAL THERAPY INITIAL EVALUATION ADULT - PERTINENT HX OF CURRENT PROBLEM, REHAB EVAL
Pt is a 89 year old male admitted to [facility] on 1/16/18 for questionable seizure

## 2018-01-21 NOTE — PROGRESS NOTE ADULT - PROBLEM SELECTOR PLAN 2
Baseline Dementia with new onset ?seizures described as b/l UE tonic clonic lasting 20 to 30 seconds total 4 to 5 episodes. CTH showed extensive microvascular disease but no acute pathology. On Trazadone at home for sleep.   - Follow up MRI/MRA Head to evaluate for ischemic focus   - vEEG negative for seizure activity.   - Spoke to neuro about pts AMS not improving. Asked if Keppra could cause it, they did think it was possible. They are okay d/c'ing Keppra and monitoring mental status. Pt on 1:1 to monitor for seizures as well as agitation.

## 2018-01-21 NOTE — PROGRESS NOTE ADULT - PROBLEM SELECTOR PLAN 1
Patient has neurogenic bladder s/p suprapubic catheter who presented in sepsis secondary to UTI.    - Urine cultures growing VRE   - DC Cefepime.   - Continue Ampicillin D1, (D5/10-14 total antibiotics) Patient has neurogenic bladder s/p suprapubic catheter who presented in sepsis secondary to UTI.    - Urine cultures growing VRE   - DC Cefepime.   - Continue Ampicillin D2, (D5/10-14 total antibiotics)

## 2018-01-21 NOTE — PROGRESS NOTE ADULT - ATTENDING COMMENTS
-Patient seen and examined and discussed on 1/21/18. -Patient seen and examined and discussed on 1/21/18.  -Discussed with patient's daughter at bedside. Will do trial off of keppra as this may have worsening his AMS/agitation; also discussed with neuro. Will start standing seroquel at bedtime to help with sleep, agitation, and delirium. Likely some component of sun-downing and therefore dosing may need to be around 4-5pm. Patient was on trazodone at home, but will try seroquel for now in setting of delirium. Will also use melatonin at bedtime.   -Check CXR.   -MRI brain once patient calm enough to go. May need light sedative prior.   -C/w one to one for safety/agitation/seizures for now.

## 2018-01-21 NOTE — PROGRESS NOTE ADULT - PROBLEM SELECTOR PLAN 3
Asymptomatic, Bradycardic 1/17 to 30s to 50s with EKG showing prolonged QTc, interpreted as afib with slow ventricular response and bifascicular block.    - Afib with HR 60-90s overnight.   - Appreciate EP eval, continue to monitor on telemetry. No plans for pacemaker at this time.   - Avoid AVN blockers   - Will follow up with outpatient cardiologist, Anselmo Pickard of [facility] [phone]

## 2018-01-21 NOTE — PROGRESS NOTE ADULT - SUBJECTIVE AND OBJECTIVE BOX
Patient is a 89y old  Male who presents with a chief complaint of ?seizure (2018 13:22)      SUBJECTIVE / OVERNIGHT EVENTS: Pt not responding to verbal commands or questions due to AMS.     MEDICATIONS  (STANDING):  ampicillin  IVPB 1 Gram(s) IV Intermittent every 6 hours  aspirin enteric coated 81 milliGRAM(s) Oral daily  heparin  Injectable 5000 Unit(s) SubCutaneous every 8 hours  levothyroxine Injectable 75 MICROGram(s) IV Push <User Schedule>  melatonin 3 milliGRAM(s) Oral at bedtime    MEDICATIONS  (PRN):  haloperidol    Injectable 2.5 milliGRAM(s) IV Push every 6 hours PRN Agitation  haloperidol    Injectable 2.5 milliGRAM(s) IntraMuscular every 6 hours PRN Agitation      T(C): 36.2 (18 @ 11:51), Max: 36.4 (18 @ 11:42)  HR: 68 (18 @ 11:42) (49 - 93)  BP: 144/95 (18 @ 11:42) (144/95 - 169/92)  RR: 19 (18 @ 11:42) (18 - 19)  SpO2: 100% (18 @ 11:42) (92% - 100%)  CAPILLARY BLOOD GLUCOSE      POCT Blood Glucose.: 92 mg/dL (2018 12:09)  POCT Blood Glucose.: 100 mg/dL (2018 04:32)  POCT Blood Glucose.: 93 mg/dL (2018 00:38)  POCT Blood Glucose.: 95 mg/dL (2018 20:50)  POCT Blood Glucose.: 113 mg/dL (2018 16:37)  POCT Blood Glucose.: 115 mg/dL (2018 15:22)    I&O's Summary    2018 07:01  -  2018 07:00  --------------------------------------------------------  IN: 200 mL / OUT: 2200 mL / NET: -2000 mL    2018 07:01  -  2018 12:18  --------------------------------------------------------  IN: 50 mL / OUT: 150 mL / NET: -100 mL        PHYSICAL EXAM:  GENERAL: Agitated.   HEAD:  Atraumatic, Normocephalic  EYES: EOMI, PERRLA, conjunctiva and sclera clear  NECK: Supple, No JVD  CHEST/LUNG: Clear to auscultation bilaterally; No wheeze  HEART: Regular rate and rhythm; No murmurs, rubs, or gallops  ABDOMEN: Soft, Nontender, Nondistended; Bowel sounds present  EXTREMITIES:  2+ Peripheral Pulses, No clubbing, cyanosis. Trace Edema   PSYCH: AAOx0  NEUROLOGY: non-focal  SKIN: No rashes or lesions    LABS:  ( @ 11:21)                        8.2  5.66 )-----------( 140                 26.7    Neutrophils = -- (--%)  Lymphocytes = -- (--%)  Eosinophils = -- (--%)  Basophils = -- (--%)  Monocytes = -- (--%)  Bands = --%    WBC Trend: 5.66<--, 7.9<--, 8.47<--  Hb Trend: 8.2<--, 9.1<--, 9.0<--, 9.4<--, 8.2<--  Plt Trend: 140<--, 158<--, 160<--, 166<--, 135<--      141  |  108  |  42<H>  ----------------------------<  93  4.1   |  19<L>  |  1.85<H>    Ca    9.3      2018 11:21  Phos  2.9       Mg     1.9         TPro  7.1  /  Alb  3.4  /  TBili  2.0<H>  /  DBili  x   /  AST  19  /  ALT  14  /  AlkPhos  109      Creatinine Trend: 1.85<--, 1.97<--, 2.06<--, 2.33<--, 2.08<--, 2.03<--  ( 2018 10:54 )   PT: 14.8 sec;   INR: 1.36 ratio;       PTT:30.8 sec      Urinalysis Basic - ( 2018 16:41 )    Color: PL Yellow / Appearance: SL Turbid / S.015 / pH: x  Gluc: x / Ketone: Trace  / Bili: Negative / Urobili: Negative   Blood: x / Protein: 30 mg/dL / Nitrite: Negative   Leuk Esterase: Large / RBC: 5-10 /HPF / WBC >50 /HPF   Sq Epi: x / Non Sq Epi: OCC /HPF / Bacteria: Few /HPF        Microbiology:  Urine Cx:  Blood Cx:    RADIOLOGY & ADDITIONAL TESTS:  X- Ray:  CT:  Ultrasound:  [ ] imaging personally reviewed and interpreted by me    Consultant(s) Notes Reviewed:      Care Discussed with Consultants/Other Providers:

## 2018-01-22 LAB
ANION GAP SERPL CALC-SCNC: 12 MMOL/L — SIGNIFICANT CHANGE UP (ref 5–17)
ANION GAP SERPL CALC-SCNC: 15 MMOL/L — SIGNIFICANT CHANGE UP (ref 5–17)
BUN SERPL-MCNC: 40 MG/DL — HIGH (ref 7–23)
BUN SERPL-MCNC: 42 MG/DL — HIGH (ref 7–23)
CALCIUM SERPL-MCNC: 8.9 MG/DL — SIGNIFICANT CHANGE UP (ref 8.4–10.5)
CALCIUM SERPL-MCNC: 9.1 MG/DL — SIGNIFICANT CHANGE UP (ref 8.4–10.5)
CHLORIDE SERPL-SCNC: 110 MMOL/L — HIGH (ref 96–108)
CHLORIDE SERPL-SCNC: 110 MMOL/L — HIGH (ref 96–108)
CO2 SERPL-SCNC: 22 MMOL/L — SIGNIFICANT CHANGE UP (ref 22–31)
CO2 SERPL-SCNC: 24 MMOL/L — SIGNIFICANT CHANGE UP (ref 22–31)
CREAT SERPL-MCNC: 1.92 MG/DL — HIGH (ref 0.5–1.3)
CREAT SERPL-MCNC: 1.93 MG/DL — HIGH (ref 0.5–1.3)
GLUCOSE BLDC GLUCOMTR-MCNC: 84 MG/DL — SIGNIFICANT CHANGE UP (ref 70–99)
GLUCOSE BLDC GLUCOMTR-MCNC: 87 MG/DL — SIGNIFICANT CHANGE UP (ref 70–99)
GLUCOSE BLDC GLUCOMTR-MCNC: 93 MG/DL — SIGNIFICANT CHANGE UP (ref 70–99)
GLUCOSE SERPL-MCNC: 112 MG/DL — HIGH (ref 70–99)
GLUCOSE SERPL-MCNC: 77 MG/DL — SIGNIFICANT CHANGE UP (ref 70–99)
HCT VFR BLD CALC: 26.6 % — LOW (ref 39–50)
HGB BLD-MCNC: 7.9 G/DL — LOW (ref 13–17)
MAGNESIUM SERPL-MCNC: 2 MG/DL — SIGNIFICANT CHANGE UP (ref 1.6–2.6)
MAGNESIUM SERPL-MCNC: 2.1 MG/DL — SIGNIFICANT CHANGE UP (ref 1.6–2.6)
MCHC RBC-ENTMCNC: 27 PG — SIGNIFICANT CHANGE UP (ref 27–34)
MCHC RBC-ENTMCNC: 29.7 GM/DL — LOW (ref 32–36)
MCV RBC AUTO: 90.8 FL — SIGNIFICANT CHANGE UP (ref 80–100)
PHOSPHATE SERPL-MCNC: 2.7 MG/DL — SIGNIFICANT CHANGE UP (ref 2.5–4.5)
PHOSPHATE SERPL-MCNC: 3.1 MG/DL — SIGNIFICANT CHANGE UP (ref 2.5–4.5)
PLATELET # BLD AUTO: 127 K/UL — LOW (ref 150–400)
POTASSIUM SERPL-MCNC: 4.1 MMOL/L — SIGNIFICANT CHANGE UP (ref 3.5–5.3)
POTASSIUM SERPL-MCNC: 4.2 MMOL/L — SIGNIFICANT CHANGE UP (ref 3.5–5.3)
POTASSIUM SERPL-SCNC: 4.1 MMOL/L — SIGNIFICANT CHANGE UP (ref 3.5–5.3)
POTASSIUM SERPL-SCNC: 4.2 MMOL/L — SIGNIFICANT CHANGE UP (ref 3.5–5.3)
RBC # BLD: 2.93 M/UL — LOW (ref 4.2–5.8)
RBC # FLD: 15.1 % — HIGH (ref 10.3–14.5)
SODIUM SERPL-SCNC: 146 MMOL/L — HIGH (ref 135–145)
SODIUM SERPL-SCNC: 147 MMOL/L — HIGH (ref 135–145)
WBC # BLD: 4.92 K/UL — SIGNIFICANT CHANGE UP (ref 3.8–10.5)
WBC # FLD AUTO: 4.92 K/UL — SIGNIFICANT CHANGE UP (ref 3.8–10.5)

## 2018-01-22 PROCEDURE — 93306 TTE W/DOPPLER COMPLETE: CPT | Mod: 26

## 2018-01-22 PROCEDURE — 99233 SBSQ HOSP IP/OBS HIGH 50: CPT | Mod: GC

## 2018-01-22 RX ORDER — SODIUM CHLORIDE 9 MG/ML
500 INJECTION, SOLUTION INTRAVENOUS
Qty: 0 | Refills: 0 | Status: DISCONTINUED | OUTPATIENT
Start: 2018-01-22 | End: 2018-01-24

## 2018-01-22 RX ORDER — IPRATROPIUM/ALBUTEROL SULFATE 18-103MCG
3 AEROSOL WITH ADAPTER (GRAM) INHALATION ONCE
Qty: 0 | Refills: 0 | Status: COMPLETED | OUTPATIENT
Start: 2018-01-22 | End: 2018-01-22

## 2018-01-22 RX ADMIN — HEPARIN SODIUM 5000 UNIT(S): 5000 INJECTION INTRAVENOUS; SUBCUTANEOUS at 22:31

## 2018-01-22 RX ADMIN — SODIUM CHLORIDE 40 MILLILITER(S): 9 INJECTION, SOLUTION INTRAVENOUS at 22:47

## 2018-01-22 RX ADMIN — Medication 3 MILLIGRAM(S): at 22:31

## 2018-01-22 RX ADMIN — Medication 81 MILLIGRAM(S): at 11:07

## 2018-01-22 RX ADMIN — HEPARIN SODIUM 5000 UNIT(S): 5000 INJECTION INTRAVENOUS; SUBCUTANEOUS at 05:22

## 2018-01-22 RX ADMIN — QUETIAPINE FUMARATE 25 MILLIGRAM(S): 200 TABLET, FILM COATED ORAL at 22:31

## 2018-01-22 RX ADMIN — HALOPERIDOL DECANOATE 2.5 MILLIGRAM(S): 100 INJECTION INTRAMUSCULAR at 11:07

## 2018-01-22 RX ADMIN — Medication 3 MILLILITER(S): at 20:47

## 2018-01-22 RX ADMIN — Medication 108 GRAM(S): at 17:05

## 2018-01-22 RX ADMIN — Medication 108 GRAM(S): at 05:11

## 2018-01-22 RX ADMIN — HEPARIN SODIUM 5000 UNIT(S): 5000 INJECTION INTRAVENOUS; SUBCUTANEOUS at 13:02

## 2018-01-22 RX ADMIN — Medication 108 GRAM(S): at 00:03

## 2018-01-22 RX ADMIN — Medication 108 GRAM(S): at 23:01

## 2018-01-22 RX ADMIN — Medication 108 GRAM(S): at 11:07

## 2018-01-22 RX ADMIN — Medication 75 MICROGRAM(S): at 05:19

## 2018-01-22 NOTE — PROGRESS NOTE ADULT - ATTENDING COMMENTS
Events noted , note over the weekend reviewed. Keppra was d/leslie. EEG -ve, MRI is non diagnostic.  seizure disorder/ VRE UTI/ ZACKERY / Hypernatremia/ afib/ anemia/ . continue IV ampicillin, monitor cbc, continue to monitor off Keppra and will d/w neuro . start short course hypotonic fluid and monitor BMP.  TTE noted c/w severe AS with moderate AR. will d/w card in AM.  Neuro note appreciated.   I d/w patient's grand son at the bed side.

## 2018-01-22 NOTE — PROGRESS NOTE ADULT - PROBLEM SELECTOR PLAN 6
DVT PPX - SQH  Diet - Mechanical Soft  Dispo - PT recs JESSICA Ardon D.O.  Medicine Team 3 Intern  pager [phone]

## 2018-01-22 NOTE — PROGRESS NOTE ADULT - PROBLEM SELECTOR PLAN 3
Asymptomatic, Bradycardic 1/17 to 30s to 50s with EKG showing prolonged QTc, interpreted as afib with slow ventricular response and bifascicular block.    - Afib with HR 60-90s overnight.   - Appreciate EP eval, continue to monitor on telemetry. No plans for pacemaker at this time.   - Avoid AVN blockers   - Will follow up with outpatient cardiologist, Anselmo Pickard of [facility] [phone]

## 2018-01-22 NOTE — CONSULT NOTE ADULT - SUBJECTIVE AND OBJECTIVE BOX
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 As per family and ED, patient was doing relatively well until earlier today when he was noted to be very lethargic. Patient then had total of 4 to 5 witnessed episodes of seizure like activity (3 of which witnessed by EMS and 1 witnessed in ED). Patient was given Versed 10mg x1 by EMS prior to arrival. Witnessed episode in ED was described as 30 seconds of bilateral UE tonic clonic with head turning to Left lasting for about 30 seconds. Family also reports that patient turned red in the face during these episodes until shaking stopped. Also noted violent coughing after these episodes. Patient remained unresponsive afterwards and was intubated. Patient was found to be hypothermic at this point and started on rewarming blanket. Patient was then noted to become bradycardic to 40s-50s with ectopy and was given Amiodarone IVP.     Labs notable for no leukocytosis WBC 5.7, Hb 9.2, platelets 146, INR 1.27, Creatinine 2.04, , T Bili 1.7, Alk P 130, , CE negative x1, BNP 4325. Initial VBG 6.9/85/29/16 with Lactate of 12.8. Most recent ABG was 7.29/42/297/19 with Lactate of 7.3. In the ED, patient received Vanco x1, Cefepime x1, Bicarb 50mEQ x2, Hydrocortisone 100mg x1, Calcium chloride 1000mg x1, Amiodarone 150mg IV x1 and started on Amiodarone gtt.     On chart review, patient was last sen by house calls doctor on 12/14/17 during which it was noted that patient had been very confused for the past 3 weeks prior to that visit as per regular HHA with improvement of symptoms after changing of suprapubic catheter. (16 Jan 2018 13:22)    Patient is a 89y old  Male who presents with a chief complaint of ?seizure (16 Jan 2018 13:22)    Allergies    No Known Allergies    Intolerances      Vital Signs Last 24 Hrs  T(C): 36.3 (22 Jan 2018 04:00), Max: 36.9 (21 Jan 2018 18:03)  T(F): 97.4 (22 Jan 2018 04:00), Max: 98.4 (21 Jan 2018 18:03)  HR: 64 (22 Jan 2018 07:31) (48 - 96)  BP: 151/78 (22 Jan 2018 07:31) (126/75 - 164/87)  BP(mean): --  RR: 18 (22 Jan 2018 07:31) (18 - 19)  SpO2: 99% (22 Jan 2018 07:31) (96% - 100%)                        7.9    4.92  )-----------( 127      ( 22 Jan 2018 07:19 )             26.6     01-22    147<H>  |  110<H>  |  42<H>  ----------------------------<  77  4.2   |  22  |  1.93<H>    Ca    8.9      22 Jan 2018 07:27  Phos  3.1     01-22  Mg     2.1     01-22    TPro  7.1  /  Alb  3.4  /  TBili  2.0<H>  /  DBili  x   /  AST  19  /  ALT  14  /  AlkPhos  109  01-20    CAPILLARY BLOOD GLUCOSE      POCT Blood Glucose.: 93 mg/dL (22 Jan 2018 09:37)    MEDICATIONS  (STANDING):  ampicillin  IVPB 1 Gram(s) IV Intermittent every 6 hours  aspirin enteric coated 81 milliGRAM(s) Oral daily  heparin  Injectable 5000 Unit(s) SubCutaneous every 8 hours  levothyroxine Injectable 75 MICROGram(s) IV Push <User Schedule>  melatonin 3 milliGRAM(s) Oral at bedtime  QUEtiapine 25 milliGRAM(s) Oral at bedtime    MEDICATIONS  (PRN):  haloperidol    Injectable 2.5 milliGRAM(s) IV Push every 6 hours PRN Agitation  haloperidol    Injectable 2.5 milliGRAM(s) IntraMuscular every 6 hours PRN Agitation    PAST MEDICAL & SURGICAL HISTORY:  Anemia  Neurogenic bladder  Hyperlipidemia  Dementia  Hypothyroid  CKD (chronic kidney disease)          ZACHARIAH:  Elongated, thickened toe nails x 10  No open lesions or local signs of infectio  Palpable pulses 2/4 bilaterally. TG WNL.  CFT instant x 10  Epicritic sensation grossly intact

## 2018-01-22 NOTE — PROVIDER CONTACT NOTE (OTHER) - ASSESSMENT
a/o x 1. lethargic, presents with coarse crackles on auscultation and to hearing. CXR shows pulmonary edema / bl pleural effusions.

## 2018-01-22 NOTE — PROGRESS NOTE ADULT - ASSESSMENT
89 y/o M here with ams with episodes of shaking movements of UE with head turn to left, s/p versed and depakote load.  Currently already extubated, awake, o to name, follows commands.  Concern for sepsis.  CT head without acute changes with current nonfocal exam.  Though seizures reported it would be unusual to have status epilepticus in first place and to be recovered and extubated already.  Convulsive syncope possible with sepsis, bradycardia documented.      However, further hx obtained from son more compelling for true tonic epileptic events with whole body stiffening, turning "beet red"  Seizure remains on differential, likely from sepsis with brain ischemic focus    -  VEEG neg  - MRI brain neg  - d/c'd depakote and had started keppra 500 mg bid given seizures are controlled and lower side effect and drug interaction profile of keppra.  - keppra stopped by primary team  - will monitor for now

## 2018-01-22 NOTE — PROGRESS NOTE ADULT - SUBJECTIVE AND OBJECTIVE BOX
Patient is a 89y old  Male who presents with a chief complaint of ?seizure (16 Jan 2018 13:22)      OVERNIGHT EVENTS: HR 30s x0.5sec overnight.   SUBJECTIVE: Patient seen and examined at bedside. Denies current complaints.      MEDICATIONS  (STANDING):  ampicillin  IVPB 1 Gram(s) IV Intermittent every 6 hours  aspirin enteric coated 81 milliGRAM(s) Oral daily  heparin  Injectable 5000 Unit(s) SubCutaneous every 8 hours  levothyroxine Injectable 75 MICROGram(s) IV Push <User Schedule>  melatonin 3 milliGRAM(s) Oral at bedtime  QUEtiapine 25 milliGRAM(s) Oral at bedtime    MEDICATIONS  (PRN):  haloperidol    Injectable 2.5 milliGRAM(s) IV Push every 6 hours PRN Agitation  haloperidol    Injectable 2.5 milliGRAM(s) IntraMuscular every 6 hours PRN Agitation      T(C): 36.3 (01-22-18 @ 04:00), Max: 36.9 (01-21-18 @ 18:03)  HR: 64 (01-22-18 @ 07:31) (48 - 96)  BP: 151/78 (01-22-18 @ 07:31) (126/75 - 164/87)  RR: 18 (01-22-18 @ 07:31) (18 - 19)  SpO2: 99% (01-22-18 @ 07:31) (96% - 100%)  CAPILLARY BLOOD GLUCOSE      POCT Blood Glucose.: 87 mg/dL (22 Jan 2018 04:04)  POCT Blood Glucose.: 84 mg/dL (22 Jan 2018 00:12)  POCT Blood Glucose.: 161 mg/dL (21 Jan 2018 19:59)  POCT Blood Glucose.: 86 mg/dL (21 Jan 2018 15:33)  POCT Blood Glucose.: 92 mg/dL (21 Jan 2018 12:09)    I&O's Summary    21 Jan 2018 07:01  -  22 Jan 2018 07:00  --------------------------------------------------------  IN: 100 mL / OUT: 1550 mL / NET: -1450 mL        PHYSICAL EXAM:  GENERAL: Agitated.   HEAD:  Atraumatic, Normocephalic  EYES: EOMI, PERRLA, conjunctiva and sclera clear  NECK: Supple, No JVD  CHEST/LUNG: Clear to auscultation bilaterally; No wheeze  HEART: Regular rate and rhythm; No murmurs, rubs, or gallops  ABDOMEN: Soft, Nontender, Nondistended; Bowel sounds present  EXTREMITIES:  2+ Peripheral Pulses, No clubbing, cyanosis. Trace Edema   PSYCH: AAOx1  NEUROLOGY: non-focal  SKIN: No rashes or lesions      LABS:                        7.9    4.92  )-----------( 127      ( 22 Jan 2018 07:19 )             26.6     01-22    147<H>  |  110<H>  |  42<H>  ----------------------------<  77  4.2   |  22  |  1.93<H>    Ca    8.9      22 Jan 2018 07:27  Phos  3.1     01-22  Mg     2.1     01-22    TPro  7.1  /  Alb  3.4  /  TBili  2.0<H>  /  DBili  x   /  AST  19  /  ALT  14  /  AlkPhos  109  01-20    PT/INR - ( 21 Jan 2018 10:54 )   PT: 14.8 sec;   INR: 1.36 ratio         PTT - ( 21 Jan 2018 10:54 )  PTT:30.8 sec          RADIOLOGY & ADDITIONAL TESTS:    Imaging Personally Reviewed:  Consultant(s) Notes Reviewed:    Care Discussed with Consultants/Other Providers:

## 2018-01-22 NOTE — PROGRESS NOTE ADULT - SUBJECTIVE AND OBJECTIVE BOX
SUBJECTIVE: No new neuro events.  keppra stopped per primary team.      Medications:  MEDICATIONS  (STANDING):  ampicillin  IVPB 1 Gram(s) IV Intermittent every 6 hours  aspirin enteric coated 81 milliGRAM(s) Oral daily  heparin  Injectable 5000 Unit(s) SubCutaneous every 8 hours  levothyroxine Injectable 75 MICROGram(s) IV Push <User Schedule>  melatonin 3 milliGRAM(s) Oral at bedtime  QUEtiapine 25 milliGRAM(s) Oral at bedtime    MEDICATIONS  (PRN):  haloperidol    Injectable 2.5 milliGRAM(s) IV Push every 6 hours PRN Agitation  haloperidol    Injectable 2.5 milliGRAM(s) IntraMuscular every 6 hours PRN Agitation      Labs:  CBC Full  -  ( 22 Jan 2018 07:19 )  WBC Count : 4.92 K/uL  Hemoglobin : 7.9 g/dL  Hematocrit : 26.6 %  Platelet Count - Automated : 127 K/uL  Mean Cell Volume : 90.8 fl  Mean Cell Hemoglobin : 27.0 pg  Mean Cell Hemoglobin Concentration : 29.7 gm/dL  Auto Neutrophil # : x  Auto Lymphocyte # : x  Auto Monocyte # : x  Auto Eosinophil # : x  Auto Basophil # : x  Auto Neutrophil % : x  Auto Lymphocyte % : x  Auto Monocyte % : x  Auto Eosinophil % : x  Auto Basophil % : x    01-22    147<H>  |  110<H>  |  42<H>  ----------------------------<  77  4.2   |  22  |  1.93<H>    Ca    8.9      22 Jan 2018 07:27  Phos  3.1     01-22  Mg     2.1     01-22    TPro  7.1  /  Alb  3.4  /  TBili  2.0<H>  /  DBili  x   /  AST  19  /  ALT  14  /  AlkPhos  109  01-20    CAPILLARY BLOOD GLUCOSE      POCT Blood Glucose.: 93 mg/dL (22 Jan 2018 09:37)  POCT Blood Glucose.: 87 mg/dL (22 Jan 2018 04:04)  POCT Blood Glucose.: 84 mg/dL (22 Jan 2018 00:12)  POCT Blood Glucose.: 161 mg/dL (21 Jan 2018 19:59)  POCT Blood Glucose.: 86 mg/dL (21 Jan 2018 15:33)  POCT Blood Glucose.: 92 mg/dL (21 Jan 2018 12:09)    LIVER FUNCTIONS - ( 20 Jan 2018 17:02 )  Alb: 3.4 g/dL / Pro: 7.1 g/dL / ALK PHOS: 109 U/L / ALT: 14 U/L RC / AST: 19 U/L / GGT: x           PT/INR - ( 21 Jan 2018 10:54 )   PT: 14.8 sec;   INR: 1.36 ratio         PTT - ( 21 Jan 2018 10:54 )  PTT:30.8 sec      Vitals:  Vital Signs Last 24 Hrs  T(C): 36.3 (22 Jan 2018 04:00), Max: 36.9 (21 Jan 2018 18:03)  T(F): 97.4 (22 Jan 2018 04:00), Max: 98.4 (21 Jan 2018 18:03)  HR: 64 (22 Jan 2018 07:31) (48 - 96)  BP: 151/78 (22 Jan 2018 07:31) (126/75 - 164/87)  BP(mean): --  RR: 18 (22 Jan 2018 07:31) (18 - 19)  SpO2: 99% (22 Jan 2018 07:31) (96% - 100%)        General Exam:   Neurology  MS: awake alert, verbal O to name, place, follows simple commands  CN: PERRL, EOMI, VFF, no facial asymmetry  Motor: 5/5 power.  no drift.  FFM equal  Sensory intact LT  Coordination: FNF intact  Gait deferred      Labs:     CBC Full  -  ( 16 Jan 2018 12:34 )  WBC Count : 5.7 K/uL  Hemoglobin : 9.2 g/dL  Hematocrit : 30.3 %  Platelet Count - Automated : 146 K/uL  Mean Cell Volume : 95.2 fl  Mean Cell Hemoglobin : 28.8 pg  Mean Cell Hemoglobin Concentration : 30.3 gm/dL  Auto Neutrophil # : 3.8 K/uL  Auto Lymphocyte # : 1.4 K/uL  Auto Monocyte # : 0.4 K/uL  Auto Eosinophil # : 0.1 K/uL  Auto Basophil # : 0.0 K/uL  Auto Neutrophil % : 65.9 %  Auto Lymphocyte % : 24.4 %  Auto Monocyte % : 7.7 %  Auto Eosinophil % : 1.4 %  Auto Basophil % : 0.6 %            PT/INR - ( 16 Jan 2018 12:34 )   PT: 13.9 sec;   INR: 1.27 ratio         PTT - ( 16 Jan 2018 12:34 )  PTT:34.7 sec

## 2018-01-22 NOTE — PROGRESS NOTE ADULT - PROBLEM SELECTOR PLAN 1
Patient has neurogenic bladder s/p suprapubic catheter who presented in sepsis secondary to UTI.    - Urine cultures growing VRE   - Continue Ampicillin D3, (D6/10-14 total antibiotics)

## 2018-01-23 DIAGNOSIS — R41.82 ALTERED MENTAL STATUS, UNSPECIFIED: ICD-10-CM

## 2018-01-23 LAB
ANION GAP SERPL CALC-SCNC: 10 MMOL/L — SIGNIFICANT CHANGE UP (ref 5–17)
BUN SERPL-MCNC: 38 MG/DL — HIGH (ref 7–23)
CALCIUM SERPL-MCNC: 8.7 MG/DL — SIGNIFICANT CHANGE UP (ref 8.4–10.5)
CHLORIDE SERPL-SCNC: 109 MMOL/L — HIGH (ref 96–108)
CO2 SERPL-SCNC: 26 MMOL/L — SIGNIFICANT CHANGE UP (ref 22–31)
CORTICOSTEROID BINDING GLOBULIN RESULT: 1.7 MG/DL — SIGNIFICANT CHANGE UP
CORTIS F/TOTAL MFR SERPL: 75 % — SIGNIFICANT CHANGE UP
CORTIS SERPL-MCNC: 46 UG/DL — SIGNIFICANT CHANGE UP
CORTISOL, FREE RESULT: 34 UG/DL — SIGNIFICANT CHANGE UP
CREAT SERPL-MCNC: 1.9 MG/DL — HIGH (ref 0.5–1.3)
GLUCOSE SERPL-MCNC: 83 MG/DL — SIGNIFICANT CHANGE UP (ref 70–99)
HCT VFR BLD CALC: 24.2 % — LOW (ref 39–50)
HGB BLD-MCNC: 8 G/DL — LOW (ref 13–17)
MAGNESIUM SERPL-MCNC: 2 MG/DL — SIGNIFICANT CHANGE UP (ref 1.6–2.6)
MCHC RBC-ENTMCNC: 30.1 PG — SIGNIFICANT CHANGE UP (ref 27–34)
MCHC RBC-ENTMCNC: 33.1 GM/DL — SIGNIFICANT CHANGE UP (ref 32–36)
MCV RBC AUTO: 90.9 FL — SIGNIFICANT CHANGE UP (ref 80–100)
PHOSPHATE SERPL-MCNC: 2.7 MG/DL — SIGNIFICANT CHANGE UP (ref 2.5–4.5)
PLATELET # BLD AUTO: 119 K/UL — LOW (ref 150–400)
POTASSIUM SERPL-MCNC: 4.1 MMOL/L — SIGNIFICANT CHANGE UP (ref 3.5–5.3)
POTASSIUM SERPL-SCNC: 4.1 MMOL/L — SIGNIFICANT CHANGE UP (ref 3.5–5.3)
RBC # BLD: 2.66 M/UL — LOW (ref 4.2–5.8)
RBC # FLD: 14.1 % — SIGNIFICANT CHANGE UP (ref 10.3–14.5)
SODIUM SERPL-SCNC: 145 MMOL/L — SIGNIFICANT CHANGE UP (ref 135–145)
WBC # BLD: 6 K/UL — SIGNIFICANT CHANGE UP (ref 3.8–10.5)
WBC # FLD AUTO: 6 K/UL — SIGNIFICANT CHANGE UP (ref 3.8–10.5)

## 2018-01-23 PROCEDURE — 99233 SBSQ HOSP IP/OBS HIGH 50: CPT | Mod: GC

## 2018-01-23 RX ADMIN — Medication 3 MILLIGRAM(S): at 21:50

## 2018-01-23 RX ADMIN — Medication 108 GRAM(S): at 11:02

## 2018-01-23 RX ADMIN — Medication 75 MICROGRAM(S): at 06:06

## 2018-01-23 RX ADMIN — Medication 81 MILLIGRAM(S): at 10:24

## 2018-01-23 RX ADMIN — SODIUM CHLORIDE 40 MILLILITER(S): 9 INJECTION, SOLUTION INTRAVENOUS at 10:25

## 2018-01-23 RX ADMIN — Medication 108 GRAM(S): at 06:06

## 2018-01-23 RX ADMIN — HEPARIN SODIUM 5000 UNIT(S): 5000 INJECTION INTRAVENOUS; SUBCUTANEOUS at 13:10

## 2018-01-23 RX ADMIN — HEPARIN SODIUM 5000 UNIT(S): 5000 INJECTION INTRAVENOUS; SUBCUTANEOUS at 06:06

## 2018-01-23 RX ADMIN — HEPARIN SODIUM 5000 UNIT(S): 5000 INJECTION INTRAVENOUS; SUBCUTANEOUS at 21:50

## 2018-01-23 RX ADMIN — Medication 108 GRAM(S): at 17:02

## 2018-01-23 NOTE — PROGRESS NOTE ADULT - PROBLEM SELECTOR PLAN 4
Asymptomatic, Bradycardic 1/17 to 30s to 50s with EKG showing prolonged QTc, interpreted as afib with slow ventricular response and bifascicular block.    - Afib with HR 60-90s overnight.   - Appreciate EP eval, continue to monitor on telemetry. No plans for pacemaker at this time.   - Avoid AVN blockers   - Will follow up with outpatient cardiologist, Anselmo Pickard of [facility] [phone]

## 2018-01-23 NOTE — PROGRESS NOTE ADULT - PROBLEM SELECTOR PLAN 7
DVT PPX - SQH  Diet - Mechanical Soft  Dispo - PT recs JESSICA Ardon D.O.  Medicine Team 3 Intern  pager [phone]

## 2018-01-23 NOTE — PROGRESS NOTE ADULT - PROBLEM SELECTOR PLAN 1
Patient initially admitted for unresponsiveness with concern for seizure activity. Baseline Dementia and AOx2. Has been experiencing agitation at night likely secondary to sundowning/dementia.    - Started on seroquel qHS.    - Enhanced supervision as needed   - Haldol PRN

## 2018-01-23 NOTE — PROGRESS NOTE ADULT - ATTENDING COMMENTS
Patient is sedated and opens eye with stimulation.  Will d/c Seroquel and haldol for now and if needed will give very small dose like .5 mg haldol as needed.   Monitor neuro check   enhance supervision  hold on to restating Kappra until EEG  neuro f/u appreciated.  Monitor BMP

## 2018-01-23 NOTE — PROGRESS NOTE ADULT - PROBLEM SELECTOR PLAN 3
Baseline Dementia with new onset ?seizures described as b/l UE tonic clonic lasting 20 to 30 seconds total 4 to 5 episodes. CTH showed extensive microvascular disease but no acute pathology. On Trazadone at home for sleep.   - MRI negative. Unable to complete MRA.    - vEEG negative for seizure activity.   - Keppra DC'd, continue to monitor off anti-epileptic.   - F/u Neurology recommendations regarding dispo and further management

## 2018-01-23 NOTE — PROGRESS NOTE ADULT - ASSESSMENT
89 y/o M here with ams with episodes of shaking movements of UE with head turn to left, s/p versed and depakote load.  Currently already extubated, awake, o to name, follows commands.  Concern for sepsis.  CT head without acute changes with current nonfocal exam.  Though seizures reported it would be unusual to have status epilepticus in first place and to be recovered and extubated already.  Convulsive syncope possible with sepsis, bradycardia documented.      However, further hx obtained from son more compelling for true tonic epileptic events with whole body stiffening, turning "beet red"  Seizure remains on differential, likely from sepsis with brain ischemic focus    -  VEEG neg  - now with altered mental status felt to be related to seroquel. would repeat routine EEG r/o subclinical seizures.  - MRI brain neg  - d/c'd depakote and had started keppra 500 mg bid given seizures are controlled and lower side effect and drug interaction profile of keppra.  - keppra stopped by primary team  - will monitor for now

## 2018-01-23 NOTE — PROGRESS NOTE ADULT - PROBLEM SELECTOR PLAN 2
Patient has neurogenic bladder s/p suprapubic catheter who presented in sepsis secondary to UTI.    - Urine cultures growing VRE   - Continue Ampicillin D4, (D7/10 total antibiotics)

## 2018-01-23 NOTE — PROGRESS NOTE ADULT - SUBJECTIVE AND OBJECTIVE BOX
SUBJECTIVE: No new neuro events.  still sedated.  receiving seroquel    Medications:  MEDICATIONS  (STANDING):  ampicillin  IVPB 1 Gram(s) IV Intermittent every 6 hours  aspirin enteric coated 81 milliGRAM(s) Oral daily  heparin  Injectable 5000 Unit(s) SubCutaneous every 8 hours  levothyroxine Injectable 75 MICROGram(s) IV Push <User Schedule>  melatonin 3 milliGRAM(s) Oral at bedtime  QUEtiapine 25 milliGRAM(s) Oral at bedtime    MEDICATIONS  (PRN):  haloperidol    Injectable 2.5 milliGRAM(s) IV Push every 6 hours PRN Agitation  haloperidol    Injectable 2.5 milliGRAM(s) IntraMuscular every 6 hours PRN Agitation      Labs:  CBC Full  -  ( 22 Jan 2018 07:19 )  WBC Count : 4.92 K/uL  Hemoglobin : 7.9 g/dL  Hematocrit : 26.6 %  Platelet Count - Automated : 127 K/uL  Mean Cell Volume : 90.8 fl  Mean Cell Hemoglobin : 27.0 pg  Mean Cell Hemoglobin Concentration : 29.7 gm/dL  Auto Neutrophil # : x  Auto Lymphocyte # : x  Auto Monocyte # : x  Auto Eosinophil # : x  Auto Basophil # : x  Auto Neutrophil % : x  Auto Lymphocyte % : x  Auto Monocyte % : x  Auto Eosinophil % : x  Auto Basophil % : x    01-22    147<H>  |  110<H>  |  42<H>  ----------------------------<  77  4.2   |  22  |  1.93<H>    Ca    8.9      22 Jan 2018 07:27  Phos  3.1     01-22  Mg     2.1     01-22    TPro  7.1  /  Alb  3.4  /  TBili  2.0<H>  /  DBili  x   /  AST  19  /  ALT  14  /  AlkPhos  109  01-20    CAPILLARY BLOOD GLUCOSE      POCT Blood Glucose.: 93 mg/dL (22 Jan 2018 09:37)  POCT Blood Glucose.: 87 mg/dL (22 Jan 2018 04:04)  POCT Blood Glucose.: 84 mg/dL (22 Jan 2018 00:12)  POCT Blood Glucose.: 161 mg/dL (21 Jan 2018 19:59)  POCT Blood Glucose.: 86 mg/dL (21 Jan 2018 15:33)  POCT Blood Glucose.: 92 mg/dL (21 Jan 2018 12:09)    LIVER FUNCTIONS - ( 20 Jan 2018 17:02 )  Alb: 3.4 g/dL / Pro: 7.1 g/dL / ALK PHOS: 109 U/L / ALT: 14 U/L RC / AST: 19 U/L / GGT: x           PT/INR - ( 21 Jan 2018 10:54 )   PT: 14.8 sec;   INR: 1.36 ratio         PTT - ( 21 Jan 2018 10:54 )  PTT:30.8 sec      Vitals:  Vital Signs Last 24 Hrs  T(C): 36.3 (22 Jan 2018 04:00), Max: 36.9 (21 Jan 2018 18:03)  T(F): 97.4 (22 Jan 2018 04:00), Max: 98.4 (21 Jan 2018 18:03)  HR: 64 (22 Jan 2018 07:31) (48 - 96)  BP: 151/78 (22 Jan 2018 07:31) (126/75 - 164/87)  BP(mean): --  RR: 18 (22 Jan 2018 07:31) (18 - 19)  SpO2: 99% (22 Jan 2018 07:31) (96% - 100%)        General Exam:   Neurology  MS: awake alert, verbal O to name, place, follows simple commands  CN: PERRL, EOMI, VFF, no facial asymmetry  Motor: 5/5 power.  no drift.  FFM equal  Sensory intact LT  Coordination: FNF intact  Gait deferred      Labs:     CBC Full  -  ( 16 Jan 2018 12:34 )  WBC Count : 5.7 K/uL  Hemoglobin : 9.2 g/dL  Hematocrit : 30.3 %  Platelet Count - Automated : 146 K/uL  Mean Cell Volume : 95.2 fl  Mean Cell Hemoglobin : 28.8 pg  Mean Cell Hemoglobin Concentration : 30.3 gm/dL  Auto Neutrophil # : 3.8 K/uL  Auto Lymphocyte # : 1.4 K/uL  Auto Monocyte # : 0.4 K/uL  Auto Eosinophil # : 0.1 K/uL  Auto Basophil # : 0.0 K/uL  Auto Neutrophil % : 65.9 %  Auto Lymphocyte % : 24.4 %  Auto Monocyte % : 7.7 %  Auto Eosinophil % : 1.4 %  Auto Basophil % : 0.6 %            PT/INR - ( 16 Jan 2018 12:34 )   PT: 13.9 sec;   INR: 1.27 ratio         PTT - ( 16 Jan 2018 12:34 )  PTT:34.7 sec SUBJECTIVE: No new neuro events.  still sedated.  receiving seroquel    MEDICATIONS  (STANDING):  ampicillin  IVPB 1 Gram(s) IV Intermittent every 6 hours  aspirin enteric coated 81 milliGRAM(s) Oral daily  dextrose 5% + sodium chloride 0.45%. 500 milliLiter(s) (40 mL/Hr) IV Continuous <Continuous>  heparin  Injectable 5000 Unit(s) SubCutaneous every 8 hours  levothyroxine Injectable 75 MICROGram(s) IV Push <User Schedule>  melatonin 3 milliGRAM(s) Oral at bedtime    MEDICATIONS  (PRN):      Labs:  CBC Full  -  ( 22 Jan 2018 07:19 )  WBC Count : 4.92 K/uL  Hemoglobin : 7.9 g/dL  Hematocrit : 26.6 %  Platelet Count - Automated : 127 K/uL  Mean Cell Volume : 90.8 fl  Mean Cell Hemoglobin : 27.0 pg  Mean Cell Hemoglobin Concentration : 29.7 gm/dL  Auto Neutrophil # : x  Auto Lymphocyte # : x  Auto Monocyte # : x  Auto Eosinophil # : x  Auto Basophil # : x  Auto Neutrophil % : x  Auto Lymphocyte % : x  Auto Monocyte % : x  Auto Eosinophil % : x  Auto Basophil % : x    01-22    147<H>  |  110<H>  |  42<H>  ----------------------------<  77  4.2   |  22  |  1.93<H>    Ca    8.9      22 Jan 2018 07:27  Phos  3.1     01-22  Mg     2.1     01-22    TPro  7.1  /  Alb  3.4  /  TBili  2.0<H>  /  DBili  x   /  AST  19  /  ALT  14  /  AlkPhos  109  01-20    CAPILLARY BLOOD GLUCOSE      POCT Blood Glucose.: 93 mg/dL (22 Jan 2018 09:37)  POCT Blood Glucose.: 87 mg/dL (22 Jan 2018 04:04)  POCT Blood Glucose.: 84 mg/dL (22 Jan 2018 00:12)  POCT Blood Glucose.: 161 mg/dL (21 Jan 2018 19:59)  POCT Blood Glucose.: 86 mg/dL (21 Jan 2018 15:33)  POCT Blood Glucose.: 92 mg/dL (21 Jan 2018 12:09)    LIVER FUNCTIONS - ( 20 Jan 2018 17:02 )  Alb: 3.4 g/dL / Pro: 7.1 g/dL / ALK PHOS: 109 U/L / ALT: 14 U/L RC / AST: 19 U/L / GGT: x           PT/INR - ( 21 Jan 2018 10:54 )   PT: 14.8 sec;   INR: 1.36 ratio         PTT - ( 21 Jan 2018 10:54 )  PTT:30.8 sec      Vitals:  Vital Signs Last 24 Hrs  T(C): 36.3 (22 Jan 2018 04:00), Max: 36.9 (21 Jan 2018 18:03)  T(F): 97.4 (22 Jan 2018 04:00), Max: 98.4 (21 Jan 2018 18:03)  HR: 64 (22 Jan 2018 07:31) (48 - 96)  BP: 151/78 (22 Jan 2018 07:31) (126/75 - 164/87)  BP(mean): --  RR: 18 (22 Jan 2018 07:31) (18 - 19)  SpO2: 99% (22 Jan 2018 07:31) (96% - 100%)        General Exam:   Neurology  MS: awake alert, verbal O to name, place, follows simple commands  CN: PERRL, EOMI, VFF, no facial asymmetry  Motor: 5/5 power.  no drift.  FFM equal  Sensory intact LT  Coordination: FNF intact  Gait deferred      Labs:     CBC Full  -  ( 16 Jan 2018 12:34 )  WBC Count : 5.7 K/uL  Hemoglobin : 9.2 g/dL  Hematocrit : 30.3 %  Platelet Count - Automated : 146 K/uL  Mean Cell Volume : 95.2 fl  Mean Cell Hemoglobin : 28.8 pg  Mean Cell Hemoglobin Concentration : 30.3 gm/dL  Auto Neutrophil # : 3.8 K/uL  Auto Lymphocyte # : 1.4 K/uL  Auto Monocyte # : 0.4 K/uL  Auto Eosinophil # : 0.1 K/uL  Auto Basophil # : 0.0 K/uL  Auto Neutrophil % : 65.9 %  Auto Lymphocyte % : 24.4 %  Auto Monocyte % : 7.7 %  Auto Eosinophil % : 1.4 %  Auto Basophil % : 0.6 %            PT/INR - ( 16 Jan 2018 12:34 )   PT: 13.9 sec;   INR: 1.27 ratio         PTT - ( 16 Jan 2018 12:34 )  PTT:34.7 sec

## 2018-01-24 DIAGNOSIS — I25.10 ATHEROSCLEROTIC HEART DISEASE OF NATIVE CORONARY ARTERY WITHOUT ANGINA PECTORIS: ICD-10-CM

## 2018-01-24 DIAGNOSIS — I48.2 CHRONIC ATRIAL FIBRILLATION: ICD-10-CM

## 2018-01-24 DIAGNOSIS — I35.0 NONRHEUMATIC AORTIC (VALVE) STENOSIS: ICD-10-CM

## 2018-01-24 LAB
ANION GAP SERPL CALC-SCNC: 12 MMOL/L — SIGNIFICANT CHANGE UP (ref 5–17)
BUN SERPL-MCNC: 34 MG/DL — HIGH (ref 7–23)
CALCIUM SERPL-MCNC: 9.1 MG/DL — SIGNIFICANT CHANGE UP (ref 8.4–10.5)
CHLORIDE SERPL-SCNC: 109 MMOL/L — HIGH (ref 96–108)
CO2 SERPL-SCNC: 24 MMOL/L — SIGNIFICANT CHANGE UP (ref 22–31)
CREAT SERPL-MCNC: 1.58 MG/DL — HIGH (ref 0.5–1.3)
GLUCOSE SERPL-MCNC: 80 MG/DL — SIGNIFICANT CHANGE UP (ref 70–99)
HCT VFR BLD CALC: 27.2 % — LOW (ref 39–50)
HGB BLD-MCNC: 8 G/DL — LOW (ref 13–17)
MAGNESIUM SERPL-MCNC: 2 MG/DL — SIGNIFICANT CHANGE UP (ref 1.6–2.6)
MCHC RBC-ENTMCNC: 25.8 PG — LOW (ref 27–34)
MCHC RBC-ENTMCNC: 29.4 GM/DL — LOW (ref 32–36)
MCV RBC AUTO: 87.7 FL — SIGNIFICANT CHANGE UP (ref 80–100)
PHOSPHATE SERPL-MCNC: 2.7 MG/DL — SIGNIFICANT CHANGE UP (ref 2.5–4.5)
PLATELET # BLD AUTO: 137 K/UL — LOW (ref 150–400)
POTASSIUM SERPL-MCNC: 4.4 MMOL/L — SIGNIFICANT CHANGE UP (ref 3.5–5.3)
POTASSIUM SERPL-SCNC: 4.4 MMOL/L — SIGNIFICANT CHANGE UP (ref 3.5–5.3)
RBC # BLD: 3.1 M/UL — LOW (ref 4.2–5.8)
RBC # FLD: 15.6 % — HIGH (ref 10.3–14.5)
SODIUM SERPL-SCNC: 145 MMOL/L — SIGNIFICANT CHANGE UP (ref 135–145)
WBC # BLD: 6.44 K/UL — SIGNIFICANT CHANGE UP (ref 3.8–10.5)
WBC # FLD AUTO: 6.44 K/UL — SIGNIFICANT CHANGE UP (ref 3.8–10.5)

## 2018-01-24 PROCEDURE — 99233 SBSQ HOSP IP/OBS HIGH 50: CPT | Mod: GC

## 2018-01-24 RX ORDER — LEVETIRACETAM 250 MG/1
500 TABLET, FILM COATED ORAL EVERY 12 HOURS
Qty: 0 | Refills: 0 | Status: DISCONTINUED | OUTPATIENT
Start: 2018-01-24 | End: 2018-01-30

## 2018-01-24 RX ORDER — SODIUM CHLORIDE 9 MG/ML
1000 INJECTION, SOLUTION INTRAVENOUS
Qty: 0 | Refills: 0 | Status: DISCONTINUED | OUTPATIENT
Start: 2018-01-24 | End: 2018-01-25

## 2018-01-24 RX ADMIN — Medication 108 GRAM(S): at 23:33

## 2018-01-24 RX ADMIN — HEPARIN SODIUM 5000 UNIT(S): 5000 INJECTION INTRAVENOUS; SUBCUTANEOUS at 13:49

## 2018-01-24 RX ADMIN — HEPARIN SODIUM 5000 UNIT(S): 5000 INJECTION INTRAVENOUS; SUBCUTANEOUS at 21:59

## 2018-01-24 RX ADMIN — Medication 108 GRAM(S): at 06:03

## 2018-01-24 RX ADMIN — Medication 108 GRAM(S): at 11:07

## 2018-01-24 RX ADMIN — Medication 81 MILLIGRAM(S): at 11:07

## 2018-01-24 RX ADMIN — SODIUM CHLORIDE 50 MILLILITER(S): 9 INJECTION, SOLUTION INTRAVENOUS at 14:22

## 2018-01-24 RX ADMIN — Medication 108 GRAM(S): at 17:48

## 2018-01-24 RX ADMIN — Medication 108 GRAM(S): at 01:18

## 2018-01-24 RX ADMIN — HEPARIN SODIUM 5000 UNIT(S): 5000 INJECTION INTRAVENOUS; SUBCUTANEOUS at 06:03

## 2018-01-24 RX ADMIN — Medication 75 MICROGRAM(S): at 06:03

## 2018-01-24 RX ADMIN — LEVETIRACETAM 400 MILLIGRAM(S): 250 TABLET, FILM COATED ORAL at 16:03

## 2018-01-24 NOTE — CONSULT NOTE ADULT - SUBJECTIVE AND OBJECTIVE BOX
Cardiology Dr Pickard [phone]    Patient is a 89y old  Male who presents with a chief complaint of ?seizure (16 Jan 2018 13:22)       HPI:  89 yo M with PMHx of Aortic stenosis, Atrial fibrillation, Dementia, Hypothyroid, HLD, CKD, anemia w/ history of GIB 2/2 H Pylori Gastritis, Urinary retention secondary to neurogenic bladder s/p suprapubic catheter presenting with seizure and unresponsiveness. Course has been complicated by bradycardia and UTI/?sepsis.      PAST MEDICAL & SURGICAL HISTORY:  Aortic stenosis  Atrial fibrillation  CAD abnormal stress 8/14  Anemia  Neurogenic bladder  Hyperlipidemia  Dementia  Hypothyroid  CKD (chronic kidney disease)      MEDICATIONS  (STANDING):  ampicillin  IVPB 1 Gram(s) IV Intermittent every 6 hours  aspirin enteric coated 81 milliGRAM(s) Oral daily  dextrose 5% + sodium chloride 0.45%. 500 milliLiter(s) (40 mL/Hr) IV Continuous <Continuous>  heparin  Injectable 5000 Unit(s) SubCutaneous every 8 hours  levothyroxine Injectable 75 MICROGram(s) IV Push <User Schedule>  melatonin 3 milliGRAM(s) Oral at bedtime    MEDICATIONS  (PRN):      Allergies    No Known Allergies    Intolerances        Social Histroy: Tobacco-none, ETOH-none, Illicit Drugs-none    T(C): 36.3 (01-24-18 @ 04:33), Max: 36.7 (01-23-18 @ 12:03)  HR: 70 (01-24-18 @ 04:33) (70 - 84)  BP: 150/92 (01-24-18 @ 04:33) (123/79 - 150/92)  RR: 18 (01-24-18 @ 04:33) (18 - 18)  SpO2: 100% (01-24-18 @ 04:33) (97% - 100%)  Wt(kg): --  Daily     Daily   I&O's Summary    23 Jan 2018 07:01  -  24 Jan 2018 07:00  --------------------------------------------------------  IN: 410 mL / OUT: 1250 mL / NET: -840 mL        Review of Systems:  Constitutional: [ ] Fever [ ] Chills [ ] Fatigue [ ] Weight change   HEENT: [ ] Blurred vision [ ] Eye Pain [ ] Headache [ ] Runny nose [ ] Sore Throat   Respiratory: [ ] Cough [ ] Wheezing [ ] Shortness of breath  Cardiovascular: [ ] Chest Pain [ ] Palpitations [ ] JOYCE [ ] PND [ ] Orthopnea  Gastrointestinal: [ ] Abdominal Pain [ ] Diarrhea [ ] Constipation [ ] Hemorrhoids [ ] Nausea [ ] Vomiting  Genitourinary: [ ] Nocturia [ ] Dysuria [ ] Incontinence  Extremities: [ ] Swelling [ ] Joint Pain  Neurologic: [ ] Focal deficit [ ] Paresthesias [ ] Syncope  Lymphatic: [ ] Swelling [ ] Lymphadenopathy   Skin: [ ] Rash [ ] Ecchymoses [ ] Wounds [ ] Lesions  Psychiatry: [ ] Depression [ ] Suicidal/Homicidal Ideation [ ] Anxiety [ ] Sleep Disturbances  [ ] 10 point review of systems is otherwise negative except as mentioned above            [x ]Unable to obtain    PHYSICAL EXAM:  GENERAL: Arousable, lethargic  HEAD:  Atraumatic, Normocephalic  NECK: Supple, No JVD  CHEST/LUNG: Clear to auscultation bilaterally; No wheeze, rhonchi or rales  HEART: S1S2, irregular 3/6 SAILAJA  ABDOMEN: Soft, Nontender, Nondistended; Bowel sounds present  EXTREMITIES:  edema/ecchymosis x 4 extremities  NEUROLOGY: non-focal      LABS:                        8.0    6.0   )-----------( 119      ( 23 Jan 2018 09:24 )             24.2     01-23    145  |  109<H>  |  38<H>  ----------------------------<  83  4.1   |  26  |  1.90<H>    Ca    8.7      23 Jan 2018 09:24  Phos  2.7     01-23  Mg     2.0     01-23                  RADIOLOGY & ADDITIONAL TESTS:    Cardiology testing:  ECG:    Echo:    Stress Testing:    Cath:    Telemetry: Cardiology Dr Pickard [phone]    Patient is a 89y old  Male who presents with a chief complaint of ?seizure (2018 13:22)       HPI:  89 yo M with PMHx of CAD abnormal stress  rx medically, Aortic stenosis, Atrial fibrillation, RBBB, Dementia, Hypothyroid, HLD, CKD, anemia w/ history of GIB 2/2 H Pylori Gastritis, Urinary retention secondary to neurogenic bladder s/p suprapubic catheter presenting with seizure and unresponsiveness. Course has been complicated by bradycardia and UTI/?sepsis.      PAST MEDICAL & SURGICAL HISTORY:  Aortic stenosis  Atrial fibrillation  CAD abnormal stress   RBBB  Anemia  Neurogenic bladder  Hyperlipidemia  Dementia  Hypothyroid  CKD (chronic kidney disease)      MEDICATIONS  (STANDING):  ampicillin  IVPB 1 Gram(s) IV Intermittent every 6 hours  aspirin enteric coated 81 milliGRAM(s) Oral daily  dextrose 5% + sodium chloride 0.45%. 500 milliLiter(s) (40 mL/Hr) IV Continuous <Continuous>  heparin  Injectable 5000 Unit(s) SubCutaneous every 8 hours  levothyroxine Injectable 75 MICROGram(s) IV Push <User Schedule>  melatonin 3 milliGRAM(s) Oral at bedtime    MEDICATIONS  (PRN):      Allergies    No Known Allergies    Intolerances        Social Histroy: Tobacco-none, ETOH-none, Illicit Drugs-none    T(C): 36.3 (18 @ 04:33), Max: 36.7 (18 @ 12:03)  HR: 70 (18 @ 04:33) (70 - 84)  BP: 150/92 (18 @ 04:33) (123/79 - 150/92)  RR: 18 (18 @ 04:33) (18 - 18)  SpO2: 100% (18 @ 04:33) (97% - 100%)  Wt(kg): --  Daily     Daily   I&O's Summary    2018 07:01  -  2018 07:00  --------------------------------------------------------  IN: 410 mL / OUT: 1250 mL / NET: -840 mL        Review of Systems:  Constitutional: [ ] Fever [ ] Chills [ ] Fatigue [ ] Weight change   HEENT: [ ] Blurred vision [ ] Eye Pain [ ] Headache [ ] Runny nose [ ] Sore Throat   Respiratory: [ ] Cough [ ] Wheezing [ ] Shortness of breath  Cardiovascular: [ ] Chest Pain [ ] Palpitations [ ] JOYCE [ ] PND [ ] Orthopnea  Gastrointestinal: [ ] Abdominal Pain [ ] Diarrhea [ ] Constipation [ ] Hemorrhoids [ ] Nausea [ ] Vomiting  Genitourinary: [ ] Nocturia [ ] Dysuria [ ] Incontinence  Extremities: [ ] Swelling [ ] Joint Pain  Neurologic: [ ] Focal deficit [ ] Paresthesias [ ] Syncope  Lymphatic: [ ] Swelling [ ] Lymphadenopathy   Skin: [ ] Rash [ ] Ecchymoses [ ] Wounds [ ] Lesions  Psychiatry: [ ] Depression [ ] Suicidal/Homicidal Ideation [ ] Anxiety [ ] Sleep Disturbances  [ ] 10 point review of systems is otherwise negative except as mentioned above            [x ]Unable to obtain    PHYSICAL EXAM:  GENERAL: Arousable, lethargic  HEAD:  Atraumatic, Normocephalic  NECK: Supple, No JVD  CHEST/LUNG: Clear to auscultation bilaterally; No wheeze, rhonchi or rales  HEART: S1S2, irregular 3/6 SAILAJA  ABDOMEN: Soft, Nontender, Nondistended; Bowel sounds present  EXTREMITIES:  edema/ecchymosis x 4 extremities  NEUROLOGY: non-focal      LABS:                        8.0    6.0   )-----------( 119      ( 2018 09:24 )             24.2     01-    145  |  109<H>  |  38<H>  ----------------------------<  83  4.1   |  26  |  1.90<H>    Ca    8.7      2018 09:24  Phos  2.7       Mg     2.0                 <        RADIOLOGY & ADDITIONAL TESTS:    Cardiology testing:  EC/21-Afib with RBBB    Echo:   from: Transthoracic Echocardiogram (18 @ 08:26) >  Patient name: HERNAN MILLER  YOB: 1926   Age: 89 (M)   MR#: [id number]  Study Date: 2018  [id number]: Ciarra Esposito RDCS  Study quality: Technically good  Referring Physician: Davis Zhao MD  Blood Pressure: 151/78 mmHg  Height: 183 cm  Weight: 97 kg  BSA: 2.2 m2  ------------------------------------------------------------------------  PROCEDURE: Transthoracic echocardiogram with 2-D, M-Mode  and complete spectral and color flow Doppler. Patient was  injected with 10 cc's of aerosolized saline. Patient was  injected with 10 cc's of aerosolized saline.  INDICATION: Cardiac murmur, unspecified (R01.1),  Nonrheumatic aortic valve disorder, unspecified (I35.9)  ------------------------------------------------------------------------  Dimensions:    Normal Values:  LA:     4.5    2.0 - 4.0 cm  Ao:     3.8    2.0 - 3.8 cm  SEPTUM: 1.1    0.6 - 1.2 cm  PWT:    1.0    0.6 - 1.1 cm  LVIDd:  5.2    3.0 - 5.6 cm  LVIDs:  3.4    1.8 - 4.0 cm  Derivedvariables:  LVMI: 95 g/m2  RWT: 0.38  Fractional short: 35 %  EF (Visual Estimate): 65 %  Doppler Peak Velocity (m/sec): AoV=5.4  ------------------------------------------------------------------------  Observations:  Mitral Valve: Mitral valve prolapse involving the anterior  mitral leaflet. Mitral annular calcification. Moderate  mitral regurgitation.  Aortic Valve/Aorta: Calcified aortic valve with decreased  opening. Peak transaortic valve gradient equals 115 mm Hg,  mean transaortic valve gradient equals 69 mm Hg, estimated  aortic valve area equals 0.6 sqcm (by continuity equation),  aortic valve velocity time integral equals 136 cm,  consistent with severe aortic stenosis. Mild-moderate  aortic regurgitation. Peak left ventricular outflow tract  gradient equals 3 mm Hg, mean gradient is equal to 2 mm Hg,  LVOT velocity time integral equals 22 cm.  Aortic Root: 3.8 cm.  LVOT diameter: 2.4 cm.  Left Atrium: Severely dilated left atrium.  LA volume index  = 51 cc/m2.  Left Ventricle:Normal left ventricular systolic function.  No segmental wall motion abnormalities. Normal left  ventricular internal dimensions and wall thicknesses.  Normal diastolic function  Right Heart: Severe right atrial enlargement. The right  ventricle is not well visualized; grossly normal right  ventricular systolic function. Normal tricuspid valve. Mild  tricuspid regurgitation. Pulmonic valve not well  visualized.  Pericardium/Pleura: Normal pericardium with no pericardial  effusion.  Hemodynamic: Estimated right atrial pressure is 8 mm Hg.  Estimated right ventricular systolic pressure equals 44 mm  Hg, assuming right atrial pressure equals 8 mm Hg,  consistent with mild pulmonary hypertension. Color doppler  across the interatrial septum is suggestive of a small PFO.  Agitated saline injection demonstrates no evidence of a  patent foramen ovale, however patient was unable to  adequately perform valsalva maneuver.  ------------------------------------------------------------------------  Conclusions:  1. Mitral valve prolapse involving the anterior mitral  leaflet. Mitral annular calcification. Moderate mitral  regurgitation.  2. Calcified aortic valve with decreased opening. Peak  transaortic valve gradient equals 115 mm Hg, mean  transaortic valve gradient equals 69 mm Hg, estimated  aortic valve area equals 0.6 sqcm (by continuity equation),  aortic valve velocity time integral equals 136 cm,  consistent with severe aortic stenosis. Mild-moderate  aortic regurgitation.  3. Severely dilated left atrium.  LA volume index = 51  cc/m2.  4. Normal left ventricular internal dimensions and wall  thicknesses.  5. Normal left ventricular systolic function. No segmental  wall motion abnormalities.  6. The right ventricle is not well visualized; grossly  normal right ventricular systolic function.  7. Color doppler across the interatrial septum is  suggestive of a small PFO. Agitated saline injection  demonstrates no evidence of a patent foramen ovale, however  patient was unable toadequately perform valsalva maneuver.  *** No previous Echo exam.  ------------------------------------------------------------------------    < end of copied text >  Stress Testin/14 NM Stress dital anterior and apical injury without significant per-infarct ischemia    Cath:    Telemetry: afib 40-80's

## 2018-01-24 NOTE — PROGRESS NOTE ADULT - PROBLEM SELECTOR PLAN 2
Patient has neurogenic bladder s/p suprapubic catheter who presented in sepsis secondary to UTI.    - Urine cultures growing VRE   - Continue Ampicillin D5, (D8/10 total antibiotics)

## 2018-01-24 NOTE — CONSULT NOTE ADULT - PROBLEM SELECTOR PROBLEM 1
Neurogenic bladder
Status epilepticus
Atrial fibrillation with slow ventricular response
Nonrheumatic aortic valve stenosis

## 2018-01-24 NOTE — CONSULT NOTE ADULT - ASSESSMENT
89 year old male seen for onychomycosis, bilateral feet  -Aseptic debridement of elongated nails  -No other acute podiatric issues at this time  -Please reconsult as needed    [phone]  Dr. Parker
Patient is a 89y old  Male who presents with a chief complaint of seizure, multiple events without returns to baseline.
chronic sp for catheter change q 1month due to recurrent infections  gu fu prn.
89 yo M with PMHx of CAD abnormal stress 8/14 rx medically, Aortic stenosis, Atrial fibrillation, RBBB, Dementia, Hypothyroid, HLD, CKD, anemia w/ history of GIB 2/2 H Pylori Gastritis, Urinary retention secondary to neurogenic bladder s/p suprapubic catheter presenting with seizure and unresponsiveness. Course has been complicated by bradycardia and UTI/?sepsis and noted to have severe AS on echo
Patient is a 89 yo M with PMH Afib (not on A/C), Dementia, Hypothyroid, HLD, CKD, anemia w/ history of GIB 2/2 H Pylori Gastritis, Urinary retention d/t neurogenic bladder s/p suprapubic catheter presenting to ED for possible seizure and unresponsiveness in setting of sepsis (more likely in setting of UA w Leuk Est large, Triple Phosphate Crystals moderate, US Renal w stones) vs myxedema coma (less likely in setting of TSH 4.50). Extubated 1/17. EKG with RBBB and Tele with slow Afib in the setting of sepsis.

## 2018-01-24 NOTE — CHART NOTE - NSCHARTNOTEFT_GEN_A_CORE
Spoke with neurologist Dr. Villanueva yesterday. Planning to continue to hold keppra until EEG. May reinstate anti-epileptic therapy depending on results of EEG. Holding off further medications for agitation as patient appears sedated.     Marisel Mayes MD  Internal Medicine, PGY-2  Pager [phone]

## 2018-01-24 NOTE — PROGRESS NOTE ADULT - PROBLEM SELECTOR PLAN 3
Baseline Dementia with new onset ?seizures described as b/l UE tonic clonic lasting 20 to 30 seconds total 4 to 5 episodes. CTH showed extensive microvascular disease but no acute pathology. On Trazadone at home for sleep.   - MRI negative. Unable to complete MRA.    - vEEG negative for seizure activity.   - Keppra DC'd, continue to monitor off anti-epileptic.   - F/u Neurology recommendations, planning to repeat EEG.

## 2018-01-24 NOTE — SWALLOW BEDSIDE ASSESSMENT ADULT - SWALLOW EVAL: DIAGNOSIS
Pt presents with an 1. oral and suspected pharyngeal dysphagia superimposed upon baseline intermittent wet vocal quality. The swallow is marked by reduced oral grading, delayed oral transit time, suspected uncontrolled loss, delayed pharyngeal swallow, reduced hyolaryngeal excursion, and wet vocal quality and throat clears post PO intake of honey thick liquid via teaspoon suggestive of laryngeal penetration/aspiration. 2. Dysarthria.

## 2018-01-24 NOTE — PROGRESS NOTE ADULT - ATTENDING COMMENTS
Patient seen and examined in AM.  Agree with resident note as above.  More alert today. Grand son at bed side.   d/w Dr Villanueva( neuro attending)  and will restart  Keppra  d/w speech and swallow - recommend NPO  Maintenance  hydration  severe AS- I d/w his grand son -   continue Abx Monitor BMP and cbc

## 2018-01-24 NOTE — PROGRESS NOTE ADULT - ASSESSMENT
89 y/o M here with ams with episodes of shaking movements of UE with head turn to left, s/p versed and depakote load.  Currently already extubated, awake, o to name, follows commands.  Concern for sepsis.  CT head without acute changes with current nonfocal exam.  Though seizures reported it would be unusual to have status epilepticus in first place and to be recovered and extubated already.  Convulsive syncope possible with sepsis, bradycardia documented.      However, further hx obtained from son more compelling for true tonic epileptic events with whole body stiffening, turning "beet red"  Seizure remains on differential, likely from sepsis with brain ischemic focus    -  VEEG neg  - now with altered mental status felt to be related to seroquel. now more awake off sedating meds. will d/c eeg  - MRI brain neg  - d/c'd depakote and had started keppra 500 mg bid given seizures are controlled and lower side effect and drug interaction profile of keppra.  - no further neuro w/u

## 2018-01-24 NOTE — SWALLOW BEDSIDE ASSESSMENT ADULT - SWALLOW EVAL: PATIENT/FAMILY GOALS STATEMENT
Pt's grandson denied history of dysphagia PTA however reported that pt now coughs with all PO intake and reported episodes of coughing and wet vocal quality after provision of applesauce yesterday.

## 2018-01-24 NOTE — PROGRESS NOTE ADULT - PROBLEM SELECTOR PLAN 7
DVT PPX - SQH  Diet - Mechanical Soft  Dispo - PT recs JESSICA Ardon D.O.  Medicine Team 3 Intern  pager [phone]

## 2018-01-24 NOTE — PROGRESS NOTE ADULT - PROBLEM SELECTOR PLAN 4
Asymptomatic, Bradycardic 1/17 to 30s to 50s with EKG showing prolonged QTc, interpreted as afib with slow ventricular response and bifascicular block.    - Afib with HR 40-70s overnight.   - Appreciate EP eval, continue to monitor on telemetry. No plans for pacemaker at this time.   - Avoid AVN blockers   - Appreciate Cards evaluation, no plans for intervention at this time. Will follow up as outpatient

## 2018-01-24 NOTE — PROGRESS NOTE ADULT - SUBJECTIVE AND OBJECTIVE BOX
Patient is a 89y old  Male who presents with a chief complaint of ?seizure (16 Jan 2018 13:22)      OVERNIGHT EVENTS: No acute events overnight.  SUBJECTIVE: Patient seen and examined at bedside. Remains somnolent.       MEDICATIONS  (STANDING):  ampicillin  IVPB 1 Gram(s) IV Intermittent every 6 hours  aspirin enteric coated 81 milliGRAM(s) Oral daily  dextrose 5% + sodium chloride 0.45%. 500 milliLiter(s) (40 mL/Hr) IV Continuous <Continuous>  heparin  Injectable 5000 Unit(s) SubCutaneous every 8 hours  levothyroxine Injectable 75 MICROGram(s) IV Push <User Schedule>    MEDICATIONS  (PRN):      T(C): 36.3 (01-24-18 @ 04:33), Max: 36.7 (01-23-18 @ 12:03)  HR: 70 (01-24-18 @ 04:33) (70 - 84)  BP: 150/92 (01-24-18 @ 04:33) (123/79 - 150/92)  RR: 18 (01-24-18 @ 04:33) (18 - 18)  SpO2: 100% (01-24-18 @ 04:33) (97% - 100%)  CAPILLARY BLOOD GLUCOSE        I&O's Summary    23 Jan 2018 07:01  -  24 Jan 2018 07:00  --------------------------------------------------------  IN: 410 mL / OUT: 1250 mL / NET: -840 mL      PHYSICAL EXAM  GENERAL: NAD, somnolent  HEAD:  Atraumatic, Normocephalic  EYES: EOMI, PERRLA, conjunctiva and sclera clear  CHEST/LUNG: Bibasilar rales, no wheezes. coarse breath sounds diffusely. poor effort.  HEART: +S1 +S2, Regular rate and rhythm  ABDOMEN: Soft, Nontender, Nondistended.   : +Suprapubic catheter in place, with clear urine in bag.  NEURO: Somnolent, awakes minimally to voice. Not able to perform full assessment. AOx0   EXTREMITIES:  Pink and warm, Peripheral Pulses intact. trace B/L edema.       LABS:                        8.0    6.0   )-----------( 119      ( 23 Jan 2018 09:24 )             24.2     01-24    145  |  109<H>  |  34<H>  ----------------------------<  80  4.4   |  24  |  1.58<H>    Ca    9.1      24 Jan 2018 07:50  Phos  2.7     01-24  Mg     2.0     01-24                RADIOLOGY & ADDITIONAL TESTS:  EEG PENDING    Imaging Personally Reviewed:  Consultant(s) Notes Reviewed:    Care Discussed with Consultants/Other Providers:

## 2018-01-24 NOTE — PROGRESS NOTE ADULT - PROBLEM SELECTOR PLAN 1
Patient initially admitted for unresponsiveness with concern for seizure activity. Baseline Dementia and AOx2. Has been experiencing agitation at night likely secondary to sundowning/dementia.    - Started on seroquel qHS, however now quite somnolent. Discontinued last night, continue to monitor off sedating medications.    - Enhanced supervision as needed

## 2018-01-24 NOTE — CONSULT NOTE ADULT - PROBLEM SELECTOR RECOMMENDATION 3
-  - Pt with hx of CAD abnormal stress 8/14 being rx medically  - He is without active cardiac complaints  - Continue ASA  - If clinical status improves will consider resuming Lipitor as outpt    -No active cardiac issues currently please reconsult as necessary otherwise patient will f/u as outpt if clinical status improves

## 2018-01-24 NOTE — CONSULT NOTE ADULT - PROBLEM SELECTOR RECOMMENDATION 2
-  - Pt with chronic Afib not on anticoagulation secondary to longstanding bleeding/anemia issues  - Rates are well controlled  - Would avoid AV zhou blockers due to baseline bradycardia  - EP consult appreciated agree no indication for PPM at this time

## 2018-01-24 NOTE — PROGRESS NOTE ADULT - SUBJECTIVE AND OBJECTIVE BOX
SUBJECTIVE: more awake.  asking to be fed    Medications:  MEDICATIONS  (STANDING):  ampicillin  IVPB 1 Gram(s) IV Intermittent every 6 hours  aspirin enteric coated 81 milliGRAM(s) Oral daily  dextrose 5% + sodium chloride 0.45%. 500 milliLiter(s) (40 mL/Hr) IV Continuous <Continuous>  heparin  Injectable 5000 Unit(s) SubCutaneous every 8 hours  levothyroxine Injectable 75 MICROGram(s) IV Push <User Schedule>    MEDICATIONS  (PRN):      Labs:  CBC Full  -  ( 24 Jan 2018 07:45 )  WBC Count : 6.44 K/uL  Hemoglobin : 8.0 g/dL  Hematocrit : 27.2 %  Platelet Count - Automated : 137 K/uL  Mean Cell Volume : 87.7 fl  Mean Cell Hemoglobin : 25.8 pg  Mean Cell Hemoglobin Concentration : 29.4 gm/dL  Auto Neutrophil # : x  Auto Lymphocyte # : x  Auto Monocyte # : x  Auto Eosinophil # : x  Auto Basophil # : x  Auto Neutrophil % : x  Auto Lymphocyte % : x  Auto Monocyte % : x  Auto Eosinophil % : x  Auto Basophil % : x    01-24    145  |  109<H>  |  34<H>  ----------------------------<  80  4.4   |  24  |  1.58<H>    Ca    9.1      24 Jan 2018 07:50  Phos  2.7     01-24  Mg     2.0     01-24      CAPILLARY BLOOD GLUCOSE                Vitals:  Vital Signs Last 24 Hrs  T(C): 36.3 (24 Jan 2018 04:33), Max: 36.7 (23 Jan 2018 12:03)  T(F): 97.4 (24 Jan 2018 04:33), Max: 98 (23 Jan 2018 12:03)  HR: 70 (24 Jan 2018 04:33) (70 - 84)  BP: 150/92 (24 Jan 2018 04:33) (123/79 - 150/92)  BP(mean): --  RR: 18 (24 Jan 2018 04:33) (18 - 18)  SpO2: 100% (24 Jan 2018 04:33) (97% - 100%)            General Exam:   Neurology  MS: awake alert, verbal O to name, place, follows simple commands  CN: PERRL, EOMI, VFF, no facial asymmetry  Motor: 5/5 power.  no drift.  FFM equal  Sensory intact LT  Coordination: FNF intact  Gait deferred      Labs:     CBC Full  -  ( 16 Jan 2018 12:34 )  WBC Count : 5.7 K/uL  Hemoglobin : 9.2 g/dL  Hematocrit : 30.3 %  Platelet Count - Automated : 146 K/uL  Mean Cell Volume : 95.2 fl  Mean Cell Hemoglobin : 28.8 pg  Mean Cell Hemoglobin Concentration : 30.3 gm/dL  Auto Neutrophil # : 3.8 K/uL  Auto Lymphocyte # : 1.4 K/uL  Auto Monocyte # : 0.4 K/uL  Auto Eosinophil # : 0.1 K/uL  Auto Basophil # : 0.0 K/uL  Auto Neutrophil % : 65.9 %  Auto Lymphocyte % : 24.4 %  Auto Monocyte % : 7.7 %  Auto Eosinophil % : 1.4 %  Auto Basophil % : 0.6 %            PT/INR - ( 16 Jan 2018 12:34 )   PT: 13.9 sec;   INR: 1.27 ratio         PTT - ( 16 Jan 2018 12:34 )  PTT:34.7 sec

## 2018-01-24 NOTE — CONSULT NOTE ADULT - PROBLEM SELECTOR RECOMMENDATION 9
-  -Patient now with severe Aortic stenosis on Echo  - He without evidence of HF or Angina  - Due to patient underlying medical issues and mental status at this time is not a candidate for either SAVR or TAVR and given clinically stable from cardiac standpoint no indication for palliative balloon valvuloplasty  - If clinical condition improves can reconsider as outpt

## 2018-01-25 ENCOUNTER — TRANSCRIPTION ENCOUNTER (OUTPATIENT)
Age: 83
End: 2018-01-25

## 2018-01-25 LAB
ANION GAP SERPL CALC-SCNC: 13 MMOL/L — SIGNIFICANT CHANGE UP (ref 5–17)
ANION GAP SERPL CALC-SCNC: 17 MMOL/L — SIGNIFICANT CHANGE UP (ref 5–17)
ANION GAP SERPL CALC-SCNC: 9 MMOL/L — SIGNIFICANT CHANGE UP (ref 5–17)
BUN SERPL-MCNC: 24 MG/DL — HIGH (ref 7–23)
BUN SERPL-MCNC: 25 MG/DL — HIGH (ref 7–23)
BUN SERPL-MCNC: 25 MG/DL — HIGH (ref 7–23)
CALCIUM SERPL-MCNC: 7.6 MG/DL — LOW (ref 8.4–10.5)
CALCIUM SERPL-MCNC: 8.2 MG/DL — LOW (ref 8.4–10.5)
CALCIUM SERPL-MCNC: 9.3 MG/DL — SIGNIFICANT CHANGE UP (ref 8.4–10.5)
CHLORIDE SERPL-SCNC: 108 MMOL/L — SIGNIFICANT CHANGE UP (ref 96–108)
CHLORIDE SERPL-SCNC: 110 MMOL/L — HIGH (ref 96–108)
CHLORIDE SERPL-SCNC: 110 MMOL/L — HIGH (ref 96–108)
CO2 SERPL-SCNC: 20 MMOL/L — LOW (ref 22–31)
CO2 SERPL-SCNC: 22 MMOL/L — SIGNIFICANT CHANGE UP (ref 22–31)
CO2 SERPL-SCNC: 26 MMOL/L — SIGNIFICANT CHANGE UP (ref 22–31)
CREAT SERPL-MCNC: 1.34 MG/DL — HIGH (ref 0.5–1.3)
CREAT SERPL-MCNC: 1.37 MG/DL — HIGH (ref 0.5–1.3)
CREAT SERPL-MCNC: 1.47 MG/DL — HIGH (ref 0.5–1.3)
CULTURE RESULTS: SIGNIFICANT CHANGE UP
CULTURE RESULTS: SIGNIFICANT CHANGE UP
GLUCOSE SERPL-MCNC: 102 MG/DL — HIGH (ref 70–99)
GLUCOSE SERPL-MCNC: 81 MG/DL — SIGNIFICANT CHANGE UP (ref 70–99)
GLUCOSE SERPL-MCNC: 94 MG/DL — SIGNIFICANT CHANGE UP (ref 70–99)
HCT VFR BLD CALC: 23.2 % — LOW (ref 39–50)
HGB BLD-MCNC: 7.6 G/DL — LOW (ref 13–17)
MAGNESIUM SERPL-MCNC: 1.5 MG/DL — LOW (ref 1.6–2.6)
MCHC RBC-ENTMCNC: 29.4 PG — SIGNIFICANT CHANGE UP (ref 27–34)
MCHC RBC-ENTMCNC: 32.5 GM/DL — SIGNIFICANT CHANGE UP (ref 32–36)
MCV RBC AUTO: 90.4 FL — SIGNIFICANT CHANGE UP (ref 80–100)
PHOSPHATE SERPL-MCNC: 2 MG/DL — LOW (ref 2.5–4.5)
PLATELET # BLD AUTO: 111 K/UL — LOW (ref 150–400)
POTASSIUM SERPL-MCNC: 3.7 MMOL/L — SIGNIFICANT CHANGE UP (ref 3.5–5.3)
POTASSIUM SERPL-MCNC: 5.2 MMOL/L — SIGNIFICANT CHANGE UP (ref 3.5–5.3)
POTASSIUM SERPL-MCNC: >9 MMOL/L — CRITICAL HIGH (ref 3.5–5.3)
POTASSIUM SERPL-SCNC: 3.7 MMOL/L — SIGNIFICANT CHANGE UP (ref 3.5–5.3)
POTASSIUM SERPL-SCNC: 5.2 MMOL/L — SIGNIFICANT CHANGE UP (ref 3.5–5.3)
POTASSIUM SERPL-SCNC: >9 MMOL/L — CRITICAL HIGH (ref 3.5–5.3)
RBC # BLD: 2.57 M/UL — LOW (ref 4.2–5.8)
RBC # FLD: 14.1 % — SIGNIFICANT CHANGE UP (ref 10.3–14.5)
SODIUM SERPL-SCNC: 143 MMOL/L — SIGNIFICANT CHANGE UP (ref 135–145)
SODIUM SERPL-SCNC: 143 MMOL/L — SIGNIFICANT CHANGE UP (ref 135–145)
SODIUM SERPL-SCNC: 150 MMOL/L — HIGH (ref 135–145)
SPECIMEN SOURCE: SIGNIFICANT CHANGE UP
SPECIMEN SOURCE: SIGNIFICANT CHANGE UP
WBC # BLD: 6.2 K/UL — SIGNIFICANT CHANGE UP (ref 3.8–10.5)
WBC # FLD AUTO: 6.2 K/UL — SIGNIFICANT CHANGE UP (ref 3.8–10.5)

## 2018-01-25 PROCEDURE — 99233 SBSQ HOSP IP/OBS HIGH 50: CPT | Mod: GC

## 2018-01-25 RX ORDER — SODIUM CHLORIDE 9 MG/ML
1000 INJECTION, SOLUTION INTRAVENOUS
Qty: 0 | Refills: 0 | Status: DISCONTINUED | OUTPATIENT
Start: 2018-01-25 | End: 2018-01-25

## 2018-01-25 RX ORDER — POTASSIUM PHOSPHATE, MONOBASIC POTASSIUM PHOSPHATE, DIBASIC 236; 224 MG/ML; MG/ML
30 INJECTION, SOLUTION INTRAVENOUS ONCE
Qty: 0 | Refills: 0 | Status: COMPLETED | OUTPATIENT
Start: 2018-01-25 | End: 2018-01-25

## 2018-01-25 RX ORDER — SODIUM CHLORIDE 9 MG/ML
1000 INJECTION, SOLUTION INTRAVENOUS
Qty: 0 | Refills: 0 | Status: DISCONTINUED | OUTPATIENT
Start: 2018-01-25 | End: 2018-02-01

## 2018-01-25 RX ORDER — MAGNESIUM SULFATE 500 MG/ML
2 VIAL (ML) INJECTION ONCE
Qty: 0 | Refills: 0 | Status: COMPLETED | OUTPATIENT
Start: 2018-01-25 | End: 2018-01-25

## 2018-01-25 RX ADMIN — Medication 50 GRAM(S): at 17:15

## 2018-01-25 RX ADMIN — HEPARIN SODIUM 5000 UNIT(S): 5000 INJECTION INTRAVENOUS; SUBCUTANEOUS at 13:41

## 2018-01-25 RX ADMIN — Medication 108 GRAM(S): at 13:39

## 2018-01-25 RX ADMIN — Medication 75 MICROGRAM(S): at 05:55

## 2018-01-25 RX ADMIN — HEPARIN SODIUM 5000 UNIT(S): 5000 INJECTION INTRAVENOUS; SUBCUTANEOUS at 05:55

## 2018-01-25 RX ADMIN — SODIUM CHLORIDE 75 MILLILITER(S): 9 INJECTION, SOLUTION INTRAVENOUS at 18:13

## 2018-01-25 RX ADMIN — POTASSIUM PHOSPHATE, MONOBASIC POTASSIUM PHOSPHATE, DIBASIC 83.33 MILLIMOLE(S): 236; 224 INJECTION, SOLUTION INTRAVENOUS at 14:32

## 2018-01-25 RX ADMIN — LEVETIRACETAM 400 MILLIGRAM(S): 250 TABLET, FILM COATED ORAL at 19:16

## 2018-01-25 RX ADMIN — HEPARIN SODIUM 5000 UNIT(S): 5000 INJECTION INTRAVENOUS; SUBCUTANEOUS at 22:18

## 2018-01-25 RX ADMIN — SODIUM CHLORIDE 75 MILLILITER(S): 9 INJECTION, SOLUTION INTRAVENOUS at 13:39

## 2018-01-25 RX ADMIN — LEVETIRACETAM 400 MILLIGRAM(S): 250 TABLET, FILM COATED ORAL at 06:57

## 2018-01-25 RX ADMIN — Medication 108 GRAM(S): at 05:54

## 2018-01-25 NOTE — DISCHARGE NOTE ADULT - MEDICATION SUMMARY - MEDICATIONS TO STOP TAKING
I will STOP taking the medications listed below when I get home from the hospital:    traZODone 50 mg oral tablet  -- 50 tab(s) by mouth once a day (at bedtime)

## 2018-01-25 NOTE — DISCHARGE NOTE ADULT - PATIENT PORTAL LINK FT
“You can access the [facility] Patient Portal, offered by [facility], by registering with the following website: [email or web address]”

## 2018-01-25 NOTE — DISCHARGE NOTE ADULT - SECONDARY DIAGNOSIS.
Urinary tract infection associated with cystostomy catheter, initial encounter Aortic stenosis, severe Atrial fibrillation with slow ventricular response Hypothyroidism Pleural effusion

## 2018-01-25 NOTE — DISCHARGE NOTE ADULT - ADDITIONAL INSTRUCTIONS
1.) Please call [phone] to make a follow-up appointment with neurology within 1-2 weeks after discharge from hospital.  Please continue your keppra.  2.) Please follow-up with Dr. Gary Goldberg phone [phone], regarding suprapubic catheter exchange. Please see them within 4 weeks.  3.) Please make an appointment with your Cardiology in 1-2 weeks for management regarding your aortic stenosis, atrial fibrillation and pleural effusions. Please call Dr. Anselmo Pickard at [phone]  4.) Please call your primary care physician within 1-2 weeks to recheck your thyroid function. Please call [phone] for an appointment with Dr. Rocha

## 2018-01-25 NOTE — DISCHARGE NOTE ADULT - MEDICATION SUMMARY - MEDICATIONS TO TAKE
I will START or STAY ON the medications listed below when I get home from the hospital:    Aspirin Enteric Coated 81 mg oral delayed release tablet  -- 1 tab(s) by mouth once a day  -- Indication: For Atrial fibrillation with slow ventricular response    heparin 5000 units/mL injectable solution  -- 1 milliliter(s) injectable 3 times a day for DVT ppx. while at rehab facility.  -- Indication: For DVT prevention    levETIRAcetam 500 mg oral tablet  -- 1 tab(s) by mouth 2 times a day  -- Indication: For Seizure disorder    petrolatum topical ointment  -- 1 application topically 2 times a day, As needed, into nose dryness  -- Indication: For Nose dryness    Lasix 40 mg oral tablet  -- 1 tab(s) by mouth 3 times a week (Sunday/Tues/Friday)  -- Indication: For Pleural effusions    sodium chloride 0.65% nasal spray  -- 1 spray(s) into nose 3 times a day as needed for congestion  -- Indication: For Nasal dryness    levothyroxine 150 mcg (0.15 mg) oral tablet  -- 1 tab(s) by mouth once a day  -- Indication: For Hypothyroidism I will START or STAY ON the medications listed below when I get home from the hospital:    Aspirin Enteric Coated 81 mg oral delayed release tablet  -- 1 tab(s) by mouth once a day  -- Indication: For Atrial fibrillation with slow ventricular response    heparin 5000 units/mL injectable solution  -- 1 milliliter(s) injectable 3 times a day for DVT ppx. while at rehab facility.  -- Indication: For DVT prevention    levETIRAcetam 500 mg oral tablet  -- 1 tab(s) by mouth 2 times a day  -- Indication: For Seizure disorder    petrolatum topical ointment  -- 1 application topically 2 times a day, As needed, into nose dryness  -- Indication: For Nose dryness    sodium chloride 0.65% nasal spray  -- 1 spray(s) into nose 3 times a day as needed for congestion  -- Indication: For Nasal dryness    levothyroxine 150 mcg (0.15 mg) oral tablet  -- 1 tab(s) by mouth once a day  -- Indication: For Hypothyroidism

## 2018-01-25 NOTE — PROGRESS NOTE ADULT - SUBJECTIVE AND OBJECTIVE BOX
SUBJECTIVE: more awake.  no c/o    Medications:  MEDICATIONS  (STANDING):  ampicillin  IVPB 1 Gram(s) IV Intermittent every 6 hours  aspirin enteric coated 81 milliGRAM(s) Oral daily  dextrose 5% + sodium chloride 0.45%. 500 milliLiter(s) (40 mL/Hr) IV Continuous <Continuous>  heparin  Injectable 5000 Unit(s) SubCutaneous every 8 hours  levothyroxine Injectable 75 MICROGram(s) IV Push <User Schedule>    MEDICATIONS  (PRN):      Labs:  CBC Full  -  ( 24 Jan 2018 07:45 )  WBC Count : 6.44 K/uL  Hemoglobin : 8.0 g/dL  Hematocrit : 27.2 %  Platelet Count - Automated : 137 K/uL  Mean Cell Volume : 87.7 fl  Mean Cell Hemoglobin : 25.8 pg  Mean Cell Hemoglobin Concentration : 29.4 gm/dL  Auto Neutrophil # : x  Auto Lymphocyte # : x  Auto Monocyte # : x  Auto Eosinophil # : x  Auto Basophil # : x  Auto Neutrophil % : x  Auto Lymphocyte % : x  Auto Monocyte % : x  Auto Eosinophil % : x  Auto Basophil % : x    01-24    145  |  109<H>  |  34<H>  ----------------------------<  80  4.4   |  24  |  1.58<H>    Ca    9.1      24 Jan 2018 07:50  Phos  2.7     01-24  Mg     2.0     01-24      CAPILLARY BLOOD GLUCOSE                Vitals:  Vital Signs Last 24 Hrs  T(C): 36.3 (24 Jan 2018 04:33), Max: 36.7 (23 Jan 2018 12:03)  T(F): 97.4 (24 Jan 2018 04:33), Max: 98 (23 Jan 2018 12:03)  HR: 70 (24 Jan 2018 04:33) (70 - 84)  BP: 150/92 (24 Jan 2018 04:33) (123/79 - 150/92)  BP(mean): --  RR: 18 (24 Jan 2018 04:33) (18 - 18)  SpO2: 100% (24 Jan 2018 04:33) (97% - 100%)            General Exam:   Neurology  MS: awake alert, verbal O to name, place, follows simple commands  CN: PERRL, EOMI, VFF, no facial asymmetry  Motor: 5/5 power.  no drift.  FFM equal  Sensory intact LT  Coordination: FNF intact  Gait deferred      Labs:     CBC Full  -  ( 16 Jan 2018 12:34 )  WBC Count : 5.7 K/uL  Hemoglobin : 9.2 g/dL  Hematocrit : 30.3 %  Platelet Count - Automated : 146 K/uL  Mean Cell Volume : 95.2 fl  Mean Cell Hemoglobin : 28.8 pg  Mean Cell Hemoglobin Concentration : 30.3 gm/dL  Auto Neutrophil # : 3.8 K/uL  Auto Lymphocyte # : 1.4 K/uL  Auto Monocyte # : 0.4 K/uL  Auto Eosinophil # : 0.1 K/uL  Auto Basophil # : 0.0 K/uL  Auto Neutrophil % : 65.9 %  Auto Lymphocyte % : 24.4 %  Auto Monocyte % : 7.7 %  Auto Eosinophil % : 1.4 %  Auto Basophil % : 0.6 %            PT/INR - ( 16 Jan 2018 12:34 )   PT: 13.9 sec;   INR: 1.27 ratio         PTT - ( 16 Jan 2018 12:34 )  PTT:34.7 sec

## 2018-01-25 NOTE — DISCHARGE NOTE ADULT - PLAN OF CARE
Medical management You came in with altered mental status secondary to seizures. You were started on a medication that suppresses seizure activity. It is called Keppra. Please continue to take it as directed.   - Please follow up with the neurologists in 4-6 weeks for re-evaluation and further management. You had a urinary tract infection that could be related to your catheter. This was change by the Urologists and you were also treated for 10 days with intravenous antibiotics.   - Please see your urologist in 4 weeks to exchange your catheter and continue management, Dr. Gary Goldberg [phone] You have a narrowing of one of the valves in your heart, called aortic stenosis. This is being managed with medications, so continue taking all of your medications as directed.    - Please make an appointment with your Cardiology in 1-2 weeks for further management, Dr. Anselmo Pickard [phone] You have an abnormal rhythm of your heart called atrial fibrillation, this was associated with a slowed heart rate while you were in the hospital. This was monitored closely and managed medically.   - Please make an appointment with your Cardiology in 1-2 weeks for further management, Dr. Anselmo Pickard [phone] You came in with altered mental status secondary to seizures. You were started on a medication that suppresses seizure activity. It is called Keppra. Please continue to take it as directed.   - Please follow up with the neurologists in 4-6 weeks for re-evaluation and further management. Please call [phone] to request a follow-up appointment with Dr. Villanueva or one of his colleagues. Please continue to take your synthroid. Please follow-up with your primary care physician within 2 weeks to check your TSH. You have pleural effusions and water retention. You were started on lasix. Please continue to take this medication three times a week. Please follow-up with your cardiologist to renew this medication. Please discuss with them that you are on this medicine. You have pleural effusions and water retention. You were started on Lasix. Please continue to take this medication three times a week. Please follow-up with your cardiologist to renew this medication. Please discuss with them that you are on this medicine.

## 2018-01-25 NOTE — PROGRESS NOTE ADULT - PROBLEM SELECTOR PLAN 3
Baseline Dementia with new onset seizures described as B/L UE tonic clonic lasting 30sec, x4-5 episodes. CT Head showed extensive microvascular disease but no acute pathology. On Trazadone at home for sleep, held.   - MRI negative. Unable to complete MRA.    - vEEG negative for seizure activity.   - Keppra restarted yesterday, continue 500mg IV BID.    - Appreciate Neurology recommendations, no further intervention planned at this time.

## 2018-01-25 NOTE — DISCHARGE NOTE ADULT - PROVIDER TOKENS
TOKEN:'[id number]',TOKEN:'[id number]' TOKEN:'[id number]:'[id number]:'[id number]'

## 2018-01-25 NOTE — PROGRESS NOTE ADULT - PROBLEM SELECTOR PLAN 7
DVT PPX - SQH  Diet - NPO, pending re-evaluation by sp/sw on friday. +IVF.   Dispo - PT recs JESSICA Ardon D.O.  Medicine Team 3 Intern  pager [phone]

## 2018-01-25 NOTE — PROGRESS NOTE ADULT - ASSESSMENT
89 y/o M here with ams with episodes of shaking movements of UE with head turn to left, s/p versed and depakote load.  Currently already extubated, awake, o to name, follows commands.  Concern for sepsis.  CT head without acute changes with current nonfocal exam.  Though seizures reported it would be unusual to have status epilepticus in first place and to be recovered and extubated already.  Convulsive syncope possible with sepsis, bradycardia documented.      However, further hx obtained from son more compelling for true tonic epileptic events with whole body stiffening, turning "beet red"  Seizure remains on differential, likely from sepsis with brain ischemic focus    -  VEEG neg  - now with altered mental status felt to be related to seroquel. now more awake off sedating meds. will d/c eeg  - MRI brain neg  - d/c'd depakote and had started keppra 500 mg bid given seizures are controlled and lower side effect and drug interaction profile of keppra.  - no further neuro w/u  - d/c planning

## 2018-01-25 NOTE — DISCHARGE NOTE ADULT - HOSPITAL COURSE
89M h/o A-fib, dementia, hypothyroidism, HLD, CKD IV, neurogenic bladder c/b urinary retention s/p suprapubic catheter, anemia with prev GIB 2/2 H pylori gastritis adm to MICU 1/16 for unresponsiveness and seizure-like activity, intubated for airway protection now extubated.    Prior to admission, the patient was noted to be lethargic on the day of admission.  He then had 4-5 witnessed episodes of seizure-like activity (3 witnessed by EMS, 1 in ED).  In the ED, the patient had 30 seconds of bilateral UE tonic-clonic movements with head turning to the L, lasting for about 30 seconds.  The family reports that the patient's turned red in the face during these episodes until the shaking stopped, with violent coughing after these episodes.  In the ED, the patient was unresponsive after this seizure-like activity and was intubated for airway protection.  He was then noted to be hypothermic and started on a warming blanket.  He also received hydrocortisone 100mg x1.  CTH with extensive microvascular disease without acute pathology.  The patient was admitted to the MICU.    During his stay in the MICU, the patient underwent vEEG, which showed no epileptiform activity.  The patient was initially started on valproic acid and switched to keppra.  Neurology was consulted and recommended MRI/MRA when stable.  The patient was extubated in the AM on 1/17 and quickly weaned to room air.  The patient's course was c/b bradycardia to the 30s, EKG with A-fib with slow ventricular response and bifascicular block.  The patient was started on IV dopamine, which was discontinued.  There was concern for possible sepsis contributing to his bradycardia, and he was continued on cefepime.  The patient was evaluated by EP, who also believed sepsis was contributing to the patient's slow rate.  They defer PPM placement at this time. Patient's urine cultures came back positive for VRE. His suprapubic catheter was exchanged and antibiotics were changed to ampicillin. Patient completed a total of 10 days if IV antibiotics. Urology instructed outpatient f/u in 1 month for catheter exchange. Patient's HR improved to 40-90s, afib. Echo was performed and demonstrated severe AS. Cardiology was notified and stated that at this time he is not a candidate for intervention, and to continue medical management. Patient developed difficulty swallowing post-extubation and was evaluated by speech/swallow... . 89M h/o A-fib, dementia, hypothyroidism, HLD, CKD IV, neurogenic bladder c/b urinary retention s/p suprapubic catheter, anemia with prev GIB 2/2 H pylori gastritis adm to MICU 1/16 for unresponsiveness and seizure-like activity, intubated for airway protection now extubated.    Prior to admission, the patient was noted to be lethargic on the day of admission.  He then had 4-5 witnessed episodes of seizure-like activity (3 witnessed by EMS, 1 in ED).  In the ED, the patient had 30 seconds of bilateral UE tonic-clonic movements with head turning to the L, lasting for about 30 seconds.  The family reports that the patient's turned red in the face during these episodes until the shaking stopped, with violent coughing after these episodes.  In the ED, the patient was unresponsive after this seizure-like activity and was intubated for airway protection.  He was then noted to be hypothermic and started on a warming blanket.  He also received hydrocortisone 100mg x1.  CTH with extensive microvascular disease without acute pathology.  The patient was admitted to the MICU.    During his stay in the MICU, the patient underwent vEEG, which showed no epileptiform activity.  The patient was initially started on valproic acid and switched to keppra.  Neurology was consulted and recommended MRI/MRA when stable.  The patient was extubated in the AM on 1/17 and quickly weaned to room air.  The patient's course was c/b bradycardia to the 30s, EKG with A-fib with slow ventricular response and bifascicular block.  The patient was started on IV dopamine, which was discontinued.  There was concern for possible sepsis contributing to his bradycardia, and he was continued on cefepime.  The patient was evaluated by EP, who also believed sepsis was contributing to the patient's slow rate.  They defer PPM placement at this time. Patient's urine cultures came back positive for VRE. His suprapubic catheter was exchanged and antibiotics were changed to ampicillin. Patient completed a total of 10 days if IV antibiotics. Urology instructed outpatient f/u in 1 month for catheter exchange. Patient's HR improved to 40-90s, afib. Echo was performed and demonstrated severe AS. Cardiology was notified and stated that at this time he is not a candidate for intervention, and to continue medical management. Patient developed difficulty swallowing post-extubation and was evaluated by speech/swallow. Patient noted to have dysphagia and evidence of laryngeal aspiration. Upon re-evaluation, this was persistent and the recommendation was to be NPO. Discussion held at length with daughter and health care proxy, Rosas, regarding risks, benefits and alternatives for nutritional and feeding options. She decided to proceed with pleasure feeds. Patient was restarted on a dysphagia, honey thick diet with strict aspiration precautions and supplementation with enlive. Patient remained persistently hypothermic during this time, with temperatures as low as 94F improved with heating blankets. Infectious and neurologic workup remained negative. Given patient otherwise asymptomatic with stable vital signs, this is likely patient's baseline with an underlying etiology that is multifactorial in the setting of advanced age, dementia and seizure history. No further interventions are deemed medically indicated at this time. Patient considered safe for discharge to rehab at this time, family aware and in agreement.

## 2018-01-25 NOTE — PROGRESS NOTE ADULT - ATTENDING COMMENTS
Patient seen and examined.  Agree with resident note as above.  Repeat speech and swallow in AM  Hypernatremia- D5W  cc/hr and monitor BMP  Will d/c  Abx after today's dose. Monitor  cbc

## 2018-01-25 NOTE — DISCHARGE NOTE ADULT - CARE PROVIDER_API CALL
Goldberg, Gary D (MD), Urology  [address] [place] [address]  [address]  [place] [address]  Phone: [phone]  Fax: [phone]    Anselmo Pickard (), Cardiovascular Disease; Internal Medicine  [address] [facility]  [address]  [place] [address]  Phone: [phone]  Fax: [phone] Goldberg, Gary D (MD), Urology  [address] [facility]  [address]  [place] [address]  Phone: [phone]  Fax: [phone]    Anselmo Pickard (DO), Cardiovascular Disease; Internal Medicine  [address] [facility]  [address]  [place] [address]  Phone: [phone]  Fax: [phone]    Trav Villanueva (MD), Neurology  [address] [facility]  [address]  [place] [address]  Phone: [phone]  Fax: [phone]    Isaias Rocha (MBBS), Internal Medicine  [address] [facility]  [address]  [place] [address]  Phone: [phone]  Fax: [phone]

## 2018-01-25 NOTE — PROGRESS NOTE ADULT - PROBLEM SELECTOR PLAN 1
Patient initially admitted for unresponsiveness with concern for seizure activity. Baseline Dementia and AOx2. Has been experiencing agitation at night likely secondary to sundowning/dementia.    - Somnolence likely secondary to seroquel. Now off x48 hours with improvement.    - Continue to monitor off all sedating medications.   - Enhanced supervision as needed for agitation or sundowning.

## 2018-01-25 NOTE — PROGRESS NOTE ADULT - SUBJECTIVE AND OBJECTIVE BOX
Patient is a 89y old  Male who presents with a chief complaint of ?seizure (16 Jan 2018 13:22)      OVERNIGHT EVENTS: No acute events overnight.  SUBJECTIVE: Patient seen and examined at bedside. Awake and answering questions, denies complaints of CP, SOB, abdominal pain/N/V.       MEDICATIONS  (STANDING):  ampicillin  IVPB 1 Gram(s) IV Intermittent every 6 hours  aspirin enteric coated 81 milliGRAM(s) Oral daily  dextrose 5% + sodium chloride 0.45%. 1000 milliLiter(s) (75 mL/Hr) IV Continuous <Continuous>  heparin  Injectable 5000 Unit(s) SubCutaneous every 8 hours  levETIRAcetam  IVPB 500 milliGRAM(s) IV Intermittent every 12 hours  levothyroxine Injectable 75 MICROGram(s) IV Push <User Schedule>  magnesium sulfate  IVPB 2 Gram(s) IV Intermittent once  potassium phosphate IVPB 30 milliMole(s) IV Intermittent once    MEDICATIONS  (PRN):      T(C): 36.4 (01-25-18 @ 04:35), Max: 36.4 (01-24-18 @ 11:44)  HR: 55 (01-25-18 @ 04:35) (55 - 70)  BP: 155/76 (01-25-18 @ 04:35) (129/80 - 155/76)  RR: 18 (01-25-18 @ 04:35) (18 - 18)  SpO2: 100% (01-25-18 @ 04:35) (100% - 100%)  CAPILLARY BLOOD GLUCOSE        I&O's Summary    24 Jan 2018 07:01  -  25 Jan 2018 07:00  --------------------------------------------------------  IN: 900 mL / OUT: 1300 mL / NET: -400 mL      PHYSICAL EXAM  GENERAL: NAD, somnolent  HEAD:  Atraumatic, Normocephalic  EYES: EOMI, PERRLA, conjunctiva and sclera clear  CHEST/LUNG: Bibasilar rales, no wheezes. coarse breath sounds diffusely. poor effort.  HEART: +S1 +S2, Regular rate and rhythm  ABDOMEN: Soft, Nontender, Nondistended.   : +Suprapubic catheter in place, with clear urine in bag.  NEURO: Somnolent, awakes minimally to voice. Not able to perform full assessment. AOx0   EXTREMITIES:  Pink and warm, Peripheral Pulses intact. trace B/L edema.         LABS:                        7.6    6.2   )-----------( 111      ( 25 Jan 2018 06:28 )             23.2     01-25    150<H>  |  110<H>  |  25<H>  ----------------------------<  81  3.7   |  22  |  1.37<H>    Ca    7.6<L>      25 Jan 2018 06:28  Phos  2.0     01-25  Mg     1.5     01-25                RADIOLOGY & ADDITIONAL TESTS:    Imaging Personally Reviewed:  Consultant(s) Notes Reviewed:    Care Discussed with Consultants/Other Providers:

## 2018-01-25 NOTE — DISCHARGE NOTE ADULT - CARE PROVIDERS DIRECT ADDRESSES
,[email or web address]

## 2018-01-25 NOTE — DISCHARGE NOTE ADULT - CARE PLAN
Principal Discharge DX:	Seizure disorder  Goal:	Medical management  Assessment and plan of treatment:	You came in with altered mental status secondary to seizures. You were started on a medication that suppresses seizure activity. It is called Keppra. Please continue to take it as directed.   - Please follow up with the neurologists in 4-6 weeks for re-evaluation and further management.  Secondary Diagnosis:	Urinary tract infection associated with cystostomy catheter, initial encounter  Assessment and plan of treatment:	You had a urinary tract infection that could be related to your catheter. This was change by the Urologists and you were also treated for 10 days with intravenous antibiotics.   - Please see your urologist in 4 weeks to exchange your catheter and continue management, Dr. Gary Goldberg [phone]  Secondary Diagnosis:	Aortic stenosis, severe  Assessment and plan of treatment:	You have a narrowing of one of the valves in your heart, called aortic stenosis. This is being managed with medications, so continue taking all of your medications as directed.    - Please make an appointment with your Cardiology in 1-2 weeks for further management, Dr. Anselmo Pickard [phone]  Secondary Diagnosis:	Atrial fibrillation with slow ventricular response  Assessment and plan of treatment:	You have an abnormal rhythm of your heart called atrial fibrillation, this was associated with a slowed heart rate while you were in the hospital. This was monitored closely and managed medically.   - Please make an appointment with your Cardiology in 1-2 weeks for further management, Dr. Anselmo Pickard [phone] Principal Discharge DX:	Seizure disorder  Goal:	Medical management  Assessment and plan of treatment:	You came in with altered mental status secondary to seizures. You were started on a medication that suppresses seizure activity. It is called Keppra. Please continue to take it as directed.   - Please follow up with the neurologists in 4-6 weeks for re-evaluation and further management. Please call [phone] to request a follow-up appointment with Dr. Villanueva or one of his colleagues.  Secondary Diagnosis:	Urinary tract infection associated with cystostomy catheter, initial encounter  Assessment and plan of treatment:	You had a urinary tract infection that could be related to your catheter. This was change by the Urologists and you were also treated for 10 days with intravenous antibiotics.   - Please see your urologist in 4 weeks to exchange your catheter and continue management, Dr. Gary Goldberg [phone]  Secondary Diagnosis:	Aortic stenosis, severe  Assessment and plan of treatment:	You have a narrowing of one of the valves in your heart, called aortic stenosis. This is being managed with medications, so continue taking all of your medications as directed.    - Please make an appointment with your Cardiology in 1-2 weeks for further management, Dr. Anselmo Pickard [phone]  Secondary Diagnosis:	Atrial fibrillation with slow ventricular response  Assessment and plan of treatment:	You have an abnormal rhythm of your heart called atrial fibrillation, this was associated with a slowed heart rate while you were in the hospital. This was monitored closely and managed medically.   - Please make an appointment with your Cardiology in 1-2 weeks for further management, Dr. Anselmo Pickard [phone]  Secondary Diagnosis:	Hypothyroidism  Assessment and plan of treatment:	Please continue to take your synthroid. Please follow-up with your primary care physician within 2 weeks to check your TSH.  Secondary Diagnosis:	Pleural effusion  Assessment and plan of treatment:	You have pleural effusions and water retention. You were started on lasix. Please continue to take this medication three times a week. Please follow-up with your cardiologist to renew this medication. Please discuss with them that you are on this medicine.

## 2018-01-26 LAB
ANION GAP SERPL CALC-SCNC: 11 MMOL/L — SIGNIFICANT CHANGE UP (ref 5–17)
BUN SERPL-MCNC: 24 MG/DL — HIGH (ref 7–23)
CALCIUM SERPL-MCNC: 8.7 MG/DL — SIGNIFICANT CHANGE UP (ref 8.4–10.5)
CHLORIDE SERPL-SCNC: 108 MMOL/L — SIGNIFICANT CHANGE UP (ref 96–108)
CO2 SERPL-SCNC: 24 MMOL/L — SIGNIFICANT CHANGE UP (ref 22–31)
CREAT SERPL-MCNC: 1.33 MG/DL — HIGH (ref 0.5–1.3)
GLUCOSE SERPL-MCNC: 97 MG/DL — SIGNIFICANT CHANGE UP (ref 70–99)
HCT VFR BLD CALC: 25.9 % — LOW (ref 39–50)
HGB BLD-MCNC: 7.7 G/DL — LOW (ref 13–17)
MAGNESIUM SERPL-MCNC: 2.1 MG/DL — SIGNIFICANT CHANGE UP (ref 1.6–2.6)
MCHC RBC-ENTMCNC: 26 PG — LOW (ref 27–34)
MCHC RBC-ENTMCNC: 29.7 GM/DL — LOW (ref 32–36)
MCV RBC AUTO: 87.5 FL — SIGNIFICANT CHANGE UP (ref 80–100)
PHOSPHATE SERPL-MCNC: 3.3 MG/DL — SIGNIFICANT CHANGE UP (ref 2.5–4.5)
PLATELET # BLD AUTO: 122 K/UL — LOW (ref 150–400)
POTASSIUM SERPL-MCNC: 5.1 MMOL/L — SIGNIFICANT CHANGE UP (ref 3.5–5.3)
POTASSIUM SERPL-SCNC: 5.1 MMOL/L — SIGNIFICANT CHANGE UP (ref 3.5–5.3)
RBC # BLD: 2.96 M/UL — LOW (ref 4.2–5.8)
RBC # FLD: 15.7 % — HIGH (ref 10.3–14.5)
SODIUM SERPL-SCNC: 143 MMOL/L — SIGNIFICANT CHANGE UP (ref 135–145)
WBC # BLD: 5.55 K/UL — SIGNIFICANT CHANGE UP (ref 3.8–10.5)
WBC # FLD AUTO: 5.55 K/UL — SIGNIFICANT CHANGE UP (ref 3.8–10.5)

## 2018-01-26 PROCEDURE — 99233 SBSQ HOSP IP/OBS HIGH 50: CPT | Mod: GC

## 2018-01-26 RX ADMIN — SODIUM CHLORIDE 75 MILLILITER(S): 9 INJECTION, SOLUTION INTRAVENOUS at 22:57

## 2018-01-26 RX ADMIN — HEPARIN SODIUM 5000 UNIT(S): 5000 INJECTION INTRAVENOUS; SUBCUTANEOUS at 16:23

## 2018-01-26 RX ADMIN — HEPARIN SODIUM 5000 UNIT(S): 5000 INJECTION INTRAVENOUS; SUBCUTANEOUS at 06:09

## 2018-01-26 RX ADMIN — Medication 75 MICROGRAM(S): at 06:09

## 2018-01-26 RX ADMIN — LEVETIRACETAM 400 MILLIGRAM(S): 250 TABLET, FILM COATED ORAL at 17:29

## 2018-01-26 RX ADMIN — HEPARIN SODIUM 5000 UNIT(S): 5000 INJECTION INTRAVENOUS; SUBCUTANEOUS at 21:49

## 2018-01-26 RX ADMIN — LEVETIRACETAM 400 MILLIGRAM(S): 250 TABLET, FILM COATED ORAL at 06:07

## 2018-01-26 NOTE — SWALLOW BEDSIDE ASSESSMENT ADULT - PHARYNGEAL PHASE
Decreased laryngeal elevation/Wet vocal quality post oral intake/Delayed pharyngeal swallow/Throat clear post oral intake
Decreased laryngeal elevation/Delayed pharyngeal swallow/Wet vocal quality post oral intake/Delayed cough post oral intake

## 2018-01-26 NOTE — CHART NOTE - NSCHARTNOTEFT_GEN_A_CORE
Nutrition Follow-up   Pt presented to ED for possible seizure and unresponsiveness in setting of sepsis (more likely in setting of UA w Leuk Est large, Triple Phosphate Crystals moderate, US Renal w stones) vs myxedema coma (less likely in setting of TSH 4.50). Extubated 1/17, stable moved to floor 1/19. 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 Per discussion with team GOC per family wishes are pleasure feeds at this time and plan to re-evaluate.       Source: Patient [ ]    Family [x]     other [x] Comprehensive review of medical records     Diet : NPO      No reports of nausea or vomiting. Pt has been NPO x2/day, previously on mechanical and soft diets with poor po intake. Pt's family reports pt was requesting chocolate, willing to try thickened chocolate Ensure Enlive. Plan to start dysphagia diet for dinner.      PO intake:  [x]< 50%        Enteral /Parenteral Nutrition: n/a       Current Weight: 1/16: 214 pounds- 1/25: 212.9 pounds  % Weight Change    Pertinent Medications: MEDICATIONS  (STANDING):  aspirin enteric coated 81 milliGRAM(s) Oral daily  dextrose 5% + sodium chloride 0.45%. 1000 milliLiter(s) (75 mL/Hr) IV Continuous <Continuous>  heparin  Injectable 5000 Unit(s) SubCutaneous every 8 hours  levETIRAcetam  IVPB 500 milliGRAM(s) IV Intermittent every 12 hours  levothyroxine Injectable 75 MICROGram(s) IV Push <User Schedule>    MEDICATIONS  (PRN):    Pertinent Labs:  01-26 Na143 mmol/L Glu 97 mg/dL K+ 5.1 mmol/L Cr  1.33 mg/dL<H> BUN 24 mg/dL<H> Phos 3.3 mg/dL Alb n/a   PAB n/a         Skin:   3+ generalized edema, no pressure injury     Estimated Needs:   [x] no change since previous assessment        Previous Nutrition Diagnosis:     [x]Inadequate Oral Intake        Nutrition Diagnosis progressed to inadequate protein-energy intake          New Nutrition Diagnosis: [ ] not applicable    [x] Inadequate Protein Energy Intake     Related to: decreased ability to consume sufficient energy, swallowing difficulty       As evidenced by: NPO x 2 days, poor po intake, plan for dysphagia diet      Interventions: Per GOC, plan to start pleasure feeds- Dysphagia 1 honey consistency, Ensure Enlive (honey thick) x2/day also added.     Recommend  1) Interventions as listed.   2) Encourage po intake with nutrient dense foods.   3) Provide food preferences as able.   4) Monitor weight, lab values, skin, po intake and GI tolerance.      Monitoring and Evaluation:   follow up per protocol    RD to remain available for further nutritional interventions as indicated.   Lenora Lara, MS, RD, CDN #[id number] Nutrition Follow-up   Pt presented to ED for possible seizure and unresponsiveness in setting of sepsis (more likely in setting of UA w Leuk Est large, Triple Phosphate Crystals moderate, US Renal w stones) vs myxedema coma (less likely in setting of TSH 4.50). Extubated 1/17, stable moved to floor 1/19. 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 Per discussion with team GOC per family wishes are pleasure feeds at this time and plan to re-evaluate.       Source: Patient [ ]    Family [x]     other [x] Comprehensive review of medical records     Diet : NPO      No reports of nausea or vomiting. Pt has been NPO x2/day, previously on mechanical and soft diets with poor po intake. Pt's family reports pt was requesting chocolate, willing to try thickened chocolate Ensure Enlive. Plan to start dysphagia diet for dinner.      PO intake:  [x]< 50%        Enteral /Parenteral Nutrition: n/a       Current Weight: 1/16: 214 pounds- 1/25: 212.9 pounds  % Weight Change    Pertinent Medications: MEDICATIONS  (STANDING):  aspirin enteric coated 81 milliGRAM(s) Oral daily  dextrose 5% + sodium chloride 0.45%. 1000 milliLiter(s) (75 mL/Hr) IV Continuous <Continuous>  heparin  Injectable 5000 Unit(s) SubCutaneous every 8 hours  levETIRAcetam  IVPB 500 milliGRAM(s) IV Intermittent every 12 hours  levothyroxine Injectable 75 MICROGram(s) IV Push <User Schedule>    MEDICATIONS  (PRN):    Pertinent Labs:  01-26 Na143 mmol/L Glu 97 mg/dL K+ 5.1 mmol/L Cr  1.33 mg/dL<H> BUN 24 mg/dL<H> Phos 3.3 mg/dL Alb n/a   PAB n/a         Skin:   3+ generalized edema, no pressure injury     Estimated Needs:   [x] no change since previous assessment        Previous Nutrition Diagnosis:     [x]Inadequate Oral Intake        Nutrition Diagnosis progressed to inadequate protein-energy intake          New Nutrition Diagnosis: [ ] not applicable    [x] Inadequate Protein Energy Intake     Related to: decreased ability to consume sufficient energy, swallowing difficulty       As evidenced by: NPO x 2 days, poor po intake, plan for dysphagia diet      Interventions: Per GOC, plan to start pleasure feeds- Dysphagia 1 honey consistency, Ensure Enlive (honey thick) x2/day also added. Strict aspiration precautions.     Recommend  1) Interventions as listed.   2) Provide assistance and encourage po intake with nutrient dense foods. Strict aspiration precautions.   3) Provide food preferences as able.   4) Monitor weight, lab values, skin, po intake and GI tolerance.   5) RD to remain available for further nutritional interventions as indicated.     Monitoring and Evaluation:   follow up per protocol    RD to remain available for further nutritional interventions as indicated.   Lenora Lara, MS, RD, CDN #[id number]

## 2018-01-26 NOTE — SWALLOW BEDSIDE ASSESSMENT ADULT - MUCOSAL QUALITY
White patches along lingual surface and soft palate->? oral thrush
secretions vs other along hard and soft palate->oral care provided.

## 2018-01-26 NOTE — SWALLOW BEDSIDE ASSESSMENT ADULT - SWALLOW EVAL: DIAGNOSIS
Chart reviewed, attempted to see pt for bedside swallow evaluation. Case d/w RN: Holli who reported family requesting grandson to be present at bedside for exam. Grandson expected in hospital later on today, service will f/u as schedule permits. Pt known to this service, seen for re-evaluation continues to present with an oral and suspected pharyngeal dysphagia superimposed upon baseline intermittent wet vocal quality. The swallow is marked by reduced oral grading, delayed oral transit time, suspected uncontrolled loss, delayed pharyngeal swallow, reduced hyolaryngeal excursion, and increased wet gurgly vocal quality and delayed coughing post PO intake of teaspoon amounts of honey thick liquids suggestive of laryngeal penetration/aspiration.

## 2018-01-26 NOTE — SWALLOW BEDSIDE ASSESSMENT ADULT - SWALLOW EVAL: RECOMMENDED DIET
NPO, with non-oral nutrition/hydration/medications.
NPO, with non-oral nutrition/hydration/medications if in keeping with pt and family wishes

## 2018-01-26 NOTE — PROGRESS NOTE ADULT - ATTENDING COMMENTS
Patient seen and examined.  Agree with resident note as above.  Failed speech and swallow- will d/w family regarding the decision for PEG  Hypernatremia- continue  D5W and monitor BMP  Anemia- Monitor  cbc  Seizure/ encephalopathy- monitor mental state   d/w patient grand son at the bedside.

## 2018-01-26 NOTE — SWALLOW BEDSIDE ASSESSMENT ADULT - ADDITIONAL RECOMMENDATIONS
Maintain good oral hygiene   Service will f/u and re-assess candidacy for initiation of PO diet as clinically appropriate.
Maintain good oral hygiene

## 2018-01-26 NOTE — PROGRESS NOTE ADULT - SUBJECTIVE AND OBJECTIVE BOX
Patient is a 89y old  Male who presents with a chief complaint of Seizure (25 Jan 2018 13:37)      OVERNIGHT EVENTS: No acute events overnight.  SUBJECTIVE: Patient seen and examined at bedside. Denies complaints of CP, SOB, abdominal pain/N/V.       MEDICATIONS  (STANDING):  aspirin enteric coated 81 milliGRAM(s) Oral daily  dextrose 5% + sodium chloride 0.45%. 1000 milliLiter(s) (75 mL/Hr) IV Continuous <Continuous>  heparin  Injectable 5000 Unit(s) SubCutaneous every 8 hours  levETIRAcetam  IVPB 500 milliGRAM(s) IV Intermittent every 12 hours  levothyroxine Injectable 75 MICROGram(s) IV Push <User Schedule>    MEDICATIONS  (PRN):      T(C): 36.3 (01-26-18 @ 04:47), Max: 36.6 (01-25-18 @ 12:02)  HR: 57 (01-26-18 @ 04:47) (57 - 74)  BP: 160/84 (01-26-18 @ 04:57) (137/79 - 161/90)  RR: 18 (01-26-18 @ 04:47) (18 - 18)  SpO2: 100% (01-26-18 @ 04:47) (100% - 100%)  CAPILLARY BLOOD GLUCOSE        I&O's Summary    25 Jan 2018 07:01  -  26 Jan 2018 07:00  --------------------------------------------------------  IN: 675 mL / OUT: 1200 mL / NET: -525 mL      PHYSICAL EXAM  GENERAL: NAD, somnolent  HEAD:  Atraumatic, Normocephalic  EYES: EOMI, PERRLA, conjunctiva and sclera clear  CHEST/LUNG: Bibasilar rales, no wheezes. coarse breath sounds diffusely. poor effort.  HEART: +S1 +S2, Regular rate and rhythm  ABDOMEN: Soft, Nontender, Nondistended.   : +Suprapubic catheter in place, with clear urine in bag.  NEURO: Somnolent, awakes minimally to voice. Not able to perform full assessment. AOx0   EXTREMITIES:  Pink and warm, Peripheral Pulses intact. trace B/L edema.       LABS:                        7.6    6.2   )-----------( 111      ( 25 Jan 2018 06:28 )             23.2     01-25    143  |  108  |  25<H>  ----------------------------<  102<H>  5.2   |  26  |  1.47<H>    Ca    9.3      25 Jan 2018 18:53  Phos  2.0     01-25  Mg     1.5     01-25                RADIOLOGY & ADDITIONAL TESTS:    Imaging Personally Reviewed:  Consultant(s) Notes Reviewed:    Care Discussed with Consultants/Other Providers:

## 2018-01-26 NOTE — PROGRESS NOTE ADULT - PROBLEM SELECTOR PLAN 1
Improved. Patient initially admitted for unresponsiveness with concern for seizure activity. Baseline Dementia and AOx2. Has been experiencing agitation at night likely secondary to sundowning/dementia.    - Somnolence likely secondary to seroquel. Now off x72 hours with improvement.    - Continue to monitor off all sedating medications.   - Enhanced supervision as needed for agitation or sundowning.

## 2018-01-26 NOTE — SWALLOW BEDSIDE ASSESSMENT ADULT - ORAL PREPARATORY PHASE
pt required verbal prompting to initiate oral grading/Reduced oral grading
Reduced oral grading/initially pt required verbal cueing to initiate oral grading, grading on spoon improved as exam progressed

## 2018-01-26 NOTE — PROGRESS NOTE ADULT - PROBLEM SELECTOR PLAN 7
DVT PPX - SQH  Diet - NPO, pending re-evaluation by sp/sw on today. +IVF.   Dispo - PT recs SANDRINE, Dispo pending Sp/Sw reassessment and recs.         Maria Luisa Ardon D.O.  Medicine Team 3 Intern  pager [phone]

## 2018-01-26 NOTE — PROGRESS NOTE ADULT - PROBLEM SELECTOR PLAN 2
Patient has neurogenic bladder with suprapubic catheter who presented in sepsis secondary to UTI.    - S/P replacement of suprapubic catheter by urology, to f/u in 1 month.   - Urine cultures growing VRE   - Continue Ampicillin D6, (D9/10 total antibiotics)

## 2018-01-26 NOTE — PROGRESS NOTE ADULT - PROBLEM SELECTOR PLAN 3
Baseline Dementia with new onset seizures described as B/L UE tonic clonic lasting 30sec, x4-5 episodes. CT Head showed extensive microvascular disease but no acute pathology. On Trazadone at home for sleep, held.   - MRI negative. Unable to complete MRA.    - vEEG negative for seizure activity.   - Continue Keppra 500mg IV BID.    - Appreciate Neurology recommendations, no further intervention planned at this time.

## 2018-01-26 NOTE — SWALLOW BEDSIDE ASSESSMENT ADULT - SLP GENERAL OBSERVATIONS
Pt awake in bed, O2 via NC, grandson present at bedside. Pt [place], able to communicate needs and follow simple directions for exam, grossly oriented x2 (name and hospital). + Intermittent wet vocal quality with throat clears at baseline. + White patches along lingual surface and surface of palate ? oral thrush.
Pt awake OOB in chair, open mouth posture, O2 via NC. Grandson present at bedside. Pt appears [place], episodes of latent and/or absent responsiveness ? related to hearing. Baseline intermittent wet vocal quality.

## 2018-01-27 DIAGNOSIS — R13.12 DYSPHAGIA, OROPHARYNGEAL PHASE: ICD-10-CM

## 2018-01-27 LAB
ANION GAP SERPL CALC-SCNC: 12 MMOL/L — SIGNIFICANT CHANGE UP (ref 5–17)
BUN SERPL-MCNC: 23 MG/DL — SIGNIFICANT CHANGE UP (ref 7–23)
CALCIUM SERPL-MCNC: 9.3 MG/DL — SIGNIFICANT CHANGE UP (ref 8.4–10.5)
CHLORIDE SERPL-SCNC: 106 MMOL/L — SIGNIFICANT CHANGE UP (ref 96–108)
CO2 SERPL-SCNC: 24 MMOL/L — SIGNIFICANT CHANGE UP (ref 22–31)
CREAT SERPL-MCNC: 1.35 MG/DL — HIGH (ref 0.5–1.3)
GLUCOSE SERPL-MCNC: 95 MG/DL — SIGNIFICANT CHANGE UP (ref 70–99)
MAGNESIUM SERPL-MCNC: 1.9 MG/DL — SIGNIFICANT CHANGE UP (ref 1.6–2.6)
PHOSPHATE SERPL-MCNC: 2.6 MG/DL — SIGNIFICANT CHANGE UP (ref 2.5–4.5)
POTASSIUM SERPL-MCNC: 4.6 MMOL/L — SIGNIFICANT CHANGE UP (ref 3.5–5.3)
POTASSIUM SERPL-SCNC: 4.6 MMOL/L — SIGNIFICANT CHANGE UP (ref 3.5–5.3)
SODIUM SERPL-SCNC: 142 MMOL/L — SIGNIFICANT CHANGE UP (ref 135–145)

## 2018-01-27 PROCEDURE — 99233 SBSQ HOSP IP/OBS HIGH 50: CPT | Mod: GC

## 2018-01-27 RX ADMIN — Medication 81 MILLIGRAM(S): at 13:42

## 2018-01-27 RX ADMIN — HEPARIN SODIUM 5000 UNIT(S): 5000 INJECTION INTRAVENOUS; SUBCUTANEOUS at 05:23

## 2018-01-27 RX ADMIN — Medication 75 MICROGRAM(S): at 05:23

## 2018-01-27 RX ADMIN — HEPARIN SODIUM 5000 UNIT(S): 5000 INJECTION INTRAVENOUS; SUBCUTANEOUS at 13:42

## 2018-01-27 RX ADMIN — SODIUM CHLORIDE 75 MILLILITER(S): 9 INJECTION, SOLUTION INTRAVENOUS at 13:46

## 2018-01-27 RX ADMIN — LEVETIRACETAM 400 MILLIGRAM(S): 250 TABLET, FILM COATED ORAL at 05:23

## 2018-01-27 RX ADMIN — LEVETIRACETAM 400 MILLIGRAM(S): 250 TABLET, FILM COATED ORAL at 20:05

## 2018-01-27 RX ADMIN — HEPARIN SODIUM 5000 UNIT(S): 5000 INJECTION INTRAVENOUS; SUBCUTANEOUS at 21:09

## 2018-01-27 NOTE — PROGRESS NOTE ADULT - PROBLEM SELECTOR PLAN 1
Patient deemed high aspiration risk on speech/swallow assessment, with recommendations to remain NPO for now. Discussion held with patient's HCP and daughter, Rosas, regarding nutritional options and associated risks/benefits (tube feeds (NGT vs. PEG), pleasure feeds, TPN).   - Decision was made to proceed with pleasure feeds at this time.    - Dysphagia I diet with honey thick liquids +Ensure Enlive x2/day   - Continue Strict Aspiration Precautions

## 2018-01-27 NOTE — PROGRESS NOTE ADULT - PROBLEM SELECTOR PLAN 8
DVT PPX - SQ  Dispo - SANDRINE, likely early next week        Maria Luisa Ardon D.O.  Medicine Team 3 Intern  pager [phone]

## 2018-01-27 NOTE — PROGRESS NOTE ADULT - ATTENDING COMMENTS
Patient seen and examined.  Agree with resident note as above.  Dysphagia- pleasure feed after discussion with HCP - continue with dysphagia I diet with monitoring and aspiration precaution  Hypernatremia- Serum sodium decreased monitor BMP  Anemia- Monitor  cbc   Seizure/ encephalopathy- monitor mental state   PT

## 2018-01-27 NOTE — PROGRESS NOTE ADULT - PROBLEM SELECTOR PLAN 3
Patient has neurogenic bladder with suprapubic catheter who presented in sepsis secondary to UTI.    - S/P replacement of suprapubic catheter by urology, to f/u in 1 month.   - Urine cultures growing VRE   - Completed 10 day course of IV Abx (1/16-1/25)

## 2018-01-27 NOTE — PROGRESS NOTE ADULT - SUBJECTIVE AND OBJECTIVE BOX
Patient is a 89y old  Male who presents with a chief complaint of Seizure (25 Jan 2018 13:37)      OVERNIGHT EVENTS: No acute events overnight.  SUBJECTIVE: Patient seen and examined at bedside. Denies complaints.       MEDICATIONS  (STANDING):  aspirin enteric coated 81 milliGRAM(s) Oral daily  dextrose 5% + sodium chloride 0.45%. 1000 milliLiter(s) (75 mL/Hr) IV Continuous <Continuous>  heparin  Injectable 5000 Unit(s) SubCutaneous every 8 hours  levETIRAcetam  IVPB 500 milliGRAM(s) IV Intermittent every 12 hours  levothyroxine Injectable 75 MICROGram(s) IV Push <User Schedule>    MEDICATIONS  (PRN):      T(C): 36.3 (01-27-18 @ 04:39), Max: 36.3 (01-26-18 @ 12:12)  HR: 51 (01-27-18 @ 04:39) (51 - 58)  BP: 164/77 (01-27-18 @ 04:39) (134/67 - 164/77)  RR: 18 (01-27-18 @ 04:39) (17 - 18)  SpO2: 100% (01-27-18 @ 04:39) (97% - 100%)  CAPILLARY BLOOD GLUCOSE        I&O's Summary    26 Jan 2018 07:01  -  27 Jan 2018 07:00  --------------------------------------------------------  IN: 990 mL / OUT: 900 mL / NET: 90 mL      PHYSICAL EXAM  GENERAL: NAD, somnolent  HEAD:  Atraumatic, Normocephalic  EYES: EOMI, PERRLA, conjunctiva and sclera clear  CHEST/LUNG: Bibasilar rales and coarse breath sounds diffusely.  HEART: +S1 +S2, Regular rate and rhythm  ABDOMEN: Soft, Nontender, Nondistended.   : +Suprapubic catheter in place, with clear urine in bag.  NEURO: Somnolent, awakes minimally to voice. Not able to perform full assessment. AOx0   EXTREMITIES:  Pink and warm, Peripheral Pulses intact. trace B/L edema.       LABS:                        7.7    5.55  )-----------( 122      ( 26 Jan 2018 07:33 )             25.9     01-26    143  |  108  |  24<H>  ----------------------------<  97  5.1   |  24  |  1.33<H>    Ca    8.7      26 Jan 2018 09:42  Phos  3.3     01-26  Mg     2.1     01-26          RADIOLOGY & ADDITIONAL TESTS:    Imaging Personally Reviewed:  Consultant(s) Notes Reviewed:    Care Discussed with Consultants/Other Providers:

## 2018-01-28 PROCEDURE — 99232 SBSQ HOSP IP/OBS MODERATE 35: CPT | Mod: GC

## 2018-01-28 RX ADMIN — LEVETIRACETAM 400 MILLIGRAM(S): 250 TABLET, FILM COATED ORAL at 05:00

## 2018-01-28 RX ADMIN — HEPARIN SODIUM 5000 UNIT(S): 5000 INJECTION INTRAVENOUS; SUBCUTANEOUS at 05:00

## 2018-01-28 RX ADMIN — Medication 75 MICROGRAM(S): at 05:00

## 2018-01-28 RX ADMIN — HEPARIN SODIUM 5000 UNIT(S): 5000 INJECTION INTRAVENOUS; SUBCUTANEOUS at 12:03

## 2018-01-28 RX ADMIN — SODIUM CHLORIDE 75 MILLILITER(S): 9 INJECTION, SOLUTION INTRAVENOUS at 02:33

## 2018-01-28 RX ADMIN — HEPARIN SODIUM 5000 UNIT(S): 5000 INJECTION INTRAVENOUS; SUBCUTANEOUS at 21:15

## 2018-01-28 RX ADMIN — Medication 81 MILLIGRAM(S): at 12:03

## 2018-01-28 RX ADMIN — LEVETIRACETAM 400 MILLIGRAM(S): 250 TABLET, FILM COATED ORAL at 17:16

## 2018-01-28 NOTE — PROGRESS NOTE ADULT - SUBJECTIVE AND OBJECTIVE BOX
Patient is a 89y old  Male who presents with a chief complaint of Seizure (25 Jan 2018 13:37)      OVERNIGHT EVENTS: No acute events overnight.  SUBJECTIVE: Patient seen and examined at bedside. Denies CP, SOB, abdominal pain/N/V.       MEDICATIONS  (STANDING):  aspirin enteric coated 81 milliGRAM(s) Oral daily  dextrose 5% + sodium chloride 0.45%. 1000 milliLiter(s) (75 mL/Hr) IV Continuous <Continuous>  heparin  Injectable 5000 Unit(s) SubCutaneous every 8 hours  levETIRAcetam  IVPB 500 milliGRAM(s) IV Intermittent every 12 hours  levothyroxine Injectable 75 MICROGram(s) IV Push <User Schedule>    MEDICATIONS  (PRN):      T(C): 36.2 (01-28-18 @ 04:20), Max: 36.6 (01-27-18 @ 11:55)  HR: 62 (01-28-18 @ 04:20) (44 - 66)  BP: 152/74 (01-28-18 @ 04:20) (129/70 - 152/74)  RR: 20 (01-28-18 @ 04:20) (18 - 20)  SpO2: 96% (01-28-18 @ 04:20) (95% - 100%)  CAPILLARY BLOOD GLUCOSE        I&O's Summary    27 Jan 2018 07:01  -  28 Jan 2018 07:00  --------------------------------------------------------  IN: 550 mL / OUT: 600 mL / NET: -50 mL        PHYSICAL EXAM  GENERAL: NAD, somnolent  HEAD:  Atraumatic, Normocephalic  EYES: EOMI, PERRLA, conjunctiva and sclera clear  CHEST/LUNG: Bibasilar rales and coarse breath sounds diffusely.  HEART: +S1 +S2, Regular rate and rhythm  ABDOMEN: Soft, Nontender, Nondistended.   : +Suprapubic catheter in place, with clear urine in bag.  NEURO: Somnolent, awakes minimally to voice. Not able to perform full assessment. AOx0   EXTREMITIES:  Pink and warm, Peripheral Pulses intact. trace B/L edema.       LABS:    01-27    142  |  106  |  23  ----------------------------<  95  4.6   |  24  |  1.35<H>    Ca    9.3      27 Jan 2018 09:09  Phos  2.6     01-27  Mg     1.9     01-27                RADIOLOGY & ADDITIONAL TESTS:    Imaging Personally Reviewed:  Consultant(s) Notes Reviewed:    Care Discussed with Consultants/Other Providers: Patient is a 89y old  Male who presents with a chief complaint of Seizure (25 Jan 2018 13:37)      OVERNIGHT EVENTS: No acute events overnight.  SUBJECTIVE: Patient seen and examined at bedside. Denies CP, SOB, abdominal pain/N/V.       MEDICATIONS  (STANDING):  aspirin enteric coated 81 milliGRAM(s) Oral daily  dextrose 5% + sodium chloride 0.45%. 1000 milliLiter(s) (75 mL/Hr) IV Continuous <Continuous>  heparin  Injectable 5000 Unit(s) SubCutaneous every 8 hours  levETIRAcetam  IVPB 500 milliGRAM(s) IV Intermittent every 12 hours  levothyroxine Injectable 75 MICROGram(s) IV Push <User Schedule>    MEDICATIONS  (PRN):      T(C): 36.2 (01-28-18 @ 04:20), Max: 36.6 (01-27-18 @ 11:55)  HR: 62 (01-28-18 @ 04:20) (44 - 66)  BP: 152/74 (01-28-18 @ 04:20) (129/70 - 152/74)  RR: 20 (01-28-18 @ 04:20) (18 - 20)  SpO2: 96% (01-28-18 @ 04:20) (95% - 100%)  CAPILLARY BLOOD GLUCOSE        I&O's Summary    27 Jan 2018 07:01  -  28 Jan 2018 07:00  --------------------------------------------------------  IN: 550 mL / OUT: 600 mL / NET: -50 mL        PHYSICAL EXAM  GENERAL: NAD, somnolent  HEAD:  Atraumatic, Normocephalic  EYES: EOMI, PERRLA, conjunctiva and sclera clear  CHEST/LUNG: Bibasilar rales and coarse breath sounds diffusely.  HEART: +S1 +S2, Regular rate and rhythm  ABDOMEN: Soft, Nontender, Nondistended.   : +Suprapubic catheter in place, with clear urine in bag.  NEURO: Somnolent, awakes minimally to voice. Not able to perform full assessment. AOx0   EXTREMITIES:  Pink and warm, Peripheral Pulses intact. trace B/L edema.       LABS:    01-27    142  |  106  |  23  ----------------------------<  95  4.6   |  24  |  1.35<H>    Ca    9.3      27 Jan 2018 09:09  Phos  2.6     01-27  Mg     1.9     01-27                    RADIOLOGY & ADDITIONAL TESTS:    Imaging Personally Reviewed:  Consultant(s) Notes Reviewed:    Care Discussed with Consultants/Other Providers:

## 2018-01-28 NOTE — PROGRESS NOTE ADULT - PROBLEM SELECTOR PLAN 1
Patient deemed high aspiration risk on speech/swallow assessment, with recommendations to remain NPO for now. Discussion held with patient's HCP and daughter, Rosas, regarding nutritional options and associated risks/benefits (tube feeds (NGT vs. PEG), pleasure feeds, TPN).   - Decision was made to proceed with pleasure feeds at this time.    - Dysphagia I diet with honey thick liquids +Ensure Enlive x2/day   - Continue Strict Aspiration Precautions Patient deemed high aspiration risk on speech/swallow assessment, with recommendations to remain NPO for now. Discussion held with patient's HCP and daughter, Rosas, regarding nutritional options and associated risks/benefits (tube feeds (NGT vs. PEG)- continue pleasure feed.

## 2018-01-28 NOTE — PROGRESS NOTE ADULT - PROBLEM SELECTOR PLAN 8
DVT PPX - [facility]  Dispo - [facility], likely early next week        Maria Luisa Ardon D.O.  Medicine Team 3 Intern  pager [phone]

## 2018-01-28 NOTE — PROGRESS NOTE ADULT - ATTENDING COMMENTS
Patient seen and examined . feels improved. no events. alert, awake follows command. up on chair. HD stable. tolerating pleasure feed.   PT and d/c planning in next 24-48 hours.

## 2018-01-28 NOTE — PROGRESS NOTE ADULT - PROBLEM SELECTOR PLAN 2
Improved. Patient initially admitted for unresponsiveness with concern for seizure activity. Baseline Dementia and AOx2. Has been experiencing agitation at night likely secondary to sundowning/dementia.    - Somnolence likely secondary to seroquel. Now off x72 hours with improvement.    - Continue to monitor off all sedating medications.   - Enhanced supervision as needed for agitation or sundowning. Improved. Patient initially admitted for unresponsiveness with concern for seizure activity. Baseline Dementia and AOx2. Has been experiencing agitation at night likely secondary to sundowning/dementia.    - Somnolence likely secondary to seroquel. Now off x72 hours with improvement.

## 2018-01-29 LAB
ANION GAP SERPL CALC-SCNC: 11 MMOL/L — SIGNIFICANT CHANGE UP (ref 5–17)
BUN SERPL-MCNC: 20 MG/DL — SIGNIFICANT CHANGE UP (ref 7–23)
CALCIUM SERPL-MCNC: 9.4 MG/DL — SIGNIFICANT CHANGE UP (ref 8.4–10.5)
CHLORIDE SERPL-SCNC: 106 MMOL/L — SIGNIFICANT CHANGE UP (ref 96–108)
CO2 SERPL-SCNC: 24 MMOL/L — SIGNIFICANT CHANGE UP (ref 22–31)
CREAT SERPL-MCNC: 1.31 MG/DL — HIGH (ref 0.5–1.3)
GLUCOSE SERPL-MCNC: 124 MG/DL — HIGH (ref 70–99)
HCT VFR BLD CALC: 27.3 % — LOW (ref 39–50)
HGB BLD-MCNC: 7.9 G/DL — LOW (ref 13–17)
MAGNESIUM SERPL-MCNC: 1.7 MG/DL — SIGNIFICANT CHANGE UP (ref 1.6–2.6)
MCHC RBC-ENTMCNC: 26.2 PG — LOW (ref 27–34)
MCHC RBC-ENTMCNC: 28.9 GM/DL — LOW (ref 32–36)
MCV RBC AUTO: 90.7 FL — SIGNIFICANT CHANGE UP (ref 80–100)
PHOSPHATE SERPL-MCNC: 2.7 MG/DL — SIGNIFICANT CHANGE UP (ref 2.5–4.5)
PLATELET # BLD AUTO: 134 K/UL — LOW (ref 150–400)
POTASSIUM SERPL-MCNC: 4.5 MMOL/L — SIGNIFICANT CHANGE UP (ref 3.5–5.3)
POTASSIUM SERPL-SCNC: 4.5 MMOL/L — SIGNIFICANT CHANGE UP (ref 3.5–5.3)
RBC # BLD: 3.01 M/UL — LOW (ref 4.2–5.8)
RBC # FLD: 15.7 % — HIGH (ref 10.3–14.5)
SODIUM SERPL-SCNC: 141 MMOL/L — SIGNIFICANT CHANGE UP (ref 135–145)
WBC # BLD: 5.58 K/UL — SIGNIFICANT CHANGE UP (ref 3.8–10.5)
WBC # FLD AUTO: 5.58 K/UL — SIGNIFICANT CHANGE UP (ref 3.8–10.5)

## 2018-01-29 PROCEDURE — 99232 SBSQ HOSP IP/OBS MODERATE 35: CPT | Mod: GC

## 2018-01-29 RX ORDER — ASPIRIN/CALCIUM CARB/MAGNESIUM 324 MG
81 TABLET ORAL DAILY
Qty: 0 | Refills: 0 | Status: DISCONTINUED | OUTPATIENT
Start: 2018-01-29 | End: 2018-01-30

## 2018-01-29 RX ADMIN — LEVETIRACETAM 400 MILLIGRAM(S): 250 TABLET, FILM COATED ORAL at 17:09

## 2018-01-29 RX ADMIN — HEPARIN SODIUM 5000 UNIT(S): 5000 INJECTION INTRAVENOUS; SUBCUTANEOUS at 22:38

## 2018-01-29 RX ADMIN — LEVETIRACETAM 400 MILLIGRAM(S): 250 TABLET, FILM COATED ORAL at 05:17

## 2018-01-29 RX ADMIN — HEPARIN SODIUM 5000 UNIT(S): 5000 INJECTION INTRAVENOUS; SUBCUTANEOUS at 13:30

## 2018-01-29 RX ADMIN — Medication 75 MICROGRAM(S): at 05:16

## 2018-01-29 RX ADMIN — HEPARIN SODIUM 5000 UNIT(S): 5000 INJECTION INTRAVENOUS; SUBCUTANEOUS at 05:16

## 2018-01-29 RX ADMIN — Medication 81 MILLIGRAM(S): at 11:17

## 2018-01-29 NOTE — PROVIDER CONTACT NOTE (OTHER) - BACKGROUND
89 y.o. male admitted with seizures. PMH anemia, neurogenic bladder, dementia, HLD, CKD, hypothyroid

## 2018-01-29 NOTE — SWALLOW BEDSIDE ASSESSMENT ADULT - DIET PRIOR TO ADMI
Per grandson: Regular texture diet

## 2018-01-29 NOTE — PROGRESS NOTE ADULT - ATTENDING COMMENTS
Pt in no acute distress, tolerating pleasure feeds well  Transition meds to po and dc planning to [facility] Chowdary MD  Division of Hospital Medicine  Pager: [phone]  Office: [phone] Pt in no acute distress, tolerating pleasure feeds well  Transition meds to po and dc planning to [facility]  Updated the daughter his clinical status and dc planning - she will go over the list of facilities and decide.    Zenobia Chowdary MD  Division of Hospital Medicine  Pager: [phone]  Office: [phone]

## 2018-01-29 NOTE — PROGRESS NOTE ADULT - SUBJECTIVE AND OBJECTIVE BOX
Patient is a 89y old  Male who presents with a chief complaint of Seizure (25 Jan 2018 13:37)      SUBJECTIVE / OVERNIGHT EVENTS:    MEDICATIONS  (STANDING):  aspirin enteric coated 81 milliGRAM(s) Oral daily  dextrose 5% + sodium chloride 0.45%. 1000 milliLiter(s) (75 mL/Hr) IV Continuous <Continuous>  heparin  Injectable 5000 Unit(s) SubCutaneous every 8 hours  levETIRAcetam  IVPB 500 milliGRAM(s) IV Intermittent every 12 hours  levothyroxine Injectable 75 MICROGram(s) IV Push <User Schedule>    MEDICATIONS  (PRN):      CAPILLARY BLOOD GLUCOSE        I&O's Summary    28 Jan 2018 07:01  -  29 Jan 2018 07:00  --------------------------------------------------------  IN: 1725 mL / OUT: 2400 mL / NET: -675 mL        T(C): 36.3 (01-28-18 @ 20:42), Max: 36.5 (01-28-18 @ 13:19)  HR: 59 (01-29-18 @ 04:32) (51 - 62)  BP: 149/67 (01-29-18 @ 04:32) (149/67 - 155/69)  RR: 18 (01-29-18 @ 04:32) (18 - 19)  SpO2: 95% (01-29-18 @ 04:32) (95% - 100%)    PHYSICAL EXAM:  GENERAL: NAD, well-developed  HEAD:  Atraumatic, Normocephalic  EYES: EOMI, PERRLA, conjunctiva and sclera clear  NECK: Supple, No JVD  CHEST/LUNG: Clear to auscultation bilaterally; No wheeze  HEART: Regular rate and rhythm; No murmurs, rubs, or gallops  ABDOMEN: Soft, Nontender, Nondistended; Bowel sounds present  EXTREMITIES:  2+ Peripheral Pulses, No clubbing, cyanosis, or edema  PSYCH: AAOx3  NEUROLOGY: non-focal  SKIN: No rashes or lesions    LABS:                    RADIOLOGY & ADDITIONAL TESTS:    Imaging Personally Reviewed:    Consultant(s) Notes Reviewed:      Care Discussed with Consultants/Other Providers: Patient is a 89y old  Male who presents with a chief complaint of Seizure (25 Jan 2018 13:37)      SUBJECTIVE / OVERNIGHT EVENTS:  No events overnight. Comfortable this AM. Confused.     MEDICATIONS  (STANDING):  aspirin enteric coated 81 milliGRAM(s) Oral daily  dextrose 5% + sodium chloride 0.45%. 1000 milliLiter(s) (75 mL/Hr) IV Continuous <Continuous>  heparin  Injectable 5000 Unit(s) SubCutaneous every 8 hours  levETIRAcetam  IVPB 500 milliGRAM(s) IV Intermittent every 12 hours  levothyroxine Injectable 75 MICROGram(s) IV Push <User Schedule>    MEDICATIONS  (PRN):      CAPILLARY BLOOD GLUCOSE        I&O's Summary    28 Jan 2018 07:01  -  29 Jan 2018 07:00  --------------------------------------------------------  IN: 1725 mL / OUT: 2400 mL / NET: -675 mL        T(C): 36.3 (01-28-18 @ 20:42), Max: 36.5 (01-28-18 @ 13:19)  HR: 59 (01-29-18 @ 04:32) (51 - 62)  BP: 149/67 (01-29-18 @ 04:32) (149/67 - 155/69)  RR: 18 (01-29-18 @ 04:32) (18 - 19)  SpO2: 95% (01-29-18 @ 04:32) (95% - 100%)    PHYSICAL EXAM:  GENERAL: NAD, well-developed  HEAD:  Atraumatic, Normocephalic  CHEST/LUNG: Clear to auscultation bilaterally; no rhonchi or wheezes.   HEART: Irregular rhythm, systolic murmur  ABDOMEN: Soft, Nontender, Nondistended; no guarding or rigidity  EXTREMITIES:  Mild bilateral edema  PSYCH: Confused                      RADIOLOGY & ADDITIONAL TESTS:    Imaging Personally Reviewed:    Consultant(s) Notes Reviewed:      Care Discussed with Consultants/Other Providers:

## 2018-01-29 NOTE — SWALLOW BEDSIDE ASSESSMENT ADULT - SWALLOW EVAL: DIAGNOSIS
Chart reviewed, per MD on 1/27: Discussion held with patient's HCP and daughter, Rosas, regarding nutritional options and associated risks/benefits (tube feeds (NGT vs. PEG), pleasure feeds, TPN). Decision was made to proceed with pleasure feeds at this time.  Dysphagia I diet with honey thick liquids +Ensure Enlive x2/day.  Continue Strict Aspiration Precautions. Given above, further dysphagia w/u to be deferred and this service will no longer actively follow. Above d/w MD team 3: Dr. Mayes.

## 2018-01-29 NOTE — PROVIDER CONTACT NOTE (OTHER) - SITUATION
Pt presenting with UE shivers. Rectal temp taken of 93.9 F. Hyperthermia blanket and heat packs applied.

## 2018-01-29 NOTE — PROGRESS NOTE ADULT - PROBLEM SELECTOR PLAN 3
Patient has neurogenic bladder with suprapubic catheter who presented in sepsis secondary to UTI.    - S/P replacement of suprapubic catheter by urology, to f/u in 1 month.   - Urine cultures with VRE Completed 10 day course of IV Abx (1/16-1/25). afebrile

## 2018-01-29 NOTE — PROGRESS NOTE ADULT - ASSESSMENT
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 Extubated 1/17. Intermittently bradycardic as low as 30s, EKG suggesting Afib w slow ventricular response (monitor sometimes looks like Aflutter). Stable for transfer to floor on 1/19. 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 Extubated 1/17. Intermittently bradycardic as low as 30s, EKG suggesting Afib w slow ventricular response (monitor sometimes looks like Aflutter). Stable for transfer to floor on 1/19. Hospital course was c/b agitation, subsequently resolved.

## 2018-01-29 NOTE — PROGRESS NOTE ADULT - PROBLEM SELECTOR PLAN 2
Improved. Patient initially admitted for unresponsiveness with concern for seizure activity. Baseline Dementia and AOx2. Has been experiencing agitation at night likely secondary to sundowning/dementia.    - Somnolence likely secondary to seroquel. Now off x72 hours with improvement. Improved. Patient initially admitted for unresponsiveness with concern for seizure activity. Baseline Dementia and AOx2. Has been experiencing agitation at night likely secondary to sundowning/dementia.    - Somnolence likely secondary to seroquel. Now off with improvement.

## 2018-01-29 NOTE — PROGRESS NOTE ADULT - PROBLEM SELECTOR PLAN 8
[facility]T PPX - [facility]  Dispo - [facility], likely early next week DVT PPX - [facility]  Dispo - [facility]    Marisel Mayes MD  Internal Medicine, PGY-2  Pager [phone]

## 2018-01-29 NOTE — PROGRESS NOTE ADULT - PROBLEM SELECTOR PLAN 1
Patient deemed high aspiration risk on speech/swallow assessment, with recommendations to remain NPO for now. Discussion held with patient's HCP and daughter, Rosas, regarding nutritional options and associated risks/benefits (tube feeds (NGT vs. PEG)- continue pleasure feed. Patient deemed high aspiration risk on speech/swallow assessment, with recommendations to remain NPO for now. Discussion held with patient's HCP and daughter, Rosas, regarding nutritional options and associated risks/benefits (tube feeds (NGT vs. PEG)- continue pleasure feed for now until repeat MBS.   - Planning to call daughter to clarify discussion

## 2018-01-30 LAB — LEUKOCYTE ESTERASE UR-ACNC: ABNORMAL

## 2018-01-30 PROCEDURE — 99232 SBSQ HOSP IP/OBS MODERATE 35: CPT | Mod: GC

## 2018-01-30 RX ORDER — LEVETIRACETAM 250 MG/1
500 TABLET, FILM COATED ORAL
Qty: 0 | Refills: 0 | Status: DISCONTINUED | OUTPATIENT
Start: 2018-01-30 | End: 2018-02-02

## 2018-01-30 RX ORDER — ASPIRIN/CALCIUM CARB/MAGNESIUM 324 MG
81 TABLET ORAL DAILY
Qty: 0 | Refills: 0 | Status: DISCONTINUED | OUTPATIENT
Start: 2018-01-30 | End: 2018-02-02

## 2018-01-30 RX ORDER — LEVOTHYROXINE SODIUM 125 MCG
150 TABLET ORAL DAILY
Qty: 0 | Refills: 0 | Status: DISCONTINUED | OUTPATIENT
Start: 2018-01-31 | End: 2018-02-02

## 2018-01-30 RX ADMIN — Medication 81 MILLIGRAM(S): at 12:47

## 2018-01-30 RX ADMIN — Medication 75 MICROGRAM(S): at 05:24

## 2018-01-30 RX ADMIN — SODIUM CHLORIDE 75 MILLILITER(S): 9 INJECTION, SOLUTION INTRAVENOUS at 18:41

## 2018-01-30 RX ADMIN — LEVETIRACETAM 400 MILLIGRAM(S): 250 TABLET, FILM COATED ORAL at 05:24

## 2018-01-30 RX ADMIN — HEPARIN SODIUM 5000 UNIT(S): 5000 INJECTION INTRAVENOUS; SUBCUTANEOUS at 15:55

## 2018-01-30 RX ADMIN — SODIUM CHLORIDE 75 MILLILITER(S): 9 INJECTION, SOLUTION INTRAVENOUS at 15:55

## 2018-01-30 RX ADMIN — HEPARIN SODIUM 5000 UNIT(S): 5000 INJECTION INTRAVENOUS; SUBCUTANEOUS at 21:22

## 2018-01-30 RX ADMIN — LEVETIRACETAM 500 MILLIGRAM(S): 250 TABLET, FILM COATED ORAL at 18:41

## 2018-01-30 RX ADMIN — SODIUM CHLORIDE 75 MILLILITER(S): 9 INJECTION, SOLUTION INTRAVENOUS at 12:48

## 2018-01-30 RX ADMIN — HEPARIN SODIUM 5000 UNIT(S): 5000 INJECTION INTRAVENOUS; SUBCUTANEOUS at 05:25

## 2018-01-30 RX ADMIN — SODIUM CHLORIDE 75 MILLILITER(S): 9 INJECTION, SOLUTION INTRAVENOUS at 05:24

## 2018-01-30 NOTE — PROGRESS NOTE ADULT - PROBLEM SELECTOR PLAN 2
Improved. Patient initially admitted for unresponsiveness with concern for seizure activity. Baseline Dementia and AOx2. Has been experiencing agitation at night likely secondary to sundowning/dementia.    - Somnolence likely secondary to seroquel. Now off with improvement.

## 2018-01-30 NOTE — PROGRESS NOTE ADULT - ATTENDING COMMENTS
Pt in no acute distress, has good appetite, tolerating pleasure feed well  DC planing to [facility] Chowdary MD  Division of Hospital Medicine  Pager: [phone]  Office: [phone]

## 2018-01-30 NOTE — PROGRESS NOTE ADULT - PROBLEM SELECTOR PLAN 1
Patient deemed high aspiration risk on speech/swallow assessment, with recommendations to remain NPO for now. Discussion held with patient's HCP/ daughter Rosas, regarding nutritional options and associated risks/benefits (tube feeds (NGT vs. PEG)- continue pleasure feeds   - Dysphagia I diet with honey thick liquids +Ensure Enlive x2/day   - Continue Strict Aspiration Precautions.

## 2018-01-30 NOTE — PROGRESS NOTE ADULT - PROBLEM SELECTOR PLAN 8
[facility]T PPX - [facility]  Dispo - [facility]    Maria Luisa Ardon D.O.  Medicine Team 3 Intern  PAger [phone]

## 2018-01-30 NOTE — PROGRESS NOTE ADULT - PROBLEM SELECTOR PLAN 5
Asymptomatic, Bradycardic 1/17 to 30s to 50s with EKG showing prolonged QTc, interpreted as afib with slow ventricular response and bifascicular block.    - Afib with HR 40-110s overnight.   - Appreciate EP eval, continue to monitor on telemetry. No plans for pacemaker at this time.   - Avoid AVN blockers   - Appreciate Cards evaluation, no plans for intervention at this time. Will follow up as outpatient

## 2018-01-30 NOTE — PROGRESS NOTE ADULT - SUBJECTIVE AND OBJECTIVE BOX
Patient is a 89y old  Male who presents with a chief complaint of Seizure (25 Jan 2018 13:37)      OVERNIGHT EVENTS: No acute events overnight.  SUBJECTIVE: Patient seen and examined at bedside. Denies complaints of CP, SOB, abdominal pain/N/V.       MEDICATIONS  (STANDING):  aspirin Disintegrating Tablet 81 milliGRAM(s) Oral daily  dextrose 5% + sodium chloride 0.45%. 1000 milliLiter(s) (75 mL/Hr) IV Continuous <Continuous>  heparin  Injectable 5000 Unit(s) SubCutaneous every 8 hours  levETIRAcetam  IVPB 500 milliGRAM(s) IV Intermittent every 12 hours  levothyroxine Injectable 75 MICROGram(s) IV Push <User Schedule>    MEDICATIONS  (PRN):      T(C): 37.3 (01-30-18 @ 04:09), Max: 37.3 (01-30-18 @ 04:09)  HR: 113 (01-30-18 @ 04:09) (50 - 113)  BP: 115/66 (01-30-18 @ 04:09) (115/66 - 160/88)  RR: 18 (01-30-18 @ 04:09) (18 - 18)  SpO2: 94% (01-30-18 @ 04:09) (94% - 100%)  CAPILLARY BLOOD GLUCOSE        I&O's Summary    29 Jan 2018 07:01  -  30 Jan 2018 07:00  --------------------------------------------------------  IN: 2720 mL / OUT: 2550 mL / NET: 170 mL      PHYSICAL EXAM  GENERAL: NAD, somnolent  HEAD:  Atraumatic, Normocephalic  EYES: EOMI, PERRLA, conjunctiva and sclera clear  CHEST/LUNG: Bibasilar rales and coarse breath sounds diffusely.  HEART: +S1 +S2, Regular rate and rhythm  ABDOMEN: Soft, Nontender, Nondistended.   : +Suprapubic catheter in place, with clear urine in bag.  NEURO: AOx0, no focal neurologic deficits.   EXTREMITIES:  Pink and warm, Peripheral Pulses intact. Trace B/L edema.       LABS:                        7.9    5.58  )-----------( 134      ( 29 Jan 2018 08:46 )             27.3     01-29    141  |  106  |  20  ----------------------------<  124<H>  4.5   |  24  |  1.31<H>    Ca    9.4      29 Jan 2018 08:55  Phos  2.7     01-29  Mg     1.7     01-29      RADIOLOGY & ADDITIONAL TESTS:    Imaging Personally Reviewed:  Consultant(s) Notes Reviewed:    Care Discussed with Consultants/Other Providers: Patient is a 89y old  Male who presents with a chief complaint of Seizure (25 Jan 2018 13:37)      OVERNIGHT EVENTS: No acute events overnight.  SUBJECTIVE: Patient seen and examined at bedside. Denies complaints of CP, SOB, abdominal pain/N/V.       MEDICATIONS  (STANDING):  aspirin Disintegrating Tablet 81 milliGRAM(s) Oral daily  dextrose 5% + sodium chloride 0.45%. 1000 milliLiter(s) (75 mL/Hr) IV Continuous <Continuous>  heparin  Injectable 5000 Unit(s) SubCutaneous every 8 hours  levETIRAcetam  IVPB 500 milliGRAM(s) IV Intermittent every 12 hours  levothyroxine Injectable 75 MICROGram(s) IV Push <User Schedule>    MEDICATIONS  (PRN):      T(C): 37.3 (01-30-18 @ 04:09), Max: 37.3 (01-30-18 @ 04:09)  HR: 113 (01-30-18 @ 04:09) (50 - 113)  BP: 115/66 (01-30-18 @ 04:09) (115/66 - 160/88)  RR: 18 (01-30-18 @ 04:09) (18 - 18)  SpO2: 94% (01-30-18 @ 04:09) (94% - 100%)  CAPILLARY BLOOD GLUCOSE        I&O's Summary    29 Jan 2018 07:01  -  30 Jan 2018 07:00  --------------------------------------------------------  IN: 2720 mL / OUT: 2550 mL / NET: 170 mL      PHYSICAL EXAM  GENERAL: NAD, somnolent  HEAD:  Atraumatic, Normocephalic  EYES: EOMI, PERRLA, conjunctiva and sclera clear  CHEST/LUNG: Bibasilar rales and coarse breath sounds diffusely.  HEART: +S1 +S2, Regular rate and rhythm  ABDOMEN: Soft, Nontender, Nondistended.   : +Suprapubic catheter in place, with clear urine in bag.  NEURO: AOx0, no focal neurologic deficits.   EXTREMITIES:  Pink and warm, Peripheral Pulses intact. Trace B/L edema.       LABS:  personally reviewed                        7.9    5.58  )-----------( 134      ( 29 Jan 2018 08:46 )             27.3     01-29    141  |  106  |  20  ----------------------------<  124<H>  4.5   |  24  |  1.31<H>    Ca    9.4      29 Jan 2018 08:55  Phos  2.7     01-29  Mg     1.7     01-29      RADIOLOGY & ADDITIONAL TESTS:    Imaging Personally Reviewed:  Consultant(s) Notes Reviewed:    Care Discussed with Consultants/Other Providers:

## 2018-01-30 NOTE — PROGRESS NOTE ADULT - ASSESSMENT
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 Extubated 1/17. Intermittently bradycardic as low as 30s, EKG suggesting Afib w slow ventricular response (monitor sometimes looks like Aflutter). Stable for transfer to floor on 1/19. Hospital course was c/b agitation, subsequently resolved.

## 2018-01-31 LAB
APPEARANCE UR: CLEAR — SIGNIFICANT CHANGE UP
BASOPHILS # BLD AUTO: 0.1 K/UL — SIGNIFICANT CHANGE UP (ref 0–0.2)
BASOPHILS NFR BLD AUTO: 1 % — SIGNIFICANT CHANGE UP (ref 0–2)
BILIRUB UR-MCNC: NEGATIVE — SIGNIFICANT CHANGE UP
BUN SERPL-MCNC: 20 MG/DL — SIGNIFICANT CHANGE UP (ref 7–23)
CALCIUM SERPL-MCNC: 9.4 MG/DL — SIGNIFICANT CHANGE UP (ref 8.4–10.5)
CHLORIDE SERPL-SCNC: 108 MMOL/L — SIGNIFICANT CHANGE UP (ref 96–108)
CO2 SERPL-SCNC: 25 MMOL/L — SIGNIFICANT CHANGE UP (ref 22–31)
COLOR SPEC: YELLOW — SIGNIFICANT CHANGE UP
CREAT SERPL-MCNC: 1.66 MG/DL — HIGH (ref 0.5–1.3)
CULTURE RESULTS: NO GROWTH — SIGNIFICANT CHANGE UP
DIFF PNL FLD: ABNORMAL
EOSINOPHIL # BLD AUTO: 0.1 K/UL — SIGNIFICANT CHANGE UP (ref 0–0.5)
EOSINOPHIL NFR BLD AUTO: 1 % — SIGNIFICANT CHANGE UP (ref 0–6)
GAS PNL BLDV: SIGNIFICANT CHANGE UP
GLUCOSE SERPL-MCNC: 145 MG/DL — HIGH (ref 70–99)
GLUCOSE UR QL: NEGATIVE — SIGNIFICANT CHANGE UP
HCT VFR BLD CALC: 25.3 % — LOW (ref 39–50)
HGB BLD-MCNC: 8.2 G/DL — LOW (ref 13–17)
KETONES UR-MCNC: NEGATIVE — SIGNIFICANT CHANGE UP
LYMPHOCYTES # BLD AUTO: 1.6 K/UL — SIGNIFICANT CHANGE UP (ref 1–3.3)
LYMPHOCYTES # BLD AUTO: 21.8 % — SIGNIFICANT CHANGE UP (ref 13–44)
MAGNESIUM SERPL-MCNC: 1.6 MG/DL — SIGNIFICANT CHANGE UP (ref 1.6–2.6)
MCHC RBC-ENTMCNC: 28.9 PG — SIGNIFICANT CHANGE UP (ref 27–34)
MCHC RBC-ENTMCNC: 32.5 GM/DL — SIGNIFICANT CHANGE UP (ref 32–36)
MCV RBC AUTO: 88.9 FL — SIGNIFICANT CHANGE UP (ref 80–100)
MONOCYTES # BLD AUTO: 1.2 K/UL — HIGH (ref 0–0.9)
MONOCYTES NFR BLD AUTO: 17 % — HIGH (ref 2–14)
NEUTROPHILS # BLD AUTO: 4.3 K/UL — SIGNIFICANT CHANGE UP (ref 1.8–7.4)
NEUTROPHILS NFR BLD AUTO: 59.2 % — SIGNIFICANT CHANGE UP (ref 43–77)
NITRITE UR-MCNC: NEGATIVE — SIGNIFICANT CHANGE UP
PH UR: 6.5 — SIGNIFICANT CHANGE UP (ref 5–8)
PHOSPHATE SERPL-MCNC: 2.8 MG/DL — SIGNIFICANT CHANGE UP (ref 2.5–4.5)
PLATELET # BLD AUTO: 135 K/UL — LOW (ref 150–400)
POTASSIUM SERPL-MCNC: 4.5 MMOL/L — SIGNIFICANT CHANGE UP (ref 3.5–5.3)
POTASSIUM SERPL-SCNC: 4.5 MMOL/L — SIGNIFICANT CHANGE UP (ref 3.5–5.3)
PROT UR-MCNC: SIGNIFICANT CHANGE UP
RBC # BLD: 2.84 M/UL — LOW (ref 4.2–5.8)
RBC # FLD: 14.5 % — SIGNIFICANT CHANGE UP (ref 10.3–14.5)
SODIUM SERPL-SCNC: 142 MMOL/L — SIGNIFICANT CHANGE UP (ref 135–145)
SP GR SPEC: 1.01 — LOW (ref 1.01–1.02)
SPECIMEN SOURCE: SIGNIFICANT CHANGE UP
UROBILINOGEN FLD QL: 2 MG/DL
WBC # BLD: 7.3 K/UL — SIGNIFICANT CHANGE UP (ref 3.8–10.5)
WBC # FLD AUTO: 7.3 K/UL — SIGNIFICANT CHANGE UP (ref 3.8–10.5)

## 2018-01-31 PROCEDURE — 71045 X-RAY EXAM CHEST 1 VIEW: CPT | Mod: 26

## 2018-01-31 PROCEDURE — 99233 SBSQ HOSP IP/OBS HIGH 50: CPT | Mod: GC

## 2018-01-31 RX ADMIN — HEPARIN SODIUM 5000 UNIT(S): 5000 INJECTION INTRAVENOUS; SUBCUTANEOUS at 13:56

## 2018-01-31 RX ADMIN — Medication 81 MILLIGRAM(S): at 12:17

## 2018-01-31 RX ADMIN — SODIUM CHLORIDE 75 MILLILITER(S): 9 INJECTION, SOLUTION INTRAVENOUS at 06:42

## 2018-01-31 RX ADMIN — HEPARIN SODIUM 5000 UNIT(S): 5000 INJECTION INTRAVENOUS; SUBCUTANEOUS at 21:03

## 2018-01-31 RX ADMIN — HEPARIN SODIUM 5000 UNIT(S): 5000 INJECTION INTRAVENOUS; SUBCUTANEOUS at 06:42

## 2018-01-31 RX ADMIN — LEVETIRACETAM 500 MILLIGRAM(S): 250 TABLET, FILM COATED ORAL at 18:41

## 2018-01-31 RX ADMIN — LEVETIRACETAM 500 MILLIGRAM(S): 250 TABLET, FILM COATED ORAL at 06:42

## 2018-01-31 RX ADMIN — SODIUM CHLORIDE 75 MILLILITER(S): 9 INJECTION, SOLUTION INTRAVENOUS at 13:56

## 2018-01-31 RX ADMIN — Medication 150 MICROGRAM(S): at 06:42

## 2018-01-31 NOTE — OCCUPATIONAL THERAPY INITIAL EVALUATION ADULT - PLANNED THERAPY INTERVENTIONS, OT EVAL
bed mobility training/strengthening/transfer training/ADL retraining/balance training/motor coordination training/ROM

## 2018-01-31 NOTE — PROVIDER CONTACT NOTE (OTHER) - SITUATION
Pt presenting with UE shivers. Rectal temp taken of 96.8 F. Hyperthermia blanket and heat packs applied.

## 2018-01-31 NOTE — OCCUPATIONAL THERAPY INITIAL EVALUATION ADULT - NS ASR FOLLOW COMMAND OT EVAL
difficulty with multistep commands, difficulty with R/L discrimination/100% of the time/able to follow single-step instructions

## 2018-01-31 NOTE — OCCUPATIONAL THERAPY INITIAL EVALUATION ADULT - ADDITIONAL COMMENTS
Pt then had total of 4 to 5 witnessed episodes of seizure like activity.  Witnessed episode in ED was described as 30 seconds of bilateral UE tonic clonic with head turning to Left lasting for about 30 seconds. Family also reports that patient turned red in the face during these episodes until shaking stopped. On chart review, patient was last sen by house calls doctor on 12/14/17 during which it was noted that patient had been very confused for the past 3 weeks prior to that visit as per regular [facility] with improvement of symptoms after changing of suprapubic catheter. CTH: Head CT: No acute intracranial hemorrhage, mass effect, or shift of the midline structures. Extensive microvascular disease.  Cervical spine CT: No acute fractures or dislocations. Multilevel cervical spondylosis

## 2018-01-31 NOTE — PROGRESS NOTE ADULT - PROBLEM SELECTOR PLAN 8
DVT PPX - [facility]  Dispo - [facility]    Maria Luisa Ardon D.O.  Medicine Team 3 Intern  Pager [phone]

## 2018-01-31 NOTE — PROGRESS NOTE ADULT - SUBJECTIVE AND OBJECTIVE BOX
Patient is a 89y old  Male who presents with a chief complaint of Seizure (2018 13:37)      OVERNIGHT EVENTS: Hypothermic overnight to 96.8F  SUBJECTIVE: Patient seen and examined at bedside. Denies current complaints.       MEDICATIONS  (STANDING):  aspirin  chewable 81 milliGRAM(s) Oral daily  dextrose 5% + sodium chloride 0.45%. 1000 milliLiter(s) (75 mL/Hr) IV Continuous <Continuous>  heparin  Injectable 5000 Unit(s) SubCutaneous every 8 hours  levETIRAcetam 500 milliGRAM(s) Oral two times a day  levothyroxine 150 MICROGram(s) Oral daily    MEDICATIONS  (PRN):      T(C): 36.5 (18 @ 04:49), Max: 37.5 (18 @ 01:00)  HR: 90 (18 @ 04:49) (73 - 90)  BP: 137/80 (18 @ 04:49) (133/82 - 158/86)  RR: 18 (18 @ 04:49) (18 - 18)  SpO2: 92% (18 @ 04:49) (92% - 97%)  CAPILLARY BLOOD GLUCOSE        I&O's Summary    2018 07:01  -  2018 07:00  --------------------------------------------------------  IN: 1480 mL / OUT: 2520 mL / NET: -1040 mL          PHYSICAL EXAM  GENERAL: NAD, somnolent  HEAD:  Atraumatic, Normocephalic  EYES: EOMI, PERRLA, conjunctiva and sclera clear  CHEST/LUNG: Bibasilar rales and coarse breath sounds diffusely.  HEART: +S1 +S2, Regular rate and rhythm  ABDOMEN: Soft, Nontender, Nondistended.   : +Suprapubic catheter in place, with clear urine in bag.  NEURO: AOx0, no focal neurologic deficits.   EXTREMITIES:  Pink and warm, Peripheral Pulses intact. Trace B/L edema.         LABS:                        7.9    5.58  )-----------( 134      ( 2018 08:46 )             27.3     01-29    141  |  106  |  20  ----------------------------<  124<H>  4.5   |  24  |  1.31<H>    Ca    9.4      2018 08:55  Phos  2.7       Mg     1.7                 Urinalysis Basic - ( 2018 23:43 )    Color: Yellow / Appearance: Clear / S.009 / pH: x  Gluc: x / Ketone: Negative  / Bili: Negative / Urobili: 2 mg/dL   Blood: x / Protein: Trace / Nitrite: Negative   Leuk Esterase: Moderate / RBC: 5-10 /HPF / WBC 11-25 /HPF   Sq Epi: x / Non Sq Epi: OCC /HPF / Bacteria: Few /HPF        RADIOLOGY & ADDITIONAL TESTS:    Imaging Personally Reviewed:  Consultant(s) Notes Reviewed:    Care Discussed with Consultants/Other Providers: Patient is a 89y old  Male who presents with a chief complaint of Seizure (2018 13:37)      OVERNIGHT EVENTS: Hypothermic overnight to 96.8F  SUBJECTIVE: Patient seen and examined at bedside. Denies current complaints.       MEDICATIONS  (STANDING):  aspirin  chewable 81 milliGRAM(s) Oral daily  dextrose 5% + sodium chloride 0.45%. 1000 milliLiter(s) (75 mL/Hr) IV Continuous <Continuous>  heparin  Injectable 5000 Unit(s) SubCutaneous every 8 hours  levETIRAcetam 500 milliGRAM(s) Oral two times a day  levothyroxine 150 MICROGram(s) Oral daily    MEDICATIONS  (PRN):      T(C): 36.5 (18 @ 04:49), Max: 37.5 (18 @ 01:00)  HR: 90 (18 @ 04:49) (73 - 90)  BP: 137/80 (18 @ 04:49) (133/82 - 158/86)  RR: 18 (18 @ 04:49) (18 - 18)  SpO2: 92% (18 @ 04:49) (92% - 97%)  CAPILLARY BLOOD GLUCOSE        I&O's Summary    2018 07:01  -  2018 07:00  --------------------------------------------------------  IN: 1480 mL / OUT: 2520 mL / NET: -1040 mL          PHYSICAL EXAM  GENERAL: NAD, somnolent  HEAD:  Atraumatic, Normocephalic  EYES: EOMI, PERRLA, conjunctiva and sclera clear  CHEST/LUNG: Bibasilar rales and coarse breath sounds diffusely.  HEART: +S1 +S2, Regular rate and rhythm  ABDOMEN: Soft, Nontender, Nondistended.   : +Suprapubic catheter in place, with clear urine in bag.  NEURO: AOx0, no focal neurologic deficits.   EXTREMITIES:  Pink and warm, Peripheral Pulses intact. Trace B/L edema.         LABS:  personally reviewed                                  8.2    7.3   )-----------( 135      ( 2018 06:56 )             25.3         142  |  108  |  20  ----------------------------<  145<H>  4.5   |  25  |  1.66<H>    Ca    9.4      2018 10:06  Phos  2.8       Mg     1.6             Urinalysis Basic - ( 2018 23:43 )    Color: Yellow / Appearance: Clear / S.009 / pH: x  Gluc: x / Ketone: Negative  / Bili: Negative / Urobili: 2 mg/dL   Blood: x / Protein: Trace / Nitrite: Negative   Leuk Esterase: Moderate / RBC: 5-10 /HPF / WBC 11-25 /HPF   Sq Epi: x / Non Sq Epi: OCC /HPF / Bacteria: Few /HPF          RADIOLOGY & ADDITIONAL TESTS:    Imaging Personally Reviewed:  -  tele: afib 70-90s  Consultant(s) Notes Reviewed:    Care Discussed with Consultants/Other Providers:

## 2018-01-31 NOTE — OCCUPATIONAL THERAPY INITIAL EVALUATION ADULT - ORIENTATION, REHAB EVAL
Pt stated place as a doctor's office and year as 2018 with extra processing time and unable to state month/person

## 2018-01-31 NOTE — PROVIDER CONTACT NOTE (OTHER) - ASSESSMENT
A&Ox1-2. lethargic but arousable. VSS. temp 96.7 rectal; denies SOB; chills; CP. hypothermia blanket applied.

## 2018-01-31 NOTE — PROGRESS NOTE ADULT - ATTENDING COMMENTS
Pt noted to be lethargic this morning, vitals stable. Pt has been under hypothermic protocol. Stat lab shows worsening ZACKERY, negative UA, normal lactate. Unlikely new infection vs. micro-aspiration. Pt continues to have no complaints but unable to obtain ROS 2/2 dementia.   Few hours later, pt noted to be more alert and awake. repeat temp 97.   Pt medically stable to be discharged to [facility] this afternoon.     Zenobia Chowdary MD  Division of Hospital Medicine  Pager: [phone]  Office: [phone] Pt noted to be lethargic this morning, vitals stable. Pt has been under hypothermic protocol. Stat lab shows worsening ZACKERY, negative UA, normal lactate. Unlikely new infection vs. micro-aspiration. Pt continues to have no complaints but unable to obtain ROS 2/2 dementia.   Few hours later, pt noted to be more alert and awake. repeat temp 97.   Updated daughter Rosas over the phone    Zenobia Chowdary MD  Division of Hospital Medicine  Pager: [phone]  Office: [phone]

## 2018-01-31 NOTE — OCCUPATIONAL THERAPY INITIAL EVALUATION ADULT - PERTINENT HX OF CURRENT PROBLEM, REHAB EVAL
89 yo M with ?Afib, Dementia, Hypothyroid, HLD, CKD, anemia w/ history of 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 As per family and ED, patient was doing relatively well until earlier today when he was noted to be very lethargic.

## 2018-01-31 NOTE — OCCUPATIONAL THERAPY INITIAL EVALUATION ADULT - LIVES WITH, PROFILE
Pt is poor historian, states he lives in apartment with no stairs, Per PT note Pt Lives with spouse and HHA 24/7 Daughter and grandson live upstairs

## 2018-02-01 DIAGNOSIS — T68.XXXA HYPOTHERMIA, INITIAL ENCOUNTER: ICD-10-CM

## 2018-02-01 LAB — RAPID RVP RESULT: SIGNIFICANT CHANGE UP

## 2018-02-01 PROCEDURE — 99233 SBSQ HOSP IP/OBS HIGH 50: CPT | Mod: GC

## 2018-02-01 RX ORDER — FUROSEMIDE 40 MG
20 TABLET ORAL ONCE
Qty: 0 | Refills: 0 | Status: COMPLETED | OUTPATIENT
Start: 2018-02-01 | End: 2018-02-01

## 2018-02-01 RX ADMIN — LEVETIRACETAM 500 MILLIGRAM(S): 250 TABLET, FILM COATED ORAL at 18:28

## 2018-02-01 RX ADMIN — HEPARIN SODIUM 5000 UNIT(S): 5000 INJECTION INTRAVENOUS; SUBCUTANEOUS at 05:22

## 2018-02-01 RX ADMIN — LEVETIRACETAM 500 MILLIGRAM(S): 250 TABLET, FILM COATED ORAL at 05:24

## 2018-02-01 RX ADMIN — HEPARIN SODIUM 5000 UNIT(S): 5000 INJECTION INTRAVENOUS; SUBCUTANEOUS at 15:03

## 2018-02-01 RX ADMIN — Medication 20 MILLIGRAM(S): at 11:20

## 2018-02-01 RX ADMIN — Medication 81 MILLIGRAM(S): at 11:20

## 2018-02-01 RX ADMIN — HEPARIN SODIUM 5000 UNIT(S): 5000 INJECTION INTRAVENOUS; SUBCUTANEOUS at 21:02

## 2018-02-01 RX ADMIN — Medication 150 MICROGRAM(S): at 05:22

## 2018-02-01 NOTE — PROGRESS NOTE ADULT - PROBLEM SELECTOR PLAN 1
Patient deemed high aspiration risk on speech/swallow assessment, with recommendations to remain NPO for now. Discussion held with patient's HCP/ daughter Rosas, regarding nutritional options and associated risks/benefits (tube feeds (NGT vs. PEG)- continue pleasure feeds   - Dysphagia I diet with honey thick liquids +Ensure Enlive x2/day   - Continue Strict Aspiration Precautions. Pt continues to be hypothermic, minimal response to heating blanket. Infectious workups (CXR, UA, BCx) so far negative for any s/s new infection. Unclear if this is autonomic dysfunction, especially given his age and advanced illness. (+) resting tremors.   - appreciate neuro recs   - continue warming blanket

## 2018-02-01 NOTE — PROGRESS NOTE ADULT - PROBLEM SELECTOR PLAN 8
DVT PPX - [facility]  Dispo - [facility]    Maria Luisa Ardon D.O.  Medicine Team 3 Intern  Pager [phone] Stable, Continue to monitor with daily BMPs.

## 2018-02-01 NOTE — PROGRESS NOTE ADULT - PROBLEM SELECTOR PLAN 5
Asymptomatic, Bradycardic 1/17 to 30s to 50s with EKG showing prolonged QTc, interpreted as afib with slow ventricular response and bifascicular block.    - Afib with HR 40-110s overnight.   - Appreciate EP eval, continue to monitor on telemetry. No plans for pacemaker at this time.   - Avoid AVN blockers   - Appreciate Cards evaluation, no plans for intervention at this time. Will follow up as outpatient Baseline Dementia with new onset seizures described as B/L UE tonic clonic lasting 30sec, x4-5 episodes. CT Head showed extensive microvascular disease but no acute pathology. On Trazadone at home for sleep, held.   - MRI negative. Unable to complete MRA.    - vEEG negative for seizure activity.   - Continue Keppra 500mg IV BID.    - Appreciate Neurology recommendations, no further intervention planned at this time.

## 2018-02-01 NOTE — PROGRESS NOTE ADULT - PROBLEM SELECTOR PLAN 3
Patient has neurogenic bladder with suprapubic catheter who presented in sepsis secondary to UTI.    - S/P replacement of suprapubic catheter by urology, to f/u in 1 month.   - Urine cultures with VRE Completed 10 day course of IV Abx (1/16-1/25). afebrile   - Repeat Urine cultures NGTD. Improved. Patient initially admitted for unresponsiveness with concern for seizure activity. Baseline Dementia and fluctuating mental status, AOx1-2.   - Has had intermittent episodes of agitation at night likely secondary to sundowning/dementia, seemingly resolved.    - Somnolence likely secondary to seroquel. Now off with improvement.

## 2018-02-01 NOTE — PROVIDER CONTACT NOTE (OTHER) - ACTION/TREATMENT ORDERED:
MD made aware. continue hypothermia blanket. VBG; BMP; MG and Phos ordered. Continue to monitor.
MD notified and aware. Coming up to assess patient. Will continue to monitor.
MD notified and aware. Will assess and follow up.
As per MD, maintain blanket to 98.6 F. Recheck rectal temp every 2 hours until 98.6 or greater. Please obtain UC and UA. Will continue to monitor
As per MD, maintain blanket to 98.6 F. Recheck rectal temp every 2 hours until 98.6 or greater. Will continue to monitor
MD contacted and aware. Will continue to monitor.
MD ordered melatonin for the patient. Pt is not able to receive ativan unless the patient is having a seizure.
No new orders at this time.  Will continue to monitor.
Pacer pads at the bed side were ordered, will continue to monitor.
RN suggested that if the patient cannot receive any medication to calm the patient down than the patient needs to be on constant observation.
hyperthermia blanket ordered
ok to refuse labs. continue to monitor

## 2018-02-01 NOTE — PROGRESS NOTE ADULT - PROBLEM SELECTOR PROBLEM 3
Urinary tract infection associated with cystostomy catheter, initial encounter Altered mental status, unspecified

## 2018-02-01 NOTE — PROGRESS NOTE ADULT - ATTENDING COMMENTS
Pt more awake, alert this morning. Infectious workups all negative so far  CXR overnight shows bilateral pleural effusion with pulm congestion - give 1 dose of IV lasix  (+) resting tremors, hypothermia - appreciate neuro recs      Zenobia Chowdary MD  Division of Hospital Medicine  Pager: [phone]  Office: [phone]

## 2018-02-01 NOTE — PROGRESS NOTE ADULT - SUBJECTIVE AND OBJECTIVE BOX
Patient is a 89y old  Male who presents with a chief complaint of Seizure (2018 13:37)      OVERNIGHT EVENTS: No acute events overnight.  SUBJECTIVE: Patient seen and examined at bedside. Denies current complaints.       MEDICATIONS  (STANDING):  aspirin  chewable 81 milliGRAM(s) Oral daily  heparin  Injectable 5000 Unit(s) SubCutaneous every 8 hours  levETIRAcetam 500 milliGRAM(s) Oral two times a day  levothyroxine 150 MICROGram(s) Oral daily    MEDICATIONS  (PRN):      T(C): 36.5 (18 @ 05:00), Max: 36.7 (18 @ 12:00)  HR: 64 (18 @ 05:00) (61 - 71)  BP: 112/69 (18 @ 05:00) (112/69 - 145/86)  RR: 18 (18 @ 05:00) (18 - 18)  SpO2: 95% (18 @ 05:00) (93% - 98%)  CAPILLARY BLOOD GLUCOSE        I&O's Summary    2018 07:  -  2018 07:00  --------------------------------------------------------  IN: 1210 mL / OUT: 1600 mL / NET: -390 mL    2018 07:  -  2018 08:55  --------------------------------------------------------  IN: 60 mL / OUT: 0 mL / NET: 60 mL        PHYSICAL EXAM  GENERAL: NAD, sleepy and arousable  HEAD:  Atraumatic, Normocephalic  EYES: EOMI, PERRLA, conjunctiva and sclera clear  CHEST/LUNG: Bibasilar rales and coarse breath sounds diffusely.  HEART: +S1 +S2, Regular rate and rhythm  ABDOMEN: Soft, Nontender, Nondistended.   : +Suprapubic catheter in place, with clear urine in bag.  NEURO: AOx1, no focal neurologic deficits.   EXTREMITIES:  Pink and warm, Peripheral Pulses intact. Trace B/L edema, L>R.       LABS:                        8.2    7.3   )-----------( 135      ( 2018 06:56 )             25.3         142  |  108  |  20  ----------------------------<  145<H>  4.5   |  25  |  1.66<H>    Ca    9.4      2018 10:06  Phos  2.8       Mg     1.6                 Urinalysis Basic - ( 2018 23:43 )    Color: Yellow / Appearance: Clear / S.009 / pH: x  Gluc: x / Ketone: Negative  / Bili: Negative / Urobili: 2 mg/dL   Blood: x / Protein: Trace / Nitrite: Negative   Leuk Esterase: Moderate / RBC: 5-10 /HPF / WBC 11-25 /HPF   Sq Epi: x / Non Sq Epi: OCC /HPF / Bacteria: Few /HPF    Blood Cultures - NGTD  Urine Cultures - NGTD    RADIOLOGY & ADDITIONAL TESTS:  CXR - pulmonary edema and B/L pleural effusions, not significantly changed from prior study. Awaiting final read.     Imaging Personally Reviewed:  Consultant(s) Notes Reviewed:    Care Discussed with Consultants/Other Providers: Patient is a 89y old  Male who presents with a chief complaint of Seizure (2018 13:37)      OVERNIGHT EVENTS: No acute events overnight.  SUBJECTIVE: Patient seen and examined at bedside. Denies current complaints.       MEDICATIONS  (STANDING):  aspirin  chewable 81 milliGRAM(s) Oral daily  heparin  Injectable 5000 Unit(s) SubCutaneous every 8 hours  levETIRAcetam 500 milliGRAM(s) Oral two times a day  levothyroxine 150 MICROGram(s) Oral daily    MEDICATIONS  (PRN):      T(C): 36.5 (18 @ 05:00), Max: 36.7 (18 @ 12:00)  HR: 64 (18 @ 05:00) (61 - 71)  BP: 112/69 (18 @ 05:00) (112/69 - 145/86)  RR: 18 (18 @ 05:00) (18 - 18)  SpO2: 95% (18 @ 05:00) (93% - 98%)  CAPILLARY BLOOD GLUCOSE        I&O's Summary    2018 07:  -  2018 07:00  --------------------------------------------------------  IN: 1210 mL / OUT: 1600 mL / NET: -390 mL    2018 07:  -  2018 08:55  --------------------------------------------------------  IN: 60 mL / OUT: 0 mL / NET: 60 mL        PHYSICAL EXAM  GENERAL: NAD, sleepy and arousable  HEAD:  Atraumatic, Normocephalic  EYES: EOMI, PERRLA, conjunctiva and sclera clear  CHEST/LUNG: Bibasilar rales and coarse breath sounds diffusely.  HEART: +S1 +S2, Regular rate and rhythm  ABDOMEN: Soft, Nontender, Nondistended.   : +Suprapubic catheter in place, with clear urine in bag.  NEURO: AOx1, no focal neurologic deficits.   EXTREMITIES:  Pink and warm, Peripheral Pulses intact. Trace B/L edema, L>R.       LABS:  personally reviewed                        8.2    7.3   )-----------( 135      ( 2018 06:56 )             25.3         142  |  108  |  20  ----------------------------<  145<H>  4.5   |  25  |  1.66<H>    Ca    9.4      2018 10:06  Phos  2.8       Mg     1.6                 Urinalysis Basic - ( 2018 23:43 )    Color: Yellow / Appearance: Clear / S.009 / pH: x  Gluc: x / Ketone: Negative  / Bili: Negative / Urobili: 2 mg/dL   Blood: x / Protein: Trace / Nitrite: Negative   Leuk Esterase: Moderate / RBC: 5-10 /HPF / WBC 11-25 /HPF   Sq Epi: x / Non Sq Epi: OCC /HPF / Bacteria: Few /HPF    Blood Cultures - NGTD  Urine Cultures - NGTD    RADIOLOGY & ADDITIONAL TESTS:  personally reviewed  CXR - pulmonary edema and B/L pleural effusions, not significantly changed from prior study. Awaiting final read.     Imaging Personally Reviewed:  Consultant(s) Notes Reviewed:    Care Discussed with Consultants/Other Providers:

## 2018-02-01 NOTE — PROGRESS NOTE ADULT - PROBLEM SELECTOR PLAN 2
Improved. Patient initially admitted for unresponsiveness with concern for seizure activity. Baseline Dementia and fluctuating mental status, AOx1-2.   - Has had intermittent episodes of agitation at night likely secondary to sundowning/dementia, seemingly resolved.    - Somnolence likely secondary to seroquel. Now off with improvement. Patient deemed high aspiration risk on speech/swallow assessment, with recommendations to remain NPO for now. Discussion held with patient's HCP/ daughter Rosas, regarding nutritional options and associated risks/benefits (tube feeds (NGT vs. PEG)- continue pleasure feeds   - Dysphagia I diet with honey thick liquids +Ensure Enlive x2/day   - Continue Strict Aspiration Precautions.

## 2018-02-01 NOTE — PROGRESS NOTE ADULT - PROBLEM SELECTOR PLAN 4
Baseline Dementia with new onset seizures described as B/L UE tonic clonic lasting 30sec, x4-5 episodes. CT Head showed extensive microvascular disease but no acute pathology. On Trazadone at home for sleep, held.   - MRI negative. Unable to complete MRA.    - vEEG negative for seizure activity.   - Continue Keppra 500mg IV BID.    - Appreciate Neurology recommendations, no further intervention planned at this time. Patient has neurogenic bladder with suprapubic catheter who presented in sepsis secondary to UTI.    - S/P replacement of suprapubic catheter by urology, to f/u in 1 month.   - Urine cultures with VRE Completed 10 day course of IV Abx (1/16-1/25). afebrile   - Repeat Urine cultures NGTD.

## 2018-02-01 NOTE — PROVIDER CONTACT NOTE (OTHER) - BACKGROUND
Patient came in with nonintractable epilepsy with status epilepticus, bradycardia. Hx of AFIB, CKD, dementia, neurogenic bladder, anemia

## 2018-02-01 NOTE — PROGRESS NOTE ADULT - PROBLEM SELECTOR PLAN 6
Continue Synthroid Asymptomatic, Bradycardic 1/17 to 30s to 50s with EKG showing prolonged QTc, interpreted as afib with slow ventricular response and bifascicular block.    - Afib with HR 40-110s overnight.   - Appreciate EP eval, continue to monitor on telemetry. No plans for pacemaker at this time.   - Avoid AVN blockers   - Appreciate Cards evaluation, no plans for intervention at this time. Will follow up as outpatient

## 2018-02-02 VITALS
SYSTOLIC BLOOD PRESSURE: 133 MMHG | DIASTOLIC BLOOD PRESSURE: 72 MMHG | TEMPERATURE: 98 F | OXYGEN SATURATION: 98 % | HEART RATE: 58 BPM | RESPIRATION RATE: 18 BRPM

## 2018-02-02 PROCEDURE — 97530 THERAPEUTIC ACTIVITIES: CPT

## 2018-02-02 PROCEDURE — 87070 CULTURE OTHR SPECIMN AEROBIC: CPT

## 2018-02-02 PROCEDURE — 84443 ASSAY THYROID STIM HORMONE: CPT

## 2018-02-02 PROCEDURE — 84481 FREE ASSAY (FT-3): CPT

## 2018-02-02 PROCEDURE — 86900 BLOOD TYPING SEROLOGIC ABO: CPT

## 2018-02-02 PROCEDURE — 87486 CHLMYD PNEUM DNA AMP PROBE: CPT

## 2018-02-02 PROCEDURE — 86618 LYME DISEASE ANTIBODY: CPT

## 2018-02-02 PROCEDURE — 99291 CRITICAL CARE FIRST HOUR: CPT | Mod: 25

## 2018-02-02 PROCEDURE — 92610 EVALUATE SWALLOWING FUNCTION: CPT

## 2018-02-02 PROCEDURE — 97161 PT EVAL LOW COMPLEX 20 MIN: CPT

## 2018-02-02 PROCEDURE — 84100 ASSAY OF PHOSPHORUS: CPT

## 2018-02-02 PROCEDURE — 87633 RESP VIRUS 12-25 TARGETS: CPT

## 2018-02-02 PROCEDURE — 86901 BLOOD TYPING SEROLOGIC RH(D): CPT

## 2018-02-02 PROCEDURE — 85610 PROTHROMBIN TIME: CPT

## 2018-02-02 PROCEDURE — 84540 ASSAY OF URINE/UREA-N: CPT

## 2018-02-02 PROCEDURE — 87581 M.PNEUMON DNA AMP PROBE: CPT

## 2018-02-02 PROCEDURE — 80048 BASIC METABOLIC PNL TOTAL CA: CPT

## 2018-02-02 PROCEDURE — 84484 ASSAY OF TROPONIN QUANT: CPT

## 2018-02-02 PROCEDURE — 99239 HOSP IP/OBS DSCHRG MGMT >30: CPT | Mod: GC

## 2018-02-02 PROCEDURE — 70450 CT HEAD/BRAIN W/O DYE: CPT

## 2018-02-02 PROCEDURE — 93308 TTE F-UP OR LMTD: CPT

## 2018-02-02 PROCEDURE — 96375 TX/PRO/DX INJ NEW DRUG ADDON: CPT | Mod: XU

## 2018-02-02 PROCEDURE — 82962 GLUCOSE BLOOD TEST: CPT

## 2018-02-02 PROCEDURE — 93005 ELECTROCARDIOGRAM TRACING: CPT | Mod: XU

## 2018-02-02 PROCEDURE — 87086 URINE CULTURE/COLONY COUNT: CPT

## 2018-02-02 PROCEDURE — 83735 ASSAY OF MAGNESIUM: CPT

## 2018-02-02 PROCEDURE — 85027 COMPLETE CBC AUTOMATED: CPT

## 2018-02-02 PROCEDURE — 82803 BLOOD GASES ANY COMBINATION: CPT

## 2018-02-02 PROCEDURE — 71250 CT THORAX DX C-: CPT

## 2018-02-02 PROCEDURE — 81001 URINALYSIS AUTO W/SCOPE: CPT

## 2018-02-02 PROCEDURE — 83880 ASSAY OF NATRIURETIC PEPTIDE: CPT

## 2018-02-02 PROCEDURE — 87798 DETECT AGENT NOS DNA AMP: CPT

## 2018-02-02 PROCEDURE — 72125 CT NECK SPINE W/O DYE: CPT

## 2018-02-02 PROCEDURE — 97116 GAIT TRAINING THERAPY: CPT

## 2018-02-02 PROCEDURE — 93306 TTE W/DOPPLER COMPLETE: CPT

## 2018-02-02 PROCEDURE — 96374 THER/PROPH/DIAG INJ IV PUSH: CPT | Mod: XU

## 2018-02-02 PROCEDURE — 83550 IRON BINDING TEST: CPT

## 2018-02-02 PROCEDURE — 82533 TOTAL CORTISOL: CPT

## 2018-02-02 PROCEDURE — 82553 CREATINE MB FRACTION: CPT

## 2018-02-02 PROCEDURE — 99233 SBSQ HOSP IP/OBS HIGH 50: CPT | Mod: GC

## 2018-02-02 PROCEDURE — 84295 ASSAY OF SERUM SODIUM: CPT

## 2018-02-02 PROCEDURE — 82728 ASSAY OF FERRITIN: CPT

## 2018-02-02 PROCEDURE — 85014 HEMATOCRIT: CPT

## 2018-02-02 PROCEDURE — 31500 INSERT EMERGENCY AIRWAY: CPT

## 2018-02-02 PROCEDURE — 85730 THROMBOPLASTIN TIME PARTIAL: CPT

## 2018-02-02 PROCEDURE — 80053 COMPREHEN METABOLIC PANEL: CPT

## 2018-02-02 PROCEDURE — 94640 AIRWAY INHALATION TREATMENT: CPT

## 2018-02-02 PROCEDURE — 94002 VENT MGMT INPAT INIT DAY: CPT

## 2018-02-02 PROCEDURE — 82570 ASSAY OF URINE CREATININE: CPT

## 2018-02-02 PROCEDURE — 87040 BLOOD CULTURE FOR BACTERIA: CPT

## 2018-02-02 PROCEDURE — 83605 ASSAY OF LACTIC ACID: CPT

## 2018-02-02 PROCEDURE — 82947 ASSAY GLUCOSE BLOOD QUANT: CPT

## 2018-02-02 PROCEDURE — 84439 ASSAY OF FREE THYROXINE: CPT

## 2018-02-02 PROCEDURE — 82330 ASSAY OF CALCIUM: CPT

## 2018-02-02 PROCEDURE — 95819 EEG AWAKE AND ASLEEP: CPT

## 2018-02-02 PROCEDURE — 94799 UNLISTED PULMONARY SVC/PX: CPT

## 2018-02-02 PROCEDURE — 97110 THERAPEUTIC EXERCISES: CPT

## 2018-02-02 PROCEDURE — 82435 ASSAY OF BLOOD CHLORIDE: CPT

## 2018-02-02 PROCEDURE — 71045 X-RAY EXAM CHEST 1 VIEW: CPT

## 2018-02-02 PROCEDURE — 84132 ASSAY OF SERUM POTASSIUM: CPT

## 2018-02-02 PROCEDURE — 94003 VENT MGMT INPAT SUBQ DAY: CPT

## 2018-02-02 PROCEDURE — 95951: CPT

## 2018-02-02 PROCEDURE — 84300 ASSAY OF URINE SODIUM: CPT

## 2018-02-02 PROCEDURE — 87186 SC STD MICRODIL/AGAR DIL: CPT

## 2018-02-02 PROCEDURE — 76775 US EXAM ABDO BACK WALL LIM: CPT

## 2018-02-02 PROCEDURE — 82550 ASSAY OF CK (CPK): CPT

## 2018-02-02 PROCEDURE — 97166 OT EVAL MOD COMPLEX 45 MIN: CPT

## 2018-02-02 PROCEDURE — 86850 RBC ANTIBODY SCREEN: CPT

## 2018-02-02 PROCEDURE — 70551 MRI BRAIN STEM W/O DYE: CPT

## 2018-02-02 RX ORDER — SODIUM CHLORIDE 0.65 %
2 AEROSOL, SPRAY (ML) NASAL
Qty: 0 | Refills: 0 | Status: DISCONTINUED | OUTPATIENT
Start: 2018-02-02 | End: 2018-02-02

## 2018-02-02 RX ORDER — LEVOTHYROXINE SODIUM 125 MCG
1 TABLET ORAL
Qty: 0 | Refills: 0 | COMMUNITY

## 2018-02-02 RX ORDER — SODIUM CHLORIDE 0.65 %
1 AEROSOL, SPRAY (ML) NASAL
Qty: 0 | Refills: 0 | DISCHARGE
Start: 2018-02-02

## 2018-02-02 RX ORDER — PETROLATUM,WHITE
1 JELLY (GRAM) TOPICAL
Qty: 0 | Refills: 0 | Status: DISCONTINUED | OUTPATIENT
Start: 2018-02-02 | End: 2018-02-02

## 2018-02-02 RX ORDER — LEVETIRACETAM 250 MG/1
0.5 TABLET, FILM COATED ORAL
Qty: 0 | Refills: 0 | DISCHARGE
Start: 2018-02-02

## 2018-02-02 RX ORDER — TRAZODONE HCL 50 MG
50 TABLET ORAL
Qty: 0 | Refills: 0 | COMMUNITY

## 2018-02-02 RX ORDER — PETROLATUM,WHITE
1 JELLY (GRAM) TOPICAL
Qty: 0 | Refills: 0 | DISCHARGE
Start: 2018-02-02

## 2018-02-02 RX ORDER — LEVETIRACETAM 250 MG/1
1 TABLET, FILM COATED ORAL
Qty: 0 | Refills: 0 | COMMUNITY
Start: 2018-02-02

## 2018-02-02 RX ORDER — FUROSEMIDE 40 MG
1 TABLET ORAL
Qty: 0 | Refills: 0 | COMMUNITY

## 2018-02-02 RX ORDER — LEVOTHYROXINE SODIUM 125 MCG
1 TABLET ORAL
Qty: 0 | Refills: 0 | COMMUNITY
Start: 2018-02-02

## 2018-02-02 RX ADMIN — HEPARIN SODIUM 5000 UNIT(S): 5000 INJECTION INTRAVENOUS; SUBCUTANEOUS at 13:53

## 2018-02-02 RX ADMIN — LEVETIRACETAM 500 MILLIGRAM(S): 250 TABLET, FILM COATED ORAL at 05:36

## 2018-02-02 RX ADMIN — Medication 150 MICROGRAM(S): at 05:36

## 2018-02-02 RX ADMIN — Medication 81 MILLIGRAM(S): at 13:53

## 2018-02-02 RX ADMIN — HEPARIN SODIUM 5000 UNIT(S): 5000 INJECTION INTRAVENOUS; SUBCUTANEOUS at 05:36

## 2018-02-02 NOTE — PROGRESS NOTE ADULT - SUBJECTIVE AND OBJECTIVE BOX
Patient is a 89y old  Male who presents with a chief complaint of Seizure (25 Jan 2018 13:37)      OVERNIGHT EVENTS: No acute events overnight.  SUBJECTIVE: Patient seen and examined at bedside. Denies current complaints.      MEDICATIONS  (STANDING):  aspirin  chewable 81 milliGRAM(s) Oral daily  heparin  Injectable 5000 Unit(s) SubCutaneous every 8 hours  levETIRAcetam 500 milliGRAM(s) Oral two times a day  levothyroxine 150 MICROGram(s) Oral daily    MEDICATIONS  (PRN):      T(C): 36.2 (02-02-18 @ 04:30), Max: 36.2 (02-01-18 @ 20:07)  HR: 67 (02-02-18 @ 07:47) (53 - 67)  BP: 125/78 (02-02-18 @ 04:30) (119/67 - 125/78)  RR: 18 (02-02-18 @ 04:30) (18 - 18)  SpO2: 100% (02-02-18 @ 04:30) (95% - 100%)  CAPILLARY BLOOD GLUCOSE        I&O's Summary    01 Feb 2018 07:01  -  02 Feb 2018 07:00  --------------------------------------------------------  IN: 380 mL / OUT: 2300 mL / NET: -1920 mL    02 Feb 2018 07:01  -  02 Feb 2018 08:02  --------------------------------------------------------  IN: 0 mL / OUT: 1000 mL / NET: -1000 mL      PHYSICAL EXAM  GENERAL: NAD, sleepy and arousable  HEAD:  Atraumatic, Normocephalic  EYES: EOMI, PERRLA, conjunctiva and sclera clear  CHEST/LUNG: Bibasilar rales and coarse breath sounds diffusely.  HEART: +S1 +S2, Regular rate and rhythm  ABDOMEN: Soft, Nontender, Nondistended.   : +Suprapubic catheter in place, with clear urine in bag.  NEURO: AOx1, no focal neurologic deficits.   EXTREMITIES:  Pink and warm, Peripheral Pulses intact. Trace B/L edema, L>R.       LABS:    01-31    142  |  108  |  20  ----------------------------<  145<H>  4.5   |  25  |  1.66<H>    Ca    9.4      31 Jan 2018 10:06  Phos  2.8     01-31  Mg     1.6     01-31                RADIOLOGY & ADDITIONAL TESTS:  < from: Xray Chest 1 View- PORTABLE-Routine (01.31.18 @ 19:35) >  IMPRESSION:  Pulmonary edema and bilateral pleural effusions, not significantly   changed since 1/21/2018.  < end of copied text >      Imaging Personally Reviewed:  Consultant(s) Notes Reviewed:    Care Discussed with Consultants/Other Providers: Patient is a 89y old  Male who presents with a chief complaint of Seizure (25 Jan 2018 13:37)      OVERNIGHT EVENTS: No acute events overnight.  SUBJECTIVE: Patient seen and examined at bedside. Denies current complaints.      MEDICATIONS  (STANDING):  aspirin  chewable 81 milliGRAM(s) Oral daily  heparin  Injectable 5000 Unit(s) SubCutaneous every 8 hours  levETIRAcetam 500 milliGRAM(s) Oral two times a day  levothyroxine 150 MICROGram(s) Oral daily    MEDICATIONS  (PRN):      T(C): 36.2 (02-02-18 @ 04:30), Max: 36.2 (02-01-18 @ 20:07)  HR: 67 (02-02-18 @ 07:47) (53 - 67)  BP: 125/78 (02-02-18 @ 04:30) (119/67 - 125/78)  RR: 18 (02-02-18 @ 04:30) (18 - 18)  SpO2: 100% (02-02-18 @ 04:30) (95% - 100%)  CAPILLARY BLOOD GLUCOSE        I&O's Summary    01 Feb 2018 07:01  -  02 Feb 2018 07:00  --------------------------------------------------------  IN: 380 mL / OUT: 2300 mL / NET: -1920 mL    02 Feb 2018 07:01  -  02 Feb 2018 08:02  --------------------------------------------------------  IN: 0 mL / OUT: 1000 mL / NET: -1000 mL      PHYSICAL EXAM  GENERAL: NAD, sleepy and arousable  HEAD:  Atraumatic, Normocephalic  EYES: EOMI, PERRLA, conjunctiva and sclera clear  CHEST/LUNG: Bibasilar rales and coarse breath sounds diffusely.  HEART: +S1 +S2, Regular rate and rhythm  ABDOMEN: Soft, Nontender, Nondistended.   : +Suprapubic catheter in place, with clear urine in bag.  NEURO: AOx1, no focal neurologic deficits.   EXTREMITIES:  Pink and warm, Peripheral Pulses intact. Trace B/L edema, L>R.       LABS:  personally reviewed    01-31    142  |  108  |  20  ----------------------------<  145<H>  4.5   |  25  |  1.66<H>    Ca    9.4      31 Jan 2018 10:06  Phos  2.8     01-31  Mg     1.6     01-31                RADIOLOGY & ADDITIONAL TESTS:  < from: Xray Chest 1 View- PORTABLE-Routine (01.31.18 @ 19:35) >  IMPRESSION:  Pulmonary edema and bilateral pleural effusions, not significantly   changed since 1/21/2018.  < end of copied text >      Imaging Personally Reviewed: [X] CXR  Consultant(s) Notes Reviewed:    Care Discussed with Consultants/Other Providers:

## 2018-02-02 NOTE — PROGRESS NOTE ADULT - PROBLEM SELECTOR PLAN 9
DVT PPX - [facility]  Dispo - [facility]    Maria Luisa Ardon D.O.  Medicine Team 3 Intern  Pager [phone]
DVT PPX - [facility]  Dispo - [facility]    Maria Luisa Ardon D.O.  Medicine Team 3 Intern  Pager [phone]

## 2018-02-02 NOTE — PROGRESS NOTE ADULT - PROBLEM SELECTOR PLAN 6
Asymptomatic, Bradycardic 1/17 to 30s to 50s with EKG showing prolonged QTc, interpreted as afib with slow ventricular response and bifascicular block.    - HR into 30s overnight, appears asymptomatic. Rest of vitals stable.   - Continue to monitor on telemetry.    - Avoid AVN blockers   - Appreciate Cards and EP evaluation, no plans for intervention or PPM at this time.     - Will follow up with outpatient Cardiologist upon discharge.

## 2018-02-02 NOTE — PROGRESS NOTE ADULT - PROBLEM SELECTOR PLAN 4
Patient has neurogenic bladder with suprapubic catheter who presented in sepsis secondary to UTI.    - S/P replacement of suprapubic catheter by urology, to f/u in 1 month.   - Urine cultures with VRE Completed 10 day course of IV Abx (1/16-1/25). afebrile   - Repeat Urine cultures NGTD.

## 2018-02-02 NOTE — PROGRESS NOTE ADULT - PROVIDER SPECIALTY LIST ADULT
Electrophysiology
Internal Medicine
MICU
MICU
Neurology
Urology
Internal Medicine
Neurology
Internal Medicine

## 2018-02-02 NOTE — PROGRESS NOTE ADULT - ATTENDING COMMENTS
Pt with clinical improvement, more awake/alert sitting in chair.  Pt in no respiratory distress on RA sitting in chair  Update CM regarding his clinical status.  Discharge planning to SANDRINE Chowdary MD  Division of Hospital Medicine  Pager: [phone]  Office: [phone] Pt with clinical improvement, more awake/alert sitting in chair. Pt in no respiratory distress on RA sitting in chair. Pt's temperature continues to fluctuate but infectious workups including but not limited to BCx, UA, CXR, RVP have been all negative and patient has remained medically stable. His hypothermia likely 2/2 autonomic dysfunction and will not  of his care at this time. Explained this to daughter and in agreement with dc planning to [facility]. Pt accepted to [facility]. All questions and concerns were addressed      Zenobia Chowdary MD  Division of Hospital Medicine  Pager: [phone]  Office: [phone]

## 2018-02-02 NOTE — PROGRESS NOTE ADULT - PROBLEM SELECTOR PLAN 3
Improved. Patient initially admitted for unresponsiveness with concern for seizure activity. Baseline Dementia and fluctuating mental status, AOx1-2.   - Has had intermittent episodes of agitation at night likely secondary to sundowning/dementia, seemingly resolved.

## 2018-02-02 NOTE — PROGRESS NOTE ADULT - PROBLEM SELECTOR PLAN 1
Pt continues to be hypothermic, responsive to heating blanket. Infectious workups (CXR, UA, BCx) so far negative for any s/s new infection. Unclear if this is autonomic dysfunction, especially given his age and advanced illness. (+) resting tremors.   - appreciate neuro recs   - continue warming blanket

## 2018-02-04 LAB
CULTURE RESULTS: SIGNIFICANT CHANGE UP
CULTURE RESULTS: SIGNIFICANT CHANGE UP
SPECIMEN SOURCE: SIGNIFICANT CHANGE UP
SPECIMEN SOURCE: SIGNIFICANT CHANGE UP

## 2018-02-20 ENCOUNTER — APPOINTMENT (OUTPATIENT)
Dept: HOME HEALTH SERVICES | Facility: HOME HEALTH | Age: 83
End: 2018-02-20
Payer: MEDICARE

## 2018-02-20 ENCOUNTER — APPOINTMENT (OUTPATIENT)
Dept: HOME HEALTH SERVICES | Facility: HOME HEALTH | Age: 83
End: 2018-02-20

## 2018-02-20 VITALS
SYSTOLIC BLOOD PRESSURE: 85 MMHG | TEMPERATURE: 97.2 F | HEART RATE: 54 BPM | DIASTOLIC BLOOD PRESSURE: 60 MMHG | OXYGEN SATURATION: 97 % | RESPIRATION RATE: 15 BRPM

## 2018-02-20 DIAGNOSIS — Z87.898 PERSONAL HISTORY OF OTHER SPECIFIED CONDITIONS: ICD-10-CM

## 2018-02-20 PROCEDURE — 99496 TRANSJ CARE MGMT HIGH F2F 7D: CPT

## 2018-02-27 ENCOUNTER — LABORATORY RESULT (OUTPATIENT)
Age: 83
End: 2018-02-27

## 2018-02-28 LAB
ALBUMIN SERPL ELPH-MCNC: 3.2 G/DL
ALP BLD-CCNC: 105 U/L
ALT SERPL-CCNC: 9 U/L
ANION GAP SERPL CALC-SCNC: 19 MMOL/L
AST SERPL-CCNC: 14 U/L
BASOPHILS # BLD AUTO: 0.16 K/UL
BASOPHILS NFR BLD AUTO: 2.7 %
BILIRUB SERPL-MCNC: 0.9 MG/DL
BUN SERPL-MCNC: 48 MG/DL
CALCIUM SERPL-MCNC: 9.7 MG/DL
CHLORIDE SERPL-SCNC: 99 MMOL/L
CO2 SERPL-SCNC: 22 MMOL/L
CREAT SERPL-MCNC: 2.5 MG/DL
EOSINOPHIL # BLD AUTO: 0.11 K/UL
EOSINOPHIL NFR BLD AUTO: 1.8
FOLATE SERPL-MCNC: 16.1 NG/ML
HBA1C MFR BLD HPLC: 5.1 %
HCT VFR BLD CALC: 25.1 %
HGB BLD-MCNC: 7.5 G/DL
LYMPHOCYTES # BLD AUTO: 2.03 K/UL
LYMPHOCYTES NFR BLD AUTO: 34.5 %
MAN DIFF?: NORMAL
MCHC RBC-ENTMCNC: 26.4 PG
MCHC RBC-ENTMCNC: 29.9 GM/DL
MCV RBC AUTO: 88.4 FL
MONOCYTES # BLD AUTO: 0.36 K/UL
MONOCYTES NFR BLD AUTO: 6.2 %
NEUTROPHILS # BLD AUTO: 3.18 K/UL
NEUTROPHILS NFR BLD AUTO: 54 %
PLATELET # BLD AUTO: 172 K/UL
POTASSIUM SERPL-SCNC: 4.3 MMOL/L
PROT SERPL-MCNC: 6.9 G/DL
RBC # BLD: 2.84 M/UL
RBC # FLD: 16.5 %
SODIUM SERPL-SCNC: 140 MMOL/L
TSH SERPL-ACNC: 0.17 UIU/ML
VIT B12 SERPL-MCNC: 1121 PG/ML
WBC # FLD AUTO: 5.88 K/UL

## 2018-03-02 LAB — LEVETIRACETAM SERPL-MCNC: 71.1 MCG/ML

## 2018-03-12 ENCOUNTER — INPATIENT (INPATIENT)
Facility: HOSPITAL | Age: 83
LOS: 1 days | Discharge: HOME | DRG: 812 | End: 2018-03-14
Attending: INTERNAL MEDICINE | Admitting: INTERNAL MEDICINE
Payer: MEDICARE

## 2018-03-12 ENCOUNTER — LABORATORY RESULT (OUTPATIENT)
Age: 83
End: 2018-03-12

## 2018-03-12 VITALS
SYSTOLIC BLOOD PRESSURE: 108 MMHG | DIASTOLIC BLOOD PRESSURE: 63 MMHG | TEMPERATURE: 98 F | OXYGEN SATURATION: 96 % | HEART RATE: 98 BPM | RESPIRATION RATE: 18 BRPM

## 2018-03-12 PROCEDURE — 99285 EMERGENCY DEPT VISIT HI MDM: CPT | Mod: 25

## 2018-03-12 NOTE — ED ADULT NURSE NOTE - OBJECTIVE STATEMENT
89y male with hx of alzheimer's BIBEMS from home for low H&H. pt is alert and oriented x 2 and speaking coherently. pt denies any pain, pt sent into ER from home due to Hgb of 5.6. pt had a transfusion in jan for low H&H. Pt in nad. md at the bedside. Family states pt is to be a direct admit to palliative floor. pt resting comfortably in bed- following commands from RN. family at the bedside. will reassess.

## 2018-03-12 NOTE — ED PROVIDER NOTE - OBJECTIVE STATEMENT
89 y/o male hx of dementia, seizures on keppra, aspirin, lasix for pleural effusions, and synthroid, p/w low h/h. Per daughter, home house aid took bloods today and hemoglobin was 5 - sent to ED for direct admission to palliative care unit. Patient is A+Ox1 to place. Unable to identify his daughter. Denies any pain. Daughter unsure if any bloody stools. Patient has required multiple transfusions in the past.

## 2018-03-12 NOTE — ED PROVIDER NOTE - CONSTITUTIONAL, MLM
normal... Well appearing, well nourished, awake, alert, oriented to place and in no apparent distress.

## 2018-03-12 NOTE — ED PROVIDER NOTE - GASTROINTESTINAL, MLM
Abdomen soft, no guarding. Suprapubic In place, non tender abd. FOBT trace positive chaperone Dr. Jay

## 2018-03-12 NOTE — ED PROVIDER NOTE - ATTENDING CONTRIBUTION TO CARE
89 y/o male hx of dementia, seizures on keppra, aspirin, lasix for pleural effusions, and synthroid, p/w low h/h. Per daughter, home house aid took bloods today and hemoglobin was 5 - sent to ED for direct admission to palliative care unit. pt well appearing, vss, faintly trace pos guiaic.  abd soft, nt. labs sent, palliative called.

## 2018-03-12 NOTE — ED PROVIDER NOTE - MEDICAL DECISION MAKING DETAILS
Jean Pierre Rodriguez (Resident): 89 y/o presents for low H/h found on home care labs - trace positive guiac test - no complaints, A=Ox1, will check labs, type and screen, and d/w palliative

## 2018-03-12 NOTE — ED PROVIDER NOTE - EYES, MLM
Clear bilaterally, pupils equal, round and reactive to light. Conjunctival pallor. EOMI. No scleral icterus. No conjunctival injection. No discharge bilaterally.

## 2018-03-13 DIAGNOSIS — R53.81 OTHER MALAISE: ICD-10-CM

## 2018-03-13 DIAGNOSIS — D64.9 ANEMIA, UNSPECIFIED: ICD-10-CM

## 2018-03-13 DIAGNOSIS — J90 PLEURAL EFFUSION, NOT ELSEWHERE CLASSIFIED: ICD-10-CM

## 2018-03-13 DIAGNOSIS — D50.0 IRON DEFICIENCY ANEMIA SECONDARY TO BLOOD LOSS (CHRONIC): ICD-10-CM

## 2018-03-13 DIAGNOSIS — Z51.5 ENCOUNTER FOR PALLIATIVE CARE: ICD-10-CM

## 2018-03-13 DIAGNOSIS — N17.9 ACUTE KIDNEY FAILURE, UNSPECIFIED: ICD-10-CM

## 2018-03-13 DIAGNOSIS — I35.0 NONRHEUMATIC AORTIC (VALVE) STENOSIS: ICD-10-CM

## 2018-03-13 DIAGNOSIS — E03.9 HYPOTHYROIDISM, UNSPECIFIED: ICD-10-CM

## 2018-03-13 DIAGNOSIS — F03.90 UNSPECIFIED DEMENTIA WITHOUT BEHAVIORAL DISTURBANCE: ICD-10-CM

## 2018-03-13 DIAGNOSIS — Z71.89 OTHER SPECIFIED COUNSELING: ICD-10-CM

## 2018-03-13 DIAGNOSIS — G40.909 EPILEPSY, UNSPECIFIED, NOT INTRACTABLE, WITHOUT STATUS EPILEPTICUS: ICD-10-CM

## 2018-03-13 DIAGNOSIS — Z29.9 ENCOUNTER FOR PROPHYLACTIC MEASURES, UNSPECIFIED: ICD-10-CM

## 2018-03-13 LAB
ALBUMIN SERPL ELPH-MCNC: 2.8 G/DL — LOW (ref 3.3–5)
ALBUMIN SERPL ELPH-MCNC: 3 G/DL
ALBUMIN SERPL ELPH-MCNC: 3.3 G/DL — SIGNIFICANT CHANGE UP (ref 3.3–5)
ALP BLD-CCNC: 94 U/L
ALP SERPL-CCNC: 84 U/L — SIGNIFICANT CHANGE UP (ref 40–120)
ALP SERPL-CCNC: 93 U/L — SIGNIFICANT CHANGE UP (ref 40–120)
ALT FLD-CCNC: 6 U/L RC — LOW (ref 10–45)
ALT FLD-CCNC: 7 U/L RC — LOW (ref 10–45)
ALT SERPL-CCNC: 9 U/L
ANION GAP SERPL CALC-SCNC: 10 MMOL/L — SIGNIFICANT CHANGE UP (ref 5–17)
ANION GAP SERPL CALC-SCNC: 12 MMOL/L — SIGNIFICANT CHANGE UP (ref 5–17)
ANION GAP SERPL CALC-SCNC: 16 MMOL/L
APTT BLD: 28.8 SEC — SIGNIFICANT CHANGE UP (ref 27.5–37.4)
AST SERPL-CCNC: 11 U/L — SIGNIFICANT CHANGE UP (ref 10–40)
AST SERPL-CCNC: 13 U/L — SIGNIFICANT CHANGE UP (ref 10–40)
AST SERPL-CCNC: 16 U/L
BASOPHILS # BLD AUTO: 0.08 K/UL
BASOPHILS # BLD AUTO: 0.1 K/UL — SIGNIFICANT CHANGE UP (ref 0–0.2)
BASOPHILS NFR BLD AUTO: 1 %
BILIRUB SERPL-MCNC: 0.5 MG/DL
BILIRUB SERPL-MCNC: 0.6 MG/DL — SIGNIFICANT CHANGE UP (ref 0.2–1.2)
BILIRUB SERPL-MCNC: 1.7 MG/DL — HIGH (ref 0.2–1.2)
BLD GP AB SCN SERPL QL: NEGATIVE — SIGNIFICANT CHANGE UP
BUN SERPL-MCNC: 45 MG/DL
BUN SERPL-MCNC: 48 MG/DL — HIGH (ref 7–23)
BUN SERPL-MCNC: 48 MG/DL — HIGH (ref 7–23)
CALCIUM SERPL-MCNC: 9.1 MG/DL
CALCIUM SERPL-MCNC: 9.2 MG/DL — SIGNIFICANT CHANGE UP (ref 8.4–10.5)
CALCIUM SERPL-MCNC: 9.3 MG/DL — SIGNIFICANT CHANGE UP (ref 8.4–10.5)
CHLORIDE SERPL-SCNC: 100 MMOL/L — SIGNIFICANT CHANGE UP (ref 96–108)
CHLORIDE SERPL-SCNC: 101 MMOL/L
CHLORIDE SERPL-SCNC: 102 MMOL/L — SIGNIFICANT CHANGE UP (ref 96–108)
CO2 SERPL-SCNC: 23 MMOL/L
CO2 SERPL-SCNC: 25 MMOL/L — SIGNIFICANT CHANGE UP (ref 22–31)
CO2 SERPL-SCNC: 26 MMOL/L — SIGNIFICANT CHANGE UP (ref 22–31)
CREAT SERPL-MCNC: 2.29 MG/DL
CREAT SERPL-MCNC: 2.37 MG/DL — HIGH (ref 0.5–1.3)
CREAT SERPL-MCNC: 2.47 MG/DL — HIGH (ref 0.5–1.3)
EOSINOPHIL # BLD AUTO: 0.08 K/UL
EOSINOPHIL # BLD AUTO: 0.1 K/UL — SIGNIFICANT CHANGE UP (ref 0–0.5)
EOSINOPHIL NFR BLD AUTO: 1 %
EOSINOPHIL NFR BLD AUTO: 1 % — SIGNIFICANT CHANGE UP (ref 0–6)
FOLATE SERPL-MCNC: 14 NG/ML
GLUCOSE SERPL-MCNC: 105 MG/DL — HIGH (ref 70–99)
GLUCOSE SERPL-MCNC: 93 MG/DL — SIGNIFICANT CHANGE UP (ref 70–99)
HBA1C MFR BLD HPLC: 5 %
HCT VFR BLD CALC: 19.4 %
HCT VFR BLD CALC: 20 % — CRITICAL LOW (ref 39–50)
HCT VFR BLD CALC: 20.6 % — CRITICAL LOW (ref 39–50)
HGB BLD-MCNC: 5.9 G/DL
HGB BLD-MCNC: 6.5 G/DL — CRITICAL LOW (ref 13–17)
HGB BLD-MCNC: 6.6 G/DL — CRITICAL LOW (ref 13–17)
IMM GRANULOCYTES NFR BLD AUTO: 0.4 %
INR BLD: 1.12 RATIO — SIGNIFICANT CHANGE UP (ref 0.88–1.16)
LYMPHOCYTES # BLD AUTO: 1.7 K/UL — SIGNIFICANT CHANGE UP (ref 1–3.3)
LYMPHOCYTES # BLD AUTO: 1.75 K/UL
LYMPHOCYTES # BLD AUTO: 24 % — SIGNIFICANT CHANGE UP (ref 13–44)
LYMPHOCYTES NFR BLD AUTO: 21.4 %
MAGNESIUM SERPL-MCNC: 2.3 MG/DL — SIGNIFICANT CHANGE UP (ref 1.6–2.6)
MAN DIFF?: NORMAL
MCHC RBC-ENTMCNC: 26 PG
MCHC RBC-ENTMCNC: 27.5 PG — SIGNIFICANT CHANGE UP (ref 27–34)
MCHC RBC-ENTMCNC: 27.7 PG — SIGNIFICANT CHANGE UP (ref 27–34)
MCHC RBC-ENTMCNC: 30.4 GM/DL
MCHC RBC-ENTMCNC: 31.8 GM/DL — LOW (ref 32–36)
MCHC RBC-ENTMCNC: 32.5 GM/DL — SIGNIFICANT CHANGE UP (ref 32–36)
MCV RBC AUTO: 85.4 FL — SIGNIFICANT CHANGE UP (ref 80–100)
MCV RBC AUTO: 85.5 FL
MCV RBC AUTO: 86.2 FL — SIGNIFICANT CHANGE UP (ref 80–100)
MONOCYTES # BLD AUTO: 0.46 K/UL
MONOCYTES # BLD AUTO: 0.5 K/UL — SIGNIFICANT CHANGE UP (ref 0–0.9)
MONOCYTES NFR BLD AUTO: 5.6 %
MONOCYTES NFR BLD AUTO: 8 % — SIGNIFICANT CHANGE UP (ref 2–14)
NEUTROPHILS # BLD AUTO: 4.5 K/UL — SIGNIFICANT CHANGE UP (ref 1.8–7.4)
NEUTROPHILS # BLD AUTO: 5.76 K/UL
NEUTROPHILS NFR BLD AUTO: 67 % — SIGNIFICANT CHANGE UP (ref 43–77)
NEUTROPHILS NFR BLD AUTO: 70.6 %
PHOSPHATE SERPL-MCNC: 4.1 MG/DL — SIGNIFICANT CHANGE UP (ref 2.5–4.5)
PLATELET # BLD AUTO: 299 K/UL — SIGNIFICANT CHANGE UP (ref 150–400)
PLATELET # BLD AUTO: 376 K/UL — SIGNIFICANT CHANGE UP (ref 150–400)
PLATELET # BLD AUTO: 391 K/UL
POTASSIUM SERPL-MCNC: 4.5 MMOL/L — SIGNIFICANT CHANGE UP (ref 3.5–5.3)
POTASSIUM SERPL-MCNC: 4.5 MMOL/L — SIGNIFICANT CHANGE UP (ref 3.5–5.3)
POTASSIUM SERPL-SCNC: 4.5 MMOL/L
POTASSIUM SERPL-SCNC: 4.5 MMOL/L — SIGNIFICANT CHANGE UP (ref 3.5–5.3)
POTASSIUM SERPL-SCNC: 4.5 MMOL/L — SIGNIFICANT CHANGE UP (ref 3.5–5.3)
PROT SERPL-MCNC: 6.5 G/DL
PROT SERPL-MCNC: 6.5 G/DL — SIGNIFICANT CHANGE UP (ref 6–8.3)
PROT SERPL-MCNC: 7.5 G/DL — SIGNIFICANT CHANGE UP (ref 6–8.3)
PROTHROM AB SERPL-ACNC: 12.2 SEC — SIGNIFICANT CHANGE UP (ref 9.8–12.7)
RBC # BLD: 2.27 M/UL
RBC # BLD: 2.34 M/UL — LOW (ref 4.2–5.8)
RBC # BLD: 2.39 M/UL — LOW (ref 4.2–5.8)
RBC # FLD: 14.2 % — SIGNIFICANT CHANGE UP (ref 10.3–14.5)
RBC # FLD: 14.6 % — HIGH (ref 10.3–14.5)
RBC # FLD: 16.5 %
RH IG SCN BLD-IMP: POSITIVE — SIGNIFICANT CHANGE UP
SODIUM SERPL-SCNC: 137 MMOL/L — SIGNIFICANT CHANGE UP (ref 135–145)
SODIUM SERPL-SCNC: 138 MMOL/L — SIGNIFICANT CHANGE UP (ref 135–145)
SODIUM SERPL-SCNC: 140 MMOL/L
TSH SERPL-ACNC: 0.3 UIU/ML
VIT B12 SERPL-MCNC: 1021 PG/ML
WBC # BLD: 6 K/UL — SIGNIFICANT CHANGE UP (ref 3.8–10.5)
WBC # BLD: 6.9 K/UL — SIGNIFICANT CHANGE UP (ref 3.8–10.5)
WBC # FLD AUTO: 6 K/UL — SIGNIFICANT CHANGE UP (ref 3.8–10.5)
WBC # FLD AUTO: 6.9 K/UL — SIGNIFICANT CHANGE UP (ref 3.8–10.5)
WBC # FLD AUTO: 8.16 K/UL

## 2018-03-13 PROCEDURE — 99223 1ST HOSP IP/OBS HIGH 75: CPT

## 2018-03-13 PROCEDURE — 99497 ADVNCD CARE PLAN 30 MIN: CPT | Mod: 25

## 2018-03-13 RX ORDER — PANTOPRAZOLE SODIUM 20 MG/1
40 TABLET, DELAYED RELEASE ORAL DAILY
Qty: 0 | Refills: 0 | Status: DISCONTINUED | OUTPATIENT
Start: 2018-03-13 | End: 2018-03-14

## 2018-03-13 RX ORDER — SODIUM CHLORIDE 9 MG/ML
1000 INJECTION INTRAMUSCULAR; INTRAVENOUS; SUBCUTANEOUS
Qty: 0 | Refills: 0 | Status: DISCONTINUED | OUTPATIENT
Start: 2018-03-13 | End: 2018-03-14

## 2018-03-13 RX ORDER — LEVETIRACETAM 250 MG/1
500 TABLET, FILM COATED ORAL
Qty: 0 | Refills: 0 | Status: DISCONTINUED | OUTPATIENT
Start: 2018-03-13 | End: 2018-03-14

## 2018-03-13 RX ORDER — HALOPERIDOL DECANOATE 100 MG/ML
0.5 INJECTION INTRAMUSCULAR ONCE
Qty: 0 | Refills: 0 | Status: DISCONTINUED | OUTPATIENT
Start: 2018-03-13 | End: 2018-03-14

## 2018-03-13 RX ORDER — LEVOTHYROXINE SODIUM 125 MCG
150 TABLET ORAL DAILY
Qty: 0 | Refills: 0 | Status: DISCONTINUED | OUTPATIENT
Start: 2018-03-13 | End: 2018-03-14

## 2018-03-13 RX ADMIN — Medication 150 MICROGRAM(S): at 06:28

## 2018-03-13 RX ADMIN — SODIUM CHLORIDE 50 MILLILITER(S): 9 INJECTION INTRAMUSCULAR; INTRAVENOUS; SUBCUTANEOUS at 08:05

## 2018-03-13 RX ADMIN — PANTOPRAZOLE SODIUM 40 MILLIGRAM(S): 20 TABLET, DELAYED RELEASE ORAL at 18:31

## 2018-03-13 RX ADMIN — SODIUM CHLORIDE 50 MILLILITER(S): 9 INJECTION INTRAMUSCULAR; INTRAVENOUS; SUBCUTANEOUS at 06:00

## 2018-03-13 RX ADMIN — LEVETIRACETAM 500 MILLIGRAM(S): 250 TABLET, FILM COATED ORAL at 18:31

## 2018-03-13 RX ADMIN — LEVETIRACETAM 500 MILLIGRAM(S): 250 TABLET, FILM COATED ORAL at 06:28

## 2018-03-13 NOTE — H&P ADULT - NSHPSOCIALHISTORY_GEN_ALL_CORE
Social History:    Marital Status:  (   x)    (   ) Single    (   )    (  )   Occupation: retired  Lives with: (  ) alone  ( x ) children   (  ) spouse   (  ) parents  (  ) other    Substance Use (street drugs): ( x ) never used  (  ) other:  Tobacco Usage:  (   ) never smoked   ( x  ) former smoker   (   ) current smoker  (     ) pack year  (        ) last cigarette date  Alcohol Usage: denies

## 2018-03-13 NOTE — PROGRESS NOTE ADULT - ASSESSMENT
89M PMH AFib, advanced dementia, hypothyroidism, HLD, CKD IV, neurogenic bladder c/b urinary retention s/p suprapubic catheter, anemia with prev GIB 2/2 H pylori gastritis, seizure disorder on keppra, hypothyroid, presents to ED and direct admit to PCU for Hemoglobin at home 5.0 and repeat 6.5 in ED.

## 2018-03-13 NOTE — H&P ADULT - PROBLEM SELECTOR PLAN 1
-prior workup has been unrevealing  -Given age, and family desire to avoid further workup, will at this point focus on symptoms and transfuse prn hgb <7  -1u prbc ordered in ER, check post transfusion cbc  -hemodynamically stable currently.  -hold aspirin, dvt ppx.

## 2018-03-13 NOTE — PROGRESS NOTE ADULT - PROBLEM SELECTOR PLAN 1
Per H&P, work up for anemia unrevealing and given age, family desire to avoid further workup and focus on symptoms and transfuse prn hgb <7. Pt was given 1 unit prbc overnight, Hgb increased from 6.5 to 6.6.  Plan to transfuse a second unit today and recheck CBC.  Pt denies any SOB or chest pain at this time

## 2018-03-13 NOTE — PROGRESS NOTE ADULT - PROBLEM SELECTOR PLAN 5
Palliative team will continue to tx symptoms related to acute on chronic anemia.  Will recheck CBC after 2nd unit of PRBC.  Will speak to daughter today re: safe discharge plan likely tomorrow Palliative team will continue to tx symptoms related to acute on chronic anemia.  Will recheck CBC after 2nd unit of PRBC.  Spoke to daughter today - she says he is at baseline mentation.  He lives at home wife who also has advanced dementia and daughter who lives on 2nd floor.  Wife has a 24 hr aide who also cares for patient.  Plan will likely be discharge home tomorrow with possibility of hospice if family agreeable

## 2018-03-13 NOTE — H&P ADULT - HISTORY OF PRESENT ILLNESS
89M PMH AFib, dementia, hypothyroidism, HLD, CKD IV, neurogenic bladder c/b urinary retention s/p suprapubic catheter, anemia with prev GIB 2/2 H pylori gastritis, seizure disorder on keppra, hypothyroid,  presents for outpatient labs showing acute on chronic anemia.     Pt was admitted in Jan-Feb/2018 for seizures with prolonged hospital course.  Intubated for seizures, in [place] had AED titrated, and course c/b bradycardia/bifasc block, initially attributed to sepsis and confired after urine cultures returned +VRE, s/p course of cefepime switched to ampicillin.  Post extubation course c/b dysphagia; failed speech and swallow, and family chose pleasure feeds. Course finally further c/b persistent hypothermia, which after negative infectious/neuro w/u, was deemed pt's baseline.     VS: 98.3, 98, 108/63, 18, 96% RA. No leukocytosis, hgb 6.5 (prev 8.2 on last hospitalization), coags wnl, ZACKERY. Ordered for 1 uprbc in ER. 89M PMH AFib, dementia, hypothyroidism, HLD, CKD IV, neurogenic bladder c/b urinary retention s/p suprapubic catheter, anemia with prev GIB 2/2 H pylori gastritis, seizure disorder on keppra, hypothyroid,  presents for outpatient labs showing acute on chronic anemia. Call placed to Daughter Rosas for collateral.  Pt is axox1-2 at baseline. Pt was d/c to rehab from prior hospitalization, and went home on 2/19.  Since then, he rarely complaints about anything; today he was pretty much USOH, but had routine labwork that showed an acute on chronic anemia (hgb ~ 6).  Pt has had anemia in the past that was extensively worked up with negative egd/colo; plan per family is to avoid further invasive workup and transfuse as needed. Pt denies abd pain or overt bleeding; pt was trace guaiac + in ER.  Pt has been on dysphagia diet since last hospitalization where he failed speech/swallow eval; this was confirmed on outpt MBS, and family again chose pleasure feeds.     Pt was admitted in Jan-Feb/2018 for seizures with prolonged hospital course.  Intubated for seizures, in [place] had AED titrated, and course c/b bradycardia/bifasc block, initially attributed to sepsis and confired after urine cultures returned +VRE, s/p course of cefepime switched to ampicillin.  Post extubation course c/b dysphagia; failed speech and swallow, and family chose pleasure feeds. Course finally further c/b persistent hypothermia, which after negative infectious/neuro w/u, was deemed pt's baseline.     VS: 98.3, 98, 108/63, 18, 96% RA. No leukocytosis, hgb 6.5 (prev 8.2 on last hospitalization), coags wnl, ZACKERY. Ordered for 1 uprbc in ER.

## 2018-03-13 NOTE — ADVANCED PRACTICE NURSE CONSULT - ASSESSMENT
Patient is on a [employer] P 500 low airloss surface and is being turned and positioned as per the nursing documentation.  At time of the visit and assessment patient was sitting out of bed in the nataliya chair with his legs elevated.  He is currently receiving a unit of blood.  He states his feet do not hurt and that I might look at his feet.  His right foot is intact but cool to the touch and had some difficulty palpating pulses.  Toenails are thickened.  Left heel presents with an evolving deep tissue injury.  The wounded area measures approximately 4.2. cm X 5.2 cm X 0 cm.  The outer edge of the wound is the darkest in color and the coloration diminishes as one moves to the center of the wound.  He did not appear to register any pain during the assessment and no drainage or odor was noted.  The periwound skin is intact  Toenails are thickened and difficulty palpating pedal pulses.  Foot was also cool to touch and pale in color.  Staff had already applied Cavilon and his heels were elevated off mattress when in bed.

## 2018-03-13 NOTE — H&P ADULT - ATTENDING COMMENTS
Case endorsed to NP ______ at ____.  Care to be assumed by ___ in am  This patient was assigned to me by the hospitalist in charge; my involvement in this case has consisted of the initial history, physical, chart review, and management plan.  This patient was previously unknown to me. Case endorsed to SUDHEER Cooley [id number] at 330 am  Care to be assumed by ___ in am  This patient was assigned to me by the hospitalist in charge; my involvement in this case has consisted of the initial history, physical, chart review, and management plan.  This patient was previously unknown to me.

## 2018-03-13 NOTE — PROGRESS NOTE ADULT - SUBJECTIVE AND OBJECTIVE BOX
Geriatric and Palliative Care Unit Progress Note [x ] Hospice Progress Note [ ]     HPI:  89M PMH AFib, dementia, hypothyroidism, HLD, CKD IV, neurogenic bladder c/b urinary retention s/p suprapubic catheter, anemia with prev GIB 2/2 H pylori gastritis, seizure disorder on keppra, hypothyroid,  presents for outpatient labs showing acute on chronic anemia. Call placed to Daughter Rosas for collateral.  Pt is axox1-2 at baseline. Pt was d/c to rehab from prior hospitalization, and went home on 2/19.  Since then, he rarely complaints about anything; today he was pretty much USOH, but had routine labwork that showed an acute on chronic anemia (hgb ~ 6).  Pt has had anemia in the past that was extensively worked up with negative egd/colo; plan per family is to avoid further invasive workup and transfuse as needed. Pt denies abd pain or overt bleeding; pt was trace guaiac + in ER.  Pt has been on dysphagia diet since last hospitalization where he failed speech/swallow eval; this was confirmed on outpt MBS, and family again chose pleasure feeds.     Pt was admitted in Jan-Feb/2018 for seizures with prolonged hospital course.  Intubated for seizures, in [place] had AED titrated, and course c/b bradycardia/bifasc block, initially attributed to sepsis and confired after urine cultures returned +VRE, s/p course of cefepime switched to ampicillin.  Post extubation course c/b dysphagia; failed speech and swallow, and family chose pleasure feeds. Course finally further c/b persistent hypothermia, which after negative infectious/neuro w/u, was deemed pt's baseline.     Indication for Palliative Care Unit Services: Symptom management/Anemia    ADVANCE DIRECTIVES:    DNR Yes    MOLST  [ ] YES [x ] NO                      [ ] Completed  Health Care Proxy [ ] YES  [x ] NO   [ ] Completed  Living Will  [ ] YES [x ] NO             [x ] Surrogate daughter  [ ] HCP  [ ] Guardian:                                                            Phone#:[phone]    Allergies    No Known Allergies    Intolerances      MEDICATIONS  (STANDING):  levETIRAcetam 500 milliGRAM(s) Oral two times a day  levothyroxine 150 MICROGram(s) Oral daily  sodium chloride 0.9%. 1000 milliLiter(s) (50 mL/Hr) IV Continuous <Continuous>    MEDICATIONS  (PRN):      PRESENT SYMPTOMS:  Source: [ x] Patient   [ ] Family   [x ] Team     Pain:                        [x ] No [ ] Yes             [ ] Mild [ ] Moderate [ ] Severe    Dyspnea:                [x ] No [ ] Yes             [ ] Mild [ ] Moderate [ ] Severe    Anxiety:                  [x ] No [ ] Yes             [ ] Mild [ ] Moderate [ ] Severe    Fatigue:                  [x] No [ ] Yes             [ ] Mild [ ] Moderate [ ] Severe    Nausea:                  [x ] No [ ] Yes             [ ] Mild [ ] Moderate [ ] Severe    Loss of appetite:   [x ] No [ ] Yes             [ ] Mild [ ] Moderate [ ] Severe    Constipation:        [x ] No [ ] Yes             [ ] Mild [ ] Moderate [ ] Severe    Other Symptoms:  [ ] All other review of systems negative   [x ] Unable to obtain due to poor mentation     Karnofsky Performance Score/Palliative Performance Status Version 2:      40   %    PHYSICAL EXAM:  Vital Signs Last 24 Hrs  T(C): 36.4 (13 Mar 2018 12:45), Max: 36.8 (12 Mar 2018 23:56)  T(F): 97.5 (13 Mar 2018 12:45), Max: 98.3 (12 Mar 2018 23:56)  HR: 55 (13 Mar 2018 12:45) (54 - 98)  BP: 90/47 (13 Mar 2018 12:45) (90/47 - 108/63)  BP(mean): --  RR: 16 (13 Mar 2018 12:45) (14 - 18)  SpO2: 100% (13 Mar 2018 12:45) (96% - 100%) I&O's Summary    12 Mar 2018 07:01  -  13 Mar 2018 07:00  --------------------------------------------------------  IN: 0 mL / OUT: 55 mL / NET: -55 mL      General:  x[ ] Alert  [ ] Oriented x 1     [ ] Lethargic  [ ] Agitated   [ ] Cachexia   [ ] Unarousable  [x ] Verbal  [ ] Non-Verbal    HEENT:  [ ] Normal   [x ] Dry mouth   [ ] ET Tube    [ ] Trach  [ ] Oral lesions    Lungs:   [x ] Clear [ ] Tachypnea  [ ] Audible excessive secretions   [ ] Rhonchi        [ ] Right [ ] Left [ ] Bilateral  [ ] Crackles        [ ] Right [ ] Left [ ] Bilateral  [ ] Wheezing     [ ] Right [ ] Left [ ] Bilateral  [ ] Respiratory failure [ ] Acute [ ] Chronic [ ] Hypoxemic [ ] Hypercarbic [ ] Other    Cardiovascular:   [ ] Regular [ ] Irregular [ ] Tachycardia   [x ] Bradycardia  [ ] Murmur [ ] Other  [ ] Shock [ ] Septic [ ] Hypovolemic [ ] Neurogenic [ ] Cardiogenic   [ ] Vasopressors [ ] Inotrophs    Abdomen:   [x ] Soft  [ ] Distended   [ ] +BS  [x ] Non tender [ ] Tender  [ ]PEG   [ ]OGT/ NGT   Last BM:     [ ] Other    Genitourinary:  [ ] Normal [ ] Incontinent   [ ] Oliguria/Anuria   [ ] Donis  [ x] Other  suprapubic catheter    Musculoskeletal:    [ ] Normal   [x ] Weakness  [ ] Edema  [ ] Bedbound/Wheelchair bound [ ]  Ambulatory [x ] with 2 assist [x ] without assistance [ ] Functional quadriplegia    Neurological: [ ] No focal deficits  [x ] Cognitive impairment  [ ] Dysphagia [ ] Dysarthria [ ] Paresis [ ] Other   [ ] Brain compression  [ ] Cerebral edema [ ] Encephalopathy    Skin: [x ] Normal   [ ] Ulcer(s) type [ ] Diabetic [ ] Pressure [ ] Other   Stage        POA [ ]  Yes [ ]  No       [ ] Rash    LABS:                        6.6    6.0   )-----------( 299      ( 13 Mar 2018 09:46 )             20.6     03-13    138  |  102  |  48<H>  ----------------------------<  93  4.5   |  26  |  2.37<H>    Ca    9.2      13 Mar 2018 09:46  Phos  4.1     03-13  Mg     2.3     03-13    TPro  6.5  /  Alb  2.8<L>  /  TBili  1.7<H>  /  DBili  x   /  AST  11  /  ALT  6<L>  /  AlkPhos  84  03-13    PT/INR - ( 13 Mar 2018 00:13 )   PT: 12.2 sec;   INR: 1.12 ratio         PTT - ( 13 Mar 2018 00:13 )  PTT:28.8 sec      Protein Calorie Malnutrition: [ x] Mild [ ] Moderate [ ] Severe    Oral Intake: [ ] Unable/mouth care only [ ] Minimal [x ] Moderate [ ] Full Capability  Diet: [ ] NPO [ ] Tube feeds [ ] TPN [ ] Other     RADIOLOGY & ADDITIONAL STUDIES:    REFERRALS:   [ ] Chaplaincy  [ ] Hospice Care  [ ] Child Life  [ x] Social Work  [x ] Case management [ ] Nutrition [ ] Holistic Therapy [ ] Wound Care [ ]Physical Therapy [ ] Other [ ]See goals of care note in [place] [facility] Progress Note [x ] Hospice Progress Note [ ]     HPI:  89M PMH AFib, dementia, hypothyroidism, HLD, CKD IV, neurogenic bladder c/b urinary retention s/p suprapubic catheter, anemia with prev GIB 2/2 H pylori gastritis, seizure disorder on keppra, hypothyroid,  presents for outpatient labs showing acute on chronic anemia. Call placed to Daughter Rosas for collateral.  Pt is axox1-2 at baseline. Pt was d/c to rehab from prior hospitalization, and went home on 2/19.  Since then, he rarely complaints about anything; today he was pretty much USOH, but had routine labwork that showed an acute on chronic anemia (hgb ~ 6).  Pt has had anemia in the past that was extensively worked up with negative egd/colo; plan per family is to avoid further invasive workup and transfuse as needed. Pt denies abd pain or overt bleeding; pt was trace guaiac + in ER.  Pt has been on dysphagia diet since last hospitalization where he failed speech/swallow eval; this was confirmed on outpt MBS, and family again chose pleasure feeds.     Pt was admitted in Jan-Feb/2018 for seizures with prolonged hospital course.  Intubated for seizures, in [place] had AED titrated, and course c/b bradycardia/bifasc block, initially attributed to sepsis and confired after urine cultures returned +VRE, s/p course of cefepime switched to ampicillin.  Post extubation course c/b dysphagia; failed speech and swallow, and family chose pleasure feeds. Course finally further c/b persistent hypothermia, which after negative infectious/neuro w/u, was deemed pt's baseline.     Indication for Palliative Care Unit Services: Symptom management/Anemia.  He denies any chest pain or SOB at this time.     ADVANCE DIRECTIVES:    DNR Yes    MOLST  [ ] YES [x ] NO                      [ ] Completed  Health Care Proxy [ ] YES  [x ] NO   [ ] Completed  Living Will  [ ] YES [x ] NO             [x ] Surrogate daughter  [ ] HCP  [ ] Guardian:                                                            Phone#:[phone]    Allergies    No Known Allergies    Intolerances      MEDICATIONS  (STANDING):  levETIRAcetam 500 milliGRAM(s) Oral two times a day  levothyroxine 150 MICROGram(s) Oral daily  sodium chloride 0.9%. 1000 milliLiter(s) (50 mL/Hr) IV Continuous <Continuous>    MEDICATIONS  (PRN):      PRESENT SYMPTOMS:  Source: [ x] Patient   [ ] Family   [x ] Team     Pain:                        [x ] No [ ] Yes             [ ] Mild [ ] Moderate [ ] Severe    Dyspnea:                [x ] No [ ] Yes             [ ] Mild [ ] Moderate [ ] Severe    Anxiety:                  [x ] No [ ] Yes             [ ] Mild [ ] Moderate [ ] Severe    Fatigue:                  [x] No [ ] Yes             [ ] Mild [ ] Moderate [ ] Severe    Nausea:                  [x ] No [ ] Yes             [ ] Mild [ ] Moderate [ ] Severe    Loss of appetite:   [x ] No [ ] Yes             [ ] Mild [ ] Moderate [ ] Severe    Constipation:        [x ] No [ ] Yes             [ ] Mild [ ] Moderate [ ] Severe    Other Symptoms:  [ ] All other review of systems negative   [x ] Unable to obtain due to poor mentation     Karnofsky Performance Score/Palliative Performance Status Version 2:      40   %    PHYSICAL EXAM:  Vital Signs Last 24 Hrs  T(C): 36.4 (13 Mar 2018 12:45), Max: 36.8 (12 Mar 2018 23:56)  T(F): 97.5 (13 Mar 2018 12:45), Max: 98.3 (12 Mar 2018 23:56)  HR: 55 (13 Mar 2018 12:45) (54 - 98)  BP: 90/47 (13 Mar 2018 12:45) (90/47 - 108/63)  BP(mean): --  RR: 16 (13 Mar 2018 12:45) (14 - 18)  SpO2: 100% (13 Mar 2018 12:45) (96% - 100%) I&O's Summary    12 Mar 2018 07:01  -  13 Mar 2018 07:00  --------------------------------------------------------  IN: 0 mL / OUT: 55 mL / NET: -55 mL      General:  x[ ] Alert  [ ] Oriented x 1     [ ] Lethargic  [ ] Agitated   [ ] Cachexia   [ ] Unarousable  [x ] Verbal  [ ] Non-Verbal    HEENT:  [ ] Normal   [x ] Dry mouth   [ ] ET Tube    [ ] Trach  [ ] Oral lesions    Lungs:   [x ] Clear [ ] Tachypnea  [ ] Audible excessive secretions   [ ] Rhonchi        [ ] Right [ ] Left [ ] Bilateral  [ ] Crackles        [ ] Right [ ] Left [ ] Bilateral  [ ] Wheezing     [ ] Right [ ] Left [ ] Bilateral  [ ] Respiratory failure [ ] Acute [ ] Chronic [ ] Hypoxemic [ ] Hypercarbic [ ] Other    Cardiovascular:   [ ] Regular [ ] Irregular [ ] Tachycardia   [x ] Bradycardia  [ ] Murmur [ ] Other  [ ] Shock [ ] Septic [ ] Hypovolemic [ ] Neurogenic [ ] Cardiogenic   [ ] Vasopressors [ ] Inotrophs    Abdomen:   [x ] Soft  [ ] Distended   [ ] +BS  [x ] Non tender [ ] Tender  [ ]PEG   [ ]OGT/ NGT   Last BM:     [ ] Other    Genitourinary:  [ ] Normal [ ] Incontinent   [ ] Oliguria/Anuria   [ ] Donis  [ x] Other  suprapubic catheter    Musculoskeletal:    [ ] Normal   [x ] Weakness  [ ] Edema  [ ] Bedbound/Wheelchair bound [ ]  Ambulatory [x ] with 2 assist [x ] without assistance [ ] Functional quadriplegia    Neurological: [ ] No focal deficits  [x ] Cognitive impairment  [ ] Dysphagia [ ] Dysarthria [ ] Paresis [ ] Other   [ ] Brain compression  [ ] Cerebral edema [ ] Encephalopathy    Skin: [x ] Normal   [ ] Ulcer(s) type [ ] Diabetic [ ] Pressure [ ] Other   Stage        POA [ ]  Yes [ ]  No       [ ] Rash    LABS:                        6.6    6.0   )-----------( 299      ( 13 Mar 2018 09:46 )             20.6     03-13    138  |  102  |  48<H>  ----------------------------<  93  4.5   |  26  |  2.37<H>    Ca    9.2      13 Mar 2018 09:46  Phos  4.1     03-13  Mg     2.3     03-13    TPro  6.5  /  Alb  2.8<L>  /  TBili  1.7<H>  /  DBili  x   /  AST  11  /  ALT  6<L>  /  AlkPhos  84  03-13    PT/INR - ( 13 Mar 2018 00:13 )   PT: 12.2 sec;   INR: 1.12 ratio         PTT - ( 13 Mar 2018 00:13 )  PTT:28.8 sec      Protein Calorie Malnutrition: [ x] Mild [ ] Moderate [ ] Severe    Oral Intake: [ ] Unable/mouth care only [ ] Minimal [x ] Moderate [ ] Full Capability  Diet: [ ] NPO [ ] Tube feeds [ ] TPN [ ] Other     RADIOLOGY & ADDITIONAL STUDIES:    REFERRALS:   [ ] Chaplaincy  [ ] Hospice Care  [ ] Child Life  [ x] Social Work  [x ] Case management [ ] Nutrition [ ] Holistic Therapy [ ] Wound Care [ ]Physical Therapy [ ] Other [ ]See goals of care note in [place]

## 2018-03-13 NOTE — H&P ADULT - PROBLEM SELECTOR PLAN 7
-At this time, patient does not demonstrate capacity with insight into condition.  Discussed at length with daughter   -20 minutes spent clarifying advanced directives with daughter Rosas via phone.  She states that given the patient's various comorbidities and now severe Aortic stenosis not amenable to repair, and advanced age, she understands that the benefits of CPR are diminished and would not want to subject him to that should he undergo a cardiac arrest. However, if the patient were to have a respiratory illness requiring intubation, she would want a trial of intubation to assess for potentially reversible conditions amenable to treatment in the setting of temporary intubation.   Thus, at this time, his daughter has verbally made the patient DNR BUT NOT DNI.  Family is amenable to IV abx and IVF as needed, and has a goal of minimizing blood draws if possible.  Furthermore, they are aware of the patient's existing aspiration risk, and choose to allow pleasure feeds for patient comfort, while accepting the risks of aspiration but attempting to minimize them with a dysphagia diet.   -All questions answered at bedside.

## 2018-03-13 NOTE — PROGRESS NOTE ADULT - PROBLEM SELECTOR PLAN 2
Pt with advanced dementia, alert oriented x 1. Ambulatory with 2 assist.  He does not appear altered at this time

## 2018-03-13 NOTE — H&P ADULT - NSHPLABSRESULTS_GEN_ALL_CORE
Labs personally reviewed No leukocytosis, hgb 6.5 (prev 8.2 on last hospitalization), coags wnl, ZACKERY.  No labs or imaging available from ER for personal review.

## 2018-03-13 NOTE — H&P ADULT - ASSESSMENT
89M PMH AFib, dementia, hypothyroidism, HLD, CKD IV, neurogenic bladder c/b urinary retention s/p suprapubic catheter, anemia with prev GIB 2/2 H pylori gastritis, seizure disorder on keppra, hypothyroid,  presents for outpatient labs showing acute on chronic anemia

## 2018-03-13 NOTE — ADVANCED PRACTICE NURSE CONSULT - REASON FOR CONSULT
Requested by nursing staff to assess skin status on left heel.  Patient is a 89M PMH AFib, dementia, hypothyroidism, HLD, CKD IV, neurogenic bladder c/b urinary retention s/p suprapubic catheter, anemia with prev GIB 2/2 H pylori gastritis, seizure disorder on keppra, hypothyroid,  presents for outpatient labs showing acute on chronic anemia. Call placed to Daughter Rosas for collateral.  Pt is axox1-2 at baseline. Pt was d/c to rehab from prior hospitalization, and went home on 2/19.  Since then, he rarely complaints about anything; today he was pretty much USOH, but had routine labwork that showed an acute on chronic anemia (hgb ~ 6).  Pt has had anemia in the past that was extensively worked up with negative egd/colo; plan per family is to avoid further invasive workup and transfuse as needed. Pt denies abd pain or overt bleeding; pt was trace guaiac + in ER.  Pt has been on dysphagia diet since last hospitalization where he failed speech/swallow eval; this was confirmed on outpt MBS, and family again chose pleasure feeds.     Pt was admitted in Jan-Feb/2018 for seizures with prolonged hospital course.  Intubated for seizures, in [place] had AED titrated, and course c/b bradycardia/bifasc block, initially attributed to sepsis and confired after urine cultures returned +VRE, s/p course of cefepime switched to ampicillin.  Post extubation course c/b dysphagia; failed speech and swallow, and family chose pleasure feeds. Course finally further c/b persistent hypothermia, which after negative infectious/neuro w/u, was deemed pt's baseline.   Patient is currently on Palliative Care Unit.    VS: 98.3, 98, 108/63, 18, 96% RA. No leukocytosis, hgb 6.5 (prev 8.2 on last hospitalization), coags wnl, ZACKERY. Ordered for 1 uprbc in ER.

## 2018-03-13 NOTE — ADVANCED PRACTICE NURSE CONSULT - RECOMMEDATIONS
1.  Cavilon to left heel daily and prn  2.  Elevate heels off bed and mattress when in bed  Patient may be a potential discharge for tomorrow.

## 2018-03-13 NOTE — H&P ADULT - PROBLEM SELECTOR PLAN 4
-anya since last admit one month ago; hold lasix and give 50 cc x 10 hours with reassessment of SCr in am

## 2018-03-13 NOTE — H&P ADULT - PROBLEM SELECTOR PLAN 3
-has been chronic since at least January, managed with lasix 20 qd  -now with anya as below; appears at baseline resp status without distress; will hold lasix in setting of anya and give small volume fluid resuccitation

## 2018-03-13 NOTE — PROGRESS NOTE ADULT - PROBLEM SELECTOR PLAN 3
PPSV3 40%; assist with all ADLs while in hospital.  Will call and speak with daughter to discuss safe discharge plan.  He is on House Calls PPSV3 40%; assist with all ADLs while in hospital. He is on House Calls

## 2018-03-13 NOTE — PROGRESS NOTE ADULT - ATTENDING COMMENTS
Pt seen with team. Called House Calls Doctor, dr Rocha. Pt with h/o Gastric ulcer  with GI Bleed. Pt transfused.   Pt to go home post transfusion. Family declining workup. Focused on comfort and discharge home.  Possible [facility] hospice referral by house calls upon discharge.

## 2018-03-13 NOTE — H&P ADULT - NSHPPHYSICALEXAM_GEN_ALL_CORE
PHYSICAL EXAM:  GENERAL: NAD, well-groomed, well-developed  HEAD:  Atraumatic, Normocephalic  EYES: EOMI, PERRLA, conjunctiva and sclera clear  ENMT: No tonsillar erythema, exudates, or enlargement; dry mucous membranes, poor dentition, No lesions  NECK: Supple, No JVD, Normal thyroid  CHEST/LUNG: dec bs at bases, no overt wheezing  HEART: Regular rate and rhythm; +loud systolic murmur.  trace Le edema b/l  ABDOMEN: Soft, Nontender, Nondistended; Bowel sounds present no rebound/guarding  EXTREMITIES:  2+ Peripheral Pulses, No clubbing, cyanosis  LYMPH: No lymphadenopathy noted  SKIN: No rashes or lesions  NERVOUS SYSTEM:  Alert & Oriented X1, [place]; Motor Strength 5/5 B/L upper and lower ext

## 2018-03-13 NOTE — ED ADULT NURSE REASSESSMENT NOTE - NS ED NURSE REASSESS COMMENT FT1
ANM states pt ok to go to floor. Pt going to Floor with transporter. Pt put on one to one when family left due to pt actively sitting up in bed and trying to take off gown. Pt in NAD. MD put in admission orders.

## 2018-03-14 ENCOUNTER — TRANSCRIPTION ENCOUNTER (OUTPATIENT)
Age: 83
End: 2018-03-14

## 2018-03-14 VITALS
RESPIRATION RATE: 16 BRPM | HEART RATE: 63 BPM | SYSTOLIC BLOOD PRESSURE: 117 MMHG | OXYGEN SATURATION: 96 % | DIASTOLIC BLOOD PRESSURE: 59 MMHG | TEMPERATURE: 98 F

## 2018-03-14 LAB
HCT VFR BLD CALC: 25.5 % — LOW (ref 39–50)
HGB BLD-MCNC: 8 G/DL — LOW (ref 13–17)
MCHC RBC-ENTMCNC: 26.8 PG — LOW (ref 27–34)
MCHC RBC-ENTMCNC: 31.4 GM/DL — LOW (ref 32–36)
MCV RBC AUTO: 85.3 FL — SIGNIFICANT CHANGE UP (ref 80–100)
NRBC # BLD: 0 /100 WBCS — SIGNIFICANT CHANGE UP (ref 0–0)
PLATELET # BLD AUTO: 356 K/UL — SIGNIFICANT CHANGE UP (ref 150–400)
RBC # BLD: 2.99 M/UL — LOW (ref 4.2–5.8)
RBC # FLD: 15.9 % — HIGH (ref 10.3–14.5)
WBC # BLD: 6.59 K/UL — SIGNIFICANT CHANGE UP (ref 3.8–10.5)
WBC # FLD AUTO: 6.59 K/UL — SIGNIFICANT CHANGE UP (ref 3.8–10.5)

## 2018-03-14 PROCEDURE — 83735 ASSAY OF MAGNESIUM: CPT

## 2018-03-14 PROCEDURE — 86850 RBC ANTIBODY SCREEN: CPT

## 2018-03-14 PROCEDURE — 85027 COMPLETE CBC AUTOMATED: CPT

## 2018-03-14 PROCEDURE — 84100 ASSAY OF PHOSPHORUS: CPT

## 2018-03-14 PROCEDURE — 86923 COMPATIBILITY TEST ELECTRIC: CPT

## 2018-03-14 PROCEDURE — 85610 PROTHROMBIN TIME: CPT

## 2018-03-14 PROCEDURE — 80053 COMPREHEN METABOLIC PANEL: CPT

## 2018-03-14 PROCEDURE — 86900 BLOOD TYPING SEROLOGIC ABO: CPT

## 2018-03-14 PROCEDURE — 86901 BLOOD TYPING SEROLOGIC RH(D): CPT

## 2018-03-14 PROCEDURE — P9016: CPT

## 2018-03-14 PROCEDURE — 36430 TRANSFUSION BLD/BLD COMPNT: CPT

## 2018-03-14 PROCEDURE — 85730 THROMBOPLASTIN TIME PARTIAL: CPT

## 2018-03-14 PROCEDURE — 99285 EMERGENCY DEPT VISIT HI MDM: CPT

## 2018-03-14 RX ORDER — LEVOTHYROXINE SODIUM 125 MCG
1 TABLET ORAL
Qty: 0 | Refills: 0 | DISCHARGE
Start: 2018-03-14

## 2018-03-14 RX ADMIN — PANTOPRAZOLE SODIUM 40 MILLIGRAM(S): 20 TABLET, DELAYED RELEASE ORAL at 12:13

## 2018-03-14 RX ADMIN — LEVETIRACETAM 500 MILLIGRAM(S): 250 TABLET, FILM COATED ORAL at 05:25

## 2018-03-14 RX ADMIN — Medication 150 MICROGRAM(S): at 05:25

## 2018-03-14 NOTE — DISCHARGE NOTE ADULT - MEDICATION SUMMARY - MEDICATIONS TO TAKE
I will START or STAY ON the medications listed below when I get home from the hospital:    Aspirin Enteric Coated 81 mg oral delayed release tablet  -- 1 tab(s) by mouth once a day  -- Indication: For Severe aortic stenosis    levETIRAcetam 500 mg oral tablet  -- 1 tab(s) by mouth 2 times a day  -- Indication: For Seizure disorder    Lasix 20 mg oral tablet  -- 1 tab(s) by mouth once a day  -- Indication: For Pleural effusion    levothyroxine 150 mcg (0.15 mg) oral tablet  -- 1 tab(s) by mouth once a day  -- Indication: For Hypothyroidism, unspecified type

## 2018-03-14 NOTE — DISCHARGE NOTE ADULT - PLAN OF CARE
To go home with house calls services and community referral to hospice Pt was transfused 2 units PRBCs, Hgb improved, he was asymptomatic and stable for discharge home with his family

## 2018-03-14 NOTE — DISCHARGE NOTE ADULT - PATIENT PORTAL LINK FT
You can access the [facility], offered by [facility], by registering with the following website: [email or web address]

## 2018-03-14 NOTE — DISCHARGE NOTE ADULT - CARE PLAN
Goal:	To go home with house calls services and community referral to hospice  Assessment and plan of treatment:	Pt was transfused 2 units PRBCs, Hgb improved, he was asymptomatic and stable for discharge home with his family Principal Discharge DX:	Anemia  Goal:	To go home with house calls services and community referral to hospice  Assessment and plan of treatment:	Pt was transfused 2 units PRBCs, Hgb improved, he was asymptomatic and stable for discharge home with his family  Secondary Diagnosis:	Debility  Secondary Diagnosis:	Hypothyroid  Secondary Diagnosis:	Neurogenic bladder

## 2018-03-14 NOTE — DISCHARGE NOTE ADULT - HOSPITAL COURSE
89 y/o male hx of dementia, seizures on keppra, aspirin, lasix for pleural effusions, and synthroid, p/w low h/h. Per daughter, home house aid took bloods today and hemoglobin was 5 - sent to ED for direct admission to palliative care unit. Patient is A+Ox1 to place. Unable to identify his daughter. Denies any pain. Daughter unsure if any bloody stools. Patient has required multiple transfusions in the past.     Patient on house calls program and admitted to PCU for blood transfusion and symptom management.  He was given 2 units PRBC and hemoglobin improved to 8.0.  He denied any chest pain or SOB.  He was started on home medications and had a good appetite while in the PCU.  Patient will be discharged home this afternoon- family and home aide will be present on arrival via ambulette.  Palliative SW arranging transportation.  Patient's daughter has been updated - she is interested in a community hospice referral if they will allow blood transfusions.

## 2018-03-15 ENCOUNTER — APPOINTMENT (OUTPATIENT)
Dept: HOME HEALTH SERVICES | Facility: HOME HEALTH | Age: 83
End: 2018-03-15
Payer: MEDICARE

## 2018-03-15 VITALS
TEMPERATURE: 97.2 F | OXYGEN SATURATION: 98 % | DIASTOLIC BLOOD PRESSURE: 60 MMHG | SYSTOLIC BLOOD PRESSURE: 95 MMHG | RESPIRATION RATE: 16 BRPM | HEART RATE: 52 BPM

## 2018-03-15 VITALS — HEIGHT: 71 IN | WEIGHT: 160 LBS | BODY MASS INDEX: 22.4 KG/M2

## 2018-03-15 DIAGNOSIS — Z86.79 PERSONAL HISTORY OF OTHER DISEASES OF THE CIRCULATORY SYSTEM: ICD-10-CM

## 2018-03-15 PROCEDURE — 99496 TRANSJ CARE MGMT HIGH F2F 7D: CPT

## 2018-03-15 PROCEDURE — 99497 ADVNCD CARE PLAN 30 MIN: CPT

## 2018-03-17 ENCOUNTER — CLINICAL ADVICE (OUTPATIENT)
Age: 83
End: 2018-03-17

## 2018-03-21 LAB
ALBUMIN SERPL ELPH-MCNC: 3.1 G/DL
ALP BLD-CCNC: 91 U/L
ALT SERPL-CCNC: 5 U/L
ANION GAP SERPL CALC-SCNC: 15 MMOL/L
AST SERPL-CCNC: 12 U/L
BASOPHILS # BLD AUTO: 0.05 K/UL
BASOPHILS NFR BLD AUTO: 1 %
BILIRUB SERPL-MCNC: 0.5 MG/DL
BUN SERPL-MCNC: 39 MG/DL
CALCIUM SERPL-MCNC: 9.1 MG/DL
CHLORIDE SERPL-SCNC: 98 MMOL/L
CO2 SERPL-SCNC: 24 MMOL/L
CREAT SERPL-MCNC: 2.61 MG/DL
EOSINOPHIL # BLD AUTO: 0.09 K/UL
EOSINOPHIL NFR BLD AUTO: 1.7 %
FERRITIN SERPL-MCNC: 114 NG/ML
HCT VFR BLD CALC: 21.7 %
HGB BLD-MCNC: 7 G/DL
IMM GRANULOCYTES NFR BLD AUTO: 0.2 %
IRON SERPL-MCNC: 15 UG/DL
LYMPHOCYTES # BLD AUTO: 1.25 K/UL
LYMPHOCYTES NFR BLD AUTO: 24.3 %
MAN DIFF?: NORMAL
MCHC RBC-ENTMCNC: 26.7 PG
MCHC RBC-ENTMCNC: 32.3 GM/DL
MCV RBC AUTO: 82.8 FL
MONOCYTES # BLD AUTO: 0.5 K/UL
MONOCYTES NFR BLD AUTO: 9.7 %
NEUTROPHILS # BLD AUTO: 3.25 K/UL
NEUTROPHILS NFR BLD AUTO: 63.1 %
PLATELET # BLD AUTO: 268 K/UL
POTASSIUM SERPL-SCNC: 5.1 MMOL/L
PROT SERPL-MCNC: 6.3 G/DL
RBC # BLD: 2.62 M/UL
RBC # FLD: 17 %
SODIUM SERPL-SCNC: 137 MMOL/L
WBC # FLD AUTO: 5.15 K/UL

## 2018-03-22 ENCOUNTER — LABORATORY RESULT (OUTPATIENT)
Age: 83
End: 2018-03-22

## 2018-03-23 ENCOUNTER — MOBILE ON CALL (OUTPATIENT)
Age: 83
End: 2018-03-23

## 2018-03-23 ENCOUNTER — CLINICAL ADVICE (OUTPATIENT)
Age: 83
End: 2018-03-23

## 2018-03-24 ENCOUNTER — CLINICAL ADVICE (OUTPATIENT)
Age: 83
End: 2018-03-24

## 2018-03-25 ENCOUNTER — CLINICAL ADVICE (OUTPATIENT)
Age: 83
End: 2018-03-25

## 2018-03-25 ENCOUNTER — INPATIENT (INPATIENT)
Facility: HOSPITAL | Age: 83
LOS: 0 days | Discharge: HOME | DRG: 812 | End: 2018-03-26
Attending: INTERNAL MEDICINE | Admitting: INTERNAL MEDICINE
Payer: MEDICARE

## 2018-03-25 VITALS
OXYGEN SATURATION: 100 % | RESPIRATION RATE: 16 BRPM | DIASTOLIC BLOOD PRESSURE: 55 MMHG | TEMPERATURE: 98 F | HEART RATE: 66 BPM | WEIGHT: 163.8 LBS | SYSTOLIC BLOOD PRESSURE: 97 MMHG | HEIGHT: 72 IN

## 2018-03-25 DIAGNOSIS — F03.90 UNSPECIFIED DEMENTIA WITHOUT BEHAVIORAL DISTURBANCE: ICD-10-CM

## 2018-03-25 DIAGNOSIS — D64.9 ANEMIA, UNSPECIFIED: ICD-10-CM

## 2018-03-25 DIAGNOSIS — N31.9 NEUROMUSCULAR DYSFUNCTION OF BLADDER, UNSPECIFIED: ICD-10-CM

## 2018-03-25 DIAGNOSIS — Z93.59 OTHER CYSTOSTOMY STATUS: ICD-10-CM

## 2018-03-25 DIAGNOSIS — I50.9 HEART FAILURE, UNSPECIFIED: ICD-10-CM

## 2018-03-25 DIAGNOSIS — Z51.5 ENCOUNTER FOR PALLIATIVE CARE: ICD-10-CM

## 2018-03-25 DIAGNOSIS — E03.9 HYPOTHYROIDISM, UNSPECIFIED: ICD-10-CM

## 2018-03-25 LAB
ANION GAP SERPL CALC-SCNC: 13 MMOL/L — SIGNIFICANT CHANGE UP (ref 5–17)
BLD GP AB SCN SERPL QL: NEGATIVE — SIGNIFICANT CHANGE UP
BUN SERPL-MCNC: 38 MG/DL — HIGH (ref 7–23)
CALCIUM SERPL-MCNC: 9.4 MG/DL — SIGNIFICANT CHANGE UP (ref 8.4–10.5)
CHLORIDE SERPL-SCNC: 101 MMOL/L — SIGNIFICANT CHANGE UP (ref 96–108)
CO2 SERPL-SCNC: 24 MMOL/L — SIGNIFICANT CHANGE UP (ref 22–31)
CREAT SERPL-MCNC: 3.2 MG/DL — HIGH (ref 0.5–1.3)
GLUCOSE SERPL-MCNC: 100 MG/DL — HIGH (ref 70–99)
HCT VFR BLD CALC: 23.1 % — LOW (ref 39–50)
HGB BLD-MCNC: 7.2 G/DL — LOW (ref 13–17)
MCHC RBC-ENTMCNC: 27 PG — SIGNIFICANT CHANGE UP (ref 27–34)
MCHC RBC-ENTMCNC: 31.2 GM/DL — LOW (ref 32–36)
MCV RBC AUTO: 86.3 FL — SIGNIFICANT CHANGE UP (ref 80–100)
PLATELET # BLD AUTO: 142 K/UL — LOW (ref 150–400)
POTASSIUM SERPL-MCNC: 5.1 MMOL/L — SIGNIFICANT CHANGE UP (ref 3.5–5.3)
POTASSIUM SERPL-SCNC: 5.1 MMOL/L — SIGNIFICANT CHANGE UP (ref 3.5–5.3)
RBC # BLD: 2.67 M/UL — LOW (ref 4.2–5.8)
RBC # FLD: 15.8 % — HIGH (ref 10.3–14.5)
RH IG SCN BLD-IMP: POSITIVE — SIGNIFICANT CHANGE UP
SODIUM SERPL-SCNC: 138 MMOL/L — SIGNIFICANT CHANGE UP (ref 135–145)
WBC # BLD: 4.7 K/UL — SIGNIFICANT CHANGE UP (ref 3.8–10.5)
WBC # FLD AUTO: 4.7 K/UL — SIGNIFICANT CHANGE UP (ref 3.8–10.5)

## 2018-03-25 PROCEDURE — 99223 1ST HOSP IP/OBS HIGH 75: CPT | Mod: GC

## 2018-03-25 RX ORDER — FUROSEMIDE 40 MG
20 TABLET ORAL DAILY
Qty: 0 | Refills: 0 | Status: DISCONTINUED | OUTPATIENT
Start: 2018-03-25 | End: 2018-03-26

## 2018-03-25 RX ORDER — LEVOTHYROXINE SODIUM 125 MCG
150 TABLET ORAL DAILY
Qty: 0 | Refills: 0 | Status: DISCONTINUED | OUTPATIENT
Start: 2018-03-25 | End: 2018-03-26

## 2018-03-25 RX ORDER — ASPIRIN/CALCIUM CARB/MAGNESIUM 324 MG
81 TABLET ORAL DAILY
Qty: 0 | Refills: 0 | Status: DISCONTINUED | OUTPATIENT
Start: 2018-03-25 | End: 2018-03-26

## 2018-03-25 RX ORDER — LEVETIRACETAM 250 MG/1
250 TABLET, FILM COATED ORAL
Qty: 0 | Refills: 0 | Status: DISCONTINUED | OUTPATIENT
Start: 2018-03-25 | End: 2018-03-26

## 2018-03-25 RX ORDER — HALOPERIDOL DECANOATE 100 MG/ML
0.5 INJECTION INTRAMUSCULAR EVERY 4 HOURS
Qty: 0 | Refills: 0 | Status: DISCONTINUED | OUTPATIENT
Start: 2018-03-25 | End: 2018-03-26

## 2018-03-25 RX ADMIN — LEVETIRACETAM 250 MILLIGRAM(S): 250 TABLET, FILM COATED ORAL at 17:16

## 2018-03-25 NOTE — H&P ADULT - NSHPSOCIALHISTORY_GEN_ALL_CORE
Patient lives at home with wife, they have 24hr aid that cares mainly for patient's wife but cares for him as well. Daughter and son in law live in the 2nd floor with easy access. Patient has VNS services and house calls visiting MD.

## 2018-03-25 NOTE — H&P ADULT - NSHPPOACENTRALVENOUSCATHETER_GEN_ALL_CORE
no breath sounds equal/normal/airway patent/no rales/respirations non-labored/clear to auscultation bilaterally/no rhonchi/no wheezes/good air movement/no subcutaneous emphysema/no chest wall tenderness/no intercostal retractions

## 2018-03-25 NOTE — H&P ADULT - PROBLEM SELECTOR PLAN 5
Well controlled. Echo done on 1/2018 showing severe AS with dilation of LA. Normal EF. Will resume home lasix 20mg daily.

## 2018-03-25 NOTE — H&P ADULT - ATTENDING COMMENTS
Patient seen and examined with Dr. Shaw.  Patient's HCP/daughter does not wish for any anemia work up. Will transfuse 1 U PRBC and pt will establish care with hematologist for venofer treatments.

## 2018-03-25 NOTE — H&P ADULT - NSHPLABSRESULTS_GEN_ALL_CORE
LABS:                        7.2    4.7   )-----------( 142      ( 25 Mar 2018 15:23 )             23.1     03-25    138  |  101  |  38<H>  ----------------------------<  100<H>  5.1   |  24  |  3.20<H>    Ca    9.4      25 Mar 2018 15:23    Radiology: No new imaging available.

## 2018-03-25 NOTE — H&P ADULT - NSHPOUTPATIENTPROVIDERS_GEN_ALL_CORE
PMD: Dr. Francisco  Hematologist: Dr. Adam Hernandez (#[phone]) PMD: Dr. Rocha ([facility])  Hematologist: Dr. Adam Hernandez (#[phone])

## 2018-03-25 NOTE — H&P ADULT - PROBLEM SELECTOR PLAN 2
S/p suprapubic cath for urinary obstruction. Draining clear urine, just completed abx course of bactrim. No leukocytosis no fever, will not start abx.

## 2018-03-25 NOTE — H&P ADULT - NSHPREVIEWOFSYSTEMS_GEN_ALL_CORE
PRESENT SYMPTOMS:  Source: [ x] Patient   [ ] Family   [ ] Team     Pain:                        [ x] No [ ] Yes             [ ] Mild [ ] Moderate [ ] Severe    Dyspnea:                [ ] No [ ] Yes             [x ] Mild [ ] Moderate [ ] Severe    Anxiety:                  [x ] No [ ] Yes             [ ] Mild [ ] Moderate [ ] Severe    Fatigue:                  [ ] No [x ] Yes             [ x] Mild [ ] Moderate [ ] Severe    Nausea:                  [ x] No [ ] Yes             [ ] Mild [ ] Moderate [ ] Severe    Loss of appetite:   [x ] No [ ] Yes             [ ] Mild [ ] Moderate [ ] Severe    Constipation:        [x ] No [ ] Yes             [ ] Mild [ ] Moderate [ ] Severe    Other Symptoms:  [x ] All other review of systems negative   [ ] Unable to obtain due to poor mentation

## 2018-03-25 NOTE — H&P ADULT - NSHPPHYSICALEXAM_GEN_ALL_CORE
Karnofsky Performance Score/Palliative Performance Status Version 2:  50%    General:  [x ] Alert  [ ] Oriented x 1-2  [ ] Lethargic  [ ] Agitated   [ ] Cachexia   [ ] Unarousable  [ ] Verbal  [ ] Non-Verbal    HEENT:  [x ] Normal   [ ] Dry mouth   [ ] ET Tube    [ ] Trach  [ ] Oral lesions    Lungs:   [x ] Clear [ ] Tachypnea  [ ] Audible excessive secretions   [ ] Rhonchi        [ ] Right [ ] Left [ ] Bilateral  [ ] Crackles        [ ] Right [ ] Left [ ] Bilateral  [ ] Wheezing     [ ] Right [ ] Left [ ] Bilateral  [ ] Respiratory failure [ ] Acute [ ] Chronic [ ] Hypoxemic [ ] Hypercarbic [ ] Other    Cardiovascular:   [x ] Regular [ ] Irregular [ ] Tachycardia   [ ] Bradycardia  [ x] Murmur [ ] Other  [ ] Shock [ ] Septic [ ] Hypovolemic [ ] Neurogenic [ ] Cardiogenic   [ ] Vasopressors [ ] Inotrophs    Abdomen:   [x ] Soft  [ ] Distended   [x ] +BS  [ ] Non tender [ ] Tender  [ ]PEG   [ ]OGT/ NGT  [ x] Other --> suprapubic cath in place, draining clear urine.    Genitourinary:  [ ] Normal [ ] Incontinent   [ ] Oliguria/Anuria   [ ] Donis  [x ] Other: Suprapubic cath in place.    Musculoskeletal:    [ ] Normal   [ ] Weakness  [x ] Edema 1+  [ ] Bedbound/Wheelchair bound [x ]  Ambulatory [x ] with assitance [ ] without assistance [ ] Functional quadriplegia    Neurological: [ ] No focal deficits  [x ] Cognitive impairment  [x ] Dysphagia [ ] Dysarthria [ ] Paresis [ ] Other   [ ] Brain compression  [ ] Cerebral edema [ ] Encephalopathy    Skin: Pale [x ] Normal   [ ] Ulcer(s) type [ ] Diabetic [ ] Pressure [ ] Other   Stage        POA [ ]  Yes [ ]  No       [ ] Rash

## 2018-03-25 NOTE — H&P ADULT - PROBLEM SELECTOR PLAN 7
Patient is DNR with a trial of intubation (NOT DNI), has HCP assigned - daughter, main goal for patient is treating distressing symptoms

## 2018-03-25 NOTE — H&P ADULT - HISTORY OF PRESENT ILLNESS
89 yo M with PMH of AFib, Dementia, Hypothyroidism, HLD, CKD IV, Neurogenic bladder c/b urinary retention s/p suprapubic catheter, anemia with prev GIB 2/2 H pylori gastritis, seizure disorder on keppra, hypothyroidism. Presents for outpatient labs showing acute on chronic anemia. Pt is axox1-2 at baseline. Patient had routine labwork that showed an acute on chronic anemia. Pt has had anemia in the past that was extensively worked up with negative egd/colo; plan per family is to avoid further invasive workup and transfuse as needed. Pt denies abd pain or overt bleeding. Pt has been on dysphagia diet since last hospitalization where he failed speech/swallow eval; this was confirmed on outpt MBS, and family again chose pleasure feeds. Has h/o recent hospitalization to MICU, he presented for seizures and was Intubated in [place] had AED titrated, and course c/b bradycardia/bifasic block, initially attributed to sepsis and confirmed after urine cultures returned +VRE, s/p course of cefepime switched to ampicillin. Post extubation course c/b dysphagia; failed speech and swallow, and family chose pleasure feeds. Course finally further c/b persistent hypothermia, which after negative infectious/neuro w/u, was deemed pt's baseline.   Being transferred from the community (on house calls being followed by Dr. Morel) for transfusion, plan after this transfusion is to start venofer. 89 yo M with PMH of AFib, Dementia, Hypothyroidism, HLD, CKD IV, Neurogenic bladder c/b urinary retention s/p suprapubic catheter, anemia with prev GIB 2/2 H pylori gastritis, seizure disorder on keppra, hypothyroidism. Presents for outpatient labs showing acute on chronic anemia. Pt is axox1-2 at baseline. Patient had routine labwork that showed an acute on chronic anemia. Pt has had anemia in the past that was extensively worked up with negative egd/colo; plan per family is to avoid further invasive workup and transfuse as needed. Pt denies abd pain or overt bleeding.   Pt has been on dysphagia diet since last hospitalization where he failed speech/swallow eval; this was confirmed on outpt MBS, and family again chose pleasure feeds. Has h/o recent hospitalization to MICU, he presented for seizures and was Intubated in [place] had AED titrated, and course c/b bradycardia/bifasic block, initially attributed to sepsis and confirmed after urine cultures returned +VRE, s/p course of cefepime switched to ampicillin. Course finally further c/b persistent hypothermia, which after negative infectious/neuro w/u, was deemed pt's baseline.   Being transferred from the community (on house calls being followed by Dr. Morel) for transfusion, plan after this transfusion is to start venofer. 89 yo M with PMH of AFib, Dementia, Hypothyroidism, HLD, CKD IV, Neurogenic bladder c/b urinary retention s/p suprapubic catheter, anemia with prev GIB 2/2 H pylori gastritis, seizure disorder on keppra, hypothyroidism. Presents for outpatient labs showing acute on chronic anemia. Pt is AxOx1-2 at baseline. Patient had routine labwork that showed an acute on chronic anemia (Hb of 6.7). Pt has had anemia in the past that was extensively worked up with negative egd/colo; plan per family is to avoid further invasive workup and transfuse as needed. Pt denies abd pain or overt bleeding.   Pt has been on dysphagia diet since last hospitalization where he failed speech/swallow eval; this was confirmed on outpt MBS, and family again chose pleasure feeds. Has h/o recent hospitalization to MICU, he presented for seizures and was Intubated in Mic[place] had AED titrated, and course c/b bradycardia/bifasic block, initially attributed to sepsis and confirmed after urine cultures returned +VRE, s/p course of cefepime switched to ampicillin. Course finally further c/b persistent hypothermia, which after negative infectious/neuro w/u, was deemed pt's baseline.   Being transferred from the community (on house calls being followed by Dr. Morel) for transfusion.  Per pt's daughter plan after this transfusion is to start [place] with Dr. Adam Hernandez.

## 2018-03-25 NOTE — H&P ADULT - PROBLEM SELECTOR PLAN 3
AOx1-2, able to answer yes/no questions. Follows commands. Eating dysphagia diet with honey thickened liquids. Will have haldol 0.5mg IV PRN for agitation. Encourage re-orienting and keeping night/day cycle.

## 2018-03-25 NOTE — H&P ADULT - PROBLEM SELECTOR PLAN 1
Patient with h/o chronic anemia requiring transfusions. Referred from outpatient primary physician for transfusion. Hb 7.2, decreased from baseline. Will transfuse 1PRBC, will f/u with outpatient hematologist Dr. Hernandez Patient with h/o chronic anemia requiring transfusions. Referred from outpatient primary physician for transfusion. Hb 7.2, decreased from baseline. Will transfuse 1 unit PRBC, will f/u with outpatient hematologist Dr. Hernandez

## 2018-03-25 NOTE — H&P ADULT - ASSESSMENT
89 yo M with PMH of AFib, Dementia, Hypothyroidism, HLD, CKD IV, Neurogenic bladder c/b urinary retention s/p suprapubic catheter, anemia with prev GIB 2/2 H pylori gastritis, seizure disorder on keppra, hypothyroidism. Presents for outpatient labs showing acute on chronic anemia. Being transferred from the community (on house calls being followed by Dr. Morel) for transfusion, plan after this transfusion is to start venofer.

## 2018-03-26 ENCOUNTER — APPOINTMENT (OUTPATIENT)
Dept: HOME HEALTH SERVICES | Facility: HOME HEALTH | Age: 83
End: 2018-03-26

## 2018-03-26 ENCOUNTER — TRANSCRIPTION ENCOUNTER (OUTPATIENT)
Age: 83
End: 2018-03-26

## 2018-03-26 VITALS
TEMPERATURE: 98 F | RESPIRATION RATE: 18 BRPM | DIASTOLIC BLOOD PRESSURE: 64 MMHG | SYSTOLIC BLOOD PRESSURE: 112 MMHG | HEART RATE: 62 BPM | OXYGEN SATURATION: 100 %

## 2018-03-26 DIAGNOSIS — D64.9 ANEMIA, UNSPECIFIED: ICD-10-CM

## 2018-03-26 DIAGNOSIS — F03.90 UNSPECIFIED DEMENTIA WITHOUT BEHAVIORAL DISTURBANCE: ICD-10-CM

## 2018-03-26 DIAGNOSIS — G40.909 EPILEPSY, UNSPECIFIED, NOT INTRACTABLE, WITHOUT STATUS EPILEPTICUS: ICD-10-CM

## 2018-03-26 LAB
BASOPHILS # BLD AUTO: 0.05 K/UL
BASOPHILS NFR BLD AUTO: 0.9 %
EOSINOPHIL # BLD AUTO: 0.08 K/UL
EOSINOPHIL NFR BLD AUTO: 1.4 %
HCT VFR BLD CALC: 21.4 %
HCT VFR BLD CALC: 26.6 % — LOW (ref 39–50)
HGB BLD-MCNC: 6.7 G/DL
HGB BLD-MCNC: 8.4 G/DL — LOW (ref 13–17)
IMM GRANULOCYTES NFR BLD AUTO: 0.7 %
LYMPHOCYTES # BLD AUTO: 1.38 K/UL
LYMPHOCYTES NFR BLD AUTO: 23.8 %
MAN DIFF?: NORMAL
MCHC RBC-ENTMCNC: 26 PG
MCHC RBC-ENTMCNC: 27 PG — SIGNIFICANT CHANGE UP (ref 27–34)
MCHC RBC-ENTMCNC: 31.3 GM/DL
MCHC RBC-ENTMCNC: 31.6 GM/DL — LOW (ref 32–36)
MCV RBC AUTO: 82.9 FL
MCV RBC AUTO: 85.6 FL — SIGNIFICANT CHANGE UP (ref 80–100)
MONOCYTES # BLD AUTO: 0.87 K/UL
MONOCYTES NFR BLD AUTO: 15 %
NEUTROPHILS # BLD AUTO: 3.39 K/UL
NEUTROPHILS NFR BLD AUTO: 58.2 %
PLATELET # BLD AUTO: 149 K/UL — LOW (ref 150–400)
PLATELET # BLD AUTO: 189 K/UL
RBC # BLD: 2.58 M/UL
RBC # BLD: 3.11 M/UL — LOW (ref 4.2–5.8)
RBC # FLD: 15.6 % — HIGH (ref 10.3–14.5)
RBC # FLD: 17.3 %
WBC # BLD: 4.9 K/UL — SIGNIFICANT CHANGE UP (ref 3.8–10.5)
WBC # FLD AUTO: 4.9 K/UL — SIGNIFICANT CHANGE UP (ref 3.8–10.5)
WBC # FLD AUTO: 5.81 K/UL

## 2018-03-26 PROCEDURE — 99233 SBSQ HOSP IP/OBS HIGH 50: CPT | Mod: GC

## 2018-03-26 PROCEDURE — 80048 BASIC METABOLIC PNL TOTAL CA: CPT

## 2018-03-26 PROCEDURE — 86901 BLOOD TYPING SEROLOGIC RH(D): CPT

## 2018-03-26 PROCEDURE — 86850 RBC ANTIBODY SCREEN: CPT

## 2018-03-26 PROCEDURE — 36430 TRANSFUSION BLD/BLD COMPNT: CPT

## 2018-03-26 PROCEDURE — P9016: CPT

## 2018-03-26 PROCEDURE — 86900 BLOOD TYPING SEROLOGIC ABO: CPT

## 2018-03-26 PROCEDURE — 86923 COMPATIBILITY TEST ELECTRIC: CPT

## 2018-03-26 PROCEDURE — 85027 COMPLETE CBC AUTOMATED: CPT

## 2018-03-26 RX ORDER — IRON SUCROSE 20 MG/ML
200 INJECTION, SOLUTION INTRAVENOUS ONCE
Qty: 0 | Refills: 0 | Status: COMPLETED | OUTPATIENT
Start: 2018-03-26 | End: 2018-03-26

## 2018-03-26 RX ADMIN — Medication 20 MILLIGRAM(S): at 06:15

## 2018-03-26 RX ADMIN — IRON SUCROSE 110 MILLIGRAM(S): 20 INJECTION, SOLUTION INTRAVENOUS at 12:39

## 2018-03-26 RX ADMIN — Medication 150 MICROGRAM(S): at 06:15

## 2018-03-26 RX ADMIN — Medication 81 MILLIGRAM(S): at 12:39

## 2018-03-26 RX ADMIN — LEVETIRACETAM 250 MILLIGRAM(S): 250 TABLET, FILM COATED ORAL at 06:15

## 2018-03-26 NOTE — DISCHARGE NOTE ADULT - HOSPITAL COURSE
89 yo M with PMH of AFib, Dementia, Hypothyroidism, HLD, CKD IV, Neurogenic bladder c/b urinary retention s/p suprapubic catheter, anemia with prev GIB 2/2 H pylori gastritis, seizure disorder on keppra, hypothyroidism. Presents for outpatient labs showing acute on chronic anemia. Pt is AxOx1-2 at baseline. Patient had routine labwork that showed an acute on chronic anemia (Hb of 6.7). Pt has had anemia in the past that was extensively worked up with negative egd/colo; plan per family is to avoid further invasive workup and transfuse as needed. Pt denies abd pain or overt bleeding.   Pt has been on dysphagia diet since last hospitalization where he failed speech/swallow eval; this was confirmed on outpt MBS, and family again chose pleasure feeds. Has h/o recent hospitalization to MICU, he presented for seizures and was Intubated in [facility] had AED titrated, and course c/b bradycardia/bifasic block, initially attributed to sepsis and confirmed after urine cultures returned +VRE, s/p course of cefepime switched to ampicillin. Course finally further c/b persistent hypothermia, which after negative infectious/neuro w/u, was deemed pt's baseline.   Being transferred from the community (on house calls being followed by Dr. Morel) for transfusion.  Per pt's daughter plan after this transfusion is to start Venofer with Dr. Adam Hernandez.    Pt was admitted to the PCU for symptom management and was transfused with 1 unit of PRBCs. Pt was also given 1 dose of Venofer. Pt continued to do well in the PCU, eating well and denied having any acute complaints or pain. Pt was DNR but not DNI (HCP daughter would like a trial of intubation). Plan is for pt to be discharged home to follow up with his house calls MD, Dr. Rocha, and his hematologist Dr. Adam Hernandez regarding further IV iron infusions. 89 yo M with PMH of AFib, Dementia, Hypothyroidism, HLD, CKD IV, Neurogenic bladder c/b urinary retention s/p suprapubic catheter, anemia with prev GIB 2/2 H pylori gastritis, seizure disorder on keppra, hypothyroidism. Presents for outpatient labs showing acute on chronic anemia. Pt is AxOx1-2 at baseline. Patient had routine labwork that showed an acute on chronic anemia (Hb of 6.7). Pt has had anemia in the past that was extensively worked up with negative egd/colo; plan per family is to avoid further invasive workup and transfuse as needed. Pt denies abd pain or overt bleeding.   Pt has been on dysphagia diet since last hospitalization where he failed speech/swallow eval; this was confirmed on outpt MBS, and family again chose pleasure feeds. Has h/o recent hospitalization to MICU, he presented for seizures and was Intubated in [facility] had AED titrated, and course c/b bradycardia/bifasic block, initially attributed to sepsis and confirmed after urine cultures returned +VRE, s/p course of cefepime switched to ampicillin. Course finally further c/b persistent hypothermia, which after negative infectious/neuro w/u, was deemed pt's baseline.   Being transferred from the community (on house calls being followed by Dr. Rocha) for transfusion.  Per pt's daughter plan after this transfusion is to start Venofer with Dr. Adam Hernandez.    Pt was admitted to the PCU for symptom management and was transfused with 1 unit of PRBCs. Pt was also given 1 dose of Venofer. Pt continued to do well in the PCU, eating well and denied having any acute complaints or pain. Pt was DNR but not DNI (HCP daughter would like a trial of intubation). Plan is for pt to be discharged home to follow up with his house calls MD, Dr. Rocha, and his hematologist Dr. Adam Hernandez regarding further IV iron infusions. 89 yo M with PMH of AFib, Dementia, Hypothyroidism, HLD, CKD IV, Neurogenic bladder c/b urinary retention s/p suprapubic catheter, anemia with prev GIB 2/2 H pylori gastritis, seizure disorder on keppra, hypothyroidism. Presents for outpatient labs showing acute on chronic anemia. Pt is AxOx1-2 at baseline. Patient had routine labwork that showed an acute on chronic anemia (Hb of 6.7). Pt has had anemia in the past that was extensively worked up with negative egd/colo; plan per family is to avoid further invasive workup and transfuse as needed. Pt denies abd pain or overt bleeding.   Pt has been on dysphagia diet since last hospitalization where he failed speech/swallow eval; this was confirmed on outpt MBS, and family again chose pleasure feeds. Has h/o recent hospitalization to MICU, he presented for seizures and was Intubated in [facility] had AED titrated, and course c/b bradycardia/bifasic block, initially attributed to sepsis and confirmed after urine cultures returned +VRE, s/p course of cefepime switched to ampicillin. Course finally further c/b persistent hypothermia, which after negative infectious/neuro w/u, was deemed pt's baseline.   Being transferred from the community (on house calls being followed by Dr. Rocha) for transfusion.  Per pt's daughter plan after this transfusion is to start Venofer with Dr. Adam Hernandez.    Pt was admitted to the PCU for symptom management and was transfused with 1 unit of PRBCs. Pt was also given 1 dose of Venofer. Pt continued to do well in the PCU, eating well and denied having any acute complaints or pain. Pt was DNR but not DNI (HCP daughter would like a trial of intubation). Plan is for pt to be discharged home to follow up with his house calls MD, Dr. Rocha, and his hematologist Dr. Adam Hernandez regarding further IV iron infusions. Pt was set up an appointment with Dr. Adam Hernandez on Tuesday, April 3rd at 2:30 PM. 89 yo M with PMH of AFib, Dementia, Hypothyroidism, HLD, CKD IV, Neurogenic bladder c/b urinary retention s/p suprapubic catheter, anemia with prev GIB 2/2 H pylori gastritis, seizure disorder on keppra, hypothyroidism. Presents for outpatient labs showing acute on chronic anemia. Pt is AxOx1-2 at baseline. Patient had routine labwork that showed an acute on chronic anemia (Hb of 6.7). Pt has had anemia in the past that was extensively worked up with negative egd/colo; plan per family is to avoid further invasive workup and transfuse as needed. Pt denies abd pain or overt bleeding.   Pt has been on dysphagia diet since last hospitalization where he failed speech/swallow eval; this was confirmed on outpt MBS, and family again chose pleasure feeds. Has h/o recent hospitalization to MICU, he presented for seizures and was Intubated in [facility] had AED titrated, and course c/b bradycardia/bifasic block, initially attributed to sepsis and confirmed after urine cultures returned +VRE, s/p course of cefepime switched to ampicillin. Course finally further c/b persistent hypothermia, which after negative infectious/neuro w/u, was deemed pt's baseline.   Being transferred from the community (on house calls being followed by Dr. Rocha) for transfusion.  Per pt's daughter plan after this transfusion is to start Venofer with Dr. Adam Hernandez.    Pt was admitted to the PCU for symptom management and was transfused with 1 unit of PRBCs. Hgb improved from 7.2 to 8.4 after transfusion. Pt was also given 1 dose of Venofer. Pt continued to do well in the PCU, eating well and denied having any acute complaints or pain. Pt was DNR but not DNI (HCP daughter would like a trial of intubation). Plan is for pt to be discharged home to follow up with his house calls MD, Dr. Rocha, and his hematologist Dr. Adam Hernandez regarding further IV iron infusions. Pt was set up an appointment with Dr. Adam Hernandez on Tuesday, April 3rd at 2:30 PM.

## 2018-03-26 NOTE — DISCHARGE NOTE ADULT - MEDICATION SUMMARY - MEDICATIONS TO TAKE
I will START or STAY ON the medications listed below when I get home from the hospital:    Aspirin Enteric Coated 81 mg oral delayed release tablet  -- 1 tab(s) by mouth once a day  -- Indication: For Prophylaxis    levETIRAcetam 500 mg oral tablet  -- 0.5 tab(s) by mouth 2 times a day  -- Indication: For Seizure disorder    Lasix 20 mg oral tablet  -- 1 tab(s) by mouth once a day  -- Indication: For Chronic heart failure    levothyroxine 150 mcg (0.15 mg) oral tablet  -- 1 tab(s) by mouth once a day  -- Indication: For Hypothyroid I will START or STAY ON the medications listed below when I get home from the hospital:    Aspirin Enteric Coated 81 mg oral delayed release tablet  -- 1 tab(s) by mouth once a day  -- Indication: For Coronary Artery disease prevention    levETIRAcetam 500 mg oral tablet  -- 0.5 tab(s) by mouth 2 times a day  -- Indication: For Seizure disorder    Lasix 20 mg oral tablet  -- 1 tab(s) by mouth once a day  -- Indication: For Diuretic    levothyroxine 150 mcg (0.15 mg) oral tablet  -- 1 tab(s) by mouth once a day  -- Indication: For Thyroid disease

## 2018-03-26 NOTE — PROGRESS NOTE ADULT - PROBLEM SELECTOR PLAN 5
Pt DNR, but not DNI (pt's HCP- daughter would like a trial of intubation)  - Plan for discharge to home today, with follow up with Dr. Rocha from house calls and Dr. Adam Hernandez from hematology. Will touch base with Dr. Hernandez regarding follow-up care.

## 2018-03-26 NOTE — PROGRESS NOTE ADULT - PROBLEM SELECTOR PLAN 3
Pt currently at baseline mental status, AAOx1-2  - Haldol 0.5mg IV q4h PRN for agitation, has not required any doses thus far

## 2018-03-26 NOTE — PROGRESS NOTE ADULT - ASSESSMENT
89 yo M with PMH of AFib, Dementia, Hypothyroidism, HLD, CKD IV, Neurogenic bladder c/b urinary retention s/p suprapubic catheter, anemia with prev GIB 2/2 H pylori gastritis and seizure disorder on Keppra admitted for drop in hemoglobin requiring transfusion with 1 unit PRBCs.

## 2018-03-26 NOTE — DISCHARGE NOTE ADULT - CARE PLAN
Principal Discharge DX:	Anemia, unspecified type  Goal:	Follow up  Assessment and plan of treatment:	You were transfused with 1 unit of blood while you were hospitalized, because your hemoglobin levels were low. You will be discharged home and will need to follow up with your house calls MD, Dr. Rocha, and your hematologist, Dr. Adam Hernandez. You were also given a dose of IV iron in the hospital- you will need to follow up with Dr. Hernandez regarding future transfusions.  Secondary Diagnosis:	Seizure disorder  Assessment and plan of treatment:	Continue Keppra at your home dose.  Secondary Diagnosis:	Chronic diastolic heart failure  Assessment and plan of treatment:	Continue Lasix at your home dose.  Secondary Diagnosis:	Hypothyroidism, unspecified type  Assessment and plan of treatment:	Continue levothyroxine at your home dose. Principal Discharge DX:	Anemia, unspecified type  Goal:	Follow up  Assessment and plan of treatment:	You were transfused with 1 unit of blood while you were hospitalized, because your hemoglobin levels were low. You will be discharged home and will need to follow up with your house calls MD, Dr. Rocha, and your hematologist, Dr. Adam Hernandez. You were also given a dose of IV iron in the hospital- you will need to follow up with Dr. Hernandez regarding future iron transfusions. You have an appointment with Dr. Hernandez on Tuesday, April 3rd at 2:30 PM. The office is located at [address] [place] [address], [address] in [place], [place].  Secondary Diagnosis:	Seizure disorder  Assessment and plan of treatment:	Continue Keppra at your home dose.  Secondary Diagnosis:	Chronic diastolic heart failure  Assessment and plan of treatment:	Continue Lasix at your home dose.  Secondary Diagnosis:	Hypothyroidism, unspecified type  Assessment and plan of treatment:	Continue levothyroxine at your home dose.

## 2018-03-26 NOTE — DISCHARGE NOTE ADULT - PROVIDER TOKENS
FREE:[LAST:[Josuei],FIRST:[Isaias],PHONE:[phone]:[phone]   [phone]    -],ADDRESS:[[address] [place] [address]]],FREE:[LAST:[David],FIRST:[Adam],PHONE:[phone]],FAX:[phone]   [phone]    -],ADDRESS:[[address] [place] [address]]]

## 2018-03-26 NOTE — PROGRESS NOTE ADULT - PROBLEM SELECTOR PLAN 1
Patient transfused with 1 unit of PRBCs for Hgb of 7.2 on admission. Per pt's family, no further invasive workup for etiology of pt's anemia.  - Venofer 200mg IV x1 dose today  - Pt will follow up with his hematologist Dr. Adam Hernandez as outpatient

## 2018-03-26 NOTE — DISCHARGE NOTE ADULT - PLAN OF CARE
Follow up You were transfused with 1 unit of blood while you were hospitalized, because your hemoglobin levels were low. You will be discharged home and will need to follow up with your house calls MD, Dr. Rocha, and your hematologist, Dr. Adam Hernandez. You were also given a dose of IV iron in the hospital- you will need to follow up with Dr. Hernandez regarding future transfusions. Continue Keppra at your home dose. Continue Lasix at your home dose. Continue levothyroxine at your home dose. You were transfused with 1 unit of blood while you were hospitalized, because your hemoglobin levels were low. You will be discharged home and will need to follow up with your house calls MD, Dr. Rocha, and your hematologist, Dr. Adam Hernandez. You were also given a dose of IV iron in the hospital- you will need to follow up with Dr. Hernandez regarding future iron transfusions. You have an appointment with Dr. Hernandez on Tuesday, April 3rd at 2:30 PM. The office is located at [address] [place] [address], [address] in [place], [place].

## 2018-03-26 NOTE — PROGRESS NOTE ADULT - SUBJECTIVE AND OBJECTIVE BOX
Geriatric and Palliative Care Unit Progress Note [X] Hospice Progress Note [ ]     HPI:  89 yo M with PMH of AFib, Dementia, Hypothyroidism, HLD, CKD IV, Neurogenic bladder c/b urinary retention s/p suprapubic catheter, anemia with prev GIB 2/2 H pylori gastritis and seizure disorder on Keppra. Presents for outpatient labs showing acute on chronic anemia. Pt is AxOx1-2 at baseline. Patient had routine labwork that showed an acute on chronic anemia (Hb of 6.7). Pt has had anemia in the past that was extensively worked up with negative egd/colo; plan per family is to avoid further invasive workup and transfuse as needed. Pt denies abd pain or overt bleeding.   Pt has been on dysphagia diet since last hospitalization where he failed speech/swallow eval; this was confirmed on outpt MBS, and family again chose pleasure feeds. Has h/o recent hospitalization to MICU, he presented for seizures and was Intubated in [facility] had AED titrated, and course c/b bradycardia/bifasic block, initially attributed to sepsis and confirmed after urine cultures returned +VRE, s/p course of cefepime switched to ampicillin. Course finally further c/b persistent hypothermia, which after negative infectious/neuro w/u, was deemed pt's baseline.   Being transferred from the community (on house calls being followed by Dr. Morel) for transfusion.  Per pt's daughter plan after this transfusion is to start Venofer with Dr. Adam Hernandez. (25 Mar 2018 14:36)    Indication for Palliative Care Unit Services: Symptom management    ADVANCE DIRECTIVES:    DNR: Yes    MOLST  [ ] YES [ ] NO                      [ ] Completed  Health Care Proxy [ ] YES  [ ] NO   [ ] Completed  Living Will  [ ] YES [ ] NO             [ ] Surrogate  [ ] HCP  [ ] Guardian:                                                                  Phone#:    Allergies    No Known Allergies    Intolerances        MEDICATIONS  (STANDING):  aspirin enteric coated 81 milliGRAM(s) Oral daily  furosemide    Tablet 20 milliGRAM(s) Oral daily  levETIRAcetam 250 milliGRAM(s) Oral two times a day  levothyroxine 150 MICROGram(s) Oral daily    MEDICATIONS  (PRN):  haloperidol    Injectable 0.5 milliGRAM(s) IV Push every 4 hours PRN Agitation      PRESENT SYMPTOMS:  Source: [X] Patient   [ ] Family   [ ] Team     Pain:                        [X] No [ ] Yes             [ ] Mild [ ] Moderate [ ] Severe    Onset -  Location -  Duration -  Character -  Alleviating/Aggravating -  Radiation -  Timing -      Dyspnea:                [X] No [ ] Yes             [ ] Mild [ ] Moderate [ ] Severe    Anxiety:                  [ ] No [ ] Yes             [ ] Mild [ ] Moderate [ ] Severe    Fatigue:                  [X] No [ ] Yes             [ ] Mild [ ] Moderate [ ] Severe    Nausea:                  [X] No [ ] Yes             [ ] Mild [ ] Moderate [ ] Severe    Loss of appetite:   [X] No [ ] Yes             [ ] Mild [ ] Moderate [ ] Severe    Constipation:        [X] No [ ] Yes             [ ] Mild [ ] Moderate [ ] Severe    Other Symptoms:  [ ] All other review of systems negative   [ ] Unable to obtain due to poor mentation     Karnofsky Performance Score/Palliative Performance Status Version 2:   50      %    PHYSICAL EXAM:  Vital Signs Last 24 Hrs  T(C): 36.4 (26 Mar 2018 07:34), Max: 36.5 (25 Mar 2018 20:35)  T(F): 97.6 (26 Mar 2018 07:34), Max: 97.7 (25 Mar 2018 20:35)  HR: 51 (26 Mar 2018 07:34) (51 - 67)  BP: 96/52 (26 Mar 2018 07:34) (94/58 - 100/64)  BP(mean): --  RR: 18 (26 Mar 2018 07:34) (16 - 18)  SpO2: 100% (26 Mar 2018 07:34) (100% - 100%) I&O's Summary    25 Mar 2018 07:01  -  26 Mar 2018 07:00  --------------------------------------------------------  IN: 0 mL / OUT: 375 mL / NET: -375 mL        General:  [X] Alert  [ ] Oriented x      [ ] Lethargic  [ ] Agitated   [ ] Cachexia   [ ] Unarousable  [X] Verbal  [ ] Non-Verbal    HEENT:  [X] Normal   [ ] Dry mouth   [ ] ET Tube    [ ] Trach  [ ] Oral lesions    Lungs:   [X] Clear [ ] Tachypnea  [ ] Audible excessive secretions   [ ] Rhonchi        [ ] Right [ ] Left [ ] Bilateral  [ ] Crackles        [ ] Right [ ] Left [ ] Bilateral  [ ] Wheezing     [ ] Right [ ] Left [ ] Bilateral  [ ] Respiratory failure [ ] Acute [ ] Chronic [ ] Hypoxemic [ ] Hypercarbic [ ] Other    Cardiovascular:   [X] Regular [ ] Irregular [ ] Tachycardia   [ ] Bradycardia  [X] Murmur [ ] Other  [ ] Shock [ ] Septic [ ] Hypovolemic [ ] Neurogenic [ ] Cardiogenic   [ ] Vasopressors [ ] Inotrophs    Abdomen:   [X] Soft  [ ] Distended   [X] +BS  [X] Non tender [ ] Tender  [ ]PEG   [ ]OGT/ NGT   Last BM:     [ ] Other    Genitourinary:  [ ] Normal [ ] Incontinent   [ ] Oliguria/Anuria   [X] Suprapubic catheter in place, draining clear yellow urine  [ ] Other       Musculoskeletal:    [ ] Normal   [X] Weakness  [ ] Edema  [ ] Bedbound/Wheelchair bound [ ]  Ambulatory [ ] with [ ] without assistance [ ] Functional quadriplegia    Neurological: [ ] No focal deficits  [X] Cognitive impairment  [X] Dysphagia [ ] Dysarthria [ ] Paresis [ ] Other   [ ] Brain compression  [ ] Cerebral edema [ ] Encephalopathy    Skin: [X] Normal   [ ] Ulcer(s) type [ ] Diabetic [ ] Pressure [ ] Other   Stage        POA [ ]  Yes [ ]  No       [ ] Rash    LABS:                        7.2    4.7   )-----------( 142      ( 25 Mar 2018 15:23 )             23.1     03-25    138  |  101  |  38<H>  ----------------------------<  100<H>  5.1   |  24  |  3.20<H>    Ca    9.4      25 Mar 2018 15:23            Protein Calorie Malnutrition: [ ] Mild [ ] Moderate [ ] Severe    Oral Intake: [ ] Unable/mouth care only [ ] Minimal [ ] Moderate [ ] Full Capability  Diet: [ ] NPO [ ] Tube feeds [ ] TPN [X] Other- Dysphagia 2 with honey thickened liquids    RADIOLOGY & ADDITIONAL STUDIES: None    REFERRALS:   [ ] Chaplaincy  [ ] Hospice Care  [ ] Child Life  [ ] Social Work  [ ] Case management [ ] Nutrition [ ] Holistic Therapy [ ] Wound Care [ ]Physical Therapy [ ] Other [ ]See goals of care note in [place] Progress Note [X] Hospice Progress Note [ ]     HPI:  89 yo M with PMH of AFib, Dementia, Hypothyroidism, HLD, CKD IV, Neurogenic bladder c/b urinary retention s/p suprapubic catheter, anemia with prev GIB 2/2 H pylori gastritis and seizure disorder on Keppra. Presents for outpatient labs showing acute on chronic anemia. Pt is AxOx1-2 at baseline. Patient had routine labwork that showed an acute on chronic anemia (Hb of 6.7). Pt has had anemia in the past that was extensively worked up with negative egd/colo; plan per family is to avoid further invasive workup and transfuse as needed. Pt denies abd pain or overt bleeding.   Pt has been on dysphagia diet since last hospitalization where he failed speech/swallow eval; this was confirmed on outpt MBS, and family again chose pleasure feeds. Has h/o recent hospitalization to MICU, he presented for seizures and was Intubated in [facility] had AED titrated, and course c/b bradycardia/bifasic block, initially attributed to sepsis and confirmed after urine cultures returned +VRE, s/p course of cefepime switched to ampicillin. Course finally further c/b persistent hypothermia, which after negative infectious/neuro w/u, was deemed pt's baseline.   Being transferred from the community (on house calls being followed by Dr. Morel) for transfusion.  Per pt's daughter plan after this transfusion is to start Venofer with Dr. Adam Hernandez. (25 Mar 2018 14:36)    Indication for Palliative Care Unit Services: Symptom management    Interval/overnight events: No events, pt received 1u PRBC transfusion. Pt stating that he feels great this morning with no complaints.    ADVANCE DIRECTIVES:    DNR: Yes    MOLST  [ ] YES [ ] NO                      [ ] Completed  Health Care Proxy [ ] YES  [ ] NO   [ ] Completed  Living Will  [ ] YES [ ] NO             [ ] Surrogate  [ ] HCP  [ ] Guardian:                                                                  Phone#:    Allergies    No Known Allergies    Intolerances        MEDICATIONS  (STANDING):  aspirin enteric coated 81 milliGRAM(s) Oral daily  furosemide    Tablet 20 milliGRAM(s) Oral daily  levETIRAcetam 250 milliGRAM(s) Oral two times a day  levothyroxine 150 MICROGram(s) Oral daily    MEDICATIONS  (PRN):  haloperidol    Injectable 0.5 milliGRAM(s) IV Push every 4 hours PRN Agitation      PRESENT SYMPTOMS:  Source: [X] Patient   [ ] Family   [ ] Team     Pain:                        [X] No [ ] Yes             [ ] Mild [ ] Moderate [ ] Severe    Onset -  Location -  Duration -  Character -  Alleviating/Aggravating -  Radiation -  Timing -      Dyspnea:                [X] No [ ] Yes             [ ] Mild [ ] Moderate [ ] Severe    Anxiety:                  [ ] No [ ] Yes             [ ] Mild [ ] Moderate [ ] Severe    Fatigue:                  [X] No [ ] Yes             [ ] Mild [ ] Moderate [ ] Severe    Nausea:                  [X] No [ ] Yes             [ ] Mild [ ] Moderate [ ] Severe    Loss of appetite:   [X] No [ ] Yes             [ ] Mild [ ] Moderate [ ] Severe    Constipation:        [X] No [ ] Yes             [ ] Mild [ ] Moderate [ ] Severe    Other Symptoms:  [ ] All other review of systems negative   [ ] Unable to obtain due to poor mentation     Karnofsky Performance Score/Palliative Performance Status Version 2:   50      %    PHYSICAL EXAM:  Vital Signs Last 24 Hrs  T(C): 36.4 (26 Mar 2018 07:34), Max: 36.5 (25 Mar 2018 20:35)  T(F): 97.6 (26 Mar 2018 07:34), Max: 97.7 (25 Mar 2018 20:35)  HR: 51 (26 Mar 2018 07:34) (51 - 67)  BP: 96/52 (26 Mar 2018 07:34) (94/58 - 100/64)  BP(mean): --  RR: 18 (26 Mar 2018 07:34) (16 - 18)  SpO2: 100% (26 Mar 2018 07:34) (100% - 100%) I&O's Summary    25 Mar 2018 07:01  -  26 Mar 2018 07:00  --------------------------------------------------------  IN: 0 mL / OUT: 375 mL / NET: -375 mL        General:  [X] Alert  [ ] Oriented x      [ ] Lethargic  [ ] Agitated   [ ] Cachexia   [ ] Unarousable  [X] Verbal  [ ] Non-Verbal    HEENT:  [X] Normal   [ ] Dry mouth   [ ] ET Tube    [ ] Trach  [ ] Oral lesions    Lungs:   [X] Clear [ ] Tachypnea  [ ] Audible excessive secretions   [ ] Rhonchi        [ ] Right [ ] Left [ ] Bilateral  [ ] Crackles        [ ] Right [ ] Left [ ] Bilateral  [ ] Wheezing     [ ] Right [ ] Left [ ] Bilateral  [ ] Respiratory failure [ ] Acute [ ] Chronic [ ] Hypoxemic [ ] Hypercarbic [ ] Other    Cardiovascular:   [X] Regular [ ] Irregular [ ] Tachycardia   [ ] Bradycardia  [X] Murmur [ ] Other  [ ] Shock [ ] Septic [ ] Hypovolemic [ ] Neurogenic [ ] Cardiogenic   [ ] Vasopressors [ ] Inotrophs    Abdomen:   [X] Soft  [ ] Distended   [X] +BS  [X] Non tender [ ] Tender  [ ]PEG   [ ]OGT/ NGT   Last BM:     [ ] Other    Genitourinary:  [ ] Normal [ ] Incontinent   [ ] Oliguria/Anuria   [X] Suprapubic catheter in place, draining clear yellow urine  [ ] Other       Musculoskeletal:    [ ] Normal   [X] Weakness  [ ] Edema  [ ] Bedbound/Wheelchair bound [ ]  Ambulatory [ ] with [ ] without assistance [ ] Functional quadriplegia    Neurological: [ ] No focal deficits  [X] Cognitive impairment  [X] Dysphagia [ ] Dysarthria [ ] Paresis [ ] Other   [ ] Brain compression  [ ] Cerebral edema [ ] Encephalopathy    Skin: [X] Normal   [ ] Ulcer(s) type [ ] Diabetic [ ] Pressure [ ] Other   Stage        POA [ ]  Yes [ ]  No       [ ] Rash    LABS:                        7.2    4.7   )-----------( 142      ( 25 Mar 2018 15:23 )             23.1     03-25    138  |  101  |  38<H>  ----------------------------<  100<H>  5.1   |  24  |  3.20<H>    Ca    9.4      25 Mar 2018 15:23            Protein Calorie Malnutrition: [ ] Mild [ ] Moderate [ ] Severe    Oral Intake: [ ] Unable/mouth care only [ ] Minimal [ ] Moderate [ ] Full Capability  Diet: [ ] NPO [ ] Tube feeds [ ] TPN [X] Other- Dysphagia 2 with honey thickened liquids    RADIOLOGY & ADDITIONAL STUDIES: None    REFERRALS:   [ ] Chaplaincy  [ ] Hospice Care  [ ] Child Life  [ ] Social Work  [ ] Case management [ ] Nutrition [ ] Holistic Therapy [ ] Wound Care [ ]Physical Therapy [ ] Other [ ]See goals of care note in [place]

## 2018-03-26 NOTE — DISCHARGE NOTE ADULT - PATIENT PORTAL LINK FT
You can access the [facility] Portal, offered by [facility], by registering with the following website: [email or web address]

## 2018-03-26 NOTE — DISCHARGE NOTE ADULT - ADDITIONAL INSTRUCTIONS
Please follow up with Dr. Rocha and Dr. Adam Hernandez within the next 1 week. You have an appointment with Dr. Adam Hernandez on Tuesday, April 3rd at 2:30 PM. The office is located at [address] [place] [address], [address] in [place], [place].

## 2018-03-26 NOTE — DISCHARGE NOTE ADULT - CARE PROVIDER_API CALL
Isaias Rocha  [id number]   [place] [address]  Phone: [phone]  Fax: (   )    -    Adam Hernandez  [address] [facility] [place], [facility] [address]  [place] [address]  Phone: [phone]  Fax: (   )    -

## 2018-03-26 NOTE — DISCHARGE NOTE ADULT - CARE PROVIDERS DIRECT ADDRESSES
,DirectAddress_Unknown,DirectAddress_Unknown [address] [facility] , [place], [place]

## 2018-03-26 NOTE — PROGRESS NOTE ADULT - PROBLEM SELECTOR PLAN 2
C/w suprapubic catheter for urinary retention- low suspicion for infection at this time given normal WBC count and no fevers

## 2018-03-27 ENCOUNTER — APPOINTMENT (OUTPATIENT)
Dept: HOME HEALTH SERVICES | Facility: HOME HEALTH | Age: 83
End: 2018-03-27

## 2018-03-27 VITALS
OXYGEN SATURATION: 98 % | HEART RATE: 80 BPM | TEMPERATURE: 98 F | SYSTOLIC BLOOD PRESSURE: 100 MMHG | RESPIRATION RATE: 18 BRPM | DIASTOLIC BLOOD PRESSURE: 60 MMHG

## 2018-03-27 RX ORDER — STARCH
POWDER (GRAM) ORAL
Qty: 2 | Refills: 2 | Status: DISCONTINUED | COMMUNITY
Start: 2018-02-20 | End: 2018-03-27

## 2018-03-27 RX ORDER — TRAZODONE HYDROCHLORIDE 50 MG/1
50 TABLET ORAL
Qty: 90 | Refills: 1 | Status: DISCONTINUED | COMMUNITY
Start: 2017-08-23 | End: 2018-03-27

## 2018-03-27 RX ORDER — ATORVASTATIN CALCIUM 20 MG/1
20 TABLET, FILM COATED ORAL
Qty: 90 | Refills: 3 | Status: DISCONTINUED | COMMUNITY
Start: 2017-08-23 | End: 2018-03-27

## 2018-03-27 RX ORDER — CHLORHEXIDINE GLUCONATE 4 %
325 (65 FE) LIQUID (ML) TOPICAL DAILY
Qty: 30 | Refills: 5 | Status: DISCONTINUED | COMMUNITY
Start: 2017-08-23 | End: 2018-03-27

## 2018-03-27 RX ORDER — PANTOPRAZOLE 40 MG/1
40 TABLET, DELAYED RELEASE ORAL
Qty: 30 | Refills: 0 | Status: DISCONTINUED | COMMUNITY
Start: 2017-06-15 | End: 2018-03-27

## 2018-04-02 ENCOUNTER — LABORATORY RESULT (OUTPATIENT)
Age: 83
End: 2018-04-02

## 2018-05-02 ENCOUNTER — APPOINTMENT (OUTPATIENT)
Dept: HOME HEALTH SERVICES | Facility: HOME HEALTH | Age: 83
End: 2018-05-02
Payer: MEDICARE

## 2018-05-02 VITALS
RESPIRATION RATE: 17 BRPM | HEART RATE: 75 BPM | TEMPERATURE: 99.1 F | DIASTOLIC BLOOD PRESSURE: 70 MMHG | OXYGEN SATURATION: 97 % | SYSTOLIC BLOOD PRESSURE: 120 MMHG

## 2018-05-02 DIAGNOSIS — Z86.19 PERSONAL HISTORY OF OTHER INFECTIOUS AND PARASITIC DISEASES: ICD-10-CM

## 2018-05-02 DIAGNOSIS — Z86.39 PERSONAL HISTORY OF OTHER ENDOCRINE, NUTRITIONAL AND METABOLIC DISEASE: ICD-10-CM

## 2018-05-02 DIAGNOSIS — Z87.448 PERSONAL HISTORY OF OTHER DISEASES OF URINARY SYSTEM: ICD-10-CM

## 2018-05-02 DIAGNOSIS — Z86.2 PERSONAL HISTORY OF DISEASES OF THE BLOOD AND BLOOD-FORMING ORGANS AND CERTAIN DISORDERS INVOLVING THE IMMUNE MECHANISM: ICD-10-CM

## 2018-05-02 PROCEDURE — 99349 HOME/RES VST EST MOD MDM 40: CPT

## 2018-05-02 RX ORDER — SULFAMETHOXAZOLE AND TRIMETHOPRIM 800; 160 MG/1; MG/1
800-160 TABLET ORAL TWICE DAILY
Qty: 14 | Refills: 0 | Status: COMPLETED | COMMUNITY
Start: 2018-03-17 | End: 2018-05-02

## 2018-05-31 ENCOUNTER — RX RENEWAL (OUTPATIENT)
Age: 83
End: 2018-05-31

## 2018-06-08 ENCOUNTER — APPOINTMENT (OUTPATIENT)
Dept: HOME HEALTH SERVICES | Facility: HOME HEALTH | Age: 83
End: 2018-06-08

## 2018-06-25 ENCOUNTER — APPOINTMENT (OUTPATIENT)
Dept: HOME HEALTH SERVICES | Facility: HOME HEALTH | Age: 83
End: 2018-06-25
Payer: MEDICARE

## 2018-06-25 VITALS
RESPIRATION RATE: 16 BRPM | SYSTOLIC BLOOD PRESSURE: 110 MMHG | HEART RATE: 76 BPM | TEMPERATURE: 98 F | DIASTOLIC BLOOD PRESSURE: 60 MMHG | OXYGEN SATURATION: 96 %

## 2018-06-25 PROCEDURE — 99349 HOME/RES VST EST MOD MDM 40: CPT

## 2018-07-03 ENCOUNTER — MEDICATION RENEWAL (OUTPATIENT)
Age: 83
End: 2018-07-03

## 2018-07-05 ENCOUNTER — RX RENEWAL (OUTPATIENT)
Age: 83
End: 2018-07-05

## 2018-08-10 PROBLEM — F03.90 UNSPECIFIED DEMENTIA WITHOUT BEHAVIORAL DISTURBANCE: Chronic | Status: ACTIVE | Noted: 2018-01-16

## 2018-08-10 PROBLEM — D64.9 ANEMIA, UNSPECIFIED: Chronic | Status: ACTIVE | Noted: 2018-01-16

## 2018-08-10 PROBLEM — N18.9 CHRONIC KIDNEY DISEASE, UNSPECIFIED: Chronic | Status: ACTIVE | Noted: 2018-01-16

## 2018-08-10 PROBLEM — E03.9 HYPOTHYROIDISM, UNSPECIFIED: Chronic | Status: ACTIVE | Noted: 2018-01-16

## 2018-08-10 PROBLEM — E78.5 HYPERLIPIDEMIA, UNSPECIFIED: Chronic | Status: ACTIVE | Noted: 2018-01-16

## 2018-08-15 ENCOUNTER — APPOINTMENT (OUTPATIENT)
Dept: HOME HEALTH SERVICES | Facility: HOME HEALTH | Age: 83
End: 2018-08-15
Payer: MEDICARE

## 2018-08-15 VITALS
HEART RATE: 60 BPM | OXYGEN SATURATION: 97 % | RESPIRATION RATE: 16 BRPM | SYSTOLIC BLOOD PRESSURE: 110 MMHG | DIASTOLIC BLOOD PRESSURE: 60 MMHG | TEMPERATURE: 98.7 F

## 2018-08-15 PROCEDURE — 99349 HOME/RES VST EST MOD MDM 40: CPT

## 2018-08-17 RX ORDER — ZOLPIDEM TARTRATE 5 MG/1
5 TABLET ORAL
Qty: 30 | Refills: 2 | Status: DISCONTINUED | COMMUNITY
Start: 2018-05-30 | End: 2018-08-17

## 2018-08-21 ENCOUNTER — RX RENEWAL (OUTPATIENT)
Age: 83
End: 2018-08-21

## 2018-10-03 ENCOUNTER — APPOINTMENT (OUTPATIENT)
Dept: HOME HEALTH SERVICES | Facility: HOME HEALTH | Age: 83
End: 2018-10-03

## 2018-10-11 RX ORDER — ZOLPIDEM TARTRATE 5 MG/1
5 TABLET ORAL
Qty: 30 | Refills: 5 | Status: COMPLETED | COMMUNITY
Start: 2018-05-30 | End: 2018-10-11

## 2018-10-18 ENCOUNTER — APPOINTMENT (OUTPATIENT)
Dept: HOME HEALTH SERVICES | Facility: HOME HEALTH | Age: 83
End: 2018-10-18
Payer: MEDICARE

## 2018-10-18 VITALS
OXYGEN SATURATION: 98 % | SYSTOLIC BLOOD PRESSURE: 105 MMHG | HEART RATE: 59 BPM | TEMPERATURE: 97.1 F | RESPIRATION RATE: 17 BRPM | DIASTOLIC BLOOD PRESSURE: 70 MMHG

## 2018-10-18 DIAGNOSIS — Z87.11 PERSONAL HISTORY OF PEPTIC ULCER DISEASE: ICD-10-CM

## 2018-10-18 DIAGNOSIS — Z87.19 PERSONAL HISTORY OF OTHER DISEASES OF THE DIGESTIVE SYSTEM: ICD-10-CM

## 2018-10-18 DIAGNOSIS — L60.2 ONYCHOGRYPHOSIS: ICD-10-CM

## 2018-10-18 DIAGNOSIS — I35.0 NONRHEUMATIC AORTIC (VALVE) STENOSIS: ICD-10-CM

## 2018-10-18 PROCEDURE — 90662 IIV NO PRSV INCREASED AG IM: CPT

## 2018-10-18 PROCEDURE — 99350 HOME/RES VST EST HIGH MDM 60: CPT | Mod: 25

## 2018-10-18 PROCEDURE — G0008: CPT

## 2018-11-01 LAB — LEVETIRACETAM SERPL-MCNC: 26.9 MCG/ML

## 2018-11-15 ENCOUNTER — APPOINTMENT (OUTPATIENT)
Dept: HOME HEALTH SERVICES | Facility: HOME HEALTH | Age: 83
End: 2018-11-15

## 2018-11-21 ENCOUNTER — RX RENEWAL (OUTPATIENT)
Age: 83
End: 2018-11-21

## 2018-12-14 ENCOUNTER — TRANSCRIPTION ENCOUNTER (OUTPATIENT)
Age: 83
End: 2018-12-14

## 2018-12-14 ENCOUNTER — CLINICAL ADVICE (OUTPATIENT)
Age: 83
End: 2018-12-14

## 2018-12-18 RX ORDER — LEVETIRACETAM 100 MG/ML
100 SOLUTION ORAL
Qty: 450 | Refills: 4 | Status: COMPLETED | COMMUNITY
Start: 2018-02-20 | End: 2018-12-18

## 2018-12-19 ENCOUNTER — APPOINTMENT (OUTPATIENT)
Dept: HOME HEALTH SERVICES | Facility: HOME HEALTH | Age: 83
End: 2018-12-19
Payer: MEDICARE

## 2018-12-19 VITALS
TEMPERATURE: 96.5 F | RESPIRATION RATE: 16 BRPM | OXYGEN SATURATION: 98 % | HEART RATE: 64 BPM | SYSTOLIC BLOOD PRESSURE: 95 MMHG | DIASTOLIC BLOOD PRESSURE: 65 MMHG

## 2018-12-19 DIAGNOSIS — B37.2 CANDIDIASIS OF SKIN AND NAIL: ICD-10-CM

## 2018-12-19 PROCEDURE — 99350 HOME/RES VST EST HIGH MDM 60: CPT

## 2018-12-19 RX ORDER — TRAZODONE HYDROCHLORIDE 50 MG/1
50 TABLET ORAL
Qty: 30 | Refills: 0 | Status: COMPLETED | COMMUNITY
Start: 2018-10-11 | End: 2018-12-19

## 2019-01-12 ENCOUNTER — OTHER (OUTPATIENT)
Age: 84
End: 2019-01-12

## 2019-01-12 ENCOUNTER — TRANSCRIPTION ENCOUNTER (OUTPATIENT)
Age: 84
End: 2019-01-12

## 2019-01-18 ENCOUNTER — APPOINTMENT (OUTPATIENT)
Dept: HOME HEALTH SERVICES | Facility: HOME HEALTH | Age: 84
End: 2019-01-18

## 2019-02-11 ENCOUNTER — APPOINTMENT (OUTPATIENT)
Dept: HOME HEALTH SERVICES | Facility: HOME HEALTH | Age: 84
End: 2019-02-11
Payer: MEDICARE

## 2019-02-11 VITALS
DIASTOLIC BLOOD PRESSURE: 70 MMHG | RESPIRATION RATE: 17 BRPM | TEMPERATURE: 97.2 F | HEART RATE: 65 BPM | OXYGEN SATURATION: 97 % | SYSTOLIC BLOOD PRESSURE: 105 MMHG

## 2019-02-11 DIAGNOSIS — R82.90 UNSPECIFIED ABNORMAL FINDINGS IN URINE: ICD-10-CM

## 2019-02-11 PROCEDURE — 99350 HOME/RES VST EST HIGH MDM 60: CPT

## 2019-02-11 RX ORDER — CIPROFLOXACIN HYDROCHLORIDE 250 MG/1
250 TABLET, FILM COATED ORAL TWICE DAILY
Qty: 14 | Refills: 0 | Status: COMPLETED | COMMUNITY
Start: 2019-01-12 | End: 2019-02-11

## 2019-02-11 NOTE — CHRONIC CARE ASSESSMENT
[PPS Score: ____] : Palliative Performance Scale (PPS) Score: [unfilled] [Oriented To Person] : ~L oriented to person [Oriented To Place] : ~L oriented to place [Oriented To Time] : ~L oriented to time [Oriented To Situation] : ~L oriented to situation [Alert] : ~L alert [Reviewed Mini-Cog Outcomes from Comprehensive Assessment] : Reviewed Mini-Cog outcomes from comprehensive assessment [Reviewed depression screen from comprehensive assessment] : Reviewed depression screen from comprehensive assessment [ADL Assessment Completed] : ADL assessment completed [iADL Assessment Completed] : iADL assessment completed [Relies on Family/Friends] : relies on family/friends [Relies on Community Resources] : relies on community resources [Reviewed Fall Risk from Comprehensive Assessment] : Reviewed fall risk from comprehensive assessment [Reviewed Home Safety Evaluation] : Reviewed home safety evaluation [No Action Needed] : No action needed [Safety elements used in home] : Safety elements used in home [Deaf] : deaf [Uses hearing aid] : uses hearing aid [Uses glasses] : uses glasses [Recent decline in ADLs or iADLs] : No recent decline in ADLs or IADLs? No

## 2019-02-11 NOTE — REASON FOR VISIT
[Follow-Up] : a follow-up visit [Spouse] : spouse [Formal Caregiver] : formal caregiver [Pre-Visit Preparation] : pre-visit preparation was done [Intercurrent Specialty/Sub-specialty Visits] : the patient has intercurrent specialty/sub-specialty visits [FreeTextEntry3] : homecare RN

## 2019-02-11 NOTE — PHYSICAL EXAM
[No Acute Distress] : no acute distress [Well Nourished] : well nourished [Well Developed] : well developed [Normal Voice/Communication] : normal voice communication [Normal Sclera/Conjunctiva] : normal sclera/conjunctiva [PERRL] : pupils equal, round and reactive to light [EOMI] : extra ocular movement intact [No JVD] : no jugular venous distention [Supple] : the neck was supple [No Respiratory Distress] : no respiratory distress [Clear to Auscultation] : lungs were clear to auscultation bilaterally [Normal Rate] : heart rate was normal  [Regular Rhythm] : with a regular rhythm [Normal S1, S2] : normal S1 and S2 [No Edema] : there was no peripheral edema [Normal Bowel Sounds] : normal bowel sounds [Non Tender] : non-tender [Soft] : abdomen soft [Not Distended] : not distended [No CVA Tenderness] : no ~M costovertebral angle tenderness [No Spinal Tenderness] : no spinal tenderness [Kyphosis] :  kyphosis present [No Joint Swelling] : no joint swelling seen [No Rash] : no rash [No Skin Lesions] : no skin lesions [No Motor Deficits] : the motor exam was normal [No Gross Sensory Deficits] : no gross sensory deficits [Normal Affect] : the affect was normal [de-identified] : pleasant, calm, mental status same as baseline, skin color good, interactive, well dressed [de-identified] : [place], wearing hearing aids, and using amplifier [de-identified] : 4/6 holo sys murmur,  [de-identified] : suprapubic catheter in place, urine clear [de-identified] : suprapubic catheter draining clear urine [de-identified] : unsteady gait, uses walker, holds onto things while walking, uses walker [de-identified] : excoriations anterior right and left leg, dryness of skin [de-identified] : oriented to place and person, hard to assess orientation 2/2 hearing problems

## 2019-02-11 NOTE — HISTORY OF PRESENT ILLNESS
[Patient] : patient [Formal Caregiver] : formal caregiver [FreeTextEntry1] : gait instability [FreeTextEntry2] : Pt is 89 yo M with Hx dementia, anemia hx GI bleed 2/2 Hpylori gastritis s/p admission for blood and iron transfusion 6/2017, urinary retention with suprapubic catheter, HLD, Insomnia, CKD stage 3, hypothyroid, hearing loss B/L,last hospitalization visit 2/2018 for tonic clonic seizures s/p intubation, c/b hypothermia, bradycardia and treatment for sepsis of unknown origin and dysphagia, re-hospitalization 3/2018 at [facility] for transfusion for hb 5.5 currently on procrit, seen for follow-up today.\par \par interval events- lost his spouse, daughter wishes to take him to [place] for a trip.\par had UTI treated with ciprofloxacin.\par \par patient has been doing well as per himself, daughter marilynn and formal HHA. daughter is worried that patient might have 'broken heart syndrome' from spouse death, even though he is not expressing any changes from baseline.\par denies fever, chills, nausea, vomiting, cough, SOB, urinary problems or pain.\par has suprapubic catheter 2/2 neurogenic bladder, changed monthly by home care  \par eating well with good appetite, patient had barium swallow which indicated NPO with alternate methods for feeding, daughters decided to go with pleasure feeds. patient prefers to eat regular food, no episodes of aspiration. weight stable.\par BM regular\par sleeping well on seroquel as per HHA\par very hard of hearing- uses hearing aids and amplifier\par patient ambulating with walker without any problems. no recent falls.\par has advanced dementia, no mood issues reported\par \par iron deficiency anemia 2/2 GI bleed- Had Upper endoscopy that did not show source of bleeding, was positive for H Pylori infection, and received complete treatment. on PPI. currently getting iron transfusions and procrit from hematologist office with good response. most recent hb 11.9 as per labs\par \par Hypothyroid - on Synthroid 150 mcg.\par ----------\par lives with spouse and HHA- 24/7\par daughter and grand son lives upstairs\par daughter is HCP

## 2019-02-11 NOTE — COUNSELING
[Normal Weight (BMI <25)] : normal weight - BMI <25 [Mediterranean diet recommended] : Mediterranean diet recommended [Non - Smoker] : non-smoker [Use assistive device to avoid falls] : use assistive device to avoid falls [Remove clutter and unsafe carpeting to avoid falls] : remove clutter and unsafe carpeting to avoid falls [Use grab bars] : use grab bars [Smoke/CO Detectors] : smoke/CO detectors [Not Indicated] : not indicated [Improve exercise tolerance] : improve exercise tolerance [Improve mobility] : improve mobility [Improve weight] : improve weight [Improve pain control] : improve pain control [Decrease stress] : decrease stress [Decrease hospital use] : decrease hospital use [Minimize unnecessary interventions] : minimize unnecessary interventions [Comfort Care] : comfort care [Maintain functional ability] : maintain functional ability [Discussed disease trajectory with patient/caregiver] : discussed disease trajectory with patient/caregiver [Likely to achieve goals/desired outcomes] : likely to achieve goals/desired outcomes [Patient/Caregiver has ___ understanding of disease process] : patient/caregiver has [unfilled] understanding of disease process [de-identified] : jovan pending

## 2019-03-05 ENCOUNTER — RX RENEWAL (OUTPATIENT)
Age: 84
End: 2019-03-05

## 2019-04-03 ENCOUNTER — RX RENEWAL (OUTPATIENT)
Age: 84
End: 2019-04-03

## 2019-04-23 ENCOUNTER — APPOINTMENT (OUTPATIENT)
Dept: HOME HEALTH SERVICES | Facility: HOME HEALTH | Age: 84
End: 2019-04-23
Payer: MEDICARE

## 2019-04-23 VITALS
SYSTOLIC BLOOD PRESSURE: 95 MMHG | HEART RATE: 67 BPM | RESPIRATION RATE: 16 BRPM | TEMPERATURE: 97.3 F | DIASTOLIC BLOOD PRESSURE: 65 MMHG | OXYGEN SATURATION: 97 %

## 2019-04-23 DIAGNOSIS — R21 RASH AND OTHER NONSPECIFIC SKIN ERUPTION: ICD-10-CM

## 2019-04-23 PROCEDURE — 99349 HOME/RES VST EST MOD MDM 40: CPT

## 2019-04-23 NOTE — COUNSELING
[Mediterranean diet recommended] : Mediterranean diet recommended [Normal Weight (BMI <25)] : normal weight - BMI <25 [Non - Smoker] : non-smoker [Use assistive device to avoid falls] : use assistive device to avoid falls [Remove clutter and unsafe carpeting to avoid falls] : remove clutter and unsafe carpeting to avoid falls [Smoke/CO Detectors] : smoke/CO detectors [Use grab bars] : use grab bars [Not Indicated] : not indicated [Improve exercise tolerance] : improve exercise tolerance [Improve mobility] : improve mobility [Improve weight] : improve weight [Improve pain control] : improve pain control [Decrease stress] : decrease stress [Decrease hospital use] : decrease hospital use [Minimize unnecessary interventions] : minimize unnecessary interventions [Comfort Care] : comfort care [Maintain functional ability] : maintain functional ability [Discussed disease trajectory with patient/caregiver] : discussed disease trajectory with patient/caregiver [Likely to achieve goals/desired outcomes] : likely to achieve goals/desired outcomes [Patient/Caregiver has ___ understanding of disease process] : patient/caregiver has [unfilled] understanding of disease process [de-identified] : jovan pending

## 2019-04-23 NOTE — PHYSICAL EXAM
[Well Nourished] : well nourished [No Acute Distress] : no acute distress [Well Developed] : well developed [Normal Voice/Communication] : normal voice communication [Normal Sclera/Conjunctiva] : normal sclera/conjunctiva [PERRL] : pupils equal, round and reactive to light [No JVD] : no jugular venous distention [EOMI] : extra ocular movement intact [Supple] : the neck was supple [No Respiratory Distress] : no respiratory distress [Clear to Auscultation] : lungs were clear to auscultation bilaterally [Normal Rate] : heart rate was normal  [Normal S1, S2] : normal S1 and S2 [Regular Rhythm] : with a regular rhythm [No Edema] : there was no peripheral edema [Normal Bowel Sounds] : normal bowel sounds [Non Tender] : non-tender [Soft] : abdomen soft [Not Distended] : not distended [No Spinal Tenderness] : no spinal tenderness [No CVA Tenderness] : no ~M costovertebral angle tenderness [Kyphosis] :  kyphosis present [No Joint Swelling] : no joint swelling seen [No Rash] : no rash [No Skin Lesions] : no skin lesions [No Motor Deficits] : the motor exam was normal [No Gross Sensory Deficits] : no gross sensory deficits [Normal Affect] : the affect was normal [de-identified] : pleasant, calm, mental status same as baseline, skin color good, interactive, well dressed [de-identified] : [place], wearing hearing aids, and using amplifier [de-identified] : 4/6 holo sys murmur,  [de-identified] : suprapubic catheter in place, urine clear [de-identified] : suprapubic catheter draining clear urine [de-identified] : unsteady gait, uses walker, holds onto things while walking, uses walker [de-identified] : oriented to place and person, hard to assess orientation 2/2 hearing problems

## 2019-04-23 NOTE — HISTORY OF PRESENT ILLNESS
[Patient] : patient [Formal Caregiver] : formal caregiver [FreeTextEntry2] : Pt is 89 yo M with Hx dementia, anemia hx GI bleed 2/2 Hpylori gastritis s/p admission for blood and iron transfusion 6/2017, urinary retention with suprapubic catheter, HLD, Insomnia, CKD stage 3, hypothyroid, hearing loss B/L,last hospitalization visit 2/2018 for tonic clonic seizures s/p intubation, c/b hypothermia, bradycardia and treatment for sepsis of unknown origin and dysphagia, re-hospitalization 3/2018 at [facility] for transfusion for hb 5.5 currently on procrit, seen for follow-up today.\par \par interval events- none significant,\par \par patient has been doing well as per himself, daughter marilynn and formal HHA. no active complaints.\par denies fever, chills, nausea, vomiting, cough, SOB, urinary problems or pain.\par has suprapubic catheter 2/2 neurogenic bladder, changed monthly by home care  \par eating well with good appetite, patient had barium swallow which indicated NPO with alternate methods for feeding, daughters decided to go with pleasure feeds. patient prefers to eat regular food, no episodes of aspiration. weight stable.\par BM regular\par sleeping well on seroquel as per HHA\par very hard of hearing- uses hearing aids and amplifier\par patient ambulating with walker without any problems. no recent falls.\par has advanced dementia, no mood issues reported\par \par iron deficiency anemia 2/2 GI bleed- Had Upper endoscopy that did not show source of bleeding, was positive for H Pylori infection, and received complete treatment. on PPI. currently getting iron transfusions and procrit from hematologist office with good response. most recent hb 11.9 as per labs, follows hematologist every 6 weeks.\par \par Hypothyroid - on Synthroid 150 mcg.\par ----------\par lives with spouse and HHA- 24/7\par daughter and grand son lives upstairs\par daughter is HCP [FreeTextEntry1] : gait instability

## 2019-04-23 NOTE — REASON FOR VISIT
[Follow-Up] : a follow-up visit [Spouse] : spouse [Formal Caregiver] : formal caregiver [Intercurrent Specialty/Sub-specialty Visits] : the patient has intercurrent specialty/sub-specialty visits [Pre-Visit Preparation] : pre-visit preparation was done [FreeTextEntry3] : homecare RN

## 2019-05-03 ENCOUNTER — RX RENEWAL (OUTPATIENT)
Age: 84
End: 2019-05-03

## 2019-06-03 ENCOUNTER — APPOINTMENT (OUTPATIENT)
Dept: HOME HEALTH SERVICES | Facility: HOME HEALTH | Age: 84
End: 2019-06-03
Payer: MEDICARE

## 2019-06-03 VITALS
HEART RATE: 58 BPM | OXYGEN SATURATION: 97 % | SYSTOLIC BLOOD PRESSURE: 95 MMHG | TEMPERATURE: 97.4 F | RESPIRATION RATE: 16 BRPM | DIASTOLIC BLOOD PRESSURE: 60 MMHG

## 2019-06-03 PROCEDURE — 99349 HOME/RES VST EST MOD MDM 40: CPT

## 2019-06-03 NOTE — CHRONIC CARE ASSESSMENT
[PPS Score: ____] : Palliative Performance Scale (PPS) Score: [unfilled] [Oriented To Person] : ~L oriented to person [Oriented To Place] : ~L oriented to place [Oriented To Time] : ~L oriented to time [Oriented To Situation] : ~L oriented to situation [Alert] : ~L alert [Reviewed Mini-Cog Outcomes from Comprehensive Assessment] : Reviewed Mini-Cog outcomes from comprehensive assessment [Reviewed depression screen from comprehensive assessment] : Reviewed depression screen from comprehensive assessment [iADL Assessment Completed] : iADL assessment completed [ADL Assessment Completed] : ADL assessment completed [Relies on Family/Friends] : relies on family/friends [Relies on Community Resources] : relies on community resources [Reviewed Fall Risk from Comprehensive Assessment] : Reviewed fall risk from comprehensive assessment [Reviewed Home Safety Evaluation] : Reviewed home safety evaluation [Safety elements used in home] : Safety elements used in home [Deaf] : deaf [No Action Needed] : No action needed [Uses hearing aid] : uses hearing aid [Uses glasses] : uses glasses [Recent decline in ADLs or iADLs] : No recent decline in ADLs or IADLs? No

## 2019-06-03 NOTE — REASON FOR VISIT
[Follow-Up] : a follow-up visit [Spouse] : spouse [Formal Caregiver] : formal caregiver [Pre-Visit Preparation] : pre-visit preparation was done [Intercurrent Specialty/Sub-specialty Visits] : the patient has intercurrent specialty/sub-specialty visits [FreeTextEntry3] : home care RN

## 2019-06-03 NOTE — CURRENT MEDS
[High Risk Medications Reviewed and Reconciled (Beers Criteria)] : high risk medications reviewed and reconciled [Reviewed patient reported medication adherence from Comprehensive Assessment] : Reviewed patient reported medication adherence from comprehensive assessment [Medication and Allergies Reconciled] : medication and allergies reconciled [Compliant with medications] : Patient is compliant with medications

## 2019-06-03 NOTE — COUNSELING
[Normal Weight (BMI <25)] : normal weight - BMI <25 [Mediterranean diet recommended] : Mediterranean diet recommended [Non - Smoker] : non-smoker [Use assistive device to avoid falls] : use assistive device to avoid falls [Remove clutter and unsafe carpeting to avoid falls] : remove clutter and unsafe carpeting to avoid falls [Use grab bars] : use grab bars [Not Indicated] : not indicated [Smoke/CO Detectors] : smoke/CO detectors [Improve exercise tolerance] : improve exercise tolerance [Improve mobility] : improve mobility [Improve pain control] : improve pain control [Improve weight] : improve weight [Decrease stress] : decrease stress [Comfort Care] : comfort care [Decrease hospital use] : decrease hospital use [Minimize unnecessary interventions] : minimize unnecessary interventions [Discussed disease trajectory with patient/caregiver] : discussed disease trajectory with patient/caregiver [Maintain functional ability] : maintain functional ability [Likely to achieve goals/desired outcomes] : likely to achieve goals/desired outcomes [Patient/Caregiver has ___ understanding of disease process] : patient/caregiver has [unfilled] understanding of disease process [Completed Medical Orders for Life-Sustaining Treatment] : completed medical orders for life-sustaining treatment [DNR] : Code Status: DNR [Limited] : Treatment Guidelines: Limited [Trial of Intubation] : Intubation: Trial of Intubation

## 2019-06-03 NOTE — PHYSICAL EXAM
[No Acute Distress] : no acute distress [Well Nourished] : well nourished [Well Developed] : well developed [Normal Sclera/Conjunctiva] : normal sclera/conjunctiva [Normal Voice/Communication] : normal voice communication [No JVD] : no jugular venous distention [PERRL] : pupils equal, round and reactive to light [EOMI] : extra ocular movement intact [Clear to Auscultation] : lungs were clear to auscultation bilaterally [No Respiratory Distress] : no respiratory distress [Supple] : the neck was supple [Normal Rate] : heart rate was normal  [Regular Rhythm] : with a regular rhythm [Normal S1, S2] : normal S1 and S2 [No Edema] : there was no peripheral edema [Normal Bowel Sounds] : normal bowel sounds [Non Tender] : non-tender [Soft] : abdomen soft [Not Distended] : not distended [No Spinal Tenderness] : no spinal tenderness [No CVA Tenderness] : no ~M costovertebral angle tenderness [Kyphosis] :  kyphosis present [No Rash] : no rash [No Skin Lesions] : no skin lesions [No Joint Swelling] : no joint swelling seen [No Gross Sensory Deficits] : no gross sensory deficits [No Motor Deficits] : the motor exam was normal [Normal Affect] : the affect was normal [de-identified] : pleasant, calm, mental status same as baseline, skin color good, well dressed [de-identified] : 4/6 holo sys murmur,  [de-identified] : suprapubic catheter in place, urine clear [de-identified] : [place], wearing hearing aids, and using amplifier [de-identified] : suprapubic catheter draining clear urine [de-identified] : oriented to place and person, hard to assess orientation 2/2 hearing problems [de-identified] : unsteady gait, uses walker, holds onto things while walking, uses walker

## 2019-06-03 NOTE — HISTORY OF PRESENT ILLNESS
[Patient] : patient [Formal Caregiver] : formal caregiver [FreeTextEntry1] : gait instability [FreeTextEntry2] : Pt is 89 yo M with Hx dementia, anemia hx GI bleed 2/2 Hpylori gastritis s/p admission for blood and iron transfusion 6/2017, urinary retention with suprapubic catheter, HLD, Insomnia, CKD stage 3, hypothyroid, hearing loss B/L,last hospitalization visit 2/2018 for tonic clonic seizures s/p intubation, c/b hypothermia, bradycardia and treatment for sepsis of unknown origin and dysphagia, re-hospitalization 3/2018 at [facility] for transfusion for hb 5.5 currently on procrit, seen for follow-up today.\par \par interval events- none significant, went to see hematologist and got procrit shot.\par \par patient has been doing well as per himself, daughter marilynn and formal HHA. no active complaints.\par he seems to be quiter than usual, however is eating and sleeping at baseline.\par denies fever, chills, nausea, vomiting, cough, SOB, urinary problems or pain.\par has suprapubic catheter 2/2 neurogenic bladder, changed monthly by home care  \par eating well with good appetite, patient had barium swallow which indicated NPO with alternate methods for feeding, daughters decided to go with pleasure feeds. patient prefers to eat regular food, no episodes of aspiration. weight stable.\par BM regular\par sleeping well on seroquel as per HHA\par very hard of hearing- uses hearing aids and amplifier\par patient ambulating with walker, no recent falls.\par has advanced dementia, no behvaior problems\par marilynn reports mood is be low as lost spouse and a close friend recently\par \par iron deficiency anemia 2/2 GI bleed- Had Upper endoscopy that did not show source of bleeding, was positive for H Pylori infection, and received complete treatment. on PPI. currently getting iron transfusions and procrit from hematologist office with good response. follows hematologist every 6 weeks.\par \par Hypothyroid - on Synthroid 150 mcg.\par ----------\par lives with spouse and HHA- 24/7\par daughter and grand son lives upstairs\par daughter is HCP

## 2019-06-05 ENCOUNTER — RX RENEWAL (OUTPATIENT)
Age: 84
End: 2019-06-05

## 2019-06-08 ENCOUNTER — TRANSCRIPTION ENCOUNTER (OUTPATIENT)
Age: 84
End: 2019-06-08

## 2019-06-12 ENCOUNTER — APPOINTMENT (OUTPATIENT)
Dept: HOME HEALTH SERVICES | Facility: HOME HEALTH | Age: 84
End: 2019-06-12

## 2019-06-27 ENCOUNTER — TRANSCRIPTION ENCOUNTER (OUTPATIENT)
Age: 84
End: 2019-06-27

## 2019-06-28 ENCOUNTER — LABORATORY RESULT (OUTPATIENT)
Age: 84
End: 2019-06-28

## 2019-06-28 ENCOUNTER — APPOINTMENT (OUTPATIENT)
Dept: HOME HEALTH SERVICES | Facility: HOME HEALTH | Age: 84
End: 2019-06-28

## 2019-06-28 VITALS
SYSTOLIC BLOOD PRESSURE: 112 MMHG | OXYGEN SATURATION: 98 % | RESPIRATION RATE: 16 BRPM | DIASTOLIC BLOOD PRESSURE: 68 MMHG | HEART RATE: 64 BPM | TEMPERATURE: 98.2 F

## 2019-07-01 LAB
APPEARANCE: ABNORMAL
BILIRUBIN URINE: NEGATIVE
BLOOD URINE: ABNORMAL
COLOR: YELLOW
GLUCOSE QUALITATIVE U: NEGATIVE
KETONES URINE: NEGATIVE
LEUKOCYTE ESTERASE URINE: ABNORMAL
NITRITE URINE: POSITIVE
PH URINE: 6
PROTEIN URINE: ABNORMAL
SPECIFIC GRAVITY URINE: 1.01
UROBILINOGEN URINE: NORMAL

## 2019-07-17 ENCOUNTER — APPOINTMENT (OUTPATIENT)
Dept: HOME HEALTH SERVICES | Facility: HOME HEALTH | Age: 84
End: 2019-07-17
Payer: MEDICARE

## 2019-07-17 VITALS
TEMPERATURE: 97.2 F | OXYGEN SATURATION: 97 % | RESPIRATION RATE: 16 BRPM | HEART RATE: 67 BPM | DIASTOLIC BLOOD PRESSURE: 65 MMHG | SYSTOLIC BLOOD PRESSURE: 115 MMHG

## 2019-07-17 PROCEDURE — 99350 HOME/RES VST EST HIGH MDM 60: CPT

## 2019-07-17 RX ORDER — CLOTRIMAZOLE 10 MG/G
1 CREAM TOPICAL 3 TIMES DAILY
Qty: 30 | Refills: 1 | Status: COMPLETED | COMMUNITY
Start: 2018-12-14 | End: 2019-07-17

## 2019-07-17 RX ORDER — CLOTRIMAZOLE AND BETAMETHASONE DIPROPIONATE 10; .5 MG/G; MG/G
1-0.05 CREAM TOPICAL TWICE DAILY
Qty: 1 | Refills: 0 | Status: COMPLETED | COMMUNITY
Start: 2019-03-05 | End: 2019-07-17

## 2019-07-17 NOTE — COUNSELING
[Improve exercise tolerance] : improve exercise tolerance [Improve mobility] : improve mobility [Improve weight] : improve weight [Improve pain control] : improve pain control [Decrease stress] : decrease stress [Decrease hospital use] : decrease hospital use [Minimize unnecessary interventions] : minimize unnecessary interventions [Comfort Care] : comfort care [Maintain functional ability] : maintain functional ability [Discussed disease trajectory with patient/caregiver] : discussed disease trajectory with patient/caregiver [Likely to achieve goals/desired outcomes] : likely to achieve goals/desired outcomes [Patient/Caregiver has ___ understanding of disease process] : patient/caregiver has [unfilled] understanding of disease process [Completed Medical Orders for Life-Sustaining Treatment] : completed medical orders for life-sustaining treatment [DNR] : Code Status: DNR [Limited] : Treatment Guidelines: Limited [Trial of Intubation] : Intubation: Trial of Intubation [Normal Weight (BMI <25)] : normal weight - BMI <25 [Mediterranean diet recommended] : Mediterranean diet recommended [Non - Smoker] : non-smoker [Use assistive device to avoid falls] : use assistive device to avoid falls [Remove clutter and unsafe carpeting to avoid falls] : remove clutter and unsafe carpeting to avoid falls [Use grab bars] : use grab bars [Smoke/CO Detectors] : smoke/CO detectors [Not Indicated] : not indicated

## 2019-07-17 NOTE — PHYSICAL EXAM
[No Acute Distress] : no acute distress [Well Nourished] : well nourished [Well Developed] : well developed [Normal Voice/Communication] : normal voice communication [Normal Sclera/Conjunctiva] : normal sclera/conjunctiva [PERRL] : pupils equal, round and reactive to light [EOMI] : extra ocular movement intact [No JVD] : no jugular venous distention [Supple] : the neck was supple [No Respiratory Distress] : no respiratory distress [Clear to Auscultation] : lungs were clear to auscultation bilaterally [Normal Rate] : heart rate was normal  [Regular Rhythm] : with a regular rhythm [Normal S1, S2] : normal S1 and S2 [No Edema] : there was no peripheral edema [Normal Bowel Sounds] : normal bowel sounds [Non Tender] : non-tender [Soft] : abdomen soft [Not Distended] : not distended [No CVA Tenderness] : no ~M costovertebral angle tenderness [No Spinal Tenderness] : no spinal tenderness [Kyphosis] :  kyphosis present [No Joint Swelling] : no joint swelling seen [No Rash] : no rash [No Skin Lesions] : no skin lesions [No Motor Deficits] : the motor exam was normal [No Gross Sensory Deficits] : no gross sensory deficits [Normal Affect] : the affect was normal [de-identified] : pleasant, calm, mental status same as baseline, skin color good, well dressed [de-identified] : [place], wearing hearing aids, and using amplifier [de-identified] : 4/6 holo sys murmur,  [de-identified] : suprapubic catheter in place, urine clear [de-identified] : suprapubic catheter draining clear urine [de-identified] : unsteady gait, uses walker, holds onto things while walking, uses walker [de-identified] : oriented to place and person, hard to assess orientation 2/2 hearing problems

## 2019-07-17 NOTE — HISTORY OF PRESENT ILLNESS
[Patient] : patient [Formal Caregiver] : formal caregiver [FreeTextEntry1] : gait instability [FreeTextEntry2] : Pt is 89 yo M with Hx dementia, anemia hx GI bleed 2/2 Hpylori gastritis s/p admission for blood and iron transfusion 6/2017, urinary retention with suprapubic catheter, HLD, Insomnia, CKD stage 3, hypothyroid, hearing loss B/L,last hospitalization visit 2/2018 for tonic clonic seizures s/p intubation, c/b hypothermia, bradycardia and treatment for sepsis of unknown origin and dysphagia, re-hospitalization 3/2018 at [facility] for transfusion for hb 5.5 currently on procrit, seen for follow-up today.\par \par interval events- had leakage via suprapubic site, bigger size tube was inserted with resolution.\par \par patient has been doing good as per himself, and formal HHA with no active complaints.\par his rash on legs have resolved, no urinary issues\par denies fever, chills, nausea, vomiting, cough, SOB, urinary problems or pain.\par has suprapubic catheter 2/2 neurogenic bladder, changed monthly by home care  \par eating well with good appetite, patient had barium swallow which indicated NPO with alternate methods for feeding, daughters decided to go with pleasure feeds. patient prefers to eat regular food, no episodes of aspiration. weight stable.\par BM regular\par sleeping well on seroquel as per HHA\par very hard of hearing- uses hearing aids and amplifier\par patient ambulating with walker, no recent falls.\par has advanced dementia, no behavior problems, mood has been stable\par \par iron deficiency anemia 2/2 GI bleed- Had Upper endoscopy that did not show source of bleeding, was positive for H Pylori infection, and received complete treatment. on PPI. currently getting iron transfusions and procrit from hematologist office with good response. follows hematologist every 6 weeks.\par \par Hypothyroid - on Synthroid 150 mcg.\par ----------\par lives with spouse and HHA- 24/7\par daughter and grand son lives upstairs\par daughter is HCP

## 2019-08-23 ENCOUNTER — RX RENEWAL (OUTPATIENT)
Age: 84
End: 2019-08-23

## 2019-08-23 ENCOUNTER — APPOINTMENT (OUTPATIENT)
Dept: HOME HEALTH SERVICES | Facility: HOME HEALTH | Age: 84
End: 2019-08-23

## 2019-09-05 ENCOUNTER — RX RENEWAL (OUTPATIENT)
Age: 84
End: 2019-09-05

## 2019-09-10 ENCOUNTER — TRANSCRIPTION ENCOUNTER (OUTPATIENT)
Age: 84
End: 2019-09-10

## 2019-09-13 ENCOUNTER — INPATIENT (INPATIENT)
Facility: HOSPITAL | Age: 84
LOS: 1 days | Discharge: HOME HEALTH | DRG: 699 | End: 2019-09-15
Attending: INTERNAL MEDICINE | Admitting: INTERNAL MEDICINE
Payer: MEDICARE

## 2019-09-13 ENCOUNTER — TRANSCRIPTION ENCOUNTER (OUTPATIENT)
Age: 84
End: 2019-09-13

## 2019-09-13 VITALS
WEIGHT: 149.91 LBS | HEART RATE: 46 BPM | DIASTOLIC BLOOD PRESSURE: 62 MMHG | TEMPERATURE: 98 F | OXYGEN SATURATION: 100 % | RESPIRATION RATE: 18 BRPM | SYSTOLIC BLOOD PRESSURE: 116 MMHG

## 2019-09-13 DIAGNOSIS — R31.0 GROSS HEMATURIA: ICD-10-CM

## 2019-09-13 LAB
ALBUMIN SERPL ELPH-MCNC: 3.5 G/DL — SIGNIFICANT CHANGE UP (ref 3.3–5)
ALP SERPL-CCNC: 124 U/L — HIGH (ref 40–120)
ALT FLD-CCNC: 7 U/L — LOW (ref 10–45)
ANION GAP SERPL CALC-SCNC: 15 MMOL/L — SIGNIFICANT CHANGE UP (ref 5–17)
APTT BLD: 32.6 SEC — SIGNIFICANT CHANGE UP (ref 27.5–36.3)
AST SERPL-CCNC: 13 U/L — SIGNIFICANT CHANGE UP (ref 10–40)
BASOPHILS # BLD AUTO: 0.1 K/UL — SIGNIFICANT CHANGE UP (ref 0–0.2)
BASOPHILS # BLD AUTO: 0.1 K/UL — SIGNIFICANT CHANGE UP (ref 0–0.2)
BASOPHILS NFR BLD AUTO: 0.6 % — SIGNIFICANT CHANGE UP (ref 0–2)
BASOPHILS NFR BLD AUTO: 0.7 % — SIGNIFICANT CHANGE UP (ref 0–2)
BILIRUB SERPL-MCNC: 0.7 MG/DL — SIGNIFICANT CHANGE UP (ref 0.2–1.2)
BUN SERPL-MCNC: 54 MG/DL — HIGH (ref 7–23)
CALCIUM SERPL-MCNC: 9.2 MG/DL — SIGNIFICANT CHANGE UP (ref 8.4–10.5)
CHLORIDE SERPL-SCNC: 105 MMOL/L — SIGNIFICANT CHANGE UP (ref 96–108)
CO2 SERPL-SCNC: 20 MMOL/L — LOW (ref 22–31)
CREAT SERPL-MCNC: 2.35 MG/DL — HIGH (ref 0.5–1.3)
EOSINOPHIL # BLD AUTO: 0.3 K/UL — SIGNIFICANT CHANGE UP (ref 0–0.5)
EOSINOPHIL # BLD AUTO: 0.3 K/UL — SIGNIFICANT CHANGE UP (ref 0–0.5)
EOSINOPHIL NFR BLD AUTO: 3.6 % — SIGNIFICANT CHANGE UP (ref 0–6)
EOSINOPHIL NFR BLD AUTO: 3.6 % — SIGNIFICANT CHANGE UP (ref 0–6)
GLUCOSE SERPL-MCNC: 109 MG/DL — HIGH (ref 70–99)
HCT VFR BLD CALC: 29.4 % — LOW (ref 39–50)
HCT VFR BLD CALC: 31.8 % — LOW (ref 39–50)
HGB BLD-MCNC: 10.3 G/DL — LOW (ref 13–17)
HGB BLD-MCNC: 9.7 G/DL — LOW (ref 13–17)
INR BLD: 1.1 RATIO — SIGNIFICANT CHANGE UP (ref 0.88–1.16)
LYMPHOCYTES # BLD AUTO: 1.6 K/UL — SIGNIFICANT CHANGE UP (ref 1–3.3)
LYMPHOCYTES # BLD AUTO: 1.7 K/UL — SIGNIFICANT CHANGE UP (ref 1–3.3)
LYMPHOCYTES # BLD AUTO: 17.4 % — SIGNIFICANT CHANGE UP (ref 13–44)
LYMPHOCYTES # BLD AUTO: 19.4 % — SIGNIFICANT CHANGE UP (ref 13–44)
MCHC RBC-ENTMCNC: 29.8 PG — SIGNIFICANT CHANGE UP (ref 27–34)
MCHC RBC-ENTMCNC: 30.3 PG — SIGNIFICANT CHANGE UP (ref 27–34)
MCHC RBC-ENTMCNC: 32.3 GM/DL — SIGNIFICANT CHANGE UP (ref 32–36)
MCHC RBC-ENTMCNC: 32.9 GM/DL — SIGNIFICANT CHANGE UP (ref 32–36)
MCV RBC AUTO: 91.9 FL — SIGNIFICANT CHANGE UP (ref 80–100)
MCV RBC AUTO: 92.4 FL — SIGNIFICANT CHANGE UP (ref 80–100)
MONOCYTES # BLD AUTO: 0.8 K/UL — SIGNIFICANT CHANGE UP (ref 0–0.9)
MONOCYTES # BLD AUTO: 0.8 K/UL — SIGNIFICANT CHANGE UP (ref 0–0.9)
MONOCYTES NFR BLD AUTO: 9 % — SIGNIFICANT CHANGE UP (ref 2–14)
MONOCYTES NFR BLD AUTO: 9.6 % — SIGNIFICANT CHANGE UP (ref 2–14)
NEUTROPHILS # BLD AUTO: 5.9 K/UL — SIGNIFICANT CHANGE UP (ref 1.8–7.4)
NEUTROPHILS # BLD AUTO: 6.2 K/UL — SIGNIFICANT CHANGE UP (ref 1.8–7.4)
NEUTROPHILS NFR BLD AUTO: 66.8 % — SIGNIFICANT CHANGE UP (ref 43–77)
NEUTROPHILS NFR BLD AUTO: 69.3 % — SIGNIFICANT CHANGE UP (ref 43–77)
PLATELET # BLD AUTO: 160 K/UL — SIGNIFICANT CHANGE UP (ref 150–400)
PLATELET # BLD AUTO: 169 K/UL — SIGNIFICANT CHANGE UP (ref 150–400)
POTASSIUM SERPL-MCNC: 4.9 MMOL/L — SIGNIFICANT CHANGE UP (ref 3.5–5.3)
POTASSIUM SERPL-SCNC: 4.9 MMOL/L — SIGNIFICANT CHANGE UP (ref 3.5–5.3)
PROT SERPL-MCNC: 7.1 G/DL — SIGNIFICANT CHANGE UP (ref 6–8.3)
PROTHROM AB SERPL-ACNC: 12.6 SEC — SIGNIFICANT CHANGE UP (ref 10–12.9)
RBC # BLD: 3.2 M/UL — LOW (ref 4.2–5.8)
RBC # BLD: 3.44 M/UL — LOW (ref 4.2–5.8)
RBC # FLD: 12.5 % — SIGNIFICANT CHANGE UP (ref 10.3–14.5)
RBC # FLD: 12.5 % — SIGNIFICANT CHANGE UP (ref 10.3–14.5)
SODIUM SERPL-SCNC: 140 MMOL/L — SIGNIFICANT CHANGE UP (ref 135–145)
WBC # BLD: 8.8 K/UL — SIGNIFICANT CHANGE UP (ref 3.8–10.5)
WBC # BLD: 9 K/UL — SIGNIFICANT CHANGE UP (ref 3.8–10.5)
WBC # FLD AUTO: 8.8 K/UL — SIGNIFICANT CHANGE UP (ref 3.8–10.5)
WBC # FLD AUTO: 9 K/UL — SIGNIFICANT CHANGE UP (ref 3.8–10.5)

## 2019-09-13 PROCEDURE — 99285 EMERGENCY DEPT VISIT HI MDM: CPT

## 2019-09-13 RX ORDER — CEFTRIAXONE 500 MG/1
1000 INJECTION, POWDER, FOR SOLUTION INTRAMUSCULAR; INTRAVENOUS ONCE
Refills: 0 | Status: COMPLETED | OUTPATIENT
Start: 2019-09-13 | End: 2019-09-13

## 2019-09-13 RX ORDER — SODIUM CHLORIDE 9 MG/ML
1000 INJECTION INTRAMUSCULAR; INTRAVENOUS; SUBCUTANEOUS ONCE
Refills: 0 | Status: COMPLETED | OUTPATIENT
Start: 2019-09-13 | End: 2019-09-13

## 2019-09-13 RX ORDER — QUETIAPINE FUMARATE 200 MG/1
50 TABLET, FILM COATED ORAL ONCE
Refills: 0 | Status: COMPLETED | OUTPATIENT
Start: 2019-09-13 | End: 2019-09-14

## 2019-09-13 RX ADMIN — SODIUM CHLORIDE 1000 MILLILITER(S): 9 INJECTION INTRAMUSCULAR; INTRAVENOUS; SUBCUTANEOUS at 22:18

## 2019-09-13 RX ADMIN — CEFTRIAXONE 100 MILLIGRAM(S): 500 INJECTION, POWDER, FOR SOLUTION INTRAMUSCULAR; INTRAVENOUS at 22:23

## 2019-09-13 NOTE — ED PROVIDER NOTE - CLINICAL SUMMARY MEDICAL DECISION MAKING FREE TEXT BOX
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   will hold active pressure  which assisted in stopping the bleeding   urology consult  recommending admission for hemoglobin trending give pts comorbidities and hx of severe anemia

## 2019-09-13 NOTE — ED ADULT NURSE NOTE - NSIMPLEMENTINTERV_GEN_ALL_ED
Implemented All Fall with Harm Risk Interventions:  [place] to call system. Call bell, personal items and telephone within reach. Instruct patient to call for assistance. Room bathroom lighting operational. Non-slip footwear when patient is off stretcher. Physically safe environment: no spills, clutter or unnecessary equipment. Stretcher in lowest position, wheels locked, appropriate side rails in place. Provide visual cue, wrist band, yellow gown, etc. Monitor gait and stability. Monitor for mental status changes and reorient to person, place, and time. Review medications for side effects contributing to fall risk. Reinforce activity limits and safety measures with patient and family. Provide visual clues: red socks.

## 2019-09-13 NOTE — CONSULT NOTE ADULT - SUBJECTIVE AND OBJECTIVE BOX
HPI    89M w PMH neurogenic bladder, Sp catheter, aFib, CKD, anemia, dementia presents today c/o bleeding from his penis s/p SP catheter scheduled replacement by visiting nurse earlier today. As per family right after suprapubic catheter replacement he started bleeding from his penis. As per family urine in the bag was bloody and then bag was exchanged and urine cleared up to yellow. Family states that pt was uncomfortable and probably in pain in his abdomen at home. He is non verbal at baseline and is not able answer the question. Family denies fever, N/V, previous episodes of hematuria. Urologist Dr. Goldberg.     PAST MEDICAL & SURGICAL HISTORY:  Anemia  Neurogenic bladder  Hyperlipidemia  Dementia  Hypothyroid  CKD (chronic kidney disease)  No significant past surgical history      MEDICATIONS  (STANDING):  Home Medications:  Aspirin Enteric Coated 81 mg oral delayed release tablet: 1 tab(s) orally once a day (13 Mar 2018 03:06)  Lasix 20 mg oral tablet: 1 tab(s) orally once a day (13 Mar 2018 03:06)  levETIRAcetam 500 mg oral tablet: 0.5 tab(s) orally 2 times a day (25 Mar 2018 14:43)  levothyroxine 150 mcg (0.15 mg) oral tablet: 1 tab(s) orally once a day (14 Mar 2018 11:20)    FAMILY HISTORY:  No pertinent family history in first degree relatives      Allergies    No Known Allergies    SOCIAL HISTORY: lives at home with home aid     REVIEW OF SYSTEMS: Otherwise negative as stated in HPI    Physical Exam    Gen awake, alert and oriented x 0, NAD. Pt does not follow commands or answer questions.   Abd soft, non distended    bladder non palpable, SP in place draining yellow clear urine, dressing clean, dry, intact. Uncircumcised, no lesions.  Dry small amount of blood at urethral meatus.  Testes descended bilaterally.  Testes and epididymis nontender bilaterally.        Vital signs  T(C): 36.6 (09-13-19 @ 17:21), Max: 36.6 (09-13-19 @ 17:21)  HR: 52 (09-13-19 @ 17:21)  BP: 112/51 (09-13-19 @ 17:21)  SpO2: 100% (09-13-19 @ 17:21)  Wt(kg): --    LABS:      09-13 @ 19:01    WBC 8.8   / Hct 31.8  / SCr --             PT/INR - ( 13 Sep 2019 19:01 )   PT: 12.6 sec;   INR: 1.10 ratio         PTT - ( 13 Sep 2019 19:01 )  PTT:32.6 sec

## 2019-09-13 NOTE — ED ADULT NURSE NOTE - OBJECTIVE STATEMENT
89YOM pmh CKD, HLD, anemia, dementia presents to the ED c/o hematuria in the edwards. Per daughter at bedside, pt has a permanent edwards which is changed monthly. Today pt got his monthly change and felt uncomfortably, also blood was noted in the edwards which has not happened before per daughter. Denies burning upon urination, fever, chills, HA, weakness, CP, SOB, N/V/D, abd pain. Safety and comfort measures provided. Will continue to monitor. Home aid and daughter at bedside. 89YOM pmh CKD, HLD, anemia, dementia presents to the ED c/o hematuria in the suprapubic catheter. Per daughter at bedside, pt has a permanent catheter which is changed monthly. Today pt got his monthly change and felt uncomfortable, also blood was noted in the urine bag which has not happened before per daughter. Moderate amount of dry blood noted around the penis and in the diaper, but pt not actively bleeding. Denies burning upon urination, fever, chills, HA, weakness, CP, SOB, N/V/D, abd pain. Safety and comfort measures provided. Will continue to monitor. Home aid and daughter at bedside.

## 2019-09-13 NOTE — CONSULT NOTE ADULT - ASSESSMENT
89M w PMH neurogenic bladder, Sp catheter, aFib, CKD, anemia, dementia presents today c/o bleeding from his penis s/p SP catheter scheduled replacement by visiting nurse earlier today.  Urologist Dr. Goldberg.     PVR 28cc, WBC 8.8, Cr 2.37 which is close to his baseline, AVSS    TBD w attending 89M w PMH neurogenic bladder, Sp catheter, aFib, CKD, anemia, dementia presents today c/o bleeding from his penis s/p SP catheter scheduled replacement by visiting nurse earlier today.  Urologist Dr. Goldberg.     PVR 28cc, WBC 8.8, Cr 2.37 which is close to his baseline, AVSS    d/w Dr. Roberts    Plan as per Dr. Roberts:    - CDU for observation if available  –Monitor urine output and color   - IVF, pain control PRN  - [place] suppository PRN for bladder spasms    –Trend Creatinine   –Trend H/H, Transfuse as need   –F/U UA, Urine Cx

## 2019-09-13 NOTE — ED PROVIDER NOTE - PHYSICAL EXAMINATION
I have reviewed the triage vital signs.    Const: Well-nourished, appearing older than stated age  Eyes: no conjunctival injection and no scleral icterus  ENMT: Atraumatic external nose and ears, Moist mucus membranes  Neck: Symmetric, trachea midline,  CVS: +S1/S2, radial pulse 2+ bilaterally  RESP: Unlabored respiratory effort, Clear to auscultation bilaterally  GI: Nontender/Nondistended soft abdomen ; suprapubic catheter inplace  : active oozing from the penis,   MSK: Normocephalic/Atraumatic, Extremities w/o deformity or ttp   Skin: Warm, Dry w/ no rashes  Psych: awake, and alert

## 2019-09-13 NOTE — ED ADULT TRIAGE NOTE - NS ED NURSE BANDS TYPE
FMLA or Disability Forms were received and faxed to the Forms Completion Department today at [phone]. (Please include all appropriate authorization forms with your fax).    Did you have the patient complete (in full) and sign the “Authorization For Disclosure of Health Information Forms Completion” form? Yes    Have you communicated that the Forms Completion Process may take up to 14 days? Yes    If you have questions about this encounter, please contact the Forms Completion Dept at [phone], Option 3.   Name band;

## 2019-09-13 NOTE — ED PROVIDER NOTE - OBJECTIVE STATEMENT
89 M w/ PMH of suprapubic catheter, 2/2 neurogenic bladder BIBEMS p/w hematuria after suprapubic catheter was replaced today. Pt reports no fevers, no chills no nausea no vomiting. Pt had spontaneous bleeding from the penis. no hx of clots, 89 M w/ PMH of suprapubic catheter, 2/2 neurogenic bladder BIBEMS p/w hematuria after suprapubic catheter was replaced today. Pt reports no fevers, no chills no nausea no vomiting. Pt had spontaneous bleeding from the penis. no hx of clots,  Pt is accompanied w/ family who provide the history.

## 2019-09-13 NOTE — ED CLERICAL - NS ED CLERK NOTE PRE-ARRIVAL INFORMATION; ADDITIONAL PRE-ARRIVAL INFORMATION

## 2019-09-13 NOTE — ED ADULT NURSE REASSESSMENT NOTE - NS ED NURSE REASSESS COMMENT FT1
Per MD Moya, cbc to be repeated at 11pm. Family expressing frustration with lack of communication and daughter states "No one has come to talk to us since the urologist left. He said he would received fluids and an antibiotic I requested. We asked for ice cream and water an hour ago. I want to speak to the doctor to see what's gong on." MD Moya made aware that pt's family would like update on plan of care.

## 2019-09-14 DIAGNOSIS — R31.0 GROSS HEMATURIA: ICD-10-CM

## 2019-09-14 DIAGNOSIS — Z96.651 PRESENCE OF RIGHT ARTIFICIAL KNEE JOINT: Chronic | ICD-10-CM

## 2019-09-14 DIAGNOSIS — N30.01 ACUTE CYSTITIS WITH HEMATURIA: ICD-10-CM

## 2019-09-14 DIAGNOSIS — G40.909 EPILEPSY, UNSPECIFIED, NOT INTRACTABLE, WITHOUT STATUS EPILEPTICUS: ICD-10-CM

## 2019-09-14 DIAGNOSIS — R13.10 DYSPHAGIA, UNSPECIFIED: ICD-10-CM

## 2019-09-14 DIAGNOSIS — Z93.59 OTHER CYSTOSTOMY STATUS: Chronic | ICD-10-CM

## 2019-09-14 DIAGNOSIS — N18.9 CHRONIC KIDNEY DISEASE, UNSPECIFIED: ICD-10-CM

## 2019-09-14 DIAGNOSIS — F03.90 UNSPECIFIED DEMENTIA WITHOUT BEHAVIORAL DISTURBANCE: ICD-10-CM

## 2019-09-14 DIAGNOSIS — Z96.659 PRESENCE OF UNSPECIFIED ARTIFICIAL KNEE JOINT: Chronic | ICD-10-CM

## 2019-09-14 DIAGNOSIS — R00.1 BRADYCARDIA, UNSPECIFIED: ICD-10-CM

## 2019-09-14 DIAGNOSIS — Z98.890 OTHER SPECIFIED POSTPROCEDURAL STATES: Chronic | ICD-10-CM

## 2019-09-14 LAB
ALBUMIN SERPL ELPH-MCNC: 3.3 G/DL — SIGNIFICANT CHANGE UP (ref 3.3–5)
ALP SERPL-CCNC: 107 U/L — SIGNIFICANT CHANGE UP (ref 40–120)
ALT FLD-CCNC: 6 U/L — LOW (ref 10–45)
ANION GAP SERPL CALC-SCNC: 14 MMOL/L — SIGNIFICANT CHANGE UP (ref 5–17)
APPEARANCE UR: ABNORMAL
AST SERPL-CCNC: 11 U/L — SIGNIFICANT CHANGE UP (ref 10–40)
BACTERIA # UR AUTO: ABNORMAL
BASOPHILS # BLD AUTO: 0.07 K/UL — SIGNIFICANT CHANGE UP (ref 0–0.2)
BASOPHILS NFR BLD AUTO: 1.1 % — SIGNIFICANT CHANGE UP (ref 0–2)
BILIRUB SERPL-MCNC: 0.7 MG/DL — SIGNIFICANT CHANGE UP (ref 0.2–1.2)
BILIRUB UR-MCNC: NEGATIVE — SIGNIFICANT CHANGE UP
BLD GP AB SCN SERPL QL: NEGATIVE — SIGNIFICANT CHANGE UP
BUN SERPL-MCNC: 50 MG/DL — HIGH (ref 7–23)
CALCIUM SERPL-MCNC: 9.3 MG/DL — SIGNIFICANT CHANGE UP (ref 8.4–10.5)
CHLORIDE SERPL-SCNC: 107 MMOL/L — SIGNIFICANT CHANGE UP (ref 96–108)
CO2 SERPL-SCNC: 20 MMOL/L — LOW (ref 22–31)
COLOR SPEC: SIGNIFICANT CHANGE UP
CREAT SERPL-MCNC: 2.29 MG/DL — HIGH (ref 0.5–1.3)
DIFF PNL FLD: ABNORMAL
EOSINOPHIL # BLD AUTO: 0.31 K/UL — SIGNIFICANT CHANGE UP (ref 0–0.5)
EOSINOPHIL NFR BLD AUTO: 4.7 % — SIGNIFICANT CHANGE UP (ref 0–6)
EPI CELLS # UR: 0 /HPF — SIGNIFICANT CHANGE UP
GLUCOSE SERPL-MCNC: 85 MG/DL — SIGNIFICANT CHANGE UP (ref 70–99)
GLUCOSE UR QL: NEGATIVE — SIGNIFICANT CHANGE UP
HCT VFR BLD CALC: 28.9 % — LOW (ref 39–50)
HGB BLD-MCNC: 8.8 G/DL — LOW (ref 13–17)
HYALINE CASTS # UR AUTO: 1 /LPF — SIGNIFICANT CHANGE UP (ref 0–2)
IMM GRANULOCYTES NFR BLD AUTO: 0.3 % — SIGNIFICANT CHANGE UP (ref 0–1.5)
KETONES UR-MCNC: NEGATIVE — SIGNIFICANT CHANGE UP
LEUKOCYTE ESTERASE UR-ACNC: ABNORMAL
LYMPHOCYTES # BLD AUTO: 1.46 K/UL — SIGNIFICANT CHANGE UP (ref 1–3.3)
LYMPHOCYTES # BLD AUTO: 21.9 % — SIGNIFICANT CHANGE UP (ref 13–44)
MAGNESIUM SERPL-MCNC: 2.2 MG/DL — SIGNIFICANT CHANGE UP (ref 1.6–2.6)
MCHC RBC-ENTMCNC: 28.6 PG — SIGNIFICANT CHANGE UP (ref 27–34)
MCHC RBC-ENTMCNC: 30.4 GM/DL — LOW (ref 32–36)
MCV RBC AUTO: 93.8 FL — SIGNIFICANT CHANGE UP (ref 80–100)
MONOCYTES # BLD AUTO: 0.74 K/UL — SIGNIFICANT CHANGE UP (ref 0–0.9)
MONOCYTES NFR BLD AUTO: 11.1 % — SIGNIFICANT CHANGE UP (ref 2–14)
NEUTROPHILS # BLD AUTO: 4.06 K/UL — SIGNIFICANT CHANGE UP (ref 1.8–7.4)
NEUTROPHILS NFR BLD AUTO: 60.9 % — SIGNIFICANT CHANGE UP (ref 43–77)
NITRITE UR-MCNC: POSITIVE
PH UR: 6 — SIGNIFICANT CHANGE UP (ref 5–8)
PHOSPHATE SERPL-MCNC: 3.6 MG/DL — SIGNIFICANT CHANGE UP (ref 2.5–4.5)
PLATELET # BLD AUTO: 151 K/UL — SIGNIFICANT CHANGE UP (ref 150–400)
POTASSIUM SERPL-MCNC: 4.2 MMOL/L — SIGNIFICANT CHANGE UP (ref 3.5–5.3)
POTASSIUM SERPL-SCNC: 4.2 MMOL/L — SIGNIFICANT CHANGE UP (ref 3.5–5.3)
PROT SERPL-MCNC: 6.6 G/DL — SIGNIFICANT CHANGE UP (ref 6–8.3)
PROT UR-MCNC: ABNORMAL
RBC # BLD: 3.08 M/UL — LOW (ref 4.2–5.8)
RBC # FLD: 13.3 % — SIGNIFICANT CHANGE UP (ref 10.3–14.5)
RBC CASTS # UR COMP ASSIST: 83 /HPF — HIGH (ref 0–4)
RH IG SCN BLD-IMP: POSITIVE — SIGNIFICANT CHANGE UP
SODIUM SERPL-SCNC: 141 MMOL/L — SIGNIFICANT CHANGE UP (ref 135–145)
SP GR SPEC: 1.01 — SIGNIFICANT CHANGE UP (ref 1.01–1.02)
TSH SERPL-MCNC: 0.07 UIU/ML — LOW (ref 0.27–4.2)
UROBILINOGEN FLD QL: NEGATIVE — SIGNIFICANT CHANGE UP
WBC # BLD: 6.66 K/UL — SIGNIFICANT CHANGE UP (ref 3.8–10.5)
WBC # FLD AUTO: 6.66 K/UL — SIGNIFICANT CHANGE UP (ref 3.8–10.5)
WBC UR QL: 27 /HPF — HIGH (ref 0–5)

## 2019-09-14 PROCEDURE — 72192 CT PELVIS W/O DYE: CPT | Mod: 26

## 2019-09-14 PROCEDURE — 99223 1ST HOSP IP/OBS HIGH 75: CPT

## 2019-09-14 RX ORDER — LEVOTHYROXINE SODIUM 125 MCG
150 TABLET ORAL DAILY
Refills: 0 | Status: DISCONTINUED | OUTPATIENT
Start: 2019-09-14 | End: 2019-09-15

## 2019-09-14 RX ORDER — CEFTRIAXONE 500 MG/1
1000 INJECTION, POWDER, FOR SOLUTION INTRAMUSCULAR; INTRAVENOUS EVERY 24 HOURS
Refills: 0 | Status: DISCONTINUED | OUTPATIENT
Start: 2019-09-14 | End: 2019-09-14

## 2019-09-14 RX ORDER — LEVETIRACETAM 250 MG/1
250 TABLET, FILM COATED ORAL
Refills: 0 | Status: DISCONTINUED | OUTPATIENT
Start: 2019-09-14 | End: 2019-09-15

## 2019-09-14 RX ORDER — FUROSEMIDE 40 MG
20 TABLET ORAL DAILY
Refills: 0 | Status: DISCONTINUED | OUTPATIENT
Start: 2019-09-14 | End: 2019-09-15

## 2019-09-14 RX ORDER — HEPARIN SODIUM 5000 [USP'U]/ML
1 INJECTION INTRAVENOUS; SUBCUTANEOUS
Qty: 0 | Refills: 0 | DISCHARGE

## 2019-09-14 RX ORDER — PIPERACILLIN AND TAZOBACTAM 4; .5 G/20ML; G/20ML
3.38 INJECTION, POWDER, LYOPHILIZED, FOR SOLUTION INTRAVENOUS EVERY 8 HOURS
Refills: 0 | Status: DISCONTINUED | OUTPATIENT
Start: 2019-09-14 | End: 2019-09-15

## 2019-09-14 RX ORDER — QUETIAPINE FUMARATE 200 MG/1
50 TABLET, FILM COATED ORAL AT BEDTIME
Refills: 0 | Status: DISCONTINUED | OUTPATIENT
Start: 2019-09-14 | End: 2019-09-15

## 2019-09-14 RX ORDER — ASPIRIN/CALCIUM CARB/MAGNESIUM 324 MG
1 TABLET ORAL
Qty: 0 | Refills: 0 | DISCHARGE

## 2019-09-14 RX ORDER — PIPERACILLIN AND TAZOBACTAM 4; .5 G/20ML; G/20ML
3.38 INJECTION, POWDER, LYOPHILIZED, FOR SOLUTION INTRAVENOUS ONCE
Refills: 0 | Status: COMPLETED | OUTPATIENT
Start: 2019-09-14 | End: 2019-09-14

## 2019-09-14 RX ADMIN — PIPERACILLIN AND TAZOBACTAM 25 GRAM(S): 4; .5 INJECTION, POWDER, LYOPHILIZED, FOR SOLUTION INTRAVENOUS at 22:00

## 2019-09-14 RX ADMIN — LEVETIRACETAM 250 MILLIGRAM(S): 250 TABLET, FILM COATED ORAL at 09:08

## 2019-09-14 RX ADMIN — CEFTRIAXONE 100 MILLIGRAM(S): 500 INJECTION, POWDER, FOR SOLUTION INTRAMUSCULAR; INTRAVENOUS at 09:08

## 2019-09-14 RX ADMIN — PIPERACILLIN AND TAZOBACTAM 200 GRAM(S): 4; .5 INJECTION, POWDER, LYOPHILIZED, FOR SOLUTION INTRAVENOUS at 18:40

## 2019-09-14 RX ADMIN — Medication 20 MILLIGRAM(S): at 05:35

## 2019-09-14 RX ADMIN — QUETIAPINE FUMARATE 50 MILLIGRAM(S): 200 TABLET, FILM COATED ORAL at 00:05

## 2019-09-14 RX ADMIN — LEVETIRACETAM 250 MILLIGRAM(S): 250 TABLET, FILM COATED ORAL at 18:40

## 2019-09-14 RX ADMIN — Medication 150 MICROGRAM(S): at 05:35

## 2019-09-14 NOTE — CONSULT NOTE ADULT - SUBJECTIVE AND OBJECTIVE BOX
HPI:   Patient is a 89y male with PMH significant for HLD, advanced dementia, severe AS, dysphagia, hypothyroidism, Afib not on a/c 2/2 GIB, CAD, CKD neurogenic bladder s/p suprapubic tube since 2016 who has had UTIs in the past & undergoes q 4 wkly SPT change. SPT was changed yesterday as scheduled at home by VNS but procedure complicated by severe pain followed by spontaneous bleed from the penis. Brought in to the ER. Given IV hydration & IV ceftriaxone. Seen by  & given nitrites on UA - antibiotics recommended for possible UTI - ID called.    REVIEW OF SYSTEMS:  Limited - advanced dementia - history as per daughter at bedside    PAST MEDICAL & SURGICAL HISTORY:  Anemia  Neurogenic bladder  Hyperlipidemia  Dementia  Hypothyroid  CKD (chronic kidney disease)  S/P knee replacement  H/O hernia repair  Suprapubic catheter      Allergies  No Known Allergies    Intolerances        Antimicrobials Day #  : 1  cefTRIAXone   IVPB 1000 milliGRAM(s) IV Intermittent every 24 hours    Other Medications:  furosemide    Tablet 20 milliGRAM(s) Oral daily  levETIRAcetam 250 milliGRAM(s) Oral two times a day  levothyroxine 150 MICROGram(s) Oral daily  QUEtiapine 50 milliGRAM(s) Oral at bedtime      FAMILY HISTORY:  No pertinent family history in first degree relatives      SOCIAL HISTORY:  Smoking:  No   ETOH:  No   Drug Use:  No        T(F): 97.9 (19 @ 09:10), Max: 98.6 (19 @ 00:46)  HR: 59 (19 @ 10:57)  BP: 118/69 (19 @ 10:57)  RR: 18 (19 @ 09:10)  SpO2: 98% (19 @ 09:10)  Wt(kg): --    PHYSICAL EXAM:  General: alert, no acute distress  Eyes:  anicteric, no conjunctival injection, no discharge  Neck: supple, without adenopathy  Lungs: clear to auscultation  Heart: regular rate and rhythm;  3/6 systolic murmur  Abdomen: soft, nondistended, nontender, without mass or organomegaly  : SPT with clear urine  Extremities: 1+ edema  Neurologic: awake, in no distress & non toxic     LAB RESULTS:                        8.8    6.66  )-----------( 151      ( 14 Sep 2019 10:44 )             28.9         141  |  107  |  50<H>  ----------------------------<  85  4.2   |  20<L>  |  2.29<H>    Ca    9.3      14 Sep 2019 07:16  Phos  3.6       Mg     2.2         TPro  6.6  /  Alb  3.3  /  TBili  0.7  /  DBili  x   /  AST  11  /  ALT  6<L>  /  AlkPhos  107      LIVER FUNCTIONS - ( 14 Sep 2019 07:16 )  Alb: 3.3 g/dL / Pro: 6.6 g/dL / ALK PHOS: 107 U/L / ALT: 6 U/L / AST: 11 U/L / GGT: x           Urinalysis Basic - ( 14 Sep 2019 00:05 )    Color: Light Yellow / Appearance: Slightly Turbid / S.013 / pH: x  Gluc: x / Ketone: Negative  / Bili: Negative / Urobili: Negative   Blood: x / Protein: 30 mg/dL / Nitrite: Positive   Leuk Esterase: Large / RBC: 83 /hpf / WBC 27 /HPF   Sq Epi: x / Non Sq Epi: 0 /hpf / Bacteria: Many        MICROBIOLOGY:  RECENT CULTURES:        RADIOLOGY REVIEWED:

## 2019-09-14 NOTE — H&P ADULT - PROBLEM SELECTOR PLAN 1
- 2/2 traumatic edwards insertion  - urine bag appears improved  - pt no longer on asa or a/c despite afib  - seen by urol  - per daughter's request, will get CT pelvis to r/o obvious trauma  - monitor cbc - likely 2/2 traumatic edwards insertion  - urine bag appears improved  - pt no longer on asa or a/c despite afib  - seen by urol  - per daughter's request, will get CT pelvis to r/o obvious trauma  - monitor cbc

## 2019-09-14 NOTE — CONSULT NOTE ADULT - ASSESSMENT
89yM with neurogenic bladder, gross hematuria, urinary retention, BPH    -Hematuria appears resolved  -Pain control  -Hydration  -Bowel regimen  -Maintain SP tube  -SP tube appears to be draining well after readjustment  -Nitrate positive--given urinary/prostatic trauma, would treat fo presumed UTI  -Further management per primary  -Thank you for the consult. Please call with questions. Urology signing off.

## 2019-09-14 NOTE — H&P ADULT - PROBLEM SELECTOR PLAN 6
- daughter is aware of bradycardia, but refuses pacemaker at this time - cont pureed honey diet  - pt's daughter requests pleasure feeds despite risk of aspiration and death. pt's daughter states that g tube was previously offered but refused.

## 2019-09-14 NOTE — H&P ADULT - PROBLEM SELECTOR PLAN 3
- at baseline - PT eval  - pt's daughter requests medical management but limited procedural interventions and more quality of life/comfort approach. refuses tavr or feeding tube.

## 2019-09-14 NOTE — H&P ADULT - PROBLEM SELECTOR PLAN 2
- PT eval  - pt's daughter requests medical management but limited procedural interventions and more quality of life/comfort approach. refuses tavr or feeding tube. - unclear if UTI or colonization  - cont ceftriaxone for now

## 2019-09-14 NOTE — H&P ADULT - HISTORY OF PRESENT ILLNESS
89M c hx advanced dementia (A&Ox1-2 baseline), very [place], neurogenic bladder s/p suprapubic catheter, CECY on IV iron, Afib not on a/c 2/2 GIB, CAD, CKD (baseline Cr 2.2), GTC seizure d/o on keppra, hpylori gastritis, bradycardia, dysphagia on ?pleasure feeds, pw hematuria and bleeding from penis meatus.    Pt is , lives on first flood of 2 story house. Pt's daughter Rosas ([phone]) lives upstairs with her family. Pt had suprapubic cath changed yesterday, c/b bleeding in urine bag and also from penis. Pt currently has no complaints.    VS: Tm 98.4, P 46, /54, R 18, 96% RA  In the Ed, received ceftriaxone, NS 1L, seroquel 50. 89M c hx advanced dementia (A&Ox1-2 baseline), very [place], severe AS (0.6 cm2), mod MR, neurogenic bladder s/p suprapubic catheter, CECY on IV iron, Afib not on a/c 2/2 GIB, CAD, CKD (baseline Cr 2.2), GTC seizure d/o on keppra, hpylori gastritis, bradycardia, dysphagia on ?pleasure feeds, pw hematuria and bleeding from penis meatus.    Pt is , lives on first flood of 2 story house. Pt's daughter Rosas ([phone]) lives upstairs with her family. Pt had suprapubic cath changed yesterday, c/b bleeding in urine bag and also from penis. Pt currently has no complaints.    VS: Tm 98.4, P 46, /54, R 18, 96% RA  In the Ed, received ceftriaxone, NS 1L, seroquel 50. 89M c hx advanced dementia (A&Ox1-2 baseline), very [place], severe AS (0.6 cm2), mod MR, neurogenic bladder s/p suprapubic catheter, CECY on IV iron, Afib not on a/c 2/2 GIB, CAD, CKD (baseline Cr 2.2), GTC seizure d/o on keppra, hpylori gastritis, bradycardia, dysphagia on pleasure feeds, pw hematuria and bleeding from penis meatus.    Pt is , lives alone. Spoke with pt's daughter Rosas ([phone]) over phone. Pt had suprapubic cath changed yesterday by a new visiting nurse, c/b bleeding in urine bag and also from penis. Pt complaining of pain at that time, and family/aide also noticed bed was soaked in blood. At baseline, pt requires full assist to ambulate, and is otherwise wheelchair bound. Pt current denying any pain, or knowing why he is in the hospital.    VS: Tm 98.4, P 46, /54, R 18, 96% RA  In the Ed, received ceftriaxone, NS 1L, seroquel 50.

## 2019-09-14 NOTE — H&P ADULT - ASSESSMENT
89M c hx advanced dementia (A&Ox1-2 baseline), very [place], neurogenic bladder s/p suprapubic catheter, CECY on IV iron, Afib not on a/c 2/2 GIB, CAD, CKD (baseline Cr 2.2), GTC seizure d/o on keppra, hpylori gastritis, bradycardia, dysphagia on ?pleasure feeds, pw hematuria and bleeding from penis meatus. 89M c hx advanced dementia (A&Ox1-2 baseline), severe AS (0.6 cm2), mod MR, very [place], neurogenic bladder s/p suprapubic catheter, CECY on IV iron, Afib not on a/c 2/2 GIB, CAD, CKD (baseline Cr 2.2), GTC seizure d/o on keppra, hpylori gastritis, bradycardia, dysphagia on ?pleasure feeds, pw hematuria and bleeding from penis meatus.

## 2019-09-14 NOTE — H&P ADULT - NSHPSOCIALHISTORY_GEN_ALL_CORE
Social History:    Marital Status: (  ) , (  ) Single, (  ) , ( x ) , (  )   # of Children: 2 daughters  Lives with: (  ) alone, ( x ) children, (  ) spouse, (  ) parents, (  ) siblings, (  ) friends, (  ) other:   Occupation:     Substance Use/Illicit Drugs: (  ) never used vs other:   Tobacco Usage: ( x ) never smoked, (  ) former smoker, (  ) current smoker and Total Pack-Years:   Last Alcohol Usage/Frequency/Amount/Withdrawal/Hx of Abuse:  none  Foreign travel:   Animal exposure:

## 2019-09-14 NOTE — PHYSICAL THERAPY INITIAL EVALUATION ADULT - ADDITIONAL COMMENTS
Pt lives alone in a two family house w/ 10 steps to enter. DTR lives with family on the second level. Pt states that he ambulated independently and was independent with ADL's PTA.

## 2019-09-14 NOTE — CONSULT NOTE ADULT - ASSESSMENT
89y male with PMH significant for HLD, advanced dementia, severe AS, dysphagia, hypothyroidism, Afib not on a/c 2/2 GIB, CAD, CKD neurogenic bladder s/p suprapubic tube since 2016 who has had UTIs in the past & undergoes q 4 wkly SPT change.  SPT was changed yesterday as scheduled at home by VNS but procedure complicated by severe pain followed by spontaneous bleed from the penis.   Brought in to the ER, had no support for sepsis.  Given IV hydration & IV ceftriaxone.   Seen by  & given nitrites on UA - antibiotics recommended for possible UTI.  Unclear significance of nitrites in this patient with a chronic SPT - he is expected to have bacterial colonization & pyuria & nitrites. Bleed appears not to be infectious rather "traumatic" in nature - post procedure bleed - self improved as well.    PLAN:  Awaiting CT of abd & pelvis to ensure no pelvic bleed or complication that needs to be addressed  I will change Ceftriaxone to zosyn - known hx of VRE colonization   Anticipating to limit antibiotics soon - reviewed with daughter at bedside

## 2019-09-14 NOTE — PHYSICAL THERAPY INITIAL EVALUATION ADULT - PERTINENT HX OF CURRENT PROBLEM, REHAB EVAL
89M c hx advanced dementia (A&Ox1-2 baseline), severe AS (0.6 cm2), mod MR, very [place], neurogenic bladder s/p suprapubic catheter, CECY on IV iron, Afib not on a/c 2/2 GIB, CAD, CKD (baseline Cr 2.2), GTC seizure d/o on keppra, hpylori gastritis, bradycardia, dysphagia on ?pleasure feeds, pw hematuria and bleeding from penis meatus.

## 2019-09-14 NOTE — H&P ADULT - NSHPPOACENTRALVENOUSCATHETER_GEN_ALL_CORE
L&D Triage Note - OB/GYN  Lois Carter 32 y o  female MRN: [id number]  Unit/Bed#: LD Triage 2 Encounter: [id number]      Assessment:  32 y o   at 39w5d not labor    Plan:  1  SVE now   -Recheck after 2 hour  2  FHT monitor        ______________________________________________________________________      Chief Compliant: Contractions    TIME: 0300  Subjective:  32 y o   at 39w5d c/o contraction woke up pt from sleep at midnight  Intermittent every 4 minutes and 45 seconds, sharp, 6/10 intensity  Decline fluid leakage, vaginal bleeding, feeling good fetal movements  ROS negative  Pregnancy complicated by hypotyroidism and anemia  Objective:  Vitals:    18 0257   BP: 120/86   Pulse: 76   Resp: 16   Temp: 98 4 °F (36 9 °C)   SpO2:        SVE: 1 / 20% / -3  FHT:  122 / Moderate 6 - 25 bpm / +accels and no decels, reactive  [place]: q 4minutes    Progress:   SVE:1/20%/-3  /[place] 6-25 bpm/+accels and no decels, reactive  toco q every 7-8 minutes  Offered to pt about therapeutic rest or discharge to home  Pt want go home    Discussed with pt about labor sign  Recommended routine prenatal visits    D/w Dr Miladys Chapman MD [id number] 8:61 AM no

## 2019-09-14 NOTE — H&P ADULT - NSICDXPASTMEDICALHX_GEN_ALL_CORE_FT
PAST MEDICAL HISTORY:  Anemia     CKD (chronic kidney disease)     Dementia     Hyperlipidemia     Hypothyroid     Neurogenic bladder

## 2019-09-14 NOTE — PHYSICAL THERAPY INITIAL EVALUATION ADULT - CRITERIA FOR SKILLED THERAPEUTIC INTERVENTIONS
predicted duration of therapy intervention/risk reduction/prevention/anticipated equipment needs at discharge/impairments found/therapy frequency/anticipated discharge recommendation

## 2019-09-14 NOTE — H&P ADULT - PROBLEM SELECTOR PROBLEM 3
Chronic kidney disease, unspecified CKD stage Dementia without behavioral disturbance, unspecified dementia type

## 2019-09-14 NOTE — H&P ADULT - NSHPLABSRESULTS_GEN_ALL_CORE
Personally reviewed labs.   Personally reviewed imaging.                       9.7    9.0   )-----------( 160      ( 13 Sep 2019 23:40 )             29.4           140  |  105  |  54<H>  ----------------------------<  109<H>  4.9   |  20<L>  |  2.35<H>    Ca    9.2      13 Sep 2019 19:01    TPro  7.1  /  Alb  3.5  /  TBili  0.7  /  DBili  x   /  AST  13  /  ALT  7<L>  /  AlkPhos  124<H>              LIVER FUNCTIONS - ( 13 Sep 2019 19:01 )  Alb: 3.5 g/dL / Pro: 7.1 g/dL / ALK PHOS: 124 U/L / ALT: 7 U/L / AST: 13 U/L / GGT: x             PT/INR - ( 13 Sep 2019 19:01 )   PT: 12.6 sec;   INR: 1.10 ratio         PTT - ( 13 Sep 2019 19:01 )  PTT:32.6 sec    Urinalysis Basic - ( 14 Sep 2019 00:05 )    Color: Light Yellow / Appearance: Slightly Turbid / S.013 / pH: x  Gluc: x / Ketone: Negative  / Bili: Negative / Urobili: Negative   Blood: x / Protein: 30 mg/dL / Nitrite: Positive   Leuk Esterase: Large / RBC: 83 /hpf / WBC 27 /HPF   Sq Epi: x / Non Sq Epi: 0 /hpf / Bacteria: Many

## 2019-09-14 NOTE — CONSULT NOTE ADULT - SUBJECTIVE AND OBJECTIVE BOX
Urology Consult Note    Chief Complaint:  gross hematuria, BPH, urinary retention    History of Present Illness:  89yM with urinary retention, BPH, SP tube dependent for bladder drainage. Presents with gross hematuria and blood per penis following routine SP tube change. Transferred to [facility] ED for further evalaution. SP tube in place currently, clear yellow urine. Denies renal colic, frequency, urgency, bladder pain. Sitting in chair. Has h/o neurogenic bladder. PMH of suprapubic catheter, 2/2 neurogenic bladder BIBEMS p/w hematuria after suprapubic catheter was replaced today. Pt reports no fevers, no chills no nausea no vomiting. Pt had spontaneous bleeding from the penis. no hx of clots,    PAST MEDICAL & SURGICAL HISTORY:  Anemia  Neurogenic bladder  Hyperlipidemia  Dementia  Hypothyroid  CKD (chronic kidney disease)  S/P knee replacement  H/O hernia repair  Suprapubic catheter      FAMILY HISTORY:  No pertinent family history in first degree relatives      Allergies    No Known Allergies    Intolerances        Social History:  Denies tobacco or alcohol use    Review of Systems:   Constitutional: No weight loss, no weakness  HEENT: No visual loss, no hearing loss, no sneezing  Skin: No rash or itching  CV: No chest pain, no chest pressure  Pulm: No shortness of breath, cough, or sputum  GI: No melena, no constipation  : Per HPI  Neuro: No headache, dizziness  MSK: No muscle pain, no joint pain  Heme: No anemia, bruising, or bleeding  Lymphatics: No enlarged nodes, no history of splenectomy  Psych: No depression of anxiety  Endo: No cold or heat intolerance  Allergies: No asthma, hives    Physical Exam:  Vital signs  T(C): 36.6 (19 @ 09:10), Max: 37 (19 @ 00:46)  HR: 59 (19 @ 10:57)  BP: 118/69 (19 @ 10:57)  SpO2: 98% (19 @ 09:10)  Wt(kg): --    Gen: No acute distress. Normal mood  HEENT: Normocephalic, neck supple  CV: Hemodynamically stable  Pulm: No increased work of breathing  Abd: soft, non-tender, non-distended  Back: No CVA tenderness, no midline pain  Extremities: No significant deformity or joint abnormality  Neuro: Alert & oriented x3, No focal deficits  Skin: Skin normal color, normal texture  Psych: Normal affect, normal behavior  : Nonpalpable bladder      Labs:       @ 10:44    WBC 6.66  / Hct 28.9  / SCr --        @ 07:16    WBC --    / Hct --    / SCr 2.29         141  |  107  |  50<H>  ----------------------------<  85  4.2   |  20<L>  |  2.29<H>    Ca    9.3      14 Sep 2019 07:16  Phos  3.6       Mg     2.2         TPro  6.6  /  Alb  3.3  /  TBili  0.7  /  DBili  x   /  AST  11  /  ALT  6<L>  /  AlkPhos  107      PT/INR - ( 13 Sep 2019 19:01 )   PT: 12.6 sec;   INR: 1.10 ratio         PTT - ( 13 Sep 2019 19:01 )  PTT:32.6 sec  Urinalysis Basic - ( 14 Sep 2019 00:05 )    Color: Light Yellow / Appearance: Slightly Turbid / S.013 / pH: x  Gluc: x / Ketone: Negative  / Bili: Negative / Urobili: Negative   Blood: x / Protein: 30 mg/dL / Nitrite: Positive   Leuk Esterase: Large / RBC: 83 /hpf / WBC 27 /HPF   Sq Epi: x / Non Sq Epi: 0 /hpf / Bacteria: Many

## 2019-09-14 NOTE — H&P ADULT - PROBLEM SELECTOR PLAN 5
- cont pureed honey diet  - pt's daughter requests pleasure feeds despite risk of aspiration and death. pt's daughter states that g tube was previously offered but refused. - cont keppra

## 2019-09-14 NOTE — H&P ADULT - NSHPPHYSICALEXAM_GEN_ALL_CORE
PHYSICAL EXAM:   GENERAL: Alert. +confused. No acute distress. Not thin. Not cachectic. Not obese.  HEAD:  Atraumatic. Normocephalic.  EYES: EOMI. PERRLA. Normal conjunctiva/sclera.  ENT: Neck supple. No JVD. Moist oral mucosa. Not edentulous. No thrush.  LYMPH: Normal supraclavicular/cervical lymph nodes.   CARDIAC: Not tachy, Not emeka. +irregularly irregular. S1. S2. +grade 4 systolic murmur. No rub. No distant heart sounds.  LUNG/CHEST: CTAB. BS equal bilaterally. No wheezes. No rales. No rhonchi.  ABDOMEN: Soft. No tenderness. No distension. No fluid wave. Normal bowel sounds.  BACK: No midline/vertebral tenderness. No flank tenderness.  VASCULAR: +2 b/l radial or ulnar pulses. Palpable DP pulses.  EXTREMITIES:  No clubbing. No cyanosis. No edema. Moving all 4.  NEUROLOGY: A&Ox1. Non-focal exam. Cranial nerves intact. Normal speech. Sensation intact.  PSYCH: Normal behavior. Normal affect.  SKIN: No jaundice. No erythema. No rash/lesion.  Vascular Access:     ICU Vital Signs Last 24 Hrs  T(C): 37 (14 Sep 2019 00:46), Max: 37 (14 Sep 2019 00:46)  T(F): 98.6 (14 Sep 2019 00:46), Max: 98.6 (14 Sep 2019 00:46)  HR: 60 (14 Sep 2019 00:46) (46 - 60)  BP: 114/58 (14 Sep 2019 00:46) (110/54 - 116/62)  BP(mean): --  ABP: --  ABP(mean): --  RR: 18 (14 Sep 2019 00:46) (18 - 18)  SpO2: 95% (14 Sep 2019 00:46) (95% - 100%)      I&O's Summary    13 Sep 2019 07:01  -  14 Sep 2019 05:15  --------------------------------------------------------  IN: 0 mL / OUT: 200 mL / NET: -200 mL

## 2019-09-15 ENCOUNTER — TRANSCRIPTION ENCOUNTER (OUTPATIENT)
Age: 84
End: 2019-09-15

## 2019-09-15 VITALS
SYSTOLIC BLOOD PRESSURE: 108 MMHG | OXYGEN SATURATION: 100 % | DIASTOLIC BLOOD PRESSURE: 66 MMHG | HEART RATE: 64 BPM | RESPIRATION RATE: 18 BRPM | TEMPERATURE: 98 F

## 2019-09-15 LAB
ANION GAP SERPL CALC-SCNC: 15 MMOL/L — SIGNIFICANT CHANGE UP (ref 5–17)
BUN SERPL-MCNC: 48 MG/DL — HIGH (ref 7–23)
CALCIUM SERPL-MCNC: 9.6 MG/DL — SIGNIFICANT CHANGE UP (ref 8.4–10.5)
CHLORIDE SERPL-SCNC: 102 MMOL/L — SIGNIFICANT CHANGE UP (ref 96–108)
CO2 SERPL-SCNC: 21 MMOL/L — LOW (ref 22–31)
CREAT SERPL-MCNC: 2.68 MG/DL — HIGH (ref 0.5–1.3)
CULTURE RESULTS: SIGNIFICANT CHANGE UP
GLUCOSE SERPL-MCNC: 81 MG/DL — SIGNIFICANT CHANGE UP (ref 70–99)
HCT VFR BLD CALC: 31.7 % — LOW (ref 39–50)
HGB BLD-MCNC: 9.7 G/DL — LOW (ref 13–17)
MCHC RBC-ENTMCNC: 28.5 PG — SIGNIFICANT CHANGE UP (ref 27–34)
MCHC RBC-ENTMCNC: 30.6 GM/DL — LOW (ref 32–36)
MCV RBC AUTO: 93.2 FL — SIGNIFICANT CHANGE UP (ref 80–100)
PLATELET # BLD AUTO: 154 K/UL — SIGNIFICANT CHANGE UP (ref 150–400)
POTASSIUM SERPL-MCNC: 3.8 MMOL/L — SIGNIFICANT CHANGE UP (ref 3.5–5.3)
POTASSIUM SERPL-SCNC: 3.8 MMOL/L — SIGNIFICANT CHANGE UP (ref 3.5–5.3)
RBC # BLD: 3.4 M/UL — LOW (ref 4.2–5.8)
RBC # FLD: 13.4 % — SIGNIFICANT CHANGE UP (ref 10.3–14.5)
SODIUM SERPL-SCNC: 138 MMOL/L — SIGNIFICANT CHANGE UP (ref 135–145)
SPECIMEN SOURCE: SIGNIFICANT CHANGE UP
WBC # BLD: 6.89 K/UL — SIGNIFICANT CHANGE UP (ref 3.8–10.5)
WBC # FLD AUTO: 6.89 K/UL — SIGNIFICANT CHANGE UP (ref 3.8–10.5)

## 2019-09-15 PROCEDURE — 85610 PROTHROMBIN TIME: CPT

## 2019-09-15 PROCEDURE — 84100 ASSAY OF PHOSPHORUS: CPT

## 2019-09-15 PROCEDURE — 80053 COMPREHEN METABOLIC PANEL: CPT

## 2019-09-15 PROCEDURE — 97161 PT EVAL LOW COMPLEX 20 MIN: CPT

## 2019-09-15 PROCEDURE — 85027 COMPLETE CBC AUTOMATED: CPT

## 2019-09-15 PROCEDURE — 85730 THROMBOPLASTIN TIME PARTIAL: CPT

## 2019-09-15 PROCEDURE — 87086 URINE CULTURE/COLONY COUNT: CPT

## 2019-09-15 PROCEDURE — 86850 RBC ANTIBODY SCREEN: CPT

## 2019-09-15 PROCEDURE — 84443 ASSAY THYROID STIM HORMONE: CPT

## 2019-09-15 PROCEDURE — 80048 BASIC METABOLIC PNL TOTAL CA: CPT

## 2019-09-15 PROCEDURE — 86901 BLOOD TYPING SEROLOGIC RH(D): CPT

## 2019-09-15 PROCEDURE — 80177 DRUG SCRN QUAN LEVETIRACETAM: CPT

## 2019-09-15 PROCEDURE — 99285 EMERGENCY DEPT VISIT HI MDM: CPT | Mod: 25

## 2019-09-15 PROCEDURE — 83735 ASSAY OF MAGNESIUM: CPT

## 2019-09-15 PROCEDURE — 96374 THER/PROPH/DIAG INJ IV PUSH: CPT

## 2019-09-15 PROCEDURE — 72192 CT PELVIS W/O DYE: CPT

## 2019-09-15 PROCEDURE — 86900 BLOOD TYPING SEROLOGIC ABO: CPT

## 2019-09-15 PROCEDURE — 81001 URINALYSIS AUTO W/SCOPE: CPT

## 2019-09-15 RX ORDER — LEVOTHYROXINE SODIUM 125 MCG
1 TABLET ORAL
Qty: 0 | Refills: 0 | DISCHARGE
Start: 2019-09-15

## 2019-09-15 RX ORDER — LEVETIRACETAM 250 MG/1
1 TABLET, FILM COATED ORAL
Qty: 0 | Refills: 0 | DISCHARGE
Start: 2019-09-15

## 2019-09-15 RX ORDER — QUETIAPINE FUMARATE 200 MG/1
1 TABLET, FILM COATED ORAL
Qty: 0 | Refills: 0 | DISCHARGE

## 2019-09-15 RX ORDER — LEVOTHYROXINE SODIUM 125 MCG
1 TABLET ORAL
Qty: 30 | Refills: 0
Start: 2019-09-15 | End: 2019-10-14

## 2019-09-15 RX ORDER — LEVOTHYROXINE SODIUM 125 MCG
100 TABLET ORAL DAILY
Refills: 0 | Status: DISCONTINUED | OUTPATIENT
Start: 2019-09-15 | End: 2019-09-15

## 2019-09-15 RX ORDER — FUROSEMIDE 40 MG
1 TABLET ORAL
Qty: 0 | Refills: 0 | DISCHARGE
Start: 2019-09-15

## 2019-09-15 RX ORDER — FUROSEMIDE 40 MG
1 TABLET ORAL
Qty: 0 | Refills: 0 | DISCHARGE

## 2019-09-15 RX ADMIN — LEVETIRACETAM 250 MILLIGRAM(S): 250 TABLET, FILM COATED ORAL at 17:57

## 2019-09-15 RX ADMIN — Medication 150 MICROGRAM(S): at 06:41

## 2019-09-15 RX ADMIN — LEVETIRACETAM 250 MILLIGRAM(S): 250 TABLET, FILM COATED ORAL at 06:41

## 2019-09-15 RX ADMIN — Medication 20 MILLIGRAM(S): at 06:41

## 2019-09-15 RX ADMIN — PIPERACILLIN AND TAZOBACTAM 25 GRAM(S): 4; .5 INJECTION, POWDER, LYOPHILIZED, FOR SOLUTION INTRAVENOUS at 06:42

## 2019-09-15 RX ADMIN — QUETIAPINE FUMARATE 50 MILLIGRAM(S): 200 TABLET, FILM COATED ORAL at 06:44

## 2019-09-15 RX ADMIN — PIPERACILLIN AND TAZOBACTAM 25 GRAM(S): 4; .5 INJECTION, POWDER, LYOPHILIZED, FOR SOLUTION INTRAVENOUS at 14:31

## 2019-09-15 NOTE — DISCHARGE NOTE NURSING/CASE MANAGEMENT/SOCIAL WORK - PATIENT PORTAL LINK FT
You can access the FollowMyHealth Patient Portal offered by [facility] by registering at the following website: [email or web address]. By joining [employer]’s FollowMyHealth portal, you will also be able to view your health information using other applications (apps) compatible with our system.

## 2019-09-15 NOTE — DISCHARGE NOTE PROVIDER - NSDCCPCAREPLAN_GEN_ALL_CORE_FT
PRINCIPAL DISCHARGE DIAGNOSIS  Diagnosis: Gross hematuria  Assessment and Plan of Treatment: - likely 2/2 traumatic edwards insertion  - resolving      SECONDARY DISCHARGE DIAGNOSES  Diagnosis: Acute cystitis with hematuria  Assessment and Plan of Treatment: continue on Augmentin 500mg Po x8 days .    Diagnosis: Seizure disorder  Assessment and Plan of Treatment: - cont keppra.  seizure and fall precaution

## 2019-09-15 NOTE — DISCHARGE NOTE PROVIDER - CARE PROVIDER_API CALL
[facility]   [facility]   [phone]  Phone: (   )    -  Fax: (   )    -  Follow Up Time:

## 2019-09-15 NOTE — DISCHARGE NOTE PROVIDER - HOSPITAL COURSE
89y male with PMH significant for HLD, advanced dementia, severe AS, dysphagia, hypothyroidism, Afib not on a/c 2/2 GIB, CAD, CKD neurogenic bladder s/p suprapubic tube since 2016 who has had UTIs in the past & undergoes q 4 wkly SPT change.    SPT was changed yesterday as scheduled at home by VNS but procedure complicated by severe pain followed by spontaneous bleed from the penis.     Brought in to the ER, had no support for sepsis.    Given IV hydration & IV ceftriaxone.     Seen by  & given nitrites on UA - antibiotics recommended for possible UTI.    Unclear significance of nitrites in this patient with a chronic SPT - he is expected to have bacterial colonization & pyuria & nitrites. Bleed appears not to be infectious rather "traumatic" in nature     post procedure bleed - self improved as well.    Ct imaging mildly nonspecific periprostatic haziness - likely traumatic but cannot exclude prostatitis       Urine cx with 3 types of organisms - c/w colonized SPT         PLAN:    Changed zosyn to PO Augmentin 500 mg BID - known hx of VRE colonization x 8 more days    Medically cleared for discharge by Dr. Bledsoe and infectious disease.

## 2019-09-15 NOTE — DISCHARGE NOTE PROVIDER - PROVIDER TOKENS
FREE:[LAST:[PCP],PHONE:[(   )    -],FAX:[(   )    -],ADDRESS:[[facility]. [facility]   [phone]]]

## 2019-09-15 NOTE — PROGRESS NOTE ADULT - SUBJECTIVE AND OBJECTIVE BOX
CC: f/u for fever     Patient reports feeling good     REVIEW OF SYSTEMS:  All other review of systems negative (Comprehensive ROS)    Antimicrobials Day #  : 2  piperacillin/tazobactam IVPB.. 3.375 Gram(s) IV Intermittent every 8 hours    Other Medications Reviewed    T(F): 98 (09-15-19 @ 13:49), Max: 98 (09-15-19 @ 13:49)  HR: 56 (09-15-19 @ 13:49)  BP: 96/54 (09-15-19 @ 13:49)  RR: 18 (09-15-19 @ 13:49)  SpO2: 97% (09-15-19 @ 13:49)  Wt(kg): --    PHYSICAL EXAM:  General: alert, no acute distress  Eyes:  anicteric, no conjunctival injection, no discharge  Neck: supple  Lungs: clear to auscultation  Heart: regular rate and rhythm; 3/6 SE murmur  Abdomen: soft, nondistended, nontender, bowel sounds are +  : SPT with clear urine  Skin: no lesions  Extremities: no edema  Neurologic: alert, & moves all extremities    LAB RESULTS:                        9.7    6.89  )-----------( 154      ( 15 Sep 2019 09:40 )             31.7     -15    138  |  102  |  48<H>  ----------------------------<  81  3.8   |  21<L>  |  2.68<H>    Ca    9.6      15 Sep 2019 07:45  Phos  3.6       Mg     2.2         TPro  6.6  /  Alb  3.3  /  TBili  0.7  /  DBili  x   /  AST  11  /  ALT  6<L>  /  AlkPhos  107  -    LIVER FUNCTIONS - ( 14 Sep 2019 07:16 )  Alb: 3.3 g/dL / Pro: 6.6 g/dL / ALK PHOS: 107 U/L / ALT: 6 U/L / AST: 11 U/L / GGT: x           Urinalysis Basic - ( 14 Sep 2019 00:05 )    Color: Light Yellow / Appearance: Slightly Turbid / S.013 / pH: x  Gluc: x / Ketone: Negative  / Bili: Negative / Urobili: Negative   Blood: x / Protein: 30 mg/dL / Nitrite: Positive   Leuk Esterase: Large / RBC: 83 /hpf / WBC 27 /HPF   Sq Epi: x / Non Sq Epi: 0 /hpf / Bacteria: Many      MICROBIOLOGY:  RECENT CULTURES:   @ 02:52 .Urine     >=3 organisms. Probable collection contamination.    RADIOLOGY REVIEWED:
Patient is a 89y old  Male who presents with a chief complaint of hematuria (14 Sep 2019 14:05)      SUBJECTIVE / OVERNIGHT EVENTS: Comfortable without new complaints. Hematuria resolved.  Review of Systems  chest pain no  palpitations no  sob no  nausea no  headache no    MEDICATIONS  (STANDING):  furosemide    Tablet 20 milliGRAM(s) Oral daily  levETIRAcetam 250 milliGRAM(s) Oral two times a day  levothyroxine 150 MICROGram(s) Oral daily  piperacillin/tazobactam IVPB.. 3.375 Gram(s) IV Intermittent every 8 hours  QUEtiapine 50 milliGRAM(s) Oral at bedtime    MEDICATIONS  (PRN):      Vital Signs Last 24 Hrs  T(C): 36.7 (15 Sep 2019 13:49), Max: 36.7 (15 Sep 2019 13:49)  T(F): 98 (15 Sep 2019 13:49), Max: 98 (15 Sep 2019 13:49)  HR: 56 (15 Sep 2019 13:49) (56 - 72)  BP: 96/54 (15 Sep 2019 13:49) (96/54 - 118/63)  BP(mean): --  RR: 18 (15 Sep 2019 13:49) (18 - 18)  SpO2: 97% (15 Sep 2019 13:49) (93% - 100%)    PHYSICAL EXAM:  GENERAL: NAD, well-developed  HEAD:  Atraumatic, Normocephalic  EYES: EOMI, PERRLA, conjunctiva and sclera clear  NECK: Supple, No JVD  CHEST/LUNG: Clear to auscultation bilaterally; No wheeze  HEART: Regular rate and rhythm; No murmurs, rubs, or gallops  ABDOMEN: Soft, Nontender, Nondistended; Bowel sounds present SP catheter.  EXTREMITIES:  2+ Peripheral Pulses, No clubbing, cyanosis, or edema  NEUROLOGY: non-focal  SKIN: No rashes or lesions    LABS:                        9.7    6.89  )-----------( 154      ( 15 Sep 2019 09:40 )             31.7     09-15    138  |  102  |  48<H>  ----------------------------<  81  3.8   |  21<L>  |  2.68<H>    Ca    9.6      15 Sep 2019 07:45  Phos  3.6     09-14  Mg     2.2         TPro  6.6  /  Alb  3.3  /  TBili  0.7  /  DBili  x   /  AST  11  /  ALT  6<L>  /  AlkPhos  107      PT/INR - ( 13 Sep 2019 19:01 )   PT: 12.6 sec;   INR: 1.10 ratio         PTT - ( 13 Sep 2019 19:01 )  PTT:32.6 sec      Urinalysis Basic - ( 14 Sep 2019 00:05 )    Color: Light Yellow / Appearance: Slightly Turbid / S.013 / pH: x  Gluc: x / Ketone: Negative  / Bili: Negative / Urobili: Negative   Blood: x / Protein: 30 mg/dL / Nitrite: Positive   Leuk Esterase: Large / RBC: 83 /hpf / WBC 27 /HPF   Sq Epi: x / Non Sq Epi: 0 /hpf / Bacteria: Many        Culture - Urine (collected 14 Sep 2019 02:52)  Source: .Urine  Final Report (15 Sep 2019 12:52):    >=3 organisms. Probable collection contamination.        RADIOLOGY & ADDITIONAL TESTS:    Imaging Personally Reviewed:  < from: CT Pelvis No Cont (19 @ 14:25) >  IMPRESSION:     Suprapubic catheter in good position.    Mildly nonspecific periprostatic haziness. Correlate for prostatitis.    < end of copied text >    Consultant(s) Notes Reviewed:      Care Discussed with Consultants/Other Providers:
spouse

## 2019-09-15 NOTE — DISCHARGE NOTE PROVIDER - NSDCFUADDAPPT_GEN_ALL_CORE_FT
please follow up with your PCP in 3-5 days .  your synthroid dosing was reduced while in the hospital ,based on TSH.  Follow up thyroid function at the PCP/endocrinologist office.

## 2019-09-15 NOTE — PROGRESS NOTE ADULT - ASSESSMENT
89y male with PMH significant for HLD, advanced dementia, severe AS, dysphagia, hypothyroidism, Afib not on a/c 2/2 GIB, CAD, CKD neurogenic bladder s/p suprapubic tube since 2016 who has had UTIs in the past & undergoes q 4 wkly SPT change.  SPT was changed yesterday as scheduled at home by VNS but procedure complicated by severe pain followed by spontaneous bleed from the penis.   Brought in to the ER, had no support for sepsis.  Given IV hydration & IV ceftriaxone.   Seen by  & given nitrites on UA - antibiotics recommended for possible UTI.  Unclear significance of nitrites in this patient with a chronic SPT - he is expected to have bacterial colonization & pyuria & nitrites. Bleed appears not to be infectious rather "traumatic" in nature - post procedure bleed - self improved as well.  Ct imaging mildly nonspecific periprostatic haziness - likely traumatic but cannot exclude prostatitis     Urine cx with 3 types of organisms - c/w colonized SPT     PLAN:  Change zosyn to PO Augmentin 500 mg BID - known hx of VRE colonization x 8 more days  No ID objection to d/c home now

## 2019-09-15 NOTE — PROGRESS NOTE ADULT - ASSESSMENT
· Assessment      89M c hx advanced dementia (A&Ox1-2 baseline), severe AS (0.6 cm2), mod MR, very [place], neurogenic bladder s/p suprapubic catheter, CECY on IV iron, Afib not on a/c 2/2 GIB, CAD, CKD (baseline Cr 2.2), GTC seizure d/o on keppra, hpylori gastritis, bradycardia, dysphagia on ?pleasure feeds, pw hematuria and bleeding from penis meatus.    Gross hematuria.  - likely 2/2 traumatic edwards insertion  - resolving  - pt no longer on asa or a/c despite afib  - urology evaluation noted  - monitor cbc.  Prostatitis/ UTI  - antibiotics  - ID evaluation noted    Dementia without behavioral disturbance, unspecified dementia type.  - PT eval  - pt's daughter requests medical management but limited procedural interventions and more quality of life/comfort approach. refuses tavr or feeding tube.    Chronic kidney disease, unspecified CKD stage.   - at baseline.    Seizure disorder.  - cont keppra.    Dysphagia, unspecified type.  - cont pureed honey diet  - pt's daughter requests pleasure feeds despite risk of aspiration and death. pt's daughter states that g tube was previously offered but refused.    Bradycardia.   - daughter is aware of bradycardia, but refuses pacemaker at this time.   David Bledsoe MD pager [phone] · Assessment		  89M c hx advanced dementia (A&Ox1-2 baseline), severe AS (0.6 cm2), mod MR, very [place], neurogenic bladder s/p suprapubic catheter, CECY on IV iron, Afib not on a/c 2/2 GIB, CAD, CKD (baseline Cr 2.2), GTC seizure d/o on keppra, hpylori gastritis, bradycardia, dysphagia on ?pleasure feeds, pw hematuria and bleeding from penis meatus.    Gross hematuria.  - likely 2/2 traumatic edwards insertion  - resolving  - pt no longer on asa or a/c despite afib  - urology evaluation noted  - monitor cbc.  Prostatitis/ UTI  - antibiotics  - ID evaluation noted    Dementia without behavioral disturbance, unspecified dementia type.  - PT eval  - pt's daughter requests medical management but limited procedural interventions and more quality of life/comfort approach. refuses tavr or feeding tube.    Chronic kidney disease, unspecified CKD stage.   - at baseline.    Seizure disorder.  - cont keppra.    Dysphagia, unspecified type.  - cont pureed honey diet  - pt's daughter requests pleasure feeds despite risk of aspiration and death. pt's daughter states that g tube was previously offered but refused.    Bradycardia.   - daughter is aware of bradycardia, but refuses pacemaker at this time.     Hypothyroidism  - decrease Synthroid, follow TSH.  David Bledsoe MD pager [phone]

## 2019-09-16 ENCOUNTER — APPOINTMENT (OUTPATIENT)
Dept: HOME HEALTH SERVICES | Facility: HOME HEALTH | Age: 84
End: 2019-09-16

## 2019-09-16 LAB — LEVETIRACETAM SERPL-MCNC: 16.2 MCG/ML — SIGNIFICANT CHANGE UP (ref 12–46)

## 2019-09-24 ENCOUNTER — APPOINTMENT (OUTPATIENT)
Dept: HOME HEALTH SERVICES | Facility: HOME HEALTH | Age: 84
End: 2019-09-24
Payer: MEDICARE

## 2019-09-24 VITALS
SYSTOLIC BLOOD PRESSURE: 120 MMHG | DIASTOLIC BLOOD PRESSURE: 70 MMHG | OXYGEN SATURATION: 97 % | RESPIRATION RATE: 17 BRPM | TEMPERATURE: 97.2 F | HEART RATE: 67 BPM

## 2019-09-24 DIAGNOSIS — I25.10 ATHEROSCLEROTIC HEART DISEASE OF NATIVE CORONARY ARTERY W/OUT ANGINA PECTORIS: ICD-10-CM

## 2019-09-24 DIAGNOSIS — Z23 ENCOUNTER FOR IMMUNIZATION: ICD-10-CM

## 2019-09-24 DIAGNOSIS — S41.112A LACERATION W/OUT FOREIGN BODY OF LEFT UPPER ARM, INITIAL ENCOUNTER: ICD-10-CM

## 2019-09-24 DIAGNOSIS — R13.12 DYSPHAGIA, OROPHARYNGEAL PHASE: ICD-10-CM

## 2019-09-24 DIAGNOSIS — G47.00 INSOMNIA, UNSPECIFIED: ICD-10-CM

## 2019-09-24 PROCEDURE — 90662 IIV NO PRSV INCREASED AG IM: CPT

## 2019-09-24 PROCEDURE — G0008: CPT

## 2019-09-24 PROCEDURE — 99495 TRANSJ CARE MGMT MOD F2F 14D: CPT

## 2019-09-24 NOTE — COUNSELING
[Improve exercise tolerance] : improve exercise tolerance [Improve mobility] : improve mobility [Improve weight] : improve weight [Improve pain control] : improve pain control [Decrease stress] : decrease stress [Decrease hospital use] : decrease hospital use [Minimize unnecessary interventions] : minimize unnecessary interventions [Comfort Care] : comfort care [Maintain functional ability] : maintain functional ability [Discussed disease trajectory with patient/caregiver] : discussed disease trajectory with patient/caregiver [Patient/Caregiver has ___ understanding of disease process] : patient/caregiver has [unfilled] understanding of disease process [Likely to achieve goals/desired outcomes] : likely to achieve goals/desired outcomes [DNR] : Code Status: DNR [Completed Medical Orders for Life-Sustaining Treatment] : completed medical orders for life-sustaining treatment [Limited] : Treatment Guidelines: Limited [Trial of Intubation] : Intubation: Trial of Intubation [Normal Weight (BMI <25)] : normal weight - BMI <25 [Mediterranean diet recommended] : Mediterranean diet recommended [Non - Smoker] : non-smoker [Use assistive device to avoid falls] : use assistive device to avoid falls [Remove clutter and unsafe carpeting to avoid falls] : remove clutter and unsafe carpeting to avoid falls [Use grab bars] : use grab bars [Smoke/CO Detectors] : smoke/CO detectors [Not Indicated] : not indicated

## 2019-09-26 ENCOUNTER — TRANSCRIPTION ENCOUNTER (OUTPATIENT)
Age: 84
End: 2019-09-26

## 2019-10-08 NOTE — HISTORY OF PRESENT ILLNESS
[Patient] : patient [Formal Caregiver] : formal caregiver [FreeTextEntry2] : Pt is 89 yo M with Hx dementia, anemia hx GI bleed 2/2 Hpylori gastritis s/p admission for blood and iron transfusion 6/2017, urinary retention with suprapubic catheter, HLD, Insomnia, CKD stage 3, hypothyroid, hearing loss B/L,last hospitalization visit 2/2018 for tonic clonic seizures s/p intubation, c/b hypothermia, bradycardia and treatment for sepsis of unknown origin and dysphagia, re-hospitalization 3/2018 at [facility] for transfusion for hb 5.5 currently on procrit, seen for follow-up today after hospitalized for hematuria 2/2 failed catheter insertion attempt. UA was treated with abx, however later presumped to be colonization, and abx was stopped. admitted 9/14/19, discharged 9/15/19.\par \par patient has been doing good as per himself, and formal HHA with no active complaints.\par he developed left arm skin tear by banging his arm, dressed daily by HHA, \par  no urinary issues\par denies fever, chills, nausea, vomiting, cough, SOB, urinary problems or pain.\par has suprapubic catheter 2/2 neurogenic bladder, changed monthly by home care  \par eating well with good appetite, patient had barium swallow which indicated NPO with alternate methods for feeding, daughters decided to go with pleasure feeds. patient prefers to eat regular food, no episodes of aspiration. weight stable.\par BM regular\par sleeping well on seroquel as per HHA\par very hard of hearing- uses hearing aids and amplifier\par patient ambulating with walker, no recent falls.\par has advanced dementia, no behavior problems, mood has been stable, needs 24/7 assistance due to advanced dementia\par \par iron deficiency anemia 2/2 GI bleed- Had Upper endoscopy that did not show source of bleeding, was positive for H Pylori infection, and received complete treatment. on PPI. currently getting iron transfusions and procrit from hematologist office with good response. follows hematologist every 6 weeks.\par \par Hypothyroid - on Synthroid 150 mcg.\par ----------\par lives with spouse and HHA- 24/7\par daughter and grand son lives upstairs\par daughter is HCP [FreeTextEntry1] : gait instability

## 2019-10-08 NOTE — CHRONIC CARE ASSESSMENT
[PPS Score: ____] : Palliative Performance Scale (PPS) Score: [unfilled] [Oriented To Person] : ~L oriented to person [Oriented To Place] : ~L oriented to place [Oriented To Time] : ~L oriented to time [Oriented To Situation] : ~L oriented to situation [Alert] : ~L alert [Reviewed Mini-Cog Outcomes from Comprehensive Assessment] : Reviewed Mini-Cog outcomes from comprehensive assessment [Reviewed depression screen from comprehensive assessment] : Reviewed depression screen from comprehensive assessment [ADL Assessment Completed] : ADL assessment completed [iADL Assessment Completed] : iADL assessment completed [Relies on Community Resources] : relies on community resources [Relies on Family/Friends] : relies on family/friends [Reviewed Fall Risk from Comprehensive Assessment] : Reviewed fall risk from comprehensive assessment [Reviewed Home Safety Evaluation] : Reviewed home safety evaluation [No Action Needed] : No action needed [Safety elements used in home] : Safety elements used in home [Uses hearing aid] : uses hearing aid [Deaf] : deaf [Uses glasses] : uses glasses [Recent decline in ADLs or iADLs] : No recent decline in ADLs or IADLs? No

## 2019-10-08 NOTE — PHYSICAL EXAM
[No Acute Distress] : no acute distress [Well Nourished] : well nourished [Well Developed] : well developed [Normal Voice/Communication] : normal voice communication [Normal Sclera/Conjunctiva] : normal sclera/conjunctiva [PERRL] : pupils equal, round and reactive to light [No JVD] : no jugular venous distention [EOMI] : extra ocular movement intact [Supple] : the neck was supple [Clear to Auscultation] : lungs were clear to auscultation bilaterally [No Respiratory Distress] : no respiratory distress [Regular Rhythm] : with a regular rhythm [Normal Rate] : heart rate was normal  [Normal S1, S2] : normal S1 and S2 [No Edema] : there was no peripheral edema [Normal Bowel Sounds] : normal bowel sounds [Non Tender] : non-tender [Not Distended] : not distended [Soft] : abdomen soft [No CVA Tenderness] : no ~M costovertebral angle tenderness [Kyphosis] :  kyphosis present [No Spinal Tenderness] : no spinal tenderness [No Rash] : no rash [No Skin Lesions] : no skin lesions [No Joint Swelling] : no joint swelling seen [No Gross Sensory Deficits] : no gross sensory deficits [No Motor Deficits] : the motor exam was normal [Normal Affect] : the affect was normal [de-identified] : pleasant, calm, mental status same as baseline, skin color good, well dressed [de-identified] : suprapubic catheter in place, urine clear [de-identified] : 4/6 holo sys murmur- due to AS [de-identified] : [place], wearing hearing aids, and using amplifier [de-identified] : unsteady gait, uses walker, holds onto things while walking, uses walker [de-identified] : suprapubic catheter draining clear urine [de-identified] : 1.5 cm linear skin tear left forearm, healing well, no signs of infection [de-identified] : oriented to place and person, hard to assess orientation 2/2 hearing problems

## 2019-10-08 NOTE — REASON FOR VISIT
[Post Hospitalization] : a post hospitalization visit [Spouse] : spouse [Formal Caregiver] : formal caregiver [Pre-Visit Preparation] : pre-visit preparation was done [Intercurrent Specialty/Sub-specialty Visits] : the patient has intercurrent specialty/sub-specialty visits [FreeTextEntry3] : home care RN

## 2019-10-10 LAB
ALBUMIN SERPL ELPH-MCNC: 3.7 G/DL
ALP BLD-CCNC: 108 U/L
ALT SERPL-CCNC: 7 U/L
ANION GAP SERPL CALC-SCNC: 15 MMOL/L
AST SERPL-CCNC: 15 U/L
BASOPHILS # BLD AUTO: 0.07 K/UL
BASOPHILS NFR BLD AUTO: 1.1 %
BILIRUB SERPL-MCNC: 0.9 MG/DL
BUN SERPL-MCNC: 58 MG/DL
CALCIUM SERPL-MCNC: 9.3 MG/DL
CHLORIDE SERPL-SCNC: 103 MMOL/L
CO2 SERPL-SCNC: 24 MMOL/L
CREAT SERPL-MCNC: 2.55 MG/DL
EOSINOPHIL # BLD AUTO: 0.33 K/UL
EOSINOPHIL NFR BLD AUTO: 5.1 %
ESTIMATED AVERAGE GLUCOSE: 97 MG/DL
HBA1C MFR BLD HPLC: 5 %
HCT VFR BLD CALC: 29.6 %
HGB BLD-MCNC: 9 G/DL
IMM GRANULOCYTES NFR BLD AUTO: 0.2 %
LYMPHOCYTES # BLD AUTO: 1.49 K/UL
LYMPHOCYTES NFR BLD AUTO: 22.9 %
MAN DIFF?: NORMAL
MCHC RBC-ENTMCNC: 28.8 PG
MCHC RBC-ENTMCNC: 30.4 GM/DL
MCV RBC AUTO: 94.6 FL
MONOCYTES # BLD AUTO: 0.54 K/UL
MONOCYTES NFR BLD AUTO: 8.3 %
NEUTROPHILS # BLD AUTO: 4.07 K/UL
NEUTROPHILS NFR BLD AUTO: 62.4 %
PLATELET # BLD AUTO: 166 K/UL
POTASSIUM SERPL-SCNC: 4.4 MMOL/L
PROT SERPL-MCNC: 6.9 G/DL
RBC # BLD: 3.13 M/UL
RBC # FLD: 13.2 %
SODIUM SERPL-SCNC: 141 MMOL/L
TSH SERPL-ACNC: 0.69 UIU/ML
WBC # FLD AUTO: 6.51 K/UL

## 2019-11-21 ENCOUNTER — APPOINTMENT (OUTPATIENT)
Dept: HOME HEALTH SERVICES | Facility: HOME HEALTH | Age: 84
End: 2019-11-21

## 2019-11-26 ENCOUNTER — APPOINTMENT (OUTPATIENT)
Dept: HOME HEALTH SERVICES | Facility: HOME HEALTH | Age: 84
End: 2019-11-26

## 2019-12-02 ENCOUNTER — APPOINTMENT (OUTPATIENT)
Dept: HOME HEALTH SERVICES | Facility: HOME HEALTH | Age: 84
End: 2019-12-02

## 2019-12-06 ENCOUNTER — RX RENEWAL (OUTPATIENT)
Age: 84
End: 2019-12-06

## 2019-12-17 ENCOUNTER — TRANSCRIPTION ENCOUNTER (OUTPATIENT)
Age: 84
End: 2019-12-17

## 2019-12-17 DIAGNOSIS — N39.0 URINARY TRACT INFECTION, SITE NOT SPECIFIED: ICD-10-CM

## 2019-12-18 ENCOUNTER — LABORATORY RESULT (OUTPATIENT)
Age: 84
End: 2019-12-18

## 2019-12-18 ENCOUNTER — APPOINTMENT (OUTPATIENT)
Dept: HOME HEALTH SERVICES | Facility: HOME HEALTH | Age: 84
End: 2019-12-18

## 2019-12-19 ENCOUNTER — APPOINTMENT (OUTPATIENT)
Dept: HOME HEALTH SERVICES | Facility: HOME HEALTH | Age: 84
End: 2019-12-19

## 2019-12-19 VITALS
DIASTOLIC BLOOD PRESSURE: 70 MMHG | OXYGEN SATURATION: 97 % | RESPIRATION RATE: 16 BRPM | HEART RATE: 56 BPM | SYSTOLIC BLOOD PRESSURE: 120 MMHG | TEMPERATURE: 98.2 F

## 2019-12-20 ENCOUNTER — TRANSCRIPTION ENCOUNTER (OUTPATIENT)
Age: 84
End: 2019-12-20

## 2019-12-20 ENCOUNTER — EMERGENCY (EMERGENCY)
Facility: HOSPITAL | Age: 84
LOS: 1 days | Discharge: HOME | End: 2019-12-20
Attending: EMERGENCY MEDICINE
Payer: MEDICARE

## 2019-12-20 ENCOUNTER — CLINICAL ADVICE (OUTPATIENT)
Age: 84
End: 2019-12-20

## 2019-12-20 VITALS
WEIGHT: 179.9 LBS | TEMPERATURE: 97 F | DIASTOLIC BLOOD PRESSURE: 55 MMHG | SYSTOLIC BLOOD PRESSURE: 102 MMHG | OXYGEN SATURATION: 99 % | RESPIRATION RATE: 16 BRPM | HEIGHT: 71 IN | HEART RATE: 85 BPM

## 2019-12-20 DIAGNOSIS — Z93.59 OTHER CYSTOSTOMY STATUS: Chronic | ICD-10-CM

## 2019-12-20 DIAGNOSIS — Z98.890 OTHER SPECIFIED POSTPROCEDURAL STATES: Chronic | ICD-10-CM

## 2019-12-20 DIAGNOSIS — Z96.659 PRESENCE OF UNSPECIFIED ARTIFICIAL KNEE JOINT: Chronic | ICD-10-CM

## 2019-12-20 LAB
ALBUMIN SERPL ELPH-MCNC: 3.6 G/DL — SIGNIFICANT CHANGE UP (ref 3.3–5)
ALP SERPL-CCNC: 98 U/L — SIGNIFICANT CHANGE UP (ref 40–120)
ALT FLD-CCNC: 10 U/L — SIGNIFICANT CHANGE UP (ref 10–45)
ANION GAP SERPL CALC-SCNC: 16 MMOL/L — SIGNIFICANT CHANGE UP (ref 5–17)
APTT BLD: 32.3 SEC — SIGNIFICANT CHANGE UP (ref 27.5–36.3)
AST SERPL-CCNC: 14 U/L — SIGNIFICANT CHANGE UP (ref 10–40)
BASOPHILS # BLD AUTO: 0.04 K/UL — SIGNIFICANT CHANGE UP (ref 0–0.2)
BASOPHILS NFR BLD AUTO: 0.4 % — SIGNIFICANT CHANGE UP (ref 0–2)
BILIRUB SERPL-MCNC: 1.2 MG/DL — SIGNIFICANT CHANGE UP (ref 0.2–1.2)
BLD GP AB SCN SERPL QL: NEGATIVE — SIGNIFICANT CHANGE UP
BUN SERPL-MCNC: 71 MG/DL — HIGH (ref 7–23)
CALCIUM SERPL-MCNC: 10 MG/DL — SIGNIFICANT CHANGE UP (ref 8.4–10.5)
CHLORIDE SERPL-SCNC: 103 MMOL/L — SIGNIFICANT CHANGE UP (ref 96–108)
CO2 SERPL-SCNC: 20 MMOL/L — LOW (ref 22–31)
CREAT SERPL-MCNC: 2.9 MG/DL — HIGH (ref 0.5–1.3)
EOSINOPHIL # BLD AUTO: 0.13 K/UL — SIGNIFICANT CHANGE UP (ref 0–0.5)
EOSINOPHIL NFR BLD AUTO: 1.3 % — SIGNIFICANT CHANGE UP (ref 0–6)
GAS PNL BLDV: SIGNIFICANT CHANGE UP
GLUCOSE SERPL-MCNC: 119 MG/DL — HIGH (ref 70–99)
HCT VFR BLD CALC: 26.9 % — LOW (ref 39–50)
HGB BLD-MCNC: 8 G/DL — LOW (ref 13–17)
IMM GRANULOCYTES NFR BLD AUTO: 0.4 % — SIGNIFICANT CHANGE UP (ref 0–1.5)
INR BLD: 1.18 RATIO — HIGH (ref 0.88–1.16)
LYMPHOCYTES # BLD AUTO: 0.89 K/UL — LOW (ref 1–3.3)
LYMPHOCYTES # BLD AUTO: 9 % — LOW (ref 13–44)
MCHC RBC-ENTMCNC: 26.8 PG — LOW (ref 27–34)
MCHC RBC-ENTMCNC: 29.7 GM/DL — LOW (ref 32–36)
MCV RBC AUTO: 90.3 FL — SIGNIFICANT CHANGE UP (ref 80–100)
MONOCYTES # BLD AUTO: 1.04 K/UL — HIGH (ref 0–0.9)
MONOCYTES NFR BLD AUTO: 10.5 % — SIGNIFICANT CHANGE UP (ref 2–14)
NEUTROPHILS # BLD AUTO: 7.79 K/UL — HIGH (ref 1.8–7.4)
NEUTROPHILS NFR BLD AUTO: 78.4 % — HIGH (ref 43–77)
NRBC # BLD: 0 /100 WBCS — SIGNIFICANT CHANGE UP (ref 0–0)
PLATELET # BLD AUTO: 163 K/UL — SIGNIFICANT CHANGE UP (ref 150–400)
POTASSIUM SERPL-MCNC: 5.3 MMOL/L — SIGNIFICANT CHANGE UP (ref 3.5–5.3)
POTASSIUM SERPL-SCNC: 5.3 MMOL/L — SIGNIFICANT CHANGE UP (ref 3.5–5.3)
PROT SERPL-MCNC: 8.1 G/DL — SIGNIFICANT CHANGE UP (ref 6–8.3)
PROTHROM AB SERPL-ACNC: 13.6 SEC — HIGH (ref 10–12.9)
RBC # BLD: 2.98 M/UL — LOW (ref 4.2–5.8)
RBC # FLD: 14.4 % — SIGNIFICANT CHANGE UP (ref 10.3–14.5)
RH IG SCN BLD-IMP: POSITIVE — SIGNIFICANT CHANGE UP
SODIUM SERPL-SCNC: 139 MMOL/L — SIGNIFICANT CHANGE UP (ref 135–145)
WBC # BLD: 9.93 K/UL — SIGNIFICANT CHANGE UP (ref 3.8–10.5)
WBC # FLD AUTO: 9.93 K/UL — SIGNIFICANT CHANGE UP (ref 3.8–10.5)

## 2019-12-20 PROCEDURE — 71045 X-RAY EXAM CHEST 1 VIEW: CPT | Mod: 26

## 2019-12-20 PROCEDURE — 99284 EMERGENCY DEPT VISIT MOD MDM: CPT

## 2019-12-20 PROCEDURE — 70450 CT HEAD/BRAIN W/O DYE: CPT | Mod: 26

## 2019-12-20 NOTE — ED CLERICAL - NS ED CLERK NOTE PRE-ARRIVAL INFORMATION; ADDITIONAL PRE-ARRIVAL INFORMATION
CC/Reason For referral: hemoglobin 7.1, UTI  Preferred Consultant(if applicable):  Who admits for you (if needed):  Do you have documents you would like to fax over?  Would you still like to speak to an ED attending?

## 2019-12-20 NOTE — ED PROVIDER NOTE - NEUROLOGICAL, MLM
Alert and oriented, no focal deficits, no motor or sensory deficits. hard of hearing. cn2-12 otherwise grossly intact, normal speech and tone

## 2019-12-20 NOTE — ED PROVIDER NOTE - CLINICAL SUMMARY MEDICAL DECISION MAKING FREE TEXT BOX
Attending MD Bolden: 89M with ho chronic anemia, dementia presenting as referral from hematologist's for reportedly low h/h 7.1, hb here 8.0 which is close to patient's baseline thus do not suspect acute blood loss anemia and no need for transfusion at this point. Patient reportedly was confused earlier in the week, perhaps better now. Will check CT head to r/o ICH, UA, CXR to r/o infectious or metabolic derangements

## 2019-12-20 NOTE — ED PROVIDER NOTE - NSFOLLOWUPINSTRUCTIONS_ED_ALL_ED_FT
You have a Urinary tract infection. Please take the keflex as prescribed. Please return to the ER if the patient develops worsening confusion/agitation, lethargy, weakness, fevers, persistent vomiting, or if there are any other concerns. Please follow up with his primary care doctor within the next 48 hours.

## 2019-12-20 NOTE — ED PROVIDER NOTE - OBJECTIVE STATEMENT
89M hx dementia AAOx1-2 at baseline, ckd, chronic anemia, receiving intermittent and iron supplmentation p/w low blood count 89M hx dementia AAOx1-2 at baseline, ckd, chronic anemia, receiving intermittent and iron supplmentation, neurogenic bladder with chronic p/w low blood count. Had urine and bloodwork drawn this week that showed hgb 7.1. Was also diagnosed with UTI and prescribed an antibiotic that has not been picked up/started yet. No obvious bleeding from rectum. Was sent in for blood transfusion and uti treatment. Patient is at normal baseline otherwise and has not been noted to be pale, sob, lethargic.

## 2019-12-20 NOTE — ED ADULT NURSE NOTE - OBJECTIVE STATEMENT
Pt is 89 Y A&O x3 M with hx of HLD, [place], dementia, hypothyroidism, neurogenic bladder with suprapubic catheter presenting to ED with family and aid with c/o low hemoglobin and UTI. Pt daughter reports he has been more lethargic and "delusional", pt taken to PCP yesterday. Pt daughter was told hgb was "7.1" and pt has UTI. Pt prescribed PO ABX but has not taken dose yet. Pt told to come to ED for blood transfusion. Pt arrives to ED breathing unlabored on RA. Pt denies pain/discomfort. Pt appears pale. Pt [place] spontaneously. Skin is warm and dry. + peripheral pulses. + suprapubic catheter in place. 20 G IV established to L hand. Red socks on. Safety and comfort maintained.

## 2019-12-20 NOTE — ED PROVIDER NOTE - ATTENDING CONTRIBUTION TO CARE
Attending MD Bolden:  I personally have seen and examined this patient.  Resident note reviewed and agree on plan of care and except where noted.  See HPI, PE, and MDM for details.

## 2019-12-20 NOTE — ED PROVIDER NOTE - CONSTITUTIONAL, MLM
normal... Well appearing, awake, alert, oriented to person, but not place, time and in no apparent distress.

## 2019-12-20 NOTE — ED PROVIDER NOTE - PATIENT PORTAL LINK FT
You can access the FollowMyHealth Patient Portal offered by [facility] by registering at the following website: [email or web address]. By joining [employer]’s FollowMyHealth portal, you will also be able to view your health information using other applications (apps) compatible with our system.

## 2019-12-20 NOTE — ED PROVIDER NOTE - MUSCULOSKELETAL, MLM
Spine appears normal, range of motion is not limited, no muscle or joint tenderness. moving all extremities b/l

## 2019-12-20 NOTE — ED PROVIDER NOTE - PROGRESS NOTE DETAILS
SHARATH Pgy3: spoke with on call [facility] doc. reviewed lab results and that patient does not require tx at this time. hgb is higher than outpatient lab. daughter prefers that patient goes home and ok with treating uti at home. she is aware of strict return precautions and will f/u with pmd. non toxic appearing and VSS.

## 2019-12-20 NOTE — ED ADULT NURSE NOTE - TEMPLATE
Review of Systems/Medical History  Patient summary reviewed  Chart reviewed  No history of anesthetic complications     Cardiovascular  Hyperlipidemia,    Pulmonary       GI/Hepatic            Endo/Other     GYN       Hematology   Musculoskeletal       Neurology  Seizures well controlled,     Psychology   Anxiety,     Comment: Intellectual disability  Anesthesia Plan  ASA Score- 2     Anesthesia Type- general with ASA Monitors  Additional Monitors:   Airway Plan: NTT  Plan Factors-    Induction- intravenous  Postoperative Plan-     Informed Consent- Anesthetic plan and risks discussed with legal guardian  I personally reviewed this patient with the CRNA  Discussed and agreed on the Anesthesia Plan with the CRNA  Isadora Moore
General

## 2019-12-21 ENCOUNTER — CLINICAL ADVICE (OUTPATIENT)
Age: 84
End: 2019-12-21

## 2019-12-21 ENCOUNTER — TRANSCRIPTION ENCOUNTER (OUTPATIENT)
Age: 84
End: 2019-12-21

## 2019-12-21 VITALS
DIASTOLIC BLOOD PRESSURE: 64 MMHG | TEMPERATURE: 98 F | HEART RATE: 63 BPM | OXYGEN SATURATION: 100 % | RESPIRATION RATE: 18 BRPM | SYSTOLIC BLOOD PRESSURE: 108 MMHG

## 2019-12-21 LAB
APPEARANCE UR: ABNORMAL
BILIRUB UR-MCNC: NEGATIVE — SIGNIFICANT CHANGE UP
COLOR SPEC: ABNORMAL
DIFF PNL FLD: ABNORMAL
GLUCOSE UR QL: NEGATIVE — SIGNIFICANT CHANGE UP
KETONES UR-MCNC: NEGATIVE — SIGNIFICANT CHANGE UP
LEUKOCYTE ESTERASE UR-ACNC: ABNORMAL
NITRITE UR-MCNC: POSITIVE
PH UR: 6 — SIGNIFICANT CHANGE UP (ref 5–8)
PROT UR-MCNC: ABNORMAL
SP GR SPEC: 1.01 — SIGNIFICANT CHANGE UP (ref 1.01–1.02)
UROBILINOGEN FLD QL: NEGATIVE — SIGNIFICANT CHANGE UP

## 2019-12-21 PROCEDURE — 84132 ASSAY OF SERUM POTASSIUM: CPT

## 2019-12-21 PROCEDURE — 82947 ASSAY GLUCOSE BLOOD QUANT: CPT

## 2019-12-21 PROCEDURE — 82435 ASSAY OF BLOOD CHLORIDE: CPT

## 2019-12-21 PROCEDURE — 85014 HEMATOCRIT: CPT

## 2019-12-21 PROCEDURE — 84295 ASSAY OF SERUM SODIUM: CPT

## 2019-12-21 PROCEDURE — 85610 PROTHROMBIN TIME: CPT

## 2019-12-21 PROCEDURE — 82330 ASSAY OF CALCIUM: CPT

## 2019-12-21 PROCEDURE — 83605 ASSAY OF LACTIC ACID: CPT

## 2019-12-21 PROCEDURE — 82803 BLOOD GASES ANY COMBINATION: CPT

## 2019-12-21 PROCEDURE — 71045 X-RAY EXAM CHEST 1 VIEW: CPT

## 2019-12-21 PROCEDURE — 86900 BLOOD TYPING SEROLOGIC ABO: CPT

## 2019-12-21 PROCEDURE — 85730 THROMBOPLASTIN TIME PARTIAL: CPT

## 2019-12-21 PROCEDURE — 86901 BLOOD TYPING SEROLOGIC RH(D): CPT

## 2019-12-21 PROCEDURE — 87086 URINE CULTURE/COLONY COUNT: CPT

## 2019-12-21 PROCEDURE — 81001 URINALYSIS AUTO W/SCOPE: CPT

## 2019-12-21 PROCEDURE — 70450 CT HEAD/BRAIN W/O DYE: CPT

## 2019-12-21 PROCEDURE — 85027 COMPLETE CBC AUTOMATED: CPT

## 2019-12-21 PROCEDURE — 80053 COMPREHEN METABOLIC PANEL: CPT

## 2019-12-21 PROCEDURE — 99284 EMERGENCY DEPT VISIT MOD MDM: CPT | Mod: 25

## 2019-12-21 PROCEDURE — 86850 RBC ANTIBODY SCREEN: CPT

## 2019-12-21 RX ORDER — CEPHALEXIN 500 MG
1 CAPSULE ORAL
Qty: 14 | Refills: 0
Start: 2019-12-21 | End: 2019-12-27

## 2019-12-21 RX ORDER — CEPHALEXIN 500 MG
250 CAPSULE ORAL ONCE
Refills: 0 | Status: COMPLETED | OUTPATIENT
Start: 2019-12-21 | End: 2019-12-21

## 2019-12-21 RX ADMIN — Medication 250 MILLIGRAM(S): at 01:48

## 2019-12-22 LAB
CULTURE RESULTS: SIGNIFICANT CHANGE UP
SPECIMEN SOURCE: SIGNIFICANT CHANGE UP

## 2019-12-23 ENCOUNTER — APPOINTMENT (OUTPATIENT)
Dept: HOME HEALTH SERVICES | Facility: HOME HEALTH | Age: 84
End: 2019-12-23

## 2019-12-23 VITALS
TEMPERATURE: 98.4 F | OXYGEN SATURATION: 97 % | HEART RATE: 60 BPM | DIASTOLIC BLOOD PRESSURE: 70 MMHG | RESPIRATION RATE: 16 BRPM | SYSTOLIC BLOOD PRESSURE: 118 MMHG

## 2019-12-23 RX ORDER — ERYTHROPOIETIN 2000 [IU]/ML
2000 INJECTION, SOLUTION INTRAVENOUS; SUBCUTANEOUS
Refills: 0 | Status: ACTIVE | COMMUNITY
Start: 2018-05-02

## 2019-12-23 RX ORDER — CIPROFLOXACIN HYDROCHLORIDE 250 MG/1
250 TABLET, FILM COATED ORAL
Qty: 14 | Refills: 0 | Status: DISCONTINUED | COMMUNITY
Start: 2019-12-20 | End: 2019-12-23

## 2019-12-23 NOTE — ED POST DISCHARGE NOTE - DETAILS
Spoke with patient's daughter Rosas. Reports that his doctor called them on Saturday to state that the culture done in the office was resistant to all oral antibiotics. [facility] came to the house to set up an IV and she has been administering IV antibiotics and will be doing so for 14 days. advised to return to ED if anything worsens. -Diane Bess PA-C

## 2019-12-25 ENCOUNTER — TRANSCRIPTION ENCOUNTER (OUTPATIENT)
Age: 84
End: 2019-12-25

## 2019-12-25 ENCOUNTER — CLINICAL ADVICE (OUTPATIENT)
Age: 84
End: 2019-12-25

## 2019-12-26 ENCOUNTER — INPATIENT (INPATIENT)
Facility: HOSPITAL | Age: 84
LOS: 9 days | Discharge: HOME | DRG: 871 | End: 2020-01-05
Attending: HOSPITALIST | Admitting: HOSPITALIST
Payer: MEDICARE

## 2019-12-26 ENCOUNTER — TRANSCRIPTION ENCOUNTER (OUTPATIENT)
Age: 84
End: 2019-12-26

## 2019-12-26 VITALS
OXYGEN SATURATION: 94 % | RESPIRATION RATE: 18 BRPM | DIASTOLIC BLOOD PRESSURE: 64 MMHG | WEIGHT: 171.96 LBS | HEART RATE: 54 BPM | HEIGHT: 71 IN | SYSTOLIC BLOOD PRESSURE: 114 MMHG

## 2019-12-26 DIAGNOSIS — N39.0 URINARY TRACT INFECTION, SITE NOT SPECIFIED: ICD-10-CM

## 2019-12-26 DIAGNOSIS — J69.0 PNEUMONITIS DUE TO INHALATION OF FOOD AND VOMIT: ICD-10-CM

## 2019-12-26 DIAGNOSIS — N17.9 ACUTE KIDNEY FAILURE, UNSPECIFIED: ICD-10-CM

## 2019-12-26 DIAGNOSIS — Z98.890 OTHER SPECIFIED POSTPROCEDURAL STATES: Chronic | ICD-10-CM

## 2019-12-26 DIAGNOSIS — Z02.9 ENCOUNTER FOR ADMINISTRATIVE EXAMINATIONS, UNSPECIFIED: ICD-10-CM

## 2019-12-26 DIAGNOSIS — R56.9 UNSPECIFIED CONVULSIONS: ICD-10-CM

## 2019-12-26 DIAGNOSIS — E03.9 HYPOTHYROIDISM, UNSPECIFIED: ICD-10-CM

## 2019-12-26 DIAGNOSIS — Z93.59 OTHER CYSTOSTOMY STATUS: Chronic | ICD-10-CM

## 2019-12-26 DIAGNOSIS — N31.9 NEUROMUSCULAR DYSFUNCTION OF BLADDER, UNSPECIFIED: ICD-10-CM

## 2019-12-26 DIAGNOSIS — Z29.9 ENCOUNTER FOR PROPHYLACTIC MEASURES, UNSPECIFIED: ICD-10-CM

## 2019-12-26 DIAGNOSIS — D64.9 ANEMIA, UNSPECIFIED: ICD-10-CM

## 2019-12-26 DIAGNOSIS — Z96.659 PRESENCE OF UNSPECIFIED ARTIFICIAL KNEE JOINT: Chronic | ICD-10-CM

## 2019-12-26 DIAGNOSIS — A41.9 SEPSIS, UNSPECIFIED ORGANISM: ICD-10-CM

## 2019-12-26 LAB
ALBUMIN SERPL ELPH-MCNC: 3.5 G/DL — SIGNIFICANT CHANGE UP (ref 3.3–5)
ALBUMIN SERPL ELPH-MCNC: 3.7 G/DL
ALP BLD-CCNC: 89 U/L
ALP SERPL-CCNC: 87 U/L — SIGNIFICANT CHANGE UP (ref 40–120)
ALT FLD-CCNC: 7 U/L — LOW (ref 10–45)
ALT SERPL-CCNC: 8 U/L
ANION GAP SERPL CALC-SCNC: 14 MMOL/L — SIGNIFICANT CHANGE UP (ref 5–17)
ANION GAP SERPL CALC-SCNC: 16 MMOL/L
ANISOCYTOSIS BLD QL: SLIGHT — SIGNIFICANT CHANGE UP
APPEARANCE UR: CLEAR — SIGNIFICANT CHANGE UP
APPEARANCE: ABNORMAL
APTT BLD: 35 SEC — SIGNIFICANT CHANGE UP (ref 27.5–36.3)
AST SERPL-CCNC: 12 U/L — SIGNIFICANT CHANGE UP (ref 10–40)
AST SERPL-CCNC: 9 U/L
BACTERIA # UR AUTO: NEGATIVE — SIGNIFICANT CHANGE UP
BACTERIA UR CULT: ABNORMAL
BASE EXCESS BLDV CALC-SCNC: -2.1 MMOL/L — LOW (ref -2–2)
BASOPHILS # BLD AUTO: 0.06 K/UL
BASOPHILS # BLD AUTO: 0.06 K/UL — SIGNIFICANT CHANGE UP (ref 0–0.2)
BASOPHILS NFR BLD AUTO: 0.8 %
BASOPHILS NFR BLD AUTO: 0.9 % — SIGNIFICANT CHANGE UP (ref 0–2)
BILIRUB SERPL-MCNC: 0.6 MG/DL — SIGNIFICANT CHANGE UP (ref 0.2–1.2)
BILIRUB SERPL-MCNC: 1.1 MG/DL
BILIRUB UR-MCNC: NEGATIVE — SIGNIFICANT CHANGE UP
BILIRUBIN URINE: NEGATIVE
BLD GP AB SCN SERPL QL: NEGATIVE — SIGNIFICANT CHANGE UP
BLOOD URINE: ABNORMAL
BUN SERPL-MCNC: 67 MG/DL — HIGH (ref 7–23)
BUN SERPL-MCNC: 72 MG/DL
BURR CELLS BLD QL SMEAR: PRESENT — SIGNIFICANT CHANGE UP
CA-I SERPL-SCNC: 1.28 MMOL/L — SIGNIFICANT CHANGE UP (ref 1.12–1.3)
CALCIUM SERPL-MCNC: 9.6 MG/DL
CALCIUM SERPL-MCNC: 9.6 MG/DL — SIGNIFICANT CHANGE UP (ref 8.4–10.5)
CHLORIDE BLDV-SCNC: 108 MMOL/L — SIGNIFICANT CHANGE UP (ref 96–108)
CHLORIDE SERPL-SCNC: 104 MMOL/L
CHLORIDE SERPL-SCNC: 104 MMOL/L — SIGNIFICANT CHANGE UP (ref 96–108)
CO2 BLDV-SCNC: 25 MMOL/L — SIGNIFICANT CHANGE UP (ref 22–30)
CO2 SERPL-SCNC: 22 MMOL/L
CO2 SERPL-SCNC: 22 MMOL/L — SIGNIFICANT CHANGE UP (ref 22–31)
COLOR SPEC: SIGNIFICANT CHANGE UP
COLOR: YELLOW
COMMENT - URINE: SIGNIFICANT CHANGE UP
CREAT SERPL-MCNC: 2.73 MG/DL — HIGH (ref 0.5–1.3)
CREAT SERPL-MCNC: 3.05 MG/DL
DACRYOCYTES BLD QL SMEAR: SLIGHT — SIGNIFICANT CHANGE UP
DIFF PNL FLD: ABNORMAL
ELLIPTOCYTES BLD QL SMEAR: SLIGHT — SIGNIFICANT CHANGE UP
EOSINOPHIL # BLD AUTO: 0.04 K/UL
EOSINOPHIL # BLD AUTO: 0.11 K/UL — SIGNIFICANT CHANGE UP (ref 0–0.5)
EOSINOPHIL NFR BLD AUTO: 0.5 %
EOSINOPHIL NFR BLD AUTO: 1.6 % — SIGNIFICANT CHANGE UP (ref 0–6)
EPI CELLS # UR: 1 /HPF — SIGNIFICANT CHANGE UP
FLU A RESULT: SIGNIFICANT CHANGE UP
FLU A RESULT: SIGNIFICANT CHANGE UP
FLUAV AG NPH QL: SIGNIFICANT CHANGE UP
FLUBV AG NPH QL: SIGNIFICANT CHANGE UP
GAS PNL BLDV: 142 MMOL/L — SIGNIFICANT CHANGE UP (ref 135–145)
GAS PNL BLDV: SIGNIFICANT CHANGE UP
GAS PNL BLDV: SIGNIFICANT CHANGE UP
GIANT PLATELETS BLD QL SMEAR: PRESENT — SIGNIFICANT CHANGE UP
GLUCOSE BLDV-MCNC: 91 MG/DL — SIGNIFICANT CHANGE UP (ref 70–99)
GLUCOSE QUALITATIVE U: NEGATIVE
GLUCOSE SERPL-MCNC: 96 MG/DL — SIGNIFICANT CHANGE UP (ref 70–99)
GLUCOSE UR QL: NEGATIVE — SIGNIFICANT CHANGE UP
HCO3 BLDV-SCNC: 24 MMOL/L — SIGNIFICANT CHANGE UP (ref 21–29)
HCT VFR BLD CALC: 23.6 % — LOW (ref 39–50)
HCT VFR BLD CALC: 24 %
HCT VFR BLDA CALC: 22 % — CRITICAL LOW (ref 39–50)
HGB BLD CALC-MCNC: 7.2 G/DL — LOW (ref 13–17)
HGB BLD-MCNC: 6.8 G/DL — CRITICAL LOW (ref 13–17)
HGB BLD-MCNC: 7.1 G/DL
HYALINE CASTS # UR AUTO: 0 /LPF — SIGNIFICANT CHANGE UP (ref 0–2)
IMM GRANULOCYTES NFR BLD AUTO: 0.3 %
IMM GRANULOCYTES NFR BLD AUTO: 0.3 % — SIGNIFICANT CHANGE UP (ref 0–1.5)
INR BLD: 1.16 RATIO — SIGNIFICANT CHANGE UP (ref 0.88–1.16)
KETONES UR-MCNC: NEGATIVE — SIGNIFICANT CHANGE UP
KETONES URINE: NEGATIVE
LACTATE BLDV-MCNC: 1.5 MMOL/L — SIGNIFICANT CHANGE UP (ref 0.7–2)
LEUKOCYTE ESTERASE UR-ACNC: ABNORMAL
LEUKOCYTE ESTERASE URINE: ABNORMAL
LG PLATELETS BLD QL AUTO: SLIGHT — SIGNIFICANT CHANGE UP
LYMPHOCYTES # BLD AUTO: 0.66 K/UL
LYMPHOCYTES # BLD AUTO: 0.71 K/UL — LOW (ref 1–3.3)
LYMPHOCYTES # BLD AUTO: 10.5 % — LOW (ref 13–44)
LYMPHOCYTES NFR BLD AUTO: 8.5 %
MACROCYTES BLD QL: SLIGHT — SIGNIFICANT CHANGE UP
MAGNESIUM SERPL-MCNC: 2.3 MG/DL — SIGNIFICANT CHANGE UP (ref 1.6–2.6)
MAN DIFF?: NORMAL
MANUAL SMEAR VERIFICATION: SIGNIFICANT CHANGE UP
MCHC RBC-ENTMCNC: 25.4 PG — LOW (ref 27–34)
MCHC RBC-ENTMCNC: 27 PG
MCHC RBC-ENTMCNC: 28.8 GM/DL — LOW (ref 32–36)
MCHC RBC-ENTMCNC: 29.6 GM/DL
MCV RBC AUTO: 88.1 FL — SIGNIFICANT CHANGE UP (ref 80–100)
MCV RBC AUTO: 91.3 FL
MICROCYTES BLD QL: SLIGHT — SIGNIFICANT CHANGE UP
MONOCYTES # BLD AUTO: 0.67 K/UL — SIGNIFICANT CHANGE UP (ref 0–0.9)
MONOCYTES # BLD AUTO: 0.87 K/UL
MONOCYTES NFR BLD AUTO: 11.1 %
MONOCYTES NFR BLD AUTO: 9.9 % — SIGNIFICANT CHANGE UP (ref 2–14)
NEUTROPHILS # BLD AUTO: 5.18 K/UL — SIGNIFICANT CHANGE UP (ref 1.8–7.4)
NEUTROPHILS # BLD AUTO: 6.16 K/UL
NEUTROPHILS NFR BLD AUTO: 76.8 % — SIGNIFICANT CHANGE UP (ref 43–77)
NEUTROPHILS NFR BLD AUTO: 78.8 %
NITRITE UR-MCNC: NEGATIVE — SIGNIFICANT CHANGE UP
NITRITE URINE: NEGATIVE
NRBC # BLD: 0 /100 WBCS — SIGNIFICANT CHANGE UP (ref 0–0)
PCO2 BLDV: 48 MMHG — SIGNIFICANT CHANGE UP (ref 35–50)
PH BLDV: 7.31 — LOW (ref 7.35–7.45)
PH UR: 5.5 — SIGNIFICANT CHANGE UP (ref 5–8)
PH URINE: 7
PHOSPHATE SERPL-MCNC: 4.7 MG/DL — HIGH (ref 2.5–4.5)
PLAT MORPH BLD: ABNORMAL
PLATELET # BLD AUTO: 138 K/UL — LOW (ref 150–400)
PLATELET # BLD AUTO: 144 K/UL
PO2 BLDV: 24 MMHG — LOW (ref 25–45)
POLYCHROMASIA BLD QL SMEAR: SLIGHT — SIGNIFICANT CHANGE UP
POTASSIUM BLDV-SCNC: 4.7 MMOL/L — SIGNIFICANT CHANGE UP (ref 3.5–5.3)
POTASSIUM SERPL-MCNC: 4.8 MMOL/L — SIGNIFICANT CHANGE UP (ref 3.5–5.3)
POTASSIUM SERPL-SCNC: 4.7 MMOL/L
POTASSIUM SERPL-SCNC: 4.8 MMOL/L — SIGNIFICANT CHANGE UP (ref 3.5–5.3)
PROT SERPL-MCNC: 7.2 G/DL
PROT SERPL-MCNC: 7.5 G/DL — SIGNIFICANT CHANGE UP (ref 6–8.3)
PROT UR-MCNC: ABNORMAL
PROTEIN URINE: ABNORMAL
PROTHROM AB SERPL-ACNC: 13.4 SEC — HIGH (ref 10–12.9)
RBC # BLD: 2.63 M/UL
RBC # BLD: 2.68 M/UL — LOW (ref 4.2–5.8)
RBC # FLD: 14.2 %
RBC # FLD: 14.6 % — HIGH (ref 10.3–14.5)
RBC BLD AUTO: ABNORMAL
RBC CASTS # UR COMP ASSIST: 1 /HPF — SIGNIFICANT CHANGE UP (ref 0–4)
RH IG SCN BLD-IMP: POSITIVE — SIGNIFICANT CHANGE UP
RSV RESULT: SIGNIFICANT CHANGE UP
RSV RNA RESP QL NAA+PROBE: SIGNIFICANT CHANGE UP
SAO2 % BLDV: 28 % — LOW (ref 67–88)
SCHISTOCYTES BLD QL AUTO: SLIGHT — SIGNIFICANT CHANGE UP
SODIUM SERPL-SCNC: 140 MMOL/L — SIGNIFICANT CHANGE UP (ref 135–145)
SODIUM SERPL-SCNC: 142 MMOL/L
SP GR SPEC: 1.02 — SIGNIFICANT CHANGE UP (ref 1.01–1.02)
SPECIFIC GRAVITY URINE: 1.02
TARGETS BLD QL SMEAR: SLIGHT — SIGNIFICANT CHANGE UP
UROBILINOGEN FLD QL: ABNORMAL
UROBILINOGEN URINE: NORMAL
WBC # BLD: 6.75 K/UL — SIGNIFICANT CHANGE UP (ref 3.8–10.5)
WBC # FLD AUTO: 6.75 K/UL — SIGNIFICANT CHANGE UP (ref 3.8–10.5)
WBC # FLD AUTO: 7.81 K/UL
WBC UR QL: 7 /HPF — HIGH (ref 0–5)

## 2019-12-26 PROCEDURE — 99223 1ST HOSP IP/OBS HIGH 75: CPT | Mod: GC

## 2019-12-26 PROCEDURE — 99291 CRITICAL CARE FIRST HOUR: CPT

## 2019-12-26 PROCEDURE — 71045 X-RAY EXAM CHEST 1 VIEW: CPT | Mod: 26

## 2019-12-26 PROCEDURE — 93010 ELECTROCARDIOGRAM REPORT: CPT

## 2019-12-26 RX ORDER — LEVOTHYROXINE SODIUM 125 MCG
100 TABLET ORAL DAILY
Refills: 0 | Status: DISCONTINUED | OUTPATIENT
Start: 2019-12-26 | End: 2019-12-26

## 2019-12-26 RX ORDER — ERTAPENEM SODIUM 1 G/1
500 INJECTION, POWDER, LYOPHILIZED, FOR SOLUTION INTRAMUSCULAR; INTRAVENOUS EVERY 24 HOURS
Refills: 0 | Status: DISCONTINUED | OUTPATIENT
Start: 2019-12-27 | End: 2019-12-28

## 2019-12-26 RX ORDER — QUETIAPINE FUMARATE 200 MG/1
25 TABLET, FILM COATED ORAL DAILY
Refills: 0 | Status: DISCONTINUED | OUTPATIENT
Start: 2019-12-26 | End: 2019-12-26

## 2019-12-26 RX ORDER — LEVETIRACETAM 250 MG/1
250 TABLET, FILM COATED ORAL EVERY 12 HOURS
Refills: 0 | Status: DISCONTINUED | OUTPATIENT
Start: 2019-12-26 | End: 2020-01-04

## 2019-12-26 RX ORDER — ERTAPENEM SODIUM 1 G/1
500 INJECTION, POWDER, LYOPHILIZED, FOR SOLUTION INTRAMUSCULAR; INTRAVENOUS ONCE
Refills: 0 | Status: COMPLETED | OUTPATIENT
Start: 2019-12-26 | End: 2019-12-26

## 2019-12-26 RX ORDER — QUETIAPINE FUMARATE 200 MG/1
50 TABLET, FILM COATED ORAL AT BEDTIME
Refills: 0 | Status: DISCONTINUED | OUTPATIENT
Start: 2019-12-26 | End: 2019-12-26

## 2019-12-26 RX ORDER — PIPERACILLIN AND TAZOBACTAM 4; .5 G/20ML; G/20ML
3.38 INJECTION, POWDER, LYOPHILIZED, FOR SOLUTION INTRAVENOUS ONCE
Refills: 0 | Status: COMPLETED | OUTPATIENT
Start: 2019-12-26 | End: 2019-12-26

## 2019-12-26 RX ORDER — PIPERACILLIN AND TAZOBACTAM 4; .5 G/20ML; G/20ML
3.38 INJECTION, POWDER, LYOPHILIZED, FOR SOLUTION INTRAVENOUS EVERY 12 HOURS
Refills: 0 | Status: DISCONTINUED | OUTPATIENT
Start: 2019-12-26 | End: 2019-12-28

## 2019-12-26 RX ORDER — SODIUM CHLORIDE 9 MG/ML
1000 INJECTION INTRAMUSCULAR; INTRAVENOUS; SUBCUTANEOUS ONCE
Refills: 0 | Status: COMPLETED | OUTPATIENT
Start: 2019-12-26 | End: 2019-12-26

## 2019-12-26 RX ORDER — LEVETIRACETAM 250 MG/1
250 TABLET, FILM COATED ORAL
Refills: 0 | Status: DISCONTINUED | OUTPATIENT
Start: 2019-12-26 | End: 2019-12-26

## 2019-12-26 RX ORDER — LEVOTHYROXINE SODIUM 125 MCG
50 TABLET ORAL AT BEDTIME
Refills: 0 | Status: DISCONTINUED | OUTPATIENT
Start: 2019-12-26 | End: 2020-01-04

## 2019-12-26 RX ADMIN — PIPERACILLIN AND TAZOBACTAM 200 GRAM(S): 4; .5 INJECTION, POWDER, LYOPHILIZED, FOR SOLUTION INTRAVENOUS at 20:59

## 2019-12-26 RX ADMIN — SODIUM CHLORIDE 1000 MILLILITER(S): 9 INJECTION INTRAMUSCULAR; INTRAVENOUS; SUBCUTANEOUS at 14:49

## 2019-12-26 RX ADMIN — ERTAPENEM SODIUM 100 MILLIGRAM(S): 1 INJECTION, POWDER, LYOPHILIZED, FOR SOLUTION INTRAMUSCULAR; INTRAVENOUS at 17:41

## 2019-12-26 NOTE — H&P ADULT - ASSESSMENT
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, and dysphagia on pleasure feeds, who presents with 4 days of worsening lethargy, somnolence, decreased PO intake, and recent diagnosis of ESBL UTI on ertapenem; admitted for suspected aspiration PNA

## 2019-12-26 NOTE — H&P ADULT - PROBLEM SELECTOR PLAN 1
Outpatient UCx growing Klebsiella pneumoniae ESBL, sensitive to ertapenem. UCx from 12/20 showing contamination. Abnormal UA on presentation to ED, (+) mod leukocyte esterase. Patient with SP catheter.  - c/w ertapenem 500mg q24h; today is day 4 of abx  - f/u UCx Outpatient UCx growing Klebsiella pneumoniae ESBL, sensitive to ertapenem. UCx from 12/20 showing contamination. Abnormal UA on presentation to ED, (+) mod leukocyte esterase. Patient with suprapubic catheter.  - c/w ertapenem 500mg q24h; today is day 4 of abx  - f/u UCx

## 2019-12-26 NOTE — H&P ADULT - PROBLEM SELECTOR PLAN 2
Suspected 2/2 dysphagia. CXR appears to show worsening RLL opacity, as compared to 12/20. Patient receiving pleasure feeds at home.  - Ertapenem does not cover aspiration PNA  - will start patient on Zosyn 3.375mg q12h  - f/u blood cultures Suspected 2/2 dysphagia. CXR appears to show worsening RLL opacity, as compared to 12/20. Patient receiving pleasure feeds at home.  - Ertapenem does not cover aspiration PNA  - will start patient on Zosyn 3.375mg q12h, renally doesd

## 2019-12-26 NOTE — H&P ADULT - PROBLEM SELECTOR PLAN 3
Has history of anemia requiring blood transfusions and IV iron supplementation. Presented to ED with hgb 6.8.  - will transfuse with 1unit PRBC, however will watch fluid status, as patient has severe AS  - trend H&H on AM labs

## 2019-12-26 NOTE — ED ADULT NURSE NOTE - NSIMPLEMENTINTERV_GEN_ALL_ED
Implemented All Fall with Harm Risk Interventions:  [place] to call system. Call bell, personal items and telephone within reach. Instruct patient to call for assistance. Room bathroom lighting operational. Non-slip footwear when patient is off stretcher. Physically safe environment: no spills, clutter or unnecessary equipment. Stretcher in lowest position, wheels locked, appropriate side rails in place. Provide visual cue, wrist band, yellow gown, etc. Monitor gait and stability. Monitor for mental status changes and reorient to person, place, and time. Review medications for side effects contributing to fall risk. Reinforce activity limits and safety measures with patient and family. Provide visual clues: red socks.

## 2019-12-26 NOTE — ED PROVIDER NOTE - CONSTITUTIONAL, MLM
normal... Awak alert, follows commands, can tell me his name but not oriented to time or place, appears ill

## 2019-12-26 NOTE — H&P ADULT - NSHPSOCIALHISTORY_GEN_ALL_CORE
Patient lives at home with 24-hour HHA. Daughter, Rosas Parikh is HCP; per faimly at bedside, patient is DNR, but not DNI.  Non-smoker, no EtOH, and no illicit drugs.  No recent travels or sick contacts.

## 2019-12-26 NOTE — H&P ADULT - ATTENDING COMMENTS
Dr. Priscilla Santos  Division of Hospital Medicine  [facility]   Pager: [phone]    Patient seen and examined on 12/26/19 with Team 1 Resident and Interns. 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 Imaging reviewed with increase in R lower base patchy infiltrate suspicious of aspiration pneumonia. c/w ertapenam for ESBLI Kleb UTI and will add zosyn for presumed aspiration pneumonia. being transfused 1u PRBC for Hb 6.8; f/u post-transfusion CBC in AM.    Rest as detailed in note above.    Discussed with patient, family bedside, and team 1 residents.

## 2019-12-26 NOTE — H&P ADULT - PROBLEM SELECTOR PLAN 4
Baseline SCr ~2.2, presenting with SCr 2.73.  - decreased PO intake over past several days, possibly due to hypovolemia  - no IVF at the moment  - will continue to monitor SCr on AM labs

## 2019-12-26 NOTE — ED PROVIDER NOTE - OBJECTIVE STATEMENT
89M hx dementia AAOx1-2 at baseline, CKD, chronic anemia, receiving intermittent and iron supplementation, neurogenic bladder with Donis in place, recently diagnosed with a UTI that was resistant to all oral Abx and was started on Ertapenem infusions by his visiting doctor/nurse 4 days ago coming in for increased confusion, not acting himself, somnolence, and not eating a drinking as normal over the past 4 days.  Accompanied by pt's daughter that is providing his history.  Stating pt is no sundowning more intensely than usual despite taking Seroquel.  Also he is normally very active, and appears more trired than normal.

## 2019-12-26 NOTE — H&P ADULT - NSHPPHYSICALEXAM_GEN_ALL_CORE
GENERAL: Elderly male laying in bed in NAD.   HEAD:  Atraumatic, Normocephalic  EYES: patient kept eyes closed, occasionally opens them, appear crusted and bloodshot  ENMT: dry lips, dried blood noted in right nostrils, dry mucous membranes  CHEST/LUNG: Clear to auscultation bilaterally, but difficult to ascertain due to poor respiratory effort  HEART: Bradycardic, systolic murmur throughout  ABDOMEN: Soft, Nontender, Nondistended; Bowel sounds present  EXTREMITIES: trace b/l LE edema  SKIN: Warm and dry  NERVOUS SYSTEM: lethargic and somnolent, but arousable. Not answering questions

## 2019-12-26 NOTE — ED PROVIDER NOTE - CARE PLAN
Principal Discharge DX:	Sepsis, due to unspecified organism, unspecified whether acute organ dysfunction present  Secondary Diagnosis:	Urinary tract infection without hematuria, site unspecified  Secondary Diagnosis:	Bradycardia  Secondary Diagnosis:	Anemia, unspecified type

## 2019-12-26 NOTE — H&P ADULT - NSHPLABSRESULTS_GEN_ALL_CORE
140  |  104  |  67<H>  ----------------------------<  96  4.8   |  22  |  2.73<H>    Ca    9.6      26 Dec 2019 14:24  Phos  4.7       Mg     2.3         TPro  7.5  /  Alb  3.5  /  TBili  0.6  /  DBili  x   /  AST  12  /  ALT  7<L>  /  AlkPhos  87    PT/INR - ( 26 Dec 2019 14:24 )   PT: 13.4 sec;   INR: 1.16 ratio    PTT - ( 26 Dec 2019 14:24 )  PTT:35.0 sec                Urinalysis Basic - ( 26 Dec 2019 14:40 )  Color: Light Yellow / Appearance: Clear / S.016 / pH: x  Gluc: x / Ketone: Negative  / Bili: Negative / Urobili: 2 mg/dL   Blood: x / Protein: Trace / Nitrite: Negative   Leuk Esterase: Moderate / RBC: 1 /hpf / WBC 7 /HPF   Sq Epi: x / Non Sq Epi: 1 /hpf / Bacteria: Negative                          6.8    6.75  )-----------( 138      ( 26 Dec 2019 14:24 )             23.6         IMAGING:  < from: Xray Chest 1 View AP/PA (19 @ 14:48) >    Comparison: 2019.     The mediastinal cardiac silhouette is enlarged.    Cardiac pacer. Overlies right lower lung limiting evaluation. Possible opacity at the right base. Obscuration of left hemidiaphragm question atelectasis and/or small effusion.    No acute osseous finding.     < end of copied text >

## 2019-12-26 NOTE — ED ADULT NURSE NOTE - OBJECTIVE STATEMENT
89 yo presents to the ED from home by EMS with family and aide at bedside. A&Ox1, baseline as per family. EMS reports worsening change in mental status x1 week. recent dx of UTI, on IV antibiotics. arrives with 22G in R forearm. hypothermic upon assessment, rectal temp 95.4. pt placed on Bear-hugger. neurogenic bladder, suprapubic catheter in place. last changed 8 days ago.

## 2019-12-26 NOTE — H&P ADULT - NSICDXPASTMEDICALHX_GEN_ALL_CORE_FT
PAST MEDICAL HISTORY:  Anemia     CKD (chronic kidney disease)     Dementia     Hyperlipidemia     Hypothyroid     Neurogenic bladder s/p SP catheter

## 2019-12-26 NOTE — ED ADULT NURSE REASSESSMENT NOTE - NS ED NURSE REASSESS COMMENT FT1
Patient awake, A/O x1. Patient family at bedside. Patient mumbling to family. 1 unit PRBC infusing. Side rails up, blankets provided.

## 2019-12-26 NOTE — ED PROVIDER NOTE - ATTENDING CONTRIBUTION TO CARE
89 YOM pmhx dementia AAOx1-2 at baseline, afib, CKD, chronic anemia, receiving intermittent and iron supplementation, neurogenic bladder with suprapubic edwards in place, recently diagnosed with a UTI and started on Ertapenem infusions by his visiting doctor/nurse 4 days ago coming in for increased confusion, not acting himself, somnolence a/w decreased PO intake. Daughter reports he is sundowning for frequently than usual. Denies fevers, chills, vomiting, cp, sob, abd pain, leg swelling.   AP - concern for urosepsis. daughter reports typically is bradycardic. hypothermic, will place on bear hugger. abx. anticipate admission

## 2019-12-27 ENCOUNTER — OTHER (OUTPATIENT)
Age: 84
End: 2019-12-27

## 2019-12-27 LAB
ALBUMIN SERPL ELPH-MCNC: 3.5 G/DL — SIGNIFICANT CHANGE UP (ref 3.3–5)
ALP SERPL-CCNC: 86 U/L — SIGNIFICANT CHANGE UP (ref 40–120)
ALT FLD-CCNC: 7 U/L — LOW (ref 10–45)
ANION GAP SERPL CALC-SCNC: 16 MMOL/L — SIGNIFICANT CHANGE UP (ref 5–17)
AST SERPL-CCNC: 14 U/L — SIGNIFICANT CHANGE UP (ref 10–40)
BASOPHILS # BLD AUTO: 0.06 K/UL — SIGNIFICANT CHANGE UP (ref 0–0.2)
BASOPHILS NFR BLD AUTO: 0.8 % — SIGNIFICANT CHANGE UP (ref 0–2)
BILIRUB SERPL-MCNC: 1.1 MG/DL — SIGNIFICANT CHANGE UP (ref 0.2–1.2)
BUN SERPL-MCNC: 64 MG/DL — HIGH (ref 7–23)
CALCIUM SERPL-MCNC: 9.5 MG/DL — SIGNIFICANT CHANGE UP (ref 8.4–10.5)
CHLORIDE SERPL-SCNC: 106 MMOL/L — SIGNIFICANT CHANGE UP (ref 96–108)
CO2 SERPL-SCNC: 20 MMOL/L — LOW (ref 22–31)
CREAT SERPL-MCNC: 2.79 MG/DL — HIGH (ref 0.5–1.3)
EOSINOPHIL # BLD AUTO: 0.08 K/UL — SIGNIFICANT CHANGE UP (ref 0–0.5)
EOSINOPHIL NFR BLD AUTO: 1.1 % — SIGNIFICANT CHANGE UP (ref 0–6)
GLUCOSE SERPL-MCNC: 87 MG/DL — SIGNIFICANT CHANGE UP (ref 70–99)
HCT VFR BLD CALC: 26.4 % — LOW (ref 39–50)
HCT VFR BLD CALC: 28.4 % — LOW (ref 39–50)
HGB BLD-MCNC: 7.8 G/DL — LOW (ref 13–17)
HGB BLD-MCNC: 8.4 G/DL — LOW (ref 13–17)
IMM GRANULOCYTES NFR BLD AUTO: 0.3 % — SIGNIFICANT CHANGE UP (ref 0–1.5)
LYMPHOCYTES # BLD AUTO: 1.05 K/UL — SIGNIFICANT CHANGE UP (ref 1–3.3)
LYMPHOCYTES # BLD AUTO: 14.5 % — SIGNIFICANT CHANGE UP (ref 13–44)
MAGNESIUM SERPL-MCNC: 2.4 MG/DL — SIGNIFICANT CHANGE UP (ref 1.6–2.6)
MCHC RBC-ENTMCNC: 26.1 PG — LOW (ref 27–34)
MCHC RBC-ENTMCNC: 26.5 PG — LOW (ref 27–34)
MCHC RBC-ENTMCNC: 29.5 GM/DL — LOW (ref 32–36)
MCHC RBC-ENTMCNC: 29.6 GM/DL — LOW (ref 32–36)
MCV RBC AUTO: 88.3 FL — SIGNIFICANT CHANGE UP (ref 80–100)
MCV RBC AUTO: 89.6 FL — SIGNIFICANT CHANGE UP (ref 80–100)
MONOCYTES # BLD AUTO: 0.7 K/UL — SIGNIFICANT CHANGE UP (ref 0–0.9)
MONOCYTES NFR BLD AUTO: 9.7 % — SIGNIFICANT CHANGE UP (ref 2–14)
NEUTROPHILS # BLD AUTO: 5.32 K/UL — SIGNIFICANT CHANGE UP (ref 1.8–7.4)
NEUTROPHILS NFR BLD AUTO: 73.6 % — SIGNIFICANT CHANGE UP (ref 43–77)
NRBC # BLD: 0 /100 WBCS — SIGNIFICANT CHANGE UP (ref 0–0)
PHOSPHATE SERPL-MCNC: 4.5 MG/DL — SIGNIFICANT CHANGE UP (ref 2.5–4.5)
PLATELET # BLD AUTO: 141 K/UL — LOW (ref 150–400)
PLATELET # BLD AUTO: 156 K/UL — SIGNIFICANT CHANGE UP (ref 150–400)
POTASSIUM SERPL-MCNC: 4.9 MMOL/L — SIGNIFICANT CHANGE UP (ref 3.5–5.3)
POTASSIUM SERPL-SCNC: 4.9 MMOL/L — SIGNIFICANT CHANGE UP (ref 3.5–5.3)
PROT SERPL-MCNC: 7.4 G/DL — SIGNIFICANT CHANGE UP (ref 6–8.3)
RBC # BLD: 2.99 M/UL — LOW (ref 4.2–5.8)
RBC # BLD: 3.17 M/UL — LOW (ref 4.2–5.8)
RBC # FLD: 14.7 % — HIGH (ref 10.3–14.5)
RBC # FLD: 14.8 % — HIGH (ref 10.3–14.5)
SODIUM SERPL-SCNC: 142 MMOL/L — SIGNIFICANT CHANGE UP (ref 135–145)
TSH SERPL-MCNC: 2 UIU/ML — SIGNIFICANT CHANGE UP (ref 0.27–4.2)
WBC # BLD: 6.68 K/UL — SIGNIFICANT CHANGE UP (ref 3.8–10.5)
WBC # BLD: 7.23 K/UL — SIGNIFICANT CHANGE UP (ref 3.8–10.5)
WBC # FLD AUTO: 6.68 K/UL — SIGNIFICANT CHANGE UP (ref 3.8–10.5)
WBC # FLD AUTO: 7.23 K/UL — SIGNIFICANT CHANGE UP (ref 3.8–10.5)

## 2019-12-27 PROCEDURE — 99233 SBSQ HOSP IP/OBS HIGH 50: CPT | Mod: GC

## 2019-12-27 RX ORDER — FUROSEMIDE 40 MG
10 TABLET ORAL ONCE
Refills: 0 | Status: COMPLETED | OUTPATIENT
Start: 2019-12-27 | End: 2019-12-27

## 2019-12-27 RX ORDER — SODIUM CHLORIDE 9 MG/ML
1000 INJECTION INTRAMUSCULAR; INTRAVENOUS; SUBCUTANEOUS
Refills: 0 | Status: DISCONTINUED | OUTPATIENT
Start: 2019-12-27 | End: 2019-12-28

## 2019-12-27 RX ADMIN — Medication 10 MILLIGRAM(S): at 04:41

## 2019-12-27 RX ADMIN — SODIUM CHLORIDE 75 MILLILITER(S): 9 INJECTION INTRAMUSCULAR; INTRAVENOUS; SUBCUTANEOUS at 18:36

## 2019-12-27 RX ADMIN — Medication 50 MICROGRAM(S): at 21:42

## 2019-12-27 RX ADMIN — LEVETIRACETAM 400 MILLIGRAM(S): 250 TABLET, FILM COATED ORAL at 10:02

## 2019-12-27 RX ADMIN — PIPERACILLIN AND TAZOBACTAM 25 GRAM(S): 4; .5 INJECTION, POWDER, LYOPHILIZED, FOR SOLUTION INTRAVENOUS at 10:03

## 2019-12-27 RX ADMIN — PIPERACILLIN AND TAZOBACTAM 25 GRAM(S): 4; .5 INJECTION, POWDER, LYOPHILIZED, FOR SOLUTION INTRAVENOUS at 21:39

## 2019-12-27 RX ADMIN — LEVETIRACETAM 400 MILLIGRAM(S): 250 TABLET, FILM COATED ORAL at 21:36

## 2019-12-27 RX ADMIN — ERTAPENEM SODIUM 100 MILLIGRAM(S): 1 INJECTION, POWDER, LYOPHILIZED, FOR SOLUTION INTRAMUSCULAR; INTRAVENOUS at 18:35

## 2019-12-27 NOTE — CHART NOTE - NSCHARTNOTEFT_GEN_A_CORE
Chart reviewed.  Patient has history of bradycardia, which is intermittent and now resolved.  Has previously declined PPM evaluation and TAVR evaluation.  Will discontinue telemetry monitoring as not consistent with goals of care.  Discussed with Dr. Santos.

## 2019-12-27 NOTE — PROGRESS NOTE ADULT - PROBLEM SELECTOR PLAN 8
DVT ppx: SCDs  Diet: NPO Code Status: DNR, MOLST in chart  DVT ppx: SCDs  Diet: advance as tolerated and more awake

## 2019-12-27 NOTE — PROGRESS NOTE ADULT - PROBLEM SELECTOR PLAN 3
Has history of anemia requiring blood transfusions and IV iron supplementation. Presented to ED with hgb 6.8.  - will transfuse with 1unit PRBC, however will watch fluid status, as patient has severe AS  - trend H&H on AM labs Has history of anemia requiring blood transfusions and IV iron supplementation. Presented to ED with hgb 6.8; now s/p 1 unit PRBC, was given 10mg IVP Lasix afterwards  - trend H&H on AM labs Has history of anemia requiring blood transfusions and IV iron supplementation. Presented to ED with hgb 6.8; now s/p 1 unit PRBC, was given 10mg IVP Lasix afterwards  - Hb with appropriate response to s/p 1u PRBCs  - monitor H/H daily, transfuse for Hb<7

## 2019-12-27 NOTE — PROGRESS NOTE ADULT - PROBLEM SELECTOR PLAN 2
Suspected 2/2 dysphagia. CXR appears to show worsening RLL opacity, as compared to 12/20. Patient receiving pleasure feeds at home.  - Ertapenem does not cover aspiration PNA  - will start patient on Zosyn 3.375mg q12h, renally doesd Suspected 2/2 dysphagia. CXR appears to show worsening RLL opacity, as compared to 12/20. Patient receiving pleasure feeds at home.  - Ertapenem does not cover aspiration PNA  - c/w Zosyn 3.375mg q12h, renally dosed. Today would be day 1 Suspected 2/2 dysphagia. CXR appears to show worsening RLL opacity, as compared to 12/20. Patient receiving pleasure feeds at home.  - Ertapenem does not cover aspiration PNA  - c/w Zosyn 3.375mg q12h, renally dosed

## 2019-12-27 NOTE — PROGRESS NOTE ADULT - PROBLEM SELECTOR PLAN 4
Baseline SCr ~2.2, presenting with SCr 2.73.  - decreased PO intake over past several days, possibly due to hypovolemia  - no IVF at the moment  - will continue to monitor SCr on AM labs Baseline SCr ~2.2, presenting with SCr 2.73.  - decreased PO intake over past several days, possibly due to hypovolemia  - no IVF at the moment; SCr 2.79 this AM, largely unchanged from day prior  - will continue to monitor SCr on AM labs Baseline SCr ~2.2, presenting with SCr 2.73.  - decreased PO intake over past several days, possibly due to hypovolemia  - SCr 2.79 this AM, largely unchanged from day prior  - will continue to monitor SCr on AM labs  - c/w NS @ 75 cc/hr  - f/u urine lytes

## 2019-12-27 NOTE — PROGRESS NOTE ADULT - PROBLEM SELECTOR PLAN 1
Outpatient UCx growing Klebsiella pneumoniae ESBL, sensitive to ertapenem. UCx from 12/20 showing contamination. Abnormal UA on presentation to ED, (+) mod leukocyte esterase. Patient with suprapubic catheter.  - c/w ertapenem 500mg q24h; today is day 4 of abx  - f/u UCx Outpatient UCx growing Klebsiella pneumoniae ESBL, sensitive to ertapenem. UCx from 12/20 showing contamination. Abnormal UA on presentation to ED, (+) mod leukocyte esterase. Patient with suprapubic catheter.  - c/w ertapenem 500mg q24h; today is day 5 of abx  - f/u UCx

## 2019-12-27 NOTE — PROGRESS NOTE ADULT - PROBLEM SELECTOR PLAN 9
Transitions of Care Status:  1.  Name of PCP: Dr. Isaias Rocha  2.  PCP Contacted on Admission: [ ] Y    [X] N    3.  PCP contacted at Discharge: [ ] Y    [ ] N    [ ] N/A  4.  Post-Discharge Appointment Date and Location:  5.  Summary of Handoff given to PCP:

## 2019-12-27 NOTE — PROGRESS NOTE ADULT - SUBJECTIVE AND OBJECTIVE BOX
Channing Castillo MD  Internal Medicine, PGY-1  Beeper: [phone] ([facility])/ [id number] ([facility])     Subjective:    Patient is a 89y old  Male who presents with a chief complaint of AMS and lethargy (26 Dec 2019 19:49)    Patient was seen and examined at bedside this AM. No acute overnight events. Denied fever, chills, nausea, vomiting, CP, palpitations, coughing, wheezing, SOB, and abdominal pain.      MEDICATIONS  (STANDING):  ertapenem  IVPB 500 milliGRAM(s) IV Intermittent every 24 hours  levETIRAcetam  IVPB 250 milliGRAM(s) IV Intermittent every 12 hours  levothyroxine Injectable 50 MICROGram(s) IV Push at bedtime  piperacillin/tazobactam IVPB.. 3.375 Gram(s) IV Intermittent every 12 hours    MEDICATIONS  (PRN):      Objective:    Vitals: Vital Signs Last 24 Hrs  T(C): 36.3 (19 @ 05:00), Max: 36.4 (19 @ 19:05)  T(F): 97.3 (19 @ 05:00), Max: 97.5 (19 @ 19:05)  HR: 60 (19 @ 05:00) (47 - 66)  BP: 128/78 (19 @ 05:00) (95/68 - 150/73)  BP(mean): --  RR: 20 (19 @ 05:00) (16 - 20)  SpO2: 98% (19 @ 05:00) (94% - 98%)              I&O's Summary    26 Dec 2019 07:01  -  27 Dec 2019 07:00  --------------------------------------------------------  IN: 0 mL / OUT: 300 mL / NET: -300 mL        PHYSICAL EXAM:  GENERAL: NAD, well-groomed, well-developed  HEAD:  Atraumatic, Normocephalic  EYES: EOMI, PERRLA, conjunctiva and sclera clear  ENMT: No tonsillar erythema, exudates, or enlargement; Moist mucous membranes, Good dentition, No lesions  NECK: Supple, No JVD, Normal thyroid  CHEST/LUNG: Clear to auscultation bilaterally; No rales, rhonchi, wheezing, or rubs  HEART: Regular rate and rhythm; No murmurs, rubs, or gallops  ABDOMEN: Soft, Nontender, Nondistended; Bowel sounds present  EXTREMITIES:  2+ Peripheral Pulses, No clubbing, cyanosis, or edema  LYMPH: No lymphadenopathy noted  SKIN: No rashes or lesions  NERVOUS SYSTEM:  Alert & Oriented X3, Good concentration; Motor Strength 5/5 B/L upper and lower extremities; DTRs 2+ intact and symmetric                                             LABS:      140  |  104  |  67<H>  ----------------------------<  96  4.8   |  22  |  2.73<H>    Ca    9.6      26 Dec 2019 14:24  Phos  4.7       Mg     2.3         TPro  7.5  /  Alb  3.5  /  TBili  0.6  /  DBili  x   /  AST  12  /  ALT  7<L>  /  AlkPhos  87        PT/INR - ( 26 Dec 2019 14:24 )   PT: 13.4 sec;   INR: 1.16 ratio         PTT - ( 26 Dec 2019 14:24 )  PTT:35.0 sec                Urinalysis Basic - ( 26 Dec 2019 14:40 )    Color: Light Yellow / Appearance: Clear / S.016 / pH: x  Gluc: x / Ketone: Negative  / Bili: Negative / Urobili: 2 mg/dL   Blood: x / Protein: Trace / Nitrite: Negative   Leuk Esterase: Moderate / RBC: 1 /hpf / WBC 7 /HPF   Sq Epi: x / Non Sq Epi: 1 /hpf / Bacteria: Negative                                  7.8    6.68  )-----------( 141      ( 27 Dec 2019 02:45 )             26.4                         6.8    6.75  )-----------( 138      ( 26 Dec 2019 14:24 )             23.6     CAPILLARY BLOOD GLUCOSE            RECENT CULTURES:   @ 04:28  .Urine Clean Catch (Midstream)  --  --  --    >=3 organisms. Probable collection contamination.  --      TELEMETRY:    ECG:    TTE:    RADIOLOGY & ADDITIONAL TESTS:    Imaging Personally Reviewed:  [ ] YES  [ ] NO    Consultants involved in case:   Consultant(s) Notes Reviewed:  [ ] YES  [ ] NO:   Care Discussed with Consultants/Other Providers [ ] YES  [ ] NO Channing Castillo MD  Internal Medicine, PGY-1  Beeper: [phone] ([facility])/ [id number] ([facility])     Subjective:    Patient is a 89y old  Male who presents with a chief complaint of AMS and lethargy (26 Dec 2019 19:49)    Patient was seen and examined at bedside this AM. No acute overnight events. Patient still in the ED at the time of encounter. Telemetry discontinued; refer to chart note. This AM, patient was awake, alert, and interactive. However, communication was challenging, as patient did not have hearing aids in. Patient's HHA at bedside was extremely upset that patient's SP catheter bad was not emptied and not placed in gravity-dependent position. She expressed concern that urine would back up into the patient's bladder and exacerbate further infections. Patient denied having pain anywhere.       MEDICATIONS  (STANDING):  ertapenem  IVPB 500 milliGRAM(s) IV Intermittent every 24 hours  levETIRAcetam  IVPB 250 milliGRAM(s) IV Intermittent every 12 hours  levothyroxine Injectable 50 MICROGram(s) IV Push at bedtime  piperacillin/tazobactam IVPB.. 3.375 Gram(s) IV Intermittent every 12 hours      Objective:    Vitals: Vital Signs Last 24 Hrs  T(C): 36.3 (19 @ 05:00), Max: 36.4 (19 @ 19:05)  T(F): 97.3 (19 @ 05:00), Max: 97.5 (19 @ 19:05)  HR: 60 (19 @ 05:00) (47 - 66)  BP: 128/78 (19 @ 05:00) (95/68 - 150/73)  RR: 20 (19 @ 05:00) (16 - 20)  SpO2: 98% (19 @ 05:00) (94% - 98%)                I&O's Summary  26 Dec 2019 07:01  -  27 Dec 2019 07:00  --------------------------------------------------------  IN: 0 mL / OUT: 300 mL / NET: -300 mL      PHYSICAL EXAM:  GENERAL: Elderly male laying in ED gurney in NAD; SP catheter bag attached to LLE. HHA at the bedside  HEAD:  Atraumatic, Normocephalic  EYES: crusting/discharge noted in both eyes.   ENMT: Dry mucous membranes  CHEST/LUNG: b/l crackles on exam  HEART: Regular rate and rhythm; No murmurs, rubs, or gallops  ABDOMEN: Soft, Nontender, Nondistended; Bowel sounds present  EXTREMITIES: Trace LE edema  SKIN: Warm and dry  NERVOUS SYSTEM: Awake and interactive, moving all extremities spontaneously                                          LABS:      142  |  106  |  64<H>  ----------------------------<  87  4.9   |  20<L>  |  2.79<H>    Ca    9.5      27 Dec 2019 10:55  Phos  4.5       Mg     2.4         TPro  7.4  /  Alb  3.5  /  TBili  1.1  /  DBili  x   /  AST  14  /  ALT  7<L>  /  AlkPhos  86    PT/INR - ( 26 Dec 2019 14:24 )   PT: 13.4 sec;   INR: 1.16 ratio    PTT - ( 26 Dec 2019 14:24 )  PTT:35.0 sec                Urinalysis Basic - ( 26 Dec 2019 14:40 )  Color: Light Yellow / Appearance: Clear / S.016 / pH: x  Gluc: x / Ketone: Negative  / Bili: Negative / Urobili: 2 mg/dL   Blood: x / Protein: Trace / Nitrite: Negative   Leuk Esterase: Moderate / RBC: 1 /hpf / WBC 7 /HPF   Sq Epi: x / Non Sq Epi: 1 /hpf / Bacteria: Negative                          8.4    7.23  )-----------( 156      ( 27 Dec 2019 08:55 )             28.4                         7.8    6.68  )-----------( 141      ( 27 Dec 2019 02:45 )             26.4

## 2019-12-27 NOTE — PROGRESS NOTE ADULT - ASSESSMENT
· Assessment		  89M c hx advanced dementia (A&Ox1-2 baseline), severe AS (0.6 cm2), mod MR, very [place], neurogenic bladder s/p suprapubic catheter, CECY on IV iron, Afib not on a/c 2/2 GIB, CAD, CKD (baseline Cr 2.2), GTC seizure d/o on keppra, hpylori gastritis, bradycardia, dysphagia on ?pleasure feeds, pw hematuria and bleeding from penis meatus.    Gross hematuria.  - likely 2/2 traumatic edwards insertion  - resolving  - pt no longer on asa or a/c despite afib  - urology evaluation noted  - monitor cbc.  Prostatitis/ UTI  - antibiotics  - ID evaluation noted    Dementia without behavioral disturbance, unspecified dementia type.  - PT eval  - pt's daughter requests medical management but limited procedural interventions and more quality of life/comfort approach. refuses tavr or feeding tube.    Chronic kidney disease, unspecified CKD stage.   - at baseline.    Seizure disorder.  - cont keppra.    Dysphagia, unspecified type.  - cont pureed honey diet  - pt's daughter requests pleasure feeds despite risk of aspiration and death. pt's daughter states that g tube was previously offered but refused.    Bradycardia.   - daughter is aware of bradycardia, but refuses pacemaker at this time.     Hypothyroidism  - decrease Synthroid, follow TSH.  David Bledsoe MD pager [phone] 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, and dysphagia on pleasure feeds, who presents with 4 days of worsening lethargy, somnolence, decreased PO intake, and recent diagnosis of ESBL UTI on ertapenem; admitted for suspected aspiration PNA

## 2019-12-27 NOTE — PROGRESS NOTE ADULT - ATTENDING COMMENTS
Dr. Priscilla Santos  Division of Hospital Medicine  [facility]   Pager: [phone]    Patient seen and examined on  12/27/19 with Team 1 Resident and Interns. Agree with above findings, assessment, and plan with edits made above and the following additions/exceptions:    c/w ertapenam for ESBL Klebsiella UTI  c/w zosyn for presumed aspiration PNA  H/H stable with appropriate response to 1u PRBC.  advance diet as tolerated as now more awake and able to participate.    Rest as detailed in note above.    Discussed with patient, HHA bedside, and team 1 residents

## 2019-12-28 ENCOUNTER — TRANSCRIPTION ENCOUNTER (OUTPATIENT)
Age: 84
End: 2019-12-28

## 2019-12-28 DIAGNOSIS — F05 DELIRIUM DUE TO KNOWN PHYSIOLOGICAL CONDITION: ICD-10-CM

## 2019-12-28 DIAGNOSIS — R45.1 RESTLESSNESS AND AGITATION: ICD-10-CM

## 2019-12-28 LAB
ANION GAP SERPL CALC-SCNC: 18 MMOL/L — HIGH (ref 5–17)
BUN SERPL-MCNC: 64 MG/DL — HIGH (ref 7–23)
CALCIUM SERPL-MCNC: 9.5 MG/DL — SIGNIFICANT CHANGE UP (ref 8.4–10.5)
CHLORIDE SERPL-SCNC: 108 MMOL/L — SIGNIFICANT CHANGE UP (ref 96–108)
CO2 SERPL-SCNC: 20 MMOL/L — LOW (ref 22–31)
CREAT SERPL-MCNC: 2.89 MG/DL — HIGH (ref 0.5–1.3)
GLUCOSE SERPL-MCNC: 76 MG/DL — SIGNIFICANT CHANGE UP (ref 70–99)
HCT VFR BLD CALC: 27.3 % — LOW (ref 39–50)
HGB BLD-MCNC: 8.1 G/DL — LOW (ref 13–17)
MAGNESIUM SERPL-MCNC: 2.4 MG/DL — SIGNIFICANT CHANGE UP (ref 1.6–2.6)
MCHC RBC-ENTMCNC: 26.4 PG — LOW (ref 27–34)
MCHC RBC-ENTMCNC: 29.7 GM/DL — LOW (ref 32–36)
MCV RBC AUTO: 88.9 FL — SIGNIFICANT CHANGE UP (ref 80–100)
PHOSPHATE SERPL-MCNC: 4.6 MG/DL — HIGH (ref 2.5–4.5)
PLATELET # BLD AUTO: 141 K/UL — LOW (ref 150–400)
POTASSIUM SERPL-MCNC: 4.8 MMOL/L — SIGNIFICANT CHANGE UP (ref 3.5–5.3)
POTASSIUM SERPL-SCNC: 4.8 MMOL/L — SIGNIFICANT CHANGE UP (ref 3.5–5.3)
RBC # BLD: 3.07 M/UL — LOW (ref 4.2–5.8)
RBC # FLD: 15 % — HIGH (ref 10.3–14.5)
SODIUM SERPL-SCNC: 146 MMOL/L — HIGH (ref 135–145)
WBC # BLD: 7.54 K/UL — SIGNIFICANT CHANGE UP (ref 3.8–10.5)
WBC # FLD AUTO: 7.54 K/UL — SIGNIFICANT CHANGE UP (ref 3.8–10.5)

## 2019-12-28 PROCEDURE — 93010 ELECTROCARDIOGRAM REPORT: CPT

## 2019-12-28 PROCEDURE — 99233 SBSQ HOSP IP/OBS HIGH 50: CPT

## 2019-12-28 PROCEDURE — 99222 1ST HOSP IP/OBS MODERATE 55: CPT

## 2019-12-28 RX ORDER — SODIUM CHLORIDE 9 MG/ML
1000 INJECTION INTRAMUSCULAR; INTRAVENOUS; SUBCUTANEOUS
Refills: 0 | Status: DISCONTINUED | OUTPATIENT
Start: 2019-12-28 | End: 2019-12-29

## 2019-12-28 RX ORDER — VALPROIC ACID (AS SODIUM SALT) 250 MG/5ML
125 SOLUTION, ORAL ORAL EVERY 6 HOURS
Refills: 0 | Status: DISCONTINUED | OUTPATIENT
Start: 2019-12-28 | End: 2020-01-05

## 2019-12-28 RX ORDER — MEROPENEM 1 G/30ML
500 INJECTION INTRAVENOUS EVERY 12 HOURS
Refills: 0 | Status: COMPLETED | OUTPATIENT
Start: 2019-12-28 | End: 2019-12-31

## 2019-12-28 RX ADMIN — MEROPENEM 100 MILLIGRAM(S): 1 INJECTION INTRAVENOUS at 18:26

## 2019-12-28 RX ADMIN — Medication 50 MICROGRAM(S): at 21:19

## 2019-12-28 RX ADMIN — Medication 25 MILLIGRAM(S): at 19:00

## 2019-12-28 RX ADMIN — LEVETIRACETAM 400 MILLIGRAM(S): 250 TABLET, FILM COATED ORAL at 06:07

## 2019-12-28 RX ADMIN — PIPERACILLIN AND TAZOBACTAM 25 GRAM(S): 4; .5 INJECTION, POWDER, LYOPHILIZED, FOR SOLUTION INTRAVENOUS at 06:08

## 2019-12-28 RX ADMIN — LEVETIRACETAM 400 MILLIGRAM(S): 250 TABLET, FILM COATED ORAL at 16:39

## 2019-12-28 NOTE — PROGRESS NOTE ADULT - PROBLEM SELECTOR PLAN 8
Code Status: DNR, MOLST in chart  DVT ppx: SCDs  Diet: advance as tolerated and more awake History of seizures, on Keppra at home  - c/w home dose Keppra, but IV formulation

## 2019-12-28 NOTE — PROGRESS NOTE ADULT - PROBLEM SELECTOR PLAN 9
Transitions of Care Status:  1.  Name of PCP: Dr. Isaias Rocha  2.  PCP Contacted on Admission: [ ] Y    [X] N    3.  PCP contacted at Discharge: [ ] Y    [ ] N    [ ] N/A  4.  Post-Discharge Appointment Date and Location:  5.  Summary of Handoff given to PCP: Code Status: DNR, MOLST in chart  DVT ppx: SCDs  Diet: advance as tolerated and more awake

## 2019-12-28 NOTE — BEHAVIORAL HEALTH ASSESSMENT NOTE - HPI (INCLUDE ILLNESS QUALITY, SEVERITY, DURATION, TIMING, CONTEXT, MODIFYING FACTORS, ASSOCIATED SIGNS AND SYMPTOMS)
Note: Collateral information was obtained by patient's daughter Estela, who was present bedside but she informed that patient's other daughter is legal proxy , see contact info above, for future communication).  Patient is 89 y old  male,  (lost wife who had dementia about 1 year ago), have 2 daughters, with no PPH, PMH of advanced dementia (A&Ox1-2 baseline), severe AS, moderate MR, neurogenic bladder s/p suprapubic catheter, CECY, Afib not on a/c 2/2 GIB, CAD, CKD (baseline Cr 2.2), GTC seizure on Keppra, and dysphagia on pleasure feeds, who presented  with 4 days of worsening lethargy, somnolence, and decreased PO intake. Patient was seen and evaluated in the ED on Friday, , for UTI and was subsequently discharged to home on a course of Keflex and Augmentin. Patient's visiting physician, Dr. Rocha switched patient to Ertapenem after cultures grew back positive for Klebsiella pneumoniae ESBL. Over the course of the next several days, patient has developed progressively worsening lethargy, somnolence, and decreased PO intake; presenting to the ED today for further evaluation, with findings of UTI again.   Psychiatry is consulted for intermittent episodes of agitation.  Patient seen in his room, where he was accompanied by his HHA and daughter Estela ([phone]). Daughter informed that patient is hard of hearing and have slurred speech at baseline, at times difficult to understand.   Patient appears disoriented to time and place (believes he is in a hospital in [place]), oriented to self only. He reports feeling "great" and has no idea of why psychiatry was consulted. Patient was able to recognize his daughter and HHA, but was not able to tell correct address (believed he lives at [address] [place] [address], which per daughter is not correct). Patient also told writer he lives with his parents who  few months ago which as per daughter is not correct, although patient lost his wife about 1 year ago, who had severe dementia. Patient reports having "dreams" at night about train approaching while he is on the track which he states has been going for a while. He denies feeling sad or recent mood changes. Patient also denies any A/V hallucinations and just talks about his dreams when asked about other forms of hallucinations. pt denies any SI/HI. He appeared slightly tearful at time but when asked about the reason, he ddi not provide one. When asked if he remembers feeling angry or agitated , patient states, I only get angry when people do bad things, without providing further details.   As per collateral information obtained by daughter, patient's mental status has declined since he had UTI about 7-8 days ago. He has become agitated, yelling, screaming, at times trying ton get out of bed or pushing others, while at baseline, he is generally a calm person. Daughter states that he is very calm right now, but usually becomes agitated at night time. Patient has no past psychiatric h/o, as well as no prior inpatient/outpatient psychiatric treatment. Patient takes Seroquel 75 mg po daily, prescribed by PCP, which basically helps him sleep better but since the onset of UTI, he has not been able to sleep well. Patient's seroquel was held by primary team due to prolonged Qtc (516 on ). Patient's daughter denies any safety concerns regarding patient and denies any recent mood changes otherwise, or prior h/o suicidal/violent behaviour.

## 2019-12-28 NOTE — PROGRESS NOTE ADULT - PROBLEM SELECTOR PLAN 5
Patient with suprapubic catheter Patient normally takes 75mg Seroquel at home for agitation, but according to family, that has not been working. Patient's HCP and daughter, Rosas Parikh expressing concern over patient's agitation and risk of fall if trying to get OOB  - agitated and tried getting OOB last night  - QTc prolonged on admission ECG; will consider 5mg IV/IM Zyprexa if he continues to be agitated  - psych consulted for assistance Patient normally takes 75mg Seroquel at home for agitation, but according to family, that has not been working. Patient's HCP and daughter, Rosas Parikh expressing concern over patient's agitation and risk of fall if trying to get OOB  - agitated and tried getting OOB last night  - QTc prolonged on admission ECG; will repeat ECG  - psych consulted for assistance

## 2019-12-28 NOTE — PROGRESS NOTE ADULT - PROBLEM SELECTOR PLAN 7
History of seizures, on Keppra at home  - c/w home dose Keppra, but IV formulation c/w home synthroid, but IV formulation

## 2019-12-28 NOTE — BEHAVIORAL HEALTH ASSESSMENT NOTE - RISK ASSESSMENT
Low Acute Suicide Risk Chronic risk : Medical comorbidities, , male gender  Protective factors: Stable living, no PPH, no current/past SI, supportive family, no substance abuse

## 2019-12-28 NOTE — PROGRESS NOTE ADULT - ATTENDING COMMENTS
Will change abx to meropenem dosed renally to cover both ESBL uti as well as aspiration PNA.  Failed bedside eval today, keeping NPO pending speech and swallow  Gentle IV hydration   F/U cultures

## 2019-12-28 NOTE — PROGRESS NOTE ADULT - PROBLEM SELECTOR PLAN 4
Baseline SCr ~2.2, presenting with SCr 2.73.  - decreased PO intake over past several days, possibly due to hypovolemia  - SCr 2.79 this AM, largely unchanged from day prior  - will continue to monitor SCr on AM labs  - c/w NS @ 75 cc/hr  - f/u urine lytes Baseline SCr ~2.2, presenting with SCr 2.73.  - decreased PO intake over past several days, possibly due to hypovolemia  - SCr 2.79 AM prior, this AM labs pending  - will continue to monitor SCr on AM labs  - c/w NS @ 75 cc/hr  - f/u urine lytes

## 2019-12-28 NOTE — PROGRESS NOTE ADULT - SUBJECTIVE AND OBJECTIVE BOX
Channing Castillo MD  Internal Medicine, PGY-1  Beeper: [phone] ([facility])/ [id number] ([facility])     Subjective:    Patient is a 89y old  Male who presents with a chief complaint of AMS and lethargy (27 Dec 2019 07:32)    Patient was seen and examined at bedside this AM. No acute overnight events. Denied fever, chills, nausea, vomiting, CP, palpitations, coughing, wheezing, SOB, and abdominal pain.      MEDICATIONS  (STANDING):  ertapenem  IVPB 500 milliGRAM(s) IV Intermittent every 24 hours  levETIRAcetam  IVPB 250 milliGRAM(s) IV Intermittent every 12 hours  levothyroxine Injectable 50 MICROGram(s) IV Push at bedtime  piperacillin/tazobactam IVPB.. 3.375 Gram(s) IV Intermittent every 12 hours  sodium chloride 0.9%. 1000 milliLiter(s) (75 mL/Hr) IV Continuous <Continuous>    MEDICATIONS  (PRN):      Objective:    Vitals: Vital Signs Last 24 Hrs  T(C): 36.3 (19 @ 06:10), Max: 36.6 (19 @ 16:10)  T(F): 97.3 (19 @ 06:10), Max: 97.9 (19 @ 16:10)  HR: 58 (19 @ 06:10) (58 - 79)  BP: 117/68 (19 @ 06:10) (117/68 - 126/76)  BP(mean): --  RR: 18 (19 @ 06:10) (16 - 18)  SpO2: 95% (19 @ 06:10) (93% - 95%)              I&O's Summary    27 Dec 2019 07:01  -  28 Dec 2019 07:00  --------------------------------------------------------  IN: 50 mL / OUT: 750 mL / NET: -700 mL        PHYSICAL EXAM:  GENERAL: NAD, well-groomed, well-developed  HEAD:  Atraumatic, Normocephalic  EYES: EOMI, PERRLA, conjunctiva and sclera clear  ENMT: No tonsillar erythema, exudates, or enlargement; Moist mucous membranes, Good dentition, No lesions  NECK: Supple, No JVD, Normal thyroid  CHEST/LUNG: Clear to auscultation bilaterally; No rales, rhonchi, wheezing, or rubs  HEART: Regular rate and rhythm; No murmurs, rubs, or gallops  ABDOMEN: Soft, Nontender, Nondistended; Bowel sounds present  EXTREMITIES:  2+ Peripheral Pulses, No clubbing, cyanosis, or edema  LYMPH: No lymphadenopathy noted  SKIN: No rashes or lesions  NERVOUS SYSTEM:  Alert & Oriented X3, Good concentration; Motor Strength 5/5 B/L upper and lower extremities; DTRs 2+ intact and symmetric                                             LABS:      142  |  106  |  64<H>  ----------------------------<  87  4.9   |  20<L>  |  2.79<H>      140  |  104  |  67<H>  ----------------------------<  96  4.8   |  22  |  2.73<H>    Ca    9.5      27 Dec 2019 10:55  Ca    9.6      26 Dec 2019 14:24  Phos  4.5       Mg     2.4         TPro  7.4  /  Alb  3.5  /  TBili  1.1  /  DBili  x   /  AST  14  /  ALT  7<L>  /  AlkPhos  86    TPro  7.5  /  Alb  3.5  /  TBili  0.6  /  DBili  x   /  AST  12  /  ALT  7<L>  /  AlkPhos  87        PT/INR - ( 26 Dec 2019 14:24 )   PT: 13.4 sec;   INR: 1.16 ratio         PTT - ( 26 Dec 2019 14:24 )  PTT:35.0 sec                Urinalysis Basic - ( 26 Dec 2019 14:40 )    Color: Light Yellow / Appearance: Clear / S.016 / pH: x  Gluc: x / Ketone: Negative  / Bili: Negative / Urobili: 2 mg/dL   Blood: x / Protein: Trace / Nitrite: Negative   Leuk Esterase: Moderate / RBC: 1 /hpf / WBC 7 /HPF   Sq Epi: x / Non Sq Epi: 1 /hpf / Bacteria: Negative                                  8.4    7.23  )-----------( 156      ( 27 Dec 2019 08:55 )             28.4                         7.8    6.68  )-----------( 141      ( 27 Dec 2019 02:45 )             26.4                         6.8    6.75  )-----------( 138      ( 26 Dec 2019 14:24 )             23.6     CAPILLARY BLOOD GLUCOSE            RECENT CULTURES:   @ 17:44  .Urine Clean Catch (Midstream)  --  --  --    50,000 - 99,000 CFU/mL Gram Negative Rods  --   @ 17:27  .Blood Blood-Peripheral  --  --  --    No growth to date.  --      TELEMETRY:    ECG:    TTE:    RADIOLOGY & ADDITIONAL TESTS:    Imaging Personally Reviewed:  [ ] YES  [ ] NO    Consultants involved in case:   Consultant(s) Notes Reviewed:  [ ] YES  [ ] NO:   Care Discussed with Consultants/Other Providers [ ] YES  [ ] NO Channing Castillo MD  Internal Medicine, PGY-1  Beeper: [phone] ([facility])/ [id number] ([facility])     Subjective:    Patient is a 89y old  Male who presents with a chief complaint of AMS and lethargy (27 Dec 2019 07:32)    Patient was seen and examined at bedside this AM. No acute overnight events. Denied fever, chills, nausea, vomiting, CP, palpitations, coughing, wheezing, SOB, and abdominal pain.      MEDICATIONS  (STANDING):  ertapenem  IVPB 500 milliGRAM(s) IV Intermittent every 24 hours  levETIRAcetam  IVPB 250 milliGRAM(s) IV Intermittent every 12 hours  levothyroxine Injectable 50 MICROGram(s) IV Push at bedtime  piperacillin/tazobactam IVPB.. 3.375 Gram(s) IV Intermittent every 12 hours  sodium chloride 0.9%. 1000 milliLiter(s) (75 mL/Hr) IV Continuous <Continuous>      Objective:    Vitals: Vital Signs Last 24 Hrs  T(C): 36.3 (19 @ 06:10), Max: 36.6 (19 @ 16:10)  T(F): 97.3 (19 @ 06:10), Max: 97.9 (19 @ 16:10)  HR: 58 (19 @ 06:10) (58 - 79)  BP: 117/68 (19 @ 06:10) (117/68 - 126/76)  RR: 18 (19 @ 06:10) (16 - 18)  SpO2: 95% (19 @ 06:10) (93% - 95%)                I&O's Summary  27 Dec 2019 07:01  -  28 Dec 2019 07:00  --------------------------------------------------------  IN: 50 mL / OUT: 750 mL / NET: -700 mL      PHYSICAL EXAM:  GENERAL: Elderly male laying comfortably in bed in NAD. Patient is very hard of hearing  HEAD:  Atraumatic, Normocephalic  EYES: EOMI, PERRLA, conjunctiva and sclera clear  ENMT: No tonsillar erythema, exudates, or enlargement; Moist mucous membranes, Good dentition, No lesions  NECK: Supple, No JVD, Normal thyroid  CHEST/LUNG: Clear to auscultation bilaterally; No rales, rhonchi, wheezing, or rubs  HEART: Regular rate and rhythm; No murmurs, rubs, or gallops  ABDOMEN: Soft, Nontender, Nondistended; Bowel sounds present  EXTREMITIES:  2+ Peripheral Pulses, No clubbing, cyanosis, or edema  LYMPH: No lymphadenopathy noted  SKIN: No rashes or lesions  NERVOUS SYSTEM:  Alert & Oriented X2-3, oriented to self and place; but states year is 2018                                              LABS:      142  |  106  |  64<H>  ----------------------------<  87  4.9   |  20<L>  |  2.79<H>    Ca    9.5      27 Dec 2019 10:55  Phos  4.5       Mg     2.4         TPro  7.4  /  Alb  3.5  /  TBili  1.1  /  DBili  x   /  AST  14  /  ALT  7<L>  /  AlkPhos  86    PT/INR - ( 26 Dec 2019 14:24 )   PT: 13.4 sec;   INR: 1.16 ratio    PTT - ( 26 Dec 2019 14:24 )  PTT:35.0 sec                Urinalysis Basic - ( 26 Dec 2019 14:40 )  Color: Light Yellow / Appearance: Clear / S.016 / pH: x  Gluc: x / Ketone: Negative  / Bili: Negative / Urobili: 2 mg/dL   Blood: x / Protein: Trace / Nitrite: Negative   Leuk Esterase: Moderate / RBC: 1 /hpf / WBC 7 /HPF   Sq Epi: x / Non Sq Epi: 1 /hpf / Bacteria: Negative                      8.4    7.23  )-----------( 156      ( 27 Dec 2019 08:55 )             28.4 Channing Castillo MD  Internal Medicine, PGY-1  Beeper: [phone] ([facility])/ [id number] ([facility])     Subjective:    Patient is a 89y old  Male who presents with a chief complaint of AMS and lethargy (27 Dec 2019 07:32)    Patient was seen and examined at bedside this AM. No acute overnight events. Patient very hard of hearing, microphone with headphones used at bedside for communication. Patient did not have any complaints or questions. Denied fever, chills, nausea, vomiting, CP, SOB, and abdominal pain.      MEDICATIONS  (STANDING):  ertapenem  IVPB 500 milliGRAM(s) IV Intermittent every 24 hours  levETIRAcetam  IVPB 250 milliGRAM(s) IV Intermittent every 12 hours  levothyroxine Injectable 50 MICROGram(s) IV Push at bedtime  piperacillin/tazobactam IVPB.. 3.375 Gram(s) IV Intermittent every 12 hours  sodium chloride 0.9%. 1000 milliLiter(s) (75 mL/Hr) IV Continuous <Continuous>      Objective:    Vitals: Vital Signs Last 24 Hrs  T(C): 36.3 (12-28-19 @ 06:10), Max: 36.6 (12-27-19 @ 16:10)  T(F): 97.3 (12-28-19 @ 06:10), Max: 97.9 (12-27-19 @ 16:10)  HR: 58 (12-28-19 @ 06:10) (58 - 79)  BP: 117/68 (12-28-19 @ 06:10) (117/68 - 126/76)  RR: 18 (12-28-19 @ 06:10) (16 - 18)  SpO2: 95% (12-28-19 @ 06:10) (93% - 95%)                I&O's Summary  27 Dec 2019 07:01  -  28 Dec 2019 07:00  --------------------------------------------------------  IN: 50 mL / OUT: 750 mL / NET: -700 mL      PHYSICAL EXAM:  GENERAL: Elderly male laying comfortably in bed in NAD. Patient is very hard of hearing; headphone with microphone had to be used at bedside for communication. Patient has slurred speech and can be difficult to comprehend at times  HEAD:  Atraumatic, Normocephalic  EYES: EOMI, conjunctiva and sclera clear  ENMT: lips appeared dry  CHEST/LUNG: Clear to auscultation bilaterally  HEART: bradycardic, irregular rhythm  ABDOMEN: Soft, Nontender, Nondistended; Bowel sounds present  EXTREMITIES: trace RLE edema  SKIN: warm and dry  NERVOUS SYSTEM:  Alert & Oriented X2-3, oriented to self and place; but states year is 2018                                              LABS:  Labs from this AM pending Channing Castillo MD  Internal Medicine, PGY-1  Beeper: [phone] ([facility])/ [id number] ([facility])     Subjective:    Patient is a 89y old  Male who presents with a chief complaint of AMS and lethargy (27 Dec 2019 07:32)    Patient was seen and examined at bedside this AM. No acute overnight events. Patient very hard of hearing, microphone with headphones used at bedside for communication. Patient did not have any complaints or questions. Denied fever, chills, nausea, vomiting, CP, SOB, and abdominal pain.      MEDICATIONS  (STANDING):  ertapenem  IVPB 500 milliGRAM(s) IV Intermittent every 24 hours  levETIRAcetam  IVPB 250 milliGRAM(s) IV Intermittent every 12 hours  levothyroxine Injectable 50 MICROGram(s) IV Push at bedtime  piperacillin/tazobactam IVPB.. 3.375 Gram(s) IV Intermittent every 12 hours  sodium chloride 0.9%. 1000 milliLiter(s) (75 mL/Hr) IV Continuous <Continuous>      Objective:    Vitals: Vital Signs Last 24 Hrs  T(C): 36.3 (12-28-19 @ 06:10), Max: 36.6 (12-27-19 @ 16:10)  T(F): 97.3 (12-28-19 @ 06:10), Max: 97.9 (12-27-19 @ 16:10)  HR: 58 (12-28-19 @ 06:10) (58 - 79)  BP: 117/68 (12-28-19 @ 06:10) (117/68 - 126/76)  RR: 18 (12-28-19 @ 06:10) (16 - 18)  SpO2: 95% (12-28-19 @ 06:10) (93% - 95%)                I&O's Summary  27 Dec 2019 07:01  -  28 Dec 2019 07:00  --------------------------------------------------------  IN: 50 mL / OUT: 750 mL / NET: -700 mL      PHYSICAL EXAM:  GENERAL: Elderly male laying comfortably in bed in NAD. Patient is very hard of hearing; headphone with microphone had to be used at bedside for communication. Patient has slurred speech and can be difficult to comprehend at times  HEAD:  Atraumatic, Normocephalic  EYES: EOMI, conjunctiva and sclera clear  ENMT: lips appeared dry  CHEST/LUNG: Clear to auscultation bilaterally  HEART: bradycardic, irregular rhythm  ABDOMEN: Soft, Nontender, Nondistended; Bowel sounds present  EXTREMITIES: trace RLE edema  SKIN: warm and dry  NERVOUS SYSTEM:  Alert & Oriented X2-3, oriented to self and place; but states year is 2018                                              LABS:  12-28    146<H>  |  108  |  64<H>  ----------------------------<  76  4.8   |  20<L>  |  2.89<H>    Ca    9.5      28 Dec 2019 09:14  Phos  4.6     12-28  Mg     2.4     12-28                          8.1    7.54  )-----------( 141      ( 28 Dec 2019 11:58 )             27.3

## 2019-12-28 NOTE — BEHAVIORAL HEALTH ASSESSMENT NOTE - SUMMARY
Patient is 89 y old  male,  (lost wife who had dementia about 1 year ago), have 2 daughters, with no PPH, PMH of advanced dementia (A&Ox1-2 baseline), severe AS, moderate MR, neurogenic bladder s/p suprapubic catheter, CECY, Afib not on a/c 2/2 GIB, CAD, CKD (baseline Cr 2.2), GTC seizure on Keppra, and dysphagia on pleasure feeds, who presented  with 4 days of worsening lethargy, somnolence, and decreased PO intake. Patient was seen and evaluated in the ED on Friday, December 20th, for UTI and was subsequently discharged to home on a course of Keflex and Augmentin. Patient's visiting physician, Dr. Rocha switched patient to Ertapenem after cultures grew back positive for Klebsiella pneumoniae ESBL. Over the course of the next several days, patient has developed progressively worsening lethargy, somnolence, and decreased PO intake; presenting to the ED today for further evaluation, with findings of UTI again.   Psychiatry is consulted for intermittent episodes of agitation. Patient at present is calm, but appears confused, A/O x 1. As per daughter, patient's mental status declined with the onset of UTI and normally he is more better oriented to time and place. She otherwise denies any safety concerns and does not believe patient requires in-patient or outpatient psychiatric treatment at this time. Based on evaluation, patient's symptoms appear secondary to delirium. Due to prolonged Qtc, with medical comorbidities, we recommend holding Seroquel until patient's Qtc is back to baseline or if approved by Cardiology. We recommend using Depakote or Depakene or sprinkles 125 mg po/iv/im q 6 hr prn severe agitation, Melatonin 3 mg bedtime and avoiding medication that may worsen confusion or prolong Qtc (including BZD, Opioids).

## 2019-12-28 NOTE — PROGRESS NOTE ADULT - PROBLEM SELECTOR PLAN 2
Suspected 2/2 dysphagia. CXR appears to show worsening RLL opacity, as compared to 12/20. Patient receiving pleasure feeds at home.  - Ertapenem does not cover aspiration PNA  - c/w Zosyn 3.375mg q12h, renally dosed

## 2019-12-28 NOTE — PROVIDER CONTACT NOTE (MEDICATION) - BACKGROUND
pt with dementia recieves seroquel 75mg HS/ and 25mg during the day if agitated. Pt.'s daughter is concerned pt.'s stress is not good for his heart

## 2019-12-28 NOTE — BEHAVIORAL HEALTH ASSESSMENT NOTE - NSBHCHARTREVIEWIMAGING_PSY_A_CORE FT
EXAM:  CT BRAIN                            PROCEDURE DATE:  12/20/2019            INTERPRETATION:  CLINICAL INFORMATION: Confusion/delirium, altered level of consciousness..    TECHNIQUE: Sequential axial images were obtained from the vertex to theskull base without intravenous contrast. Coronal and sagittal reformations were obtained.     COMPARISON: CT brain 1/16/2018.    FINDINGS: There is no acute intracranial hemorrhage, large cortical infarct, mass effect or midline shift. Nonspecific mild to moderate periventricular and subcortical white matter lucencies likely represent chronic microvascular ischemic changes and/or age-indeterminate infarcts. There is mild to moderate cerebral volume loss with ventricular dilatation.     There is no depressed skull fracture. There is minimal mucosal thickening of the sinuses. The tympanomastoid region is clear. Mild asymmetry of the lateral atlantodental space may be due to positioning and/or ligamentous laxity.    IMPRESSION:     No acute intracranial hemorrhage, large cortical infarct or mass effect. If clinically indicated, short-term follow-up or MRI may be obtained for further evaluation.

## 2019-12-28 NOTE — BEHAVIORAL HEALTH ASSESSMENT NOTE - OTHER
HHA, daughter lives on second floor apartment UNPREDICTABLE NOT ASSESSED UNABLE TO ASSESS slurred "great" Not taking seroquel since 12/26

## 2019-12-28 NOTE — PROGRESS NOTE ADULT - PROBLEM SELECTOR PLAN 1
Outpatient UCx growing Klebsiella pneumoniae ESBL, sensitive to ertapenem. UCx from 12/20 showing contamination. Abnormal UA on presentation to ED, (+) mod leukocyte esterase. Patient with suprapubic catheter.  - c/w ertapenem 500mg q24h; today is day 5 of abx  - f/u UCx Outpatient UCx growing Klebsiella pneumoniae ESBL, sensitive to ertapenem. UCx from 12/20 showing contamination. Abnormal UA on presentation to ED, (+) mod leukocyte esterase. Patient with suprapubic catheter.  - c/w ertapenem 500mg q24h; today is day 5 of abx  - UCx growing GNR

## 2019-12-28 NOTE — DISCHARGE NOTE PROVIDER - NSDCCPCAREPLAN_GEN_ALL_CORE_FT
PRINCIPAL DISCHARGE DIAGNOSIS  Diagnosis: UTI due to Klebsiella species  Assessment and Plan of Treatment: PRINCIPAL DISCHARGE DIAGNOSIS  Diagnosis: UTI due to Klebsiella species  Assessment and Plan of Treatment: You were found to have an UTI, caused by an organism that was sensitive to Ertapenem as an outpatient. After being admitted, your course of Ertapenem was completed and your infection was cleared. After being discharged, please follow up with your PMD.      SECONDARY DISCHARGE DIAGNOSES  Diagnosis: Aspiration pneumonia  Assessment and Plan of Treatment: A chest xray performed in the emergency department showed evidence that you had a pneumonia, likely due to aspiration from your dysphagia. You were treated with a full course of antibiotics for aspiration pneumonia. After discharge, please have close follow up with your PMD. PRINCIPAL DISCHARGE DIAGNOSIS  Diagnosis: UTI due to Klebsiella species  Assessment and Plan of Treatment: You were found to have an urinary tract infection, caused by an organism that was sensitive to Ertapenem as an outpatient. After being admitted, ertapenem was switched to meropenem to cover both urinary tract infection and pneumonia. You have completed the courase of antibiotics and your infection was cleared. After being discharged, please follow up with your primary care doctor.      SECONDARY DISCHARGE DIAGNOSES  Diagnosis: Dysphagia  Assessment and Plan of Treatment: Multiple test done during this admission showed that there is concern about food going into the breathing pipe when you eat. After your family discussed with the palliative care team, it has been decided that you will have pleasure feed with understanding the risk of aspiration.    Diagnosis: Aspiration pneumonia  Assessment and Plan of Treatment: A chest xray performed in the emergency department showed evidence that you had a pneumonia, likely due to aspiration from your dysphagia. You were treated with a full course of antibiotics for aspiration pneumonia. After discharge, please have close follow up with your PMD.

## 2019-12-28 NOTE — BEHAVIORAL HEALTH ASSESSMENT NOTE - NSBHCONSULTRECOMMENDOTHER_PSY_A_CORE FT
1. No standing psych meds for now.    2. Depakote 125 mg po/iv/im q 6 hr prn severe agitation .      3.Melatonin 3mg PO qHS for insomnia    4. Check: TSH, B12, folate, RPR, UA, U-tox; consider CT head    4. Minimize use of benzos, opioids, anticholinergics, or other deliriogenic agents when possible.  Maintain sleep wake cycle.  Provide frequent reorientation and redirection.  Family member at bedside if possible. Assess for need for glasses and hearing aid (if applicable).    5. Pt does not meet criteria for psychiatric hospitalization.  Recommend outpt psych f/u at [facility] after d/c- [phone].   CL Psych will follow.

## 2019-12-28 NOTE — BEHAVIORAL HEALTH ASSESSMENT NOTE - DESCRIPTION
advanced dementia (A&Ox1-2 baseline), severe AS, moderate MR, neurogenic bladder s/p suprapubic catheter, CECY, Afib not on a/c 2/2 GIB, CAD, CKD (baseline Cr 2.2), GTC seizure on Keppra, and dysphagia on pleasure feeds,

## 2019-12-28 NOTE — BEHAVIORAL HEALTH ASSESSMENT NOTE - NSBHCHARTREVIEWVS_PSY_A_CORE FT
Vital Signs Last 24 Hrs  T(C): 36.3 (28 Dec 2019 07:57), Max: 36.6 (27 Dec 2019 16:10)  T(F): 97.4 (28 Dec 2019 07:57), Max: 97.9 (27 Dec 2019 16:10)  HR: 70 (28 Dec 2019 07:57) (58 - 72)  BP: 130/71 (28 Dec 2019 07:57) (117/68 - 130/71)  BP(mean): --  RR: 18 (28 Dec 2019 07:57) (16 - 18)  SpO2: 92% (28 Dec 2019 07:57) (92% - 95%)

## 2019-12-28 NOTE — BEHAVIORAL HEALTH ASSESSMENT NOTE - NSBHCHARTREVIEWLAB_PSY_A_CORE FT
8.1    7.54  )-----------( 141      ( 28 Dec 2019 11:58 )             27.3     12-28    146<H>  |  108  |  64<H>  ----------------------------<  76  4.8   |  20<L>  |  2.89<H>    Ca    9.5      28 Dec 2019 09:14  Phos  4.6     12-28  Mg     2.4     12-28    TPro  7.4  /  Alb  3.5  /  TBili  1.1  /  DBili  x   /  AST  14  /  ALT  7<L>  /  AlkPhos  86  12-27

## 2019-12-28 NOTE — BEHAVIORAL HEALTH ASSESSMENT NOTE - CASE SUMMARY
This is 89-y.o. CM pt,  (lost wife who had dementia about 1 year ago), have 2 daughters, with no PPH, PMH of advanced dementia (A&Ox1-2 baseline), severe AS, moderate MR, neurogenic bladder s/p suprapubic catheter, CECY, Afib not on a/c 2/2 GIB, CAD, CKD (baseline Cr 2.2), GTC seizure on Keppra, and dysphagia on pleasure feeds, who presented  with 4 days of worsening lethargy, somnolence, and decreased PO intake. Patient was seen and evaluated in the ED on Friday, December 20th, for UTI and was subsequently discharged to home on a course of Keflex and Augmentin. Patient's visiting physician, Dr. Rocha switched patient to Ertapenem after cultures grew back positive for Klebsiella pneumoniae ESBL. Over the course of the next several days, patient has developed progressively worsening lethargy, somnolence, and decreased PO intake; presenting to the ED today for further evaluation, with findings of UTI again. Psychiatry is consulted for intermittent episodes of agitation.    I have seen and evaluated this patient myself. Chart, labs, meds reviewed. I agree with resident's assessment and plan. Please monitor QTc on EKG, refrain from administering neuroleptics while QTc >500ms. Re-evaluate the dose of Keppra in the context of inpatient use of valproate for management of delirium.

## 2019-12-28 NOTE — DISCHARGE NOTE PROVIDER - CARE PROVIDER_API CALL
Isaias Rocha (MD)  Internal Medicine  [address] [place] [address], [place] [address]  Phone: [phone]  Fax: [phone]  Follow Up Time:

## 2019-12-28 NOTE — PROGRESS NOTE ADULT - PROBLEM SELECTOR PLAN 3
Has history of anemia requiring blood transfusions and IV iron supplementation. Presented to ED with hgb 6.8; now s/p 1 unit PRBC, was given 10mg IVP Lasix afterwards  - Hb with appropriate response to s/p 1u PRBCs  - monitor H/H daily, transfuse for Hb<7

## 2019-12-28 NOTE — BEHAVIORAL HEALTH ASSESSMENT NOTE - TELEPSYCHIATRY?
No [facility]         Interventional Radiology  Discharge Instructions Post Angiogram (NEURO)    AFTER YOU GO HOME  ? DO NOT drive or operate machinery at home or at work for at least 24 hours  ? If you were given sedation; we recommend that an adult stay with you for the first 24 hours  ? DO relax and take it easy for 24 hours  ? DO drink plenty of fluids  ? DO resume your regular diet, unless otherwise instructed by your Primary Physician  ? DO NOT SMOKE FOR AT LEAST 24 HOURS, if you have been given any medications that were to help you relax or sedate you during your procedure  ? DO NOT drink alcoholic beverages the day of your procedure  ? DO NOT do any strenuous exercise or lifting for at least 2 days following your procedure  ? DO NOT take a bath or shower for at least 12 hours following your procedure  ? DO NOT scrub the procedure site vigorously for 5 days  ? DO NOT make any important or legal decisions for 24 hours following your procedure if you were given sedation    CALL THE PHYSICIAN IF:  ? You start bleeding from the procedure site.  If you do start to bleed from that site, lie down flat and hold pressure on the site.  Your physician will tell you if you need to return to the hospital.  It is common to have a small bruise or lump at the site  ? You have numbness, coolness or change in color of the arm or leg of the puncture site  ? You experience changes in your vision, hearing, balance, coordination, speech, thinking or memory  ? You experience weakness in one or more extremity  ? You experience pain or redness at the puncture site  ? You develop nausea or vomiting  ? You develop hives or a rash or unexplained itching  ? You develop a temperature of 101 degrees F or greater    ADDITIONAL INSTRUCTIONS  ? Support the puncture site for coughing, sneezing, or moving your bowels for the first 48 hours  ? No tub bath, hot tubs, or swimming for 5 days  ? No lotion or powder to the puncture  site for 3 days  ? Star Closure on both femoral veins    [facility] INTERVENTIONAL RADIOLOGY DEPARTMENT  Procedure Physician: TRISHA Rodriges MD/ KURT Nicolas MD/ZACH Perry MD  Date of procedure: November 16, 2018  Telephone Numbers: [phone] Monday-Friday 8:00 am to 4:30 pm  [phone] After 4:30 pm Monday-Friday, Weekends & Holidays.   Ask for the Neuro-Radiologist on call.  Someone is on call 24 hrs/day  [facility] toll free number: [phone] Monday-Friday 8:00 am to 4:30 pm  [facility] Emergency Dept: [phone]

## 2019-12-28 NOTE — BEHAVIORAL HEALTH ASSESSMENT NOTE - NSBHCHARTREVIEWINVESTIGATE_PSY_A_CORE FT
Ventricular Rate 51 BPM    Atrial Rate 241 BPM    QRS Duration 154 ms    Q-T Interval 560 ms    QTC Calculation(Bezet) 516 ms    R Axis -31 degrees    T Axis 1 degrees    Diagnosis Line UNDETERMINED RHYTHM  LEFT AXIS DEVIATION  RIGHT BUNDLE BRANCH BLOCK  ABNORMAL ECG    Confirmed by MD ANMOL, MERRITT ([id number]) on 12/27/2019 11:21:27 AM

## 2019-12-28 NOTE — DISCHARGE NOTE PROVIDER - NSDCMRMEDTOKEN_GEN_ALL_CORE_FT
furosemide 20 mg oral tablet: 1 tab(s) orally once a day  levETIRAcetam 250 mg oral tablet: 1 tab(s) orally 2 times a day  levothyroxine 100 mcg (0.1 mg) oral tablet: 1 tab(s) orally once a day  QUEtiapine 25 mg oral tablet: 1 tab(s) orally once a day with 50mg dose for total dose of 75mg.    NOTE: pharmacy dispensed 2 tabs at bedtime.  QUEtiapine 50 mg oral tablet: 1 tab(s) orally once a day with 25mg dose for total dose of 75mg.    NOTE: pharmacy dispensed 1-2 tabs at bedtime PRN for sleep. furosemide 20 mg oral tablet: 1 tab(s) orally once a day  levETIRAcetam 250 mg oral tablet: 1 tab(s) orally 2 times a day  levothyroxine 100 mcg (0.1 mg) oral tablet: 1 tab(s) orally once a day  QUEtiapine 25 mg oral tablet: 1 tab(s) orally 3 times a day, As Needed -for agitation   QUEtiapine 50 mg oral tablet: 1 tab(s) orally once a day, As Needed -for agitation

## 2019-12-28 NOTE — DISCHARGE NOTE PROVIDER - HOSPITAL COURSE
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 Patient was seen and evaluated in the ED on Friday, December 20th, when he had an abnormal UA suggestive of UTI and was subsequently discharged to home on a course of Keflex and Augmentin. Patient's visiting physician, Dr. Rocha switched patient to Ertapenem after cultures grew back positive for Klebsiella pneumoniae ESBL. Over the course of the next several days, patient has developed progressively worsening lethargy, somnolence, and decreased PO intake; presenting to the ED today for further evaluation.        Per patient's family at bedside, patient is interactive and eats his meals; of note, patient diagnosed with dysphagia following a failed s/s evaluation approximately 2 years ago, currently on pleasure feeds. Family does not report history of coughing when eating, and no reports of URI or GI symptoms. However, per A, patient's last BM was dark appearing and extremely "smelly". Patient's last TTE was 01/2018, showing EF 65% with severe AS, moderate MR, and severely dilated LA.         In the ED, patient was hypothermic to 95.4 on rectal temp., bradycardic to 47, and had BP readings WNL. He did not have an elevated WBC and had an abnormal UA with moderate leukocyte esterase. Patient was acidotic on VBG 7.31/48/24/24 with lactate of 1.5 and anemic with hgb 6.8. Patient's SCr was elevated from baseline to 2.73 and had CXR showing right lower lobe opacity that appears to have worsened, as compared to study performed on the 20th.             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 Patient was seen and evaluated in the ED on Friday, December 20th, when he had an abnormal UA suggestive of UTI and was subsequently discharged to home on a course of Keflex and Augmentin. Patient's visiting physician, Dr. Rocha switched patient to Ertapenem after cultures grew back positive for Klebsiella pneumoniae ESBL. Over the course of the next several days, patient has developed progressively worsening lethargy, somnolence, and decreased PO intake; presenting to the ED today for further evaluation.        Per patient's family at bedside, patient is interactive and eats his meals; of note, patient diagnosed with dysphagia following a failed s/s evaluation approximately 2 years ago, currently on pleasure feeds. Family does not report history of coughing when eating, and no reports of URI or GI symptoms. However, per A, patient's last BM was dark appearing and extremely "smelly". Patient's last TTE was 01/2018, showing EF 65% with severe AS, moderate MR, and severely dilated LA.         In the ED, patient was hypothermic to 95.4 on rectal temp., bradycardic to 47, and had BP readings WNL. He did not have an elevated WBC and had an abnormal UA with moderate leukocyte esterase. Patient was acidotic on VBG 7.31/48/24/24 with lactate of 1.5 and anemic with hgb 6.8. Patient's SCr was elevated from baseline to 2.73 and had CXR showing right lower lobe opacity that appears to have worsened, as compared to study performed on the 20th.             Hospital Course:    Patient was started on zosyn on admission for aspiration PNA coverage and Ertapenem was continued for UTI treatment. Psychiatry was consulted for agitation; patient subsequently started on valproate 125mg IV q6h PRN, due to concerns over QTc prolongation. 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 Patient had a positive UA and CXR showing right lower lobe opacity. He was admitted for UTI and aspiration PNA. Ucx grew acinetpbacter Iwoffii and stenotrophomonas maltophilia sensitive to carbapenem. Blood cx neg. Cr improved. Hypernatremia resolved. Patient was started on zosyn on admission for aspiration PNA coverage and Ertapenem was continued for UTI treatment. Psychiatry was consulted for agitation; patient subsequently started on valproate though patient hasn't really been requiring it. Patient completed a course of meropenem which was started to replace both ertapenem and zosyn for UTI and aspiration PNA. Patient failed s/s and MBS. Palliative care was consulted and after discussion with family, daughter decided on pleasure feed. Patient is stable to be dc home with home hospice. 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 Patient had a positive UA and CXR showing right lower lobe opacity. He was admitted for UTI and aspiration PNA. Ucx grew acinetpbacter Iwoffii and stenotrophomonas maltophilia sensitive to carbapenem. Blood cx neg. ZACKERY prerenal - Cr improved. Hypernatremia resolved. Patient was started on zosyn on admission for aspiration PNA coverage and Ertapenem was continued for UTI treatment. Psychiatry was consulted for agitation; patient subsequently started on valproate though patient hasn't really been requiring it. Patient completed a course of meropenem which was started to replace both ertapenem and zosyn for UTI and aspiration PNA. Patient failed s/s and MBS. Palliative care was consulted and after discussion with family, daughter decided on pleasure feed. Patient is stable to be dc home with home hospice.

## 2019-12-29 DIAGNOSIS — E87.0 HYPEROSMOLALITY AND HYPERNATREMIA: ICD-10-CM

## 2019-12-29 LAB
-  AMIKACIN: SIGNIFICANT CHANGE UP
-  AMPICILLIN/SULBACTAM: SIGNIFICANT CHANGE UP
-  CEFEPIME: SIGNIFICANT CHANGE UP
-  CEFTAZIDIME: SIGNIFICANT CHANGE UP
-  CEFTAZIDIME: SIGNIFICANT CHANGE UP
-  CEFTRIAXONE: SIGNIFICANT CHANGE UP
-  CIPROFLOXACIN: SIGNIFICANT CHANGE UP
-  GENTAMICIN: SIGNIFICANT CHANGE UP
-  LEVOFLOXACIN: SIGNIFICANT CHANGE UP
-  LEVOFLOXACIN: SIGNIFICANT CHANGE UP
-  MEROPENEM: SIGNIFICANT CHANGE UP
-  TOBRAMYCIN: SIGNIFICANT CHANGE UP
-  TRIMETHOPRIM/SULFAMETHOXAZOLE: SIGNIFICANT CHANGE UP
-  TRIMETHOPRIM/SULFAMETHOXAZOLE: SIGNIFICANT CHANGE UP
ANION GAP SERPL CALC-SCNC: 17 MMOL/L — SIGNIFICANT CHANGE UP (ref 5–17)
ANION GAP SERPL CALC-SCNC: 18 MMOL/L — HIGH (ref 5–17)
ANION GAP SERPL CALC-SCNC: 20 MMOL/L — HIGH (ref 5–17)
BUN SERPL-MCNC: 67 MG/DL — HIGH (ref 7–23)
BUN SERPL-MCNC: 69 MG/DL — HIGH (ref 7–23)
BUN SERPL-MCNC: 71 MG/DL — HIGH (ref 7–23)
CALCIUM SERPL-MCNC: 9.2 MG/DL — SIGNIFICANT CHANGE UP (ref 8.4–10.5)
CALCIUM SERPL-MCNC: 9.3 MG/DL — SIGNIFICANT CHANGE UP (ref 8.4–10.5)
CALCIUM SERPL-MCNC: 9.6 MG/DL — SIGNIFICANT CHANGE UP (ref 8.4–10.5)
CHLORIDE SERPL-SCNC: 112 MMOL/L — HIGH (ref 96–108)
CHLORIDE SERPL-SCNC: 113 MMOL/L — HIGH (ref 96–108)
CHLORIDE SERPL-SCNC: 113 MMOL/L — HIGH (ref 96–108)
CO2 SERPL-SCNC: 16 MMOL/L — LOW (ref 22–31)
CO2 SERPL-SCNC: 18 MMOL/L — LOW (ref 22–31)
CO2 SERPL-SCNC: 18 MMOL/L — LOW (ref 22–31)
CREAT SERPL-MCNC: 2.89 MG/DL — HIGH (ref 0.5–1.3)
CREAT SERPL-MCNC: 2.94 MG/DL — HIGH (ref 0.5–1.3)
CREAT SERPL-MCNC: 3.03 MG/DL — HIGH (ref 0.5–1.3)
GLUCOSE SERPL-MCNC: 122 MG/DL — HIGH (ref 70–99)
GLUCOSE SERPL-MCNC: 132 MG/DL — HIGH (ref 70–99)
GLUCOSE SERPL-MCNC: 83 MG/DL — SIGNIFICANT CHANGE UP (ref 70–99)
HCT VFR BLD CALC: 27 % — LOW (ref 39–50)
HGB BLD-MCNC: 8.2 G/DL — LOW (ref 13–17)
MAGNESIUM SERPL-MCNC: 2.4 MG/DL — SIGNIFICANT CHANGE UP (ref 1.6–2.6)
MCHC RBC-ENTMCNC: 26.7 PG — LOW (ref 27–34)
MCHC RBC-ENTMCNC: 30.4 GM/DL — LOW (ref 32–36)
MCV RBC AUTO: 87.9 FL — SIGNIFICANT CHANGE UP (ref 80–100)
METHOD TYPE: SIGNIFICANT CHANGE UP
METHOD TYPE: SIGNIFICANT CHANGE UP
PHOSPHATE SERPL-MCNC: 4.8 MG/DL — HIGH (ref 2.5–4.5)
PLATELET # BLD AUTO: 143 K/UL — LOW (ref 150–400)
POTASSIUM SERPL-MCNC: 4.6 MMOL/L — SIGNIFICANT CHANGE UP (ref 3.5–5.3)
POTASSIUM SERPL-MCNC: 4.6 MMOL/L — SIGNIFICANT CHANGE UP (ref 3.5–5.3)
POTASSIUM SERPL-MCNC: 5.2 MMOL/L — SIGNIFICANT CHANGE UP (ref 3.5–5.3)
POTASSIUM SERPL-SCNC: 4.6 MMOL/L — SIGNIFICANT CHANGE UP (ref 3.5–5.3)
POTASSIUM SERPL-SCNC: 4.6 MMOL/L — SIGNIFICANT CHANGE UP (ref 3.5–5.3)
POTASSIUM SERPL-SCNC: 5.2 MMOL/L — SIGNIFICANT CHANGE UP (ref 3.5–5.3)
RBC # BLD: 3.07 M/UL — LOW (ref 4.2–5.8)
RBC # FLD: 15 % — HIGH (ref 10.3–14.5)
SODIUM SERPL-SCNC: 146 MMOL/L — HIGH (ref 135–145)
SODIUM SERPL-SCNC: 148 MMOL/L — HIGH (ref 135–145)
SODIUM SERPL-SCNC: 151 MMOL/L — HIGH (ref 135–145)
WBC # BLD: 8.97 K/UL — SIGNIFICANT CHANGE UP (ref 3.8–10.5)
WBC # FLD AUTO: 8.97 K/UL — SIGNIFICANT CHANGE UP (ref 3.8–10.5)

## 2019-12-29 PROCEDURE — 99233 SBSQ HOSP IP/OBS HIGH 50: CPT | Mod: GC

## 2019-12-29 RX ORDER — SODIUM CHLORIDE 9 MG/ML
1000 INJECTION, SOLUTION INTRAVENOUS
Refills: 0 | Status: DISCONTINUED | OUTPATIENT
Start: 2019-12-29 | End: 2019-12-30

## 2019-12-29 RX ADMIN — Medication 25 MILLIGRAM(S): at 22:17

## 2019-12-29 RX ADMIN — LEVETIRACETAM 400 MILLIGRAM(S): 250 TABLET, FILM COATED ORAL at 05:16

## 2019-12-29 RX ADMIN — MEROPENEM 100 MILLIGRAM(S): 1 INJECTION INTRAVENOUS at 18:13

## 2019-12-29 RX ADMIN — SODIUM CHLORIDE 75 MILLILITER(S): 9 INJECTION, SOLUTION INTRAVENOUS at 11:44

## 2019-12-29 RX ADMIN — Medication 50 MICROGRAM(S): at 22:16

## 2019-12-29 RX ADMIN — MEROPENEM 100 MILLIGRAM(S): 1 INJECTION INTRAVENOUS at 05:16

## 2019-12-29 RX ADMIN — LEVETIRACETAM 400 MILLIGRAM(S): 250 TABLET, FILM COATED ORAL at 18:13

## 2019-12-29 NOTE — PROGRESS NOTE ADULT - PROBLEM SELECTOR PLAN 2
Suspected 2/2 dysphagia. CXR appears to show worsening RLL opacity, as compared to 12/20. Patient receiving pleasure feeds at home.  - Ertapenem does not cover aspiration PNA, will treat with meropenem as well Na 151-->146-->142 likely 2/2 decreased PO intake.   - start D5 @75cc/hr for 12 hours, recheck BMP at 6 PM Na 151-->146-->142 likely 2/2 decreased PO intake.   - start D5 @75cc/hr for 12 hours, recheck BMP at 3PM and then every 6 hours.

## 2019-12-29 NOTE — PROGRESS NOTE ADULT - ATTENDING COMMENTS
C/w meropenem   Check bmp q6 for hypernatremia   C/w D5, judicious use of fluids as pt with severe AS C/w meropenem   Check bmp q6 for hypernatremia   C/w D5, judicious use of fluids as pt with severe AS  D/w son at bedside, pt currently NPO, follow up nutritional goals of care in am

## 2019-12-29 NOTE — PROGRESS NOTE ADULT - ASSESSMENT
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

## 2019-12-29 NOTE — PROGRESS NOTE ADULT - PROBLEM SELECTOR PLAN 4
Baseline SCr ~2.2, presenting with SCr 2.73.  - decreased PO intake over past several days, possibly due to hypovolemia  - will continue to monitor SCr on AM labs  - f/u urine lytes Has history of anemia requiring blood transfusions and IV iron supplementation. Presented to ED with hgb 6.8; now s/p 1 unit PRBC, was given 10mg IVP Lasix afterwards  - Hb with appropriate response to s/p 1u PRBCs  - monitor H/H daily, transfuse for Hb<7

## 2019-12-29 NOTE — PROGRESS NOTE ADULT - PROBLEM SELECTOR PLAN 1
Outpatient UCx growing Klebsiella pneumoniae ESBL, sensitive to ertapenem. UCx from 12/20 showing contamination. Abnormal UA on presentation to ED, (+) mod leukocyte esterase. Patient with suprapubic catheter.  - switched to Meropenem on 12/28  - UCx growing GNR Outpatient UCx growing Klebsiella pneumoniae ESBL, sensitive to ertapenem. UCx from 12/20 showing contamination. Abnormal UA on presentation to ED, (+) mod leukocyte esterase. Patient with suprapubic catheter.  - switched to Meropenem on 12/28 (day 2/3)  - UCx growing GNR

## 2019-12-29 NOTE — PROGRESS NOTE ADULT - SUBJECTIVE AND OBJECTIVE BOX
Too Villa MD PGY-2  Department of Internal Medicine  Pager: [phone] (NS) /[phone] ([facility])     PROGRESS NOTE:     Patient is a 89y old  Male who presents with a chief complaint of AMS and lethargy (28 Dec 2019 10:49)      SUBJECTIVE / OVERNIGHT EVENTS: Patient seen and examined at bedside. Vital signs stable overnight. Patient denies headache, dizziness, chest/abd pain, shortness of breath. ROS negative unless otherwise stated.     ADDITIONAL REVIEW OF SYSTEMS:    MEDICATIONS  (STANDING):  levETIRAcetam  IVPB 250 milliGRAM(s) IV Intermittent every 12 hours  levothyroxine Injectable 50 MICROGram(s) IV Push at bedtime  meropenem  IVPB 500 milliGRAM(s) IV Intermittent every 12 hours  sodium chloride 0.9%. 1000 milliLiter(s) (50 mL/Hr) IV Continuous <Continuous>    MEDICATIONS  (PRN):  valproate sodium IVPB 125 milliGRAM(s) IV Intermittent every 6 hours PRN Agitation      CAPILLARY BLOOD GLUCOSE        I&O's Summary    28 Dec 2019 07:01  -  29 Dec 2019 07:00  --------------------------------------------------------  IN: 700 mL / OUT: 1000 mL / NET: -300 mL        PHYSICAL EXAM:  Vital Signs Last 24 Hrs  T(C): 36.4 (29 Dec 2019 04:58), Max: 36.4 (29 Dec 2019 00:10)  T(F): 97.5 (29 Dec 2019 04:58), Max: 97.5 (29 Dec 2019 00:10)  HR: 79 (29 Dec 2019 04:58) (53 - 79)  BP: 131/64 (29 Dec 2019 04:58) (117/70 - 141/67)  BP(mean): --  RR: 18 (29 Dec 2019 04:58) (18 - 18)  SpO2: 92% (29 Dec 2019 04:58) (92% - 97%)    CONSTITUTIONAL: NAD, well-developed  RESPIRATORY: Normal respiratory effort; lungs are clear to auscultation bilaterally  CARDIOVASCULAR: Regular rate and rhythm, normal S1 and S2, no murmur/rub/gallop; No lower extremity edema; Peripheral pulses are 2+ bilaterally  ABDOMEN: Nontender to palpation, normoactive bowel sounds, no rebound/guarding; No hepatosplenomegaly  MUSCLOSKELETAL: no clubbing or cyanosis of digits; no joint swelling or tenderness to palpation  PSYCH: A+O to person, place, and time; affect appropriate    LABS:                        8.1    7.54  )-----------( 141      ( 28 Dec 2019 11:58 )             27.3     12-28    146<H>  |  108  |  64<H>  ----------------------------<  76  4.8   |  20<L>  |  2.89<H>    Ca    9.5      28 Dec 2019 09:14  Phos  4.6     12-28  Mg     2.4     12-28    TPro  7.4  /  Alb  3.5  /  TBili  1.1  /  DBili  x   /  AST  14  /  ALT  7<L>  /  AlkPhos  86  12-27              Culture - Urine (collected 26 Dec 2019 17:44)  Source: .Urine Clean Catch (Midstream)  Preliminary Report (27 Dec 2019 17:13):    50,000 - 99,000 CFU/mL Gram Negative Rods    Culture - Blood (collected 26 Dec 2019 17:27)  Source: .Blood Blood-Peripheral  Preliminary Report (27 Dec 2019 18:01):    No growth to date.    Culture - Blood (collected 26 Dec 2019 17:27)  Source: .Blood Blood-Peripheral  Preliminary Report (27 Dec 2019 18:01):    No growth to date.        RADIOLOGY & ADDITIONAL TESTS:  Results Reviewed:   Imaging Personally Reviewed:  Electrocardiogram Personally Reviewed:    COORDINATION OF CARE:  Care Discussed with Consultants/Other Providers [Y/N]:  Prior or Outpatient Records Reviewed [Y/N]: Too Villa MD PGY-2  Department of Internal Medicine  Pager: [phone] (NS) /[phone] ([facility])     PROGRESS NOTE:     Patient is a 89y old  Male who presents with a chief complaint of AMS and lethargy (28 Dec 2019 10:49)      SUBJECTIVE / OVERNIGHT EVENTS: Patient seen and examined at bedside. Vital signs stable overnight. Patient denies headache, dizziness, chest/abd pain, shortness of breath. ROS negative unless otherwise stated.     ADDITIONAL REVIEW OF SYSTEMS:    MEDICATIONS  (STANDING):  levETIRAcetam  IVPB 250 milliGRAM(s) IV Intermittent every 12 hours  levothyroxine Injectable 50 MICROGram(s) IV Push at bedtime  meropenem  IVPB 500 milliGRAM(s) IV Intermittent every 12 hours  sodium chloride 0.9%. 1000 milliLiter(s) (50 mL/Hr) IV Continuous <Continuous>    MEDICATIONS  (PRN):  valproate sodium IVPB 125 milliGRAM(s) IV Intermittent every 6 hours PRN Agitation      CAPILLARY BLOOD GLUCOSE        I&O's Summary    28 Dec 2019 07:01  -  29 Dec 2019 07:00  --------------------------------------------------------  IN: 700 mL / OUT: 1000 mL / NET: -300 mL        PHYSICAL EXAM:  Vital Signs Last 24 Hrs  T(C): 36.4 (29 Dec 2019 04:58), Max: 36.4 (29 Dec 2019 00:10)  T(F): 97.5 (29 Dec 2019 04:58), Max: 97.5 (29 Dec 2019 00:10)  HR: 79 (29 Dec 2019 04:58) (53 - 79)  BP: 131/64 (29 Dec 2019 04:58) (117/70 - 141/67)  BP(mean): --  RR: 18 (29 Dec 2019 04:58) (18 - 18)  SpO2: 92% (29 Dec 2019 04:58) (92% - 97%)    GENERAL: Elderly male laying comfortably in bed in [facility]. Patient is very hard of hearing; headphone with microphone had to be used at bedside for communication. Patient has slurred speech and can be difficult to comprehend at times  HEAD:  Atraumatic, Normocephalic  EYES: EOMI, conjunctiva and sclera clear  ENMT: lips appeared dry  CHEST/LUNG: Clear to auscultation bilaterally  HEART: bradycardic, irregular rhythm  ABDOMEN: Soft, Nontender, Nondistended; Bowel sounds present  EXTREMITIES: trace RLE edema  SKIN: warm and dry  NERVOUS SYSTEM:  Alert & Oriented X2-3, oriented to self and place; but states year is 2018                                              LABS:                        8.1    7.54  )-----------( 141      ( 28 Dec 2019 11:58 )             27.3     12-28    146<H>  |  108  |  64<H>  ----------------------------<  76  4.8   |  20<L>  |  2.89<H>    Ca    9.5      28 Dec 2019 09:14  Phos  4.6     12-28  Mg     2.4     12-28    TPro  7.4  /  Alb  3.5  /  TBili  1.1  /  DBili  x   /  AST  14  /  ALT  7<L>  /  AlkPhos  86  12-27              Culture - Urine (collected 26 Dec 2019 17:44)  Source: .Urine Clean Catch (Midstream)  Preliminary Report (27 Dec 2019 17:13):    50,000 - 99,000 CFU/mL Gram Negative Rods    Culture - Blood (collected 26 Dec 2019 17:27)  Source: .Blood Blood-Peripheral  Preliminary Report (27 Dec 2019 18:01):    No growth to date.    Culture - Blood (collected 26 Dec 2019 17:27)  Source: .Blood Blood-Peripheral  Preliminary Report (27 Dec 2019 18:01):    No growth to date.        RADIOLOGY & ADDITIONAL TESTS:  Results Reviewed:   Imaging Personally Reviewed:  Electrocardiogram Personally Reviewed:    COORDINATION OF CARE:  Care Discussed with Consultants/Other Providers [Y/N]:  Prior or Outpatient Records Reviewed [Y/N]:

## 2019-12-29 NOTE — PROGRESS NOTE ADULT - PROBLEM SELECTOR PLAN 6
Patient with suprapubic catheter Patient normally takes 75mg Seroquel at home for agitation, but according to family, that has not been working. Patient's HCP and daughter, Rosas Parikh expressing concern over patient's agitation and risk of fall if trying to get OOB  - Given concern for agitation, psych was consulted for assistance and recommended IV Depacon 125mg IV q6hrs prn for agitation.  - avoid QTc prolonging psych meds

## 2019-12-29 NOTE — PROGRESS NOTE ADULT - PROBLEM SELECTOR PLAN 3
Has history of anemia requiring blood transfusions and IV iron supplementation. Presented to ED with hgb 6.8; now s/p 1 unit PRBC, was given 10mg IVP Lasix afterwards  - Hb with appropriate response to s/p 1u PRBCs  - monitor H/H daily, transfuse for Hb<7 Suspected 2/2 dysphagia. CXR appears to show worsening RLL opacity, as compared to 12/20. Patient receiving pleasure feeds at home.  - Ertapenem does not cover aspiration PNA, will treat with meropenem as well

## 2019-12-29 NOTE — PROGRESS NOTE ADULT - PROBLEM SELECTOR PLAN 5
Patient normally takes 75mg Seroquel at home for agitation, but according to family, that has not been working. Patient's HCP and daughter, Rosas Parikh expressing concern over patient's agitation and risk of fall if trying to get OOB  - Given concern for agitation, psych was consulted for assistance and recommended IV Depacon 125mg IV q6hrs prn for agitation.  - avoid QTc prolonging psych meds Baseline SCr ~2.2, presenting with SCr 2.73.  - decreased PO intake over past several days, possibly due to hypovolemia  - will continue to monitor SCr on AM labs  - f/u urine lytes

## 2019-12-30 ENCOUNTER — RX RENEWAL (OUTPATIENT)
Age: 84
End: 2019-12-30

## 2019-12-30 LAB
-  IMIPENEM: SIGNIFICANT CHANGE UP
-  PIPERACILLIN/TAZOBACTAM: SIGNIFICANT CHANGE UP
ANION GAP SERPL CALC-SCNC: 16 MMOL/L — SIGNIFICANT CHANGE UP (ref 5–17)
ANION GAP SERPL CALC-SCNC: 17 MMOL/L — SIGNIFICANT CHANGE UP (ref 5–17)
BUN SERPL-MCNC: 69 MG/DL — HIGH (ref 7–23)
BUN SERPL-MCNC: 69 MG/DL — HIGH (ref 7–23)
CALCIUM SERPL-MCNC: 9.4 MG/DL — SIGNIFICANT CHANGE UP (ref 8.4–10.5)
CALCIUM SERPL-MCNC: 9.6 MG/DL — SIGNIFICANT CHANGE UP (ref 8.4–10.5)
CHLORIDE SERPL-SCNC: 113 MMOL/L — HIGH (ref 96–108)
CHLORIDE SERPL-SCNC: 113 MMOL/L — HIGH (ref 96–108)
CO2 SERPL-SCNC: 20 MMOL/L — LOW (ref 22–31)
CO2 SERPL-SCNC: 20 MMOL/L — LOW (ref 22–31)
CREAT SERPL-MCNC: 2.87 MG/DL — HIGH (ref 0.5–1.3)
CREAT SERPL-MCNC: 2.89 MG/DL — HIGH (ref 0.5–1.3)
CULTURE RESULTS: SIGNIFICANT CHANGE UP
GLUCOSE SERPL-MCNC: 108 MG/DL — HIGH (ref 70–99)
GLUCOSE SERPL-MCNC: 95 MG/DL — SIGNIFICANT CHANGE UP (ref 70–99)
HCT VFR BLD CALC: 27.8 % — LOW (ref 39–50)
HGB BLD-MCNC: 8.2 G/DL — LOW (ref 13–17)
MAGNESIUM SERPL-MCNC: 2.6 MG/DL — SIGNIFICANT CHANGE UP (ref 1.6–2.6)
MCHC RBC-ENTMCNC: 26 PG — LOW (ref 27–34)
MCHC RBC-ENTMCNC: 29.5 GM/DL — LOW (ref 32–36)
MCV RBC AUTO: 88.3 FL — SIGNIFICANT CHANGE UP (ref 80–100)
METHOD TYPE: SIGNIFICANT CHANGE UP
NRBC # BLD: 0 /100 WBCS — SIGNIFICANT CHANGE UP (ref 0–0)
ORGANISM # SPEC MICROSCOPIC CNT: SIGNIFICANT CHANGE UP
PHOSPHATE SERPL-MCNC: 4.2 MG/DL — SIGNIFICANT CHANGE UP (ref 2.5–4.5)
PLATELET # BLD AUTO: 142 K/UL — LOW (ref 150–400)
POTASSIUM SERPL-MCNC: 4.6 MMOL/L — SIGNIFICANT CHANGE UP (ref 3.5–5.3)
POTASSIUM SERPL-MCNC: 4.6 MMOL/L — SIGNIFICANT CHANGE UP (ref 3.5–5.3)
POTASSIUM SERPL-SCNC: 4.6 MMOL/L — SIGNIFICANT CHANGE UP (ref 3.5–5.3)
POTASSIUM SERPL-SCNC: 4.6 MMOL/L — SIGNIFICANT CHANGE UP (ref 3.5–5.3)
RBC # BLD: 3.15 M/UL — LOW (ref 4.2–5.8)
RBC # FLD: 15.4 % — HIGH (ref 10.3–14.5)
SODIUM SERPL-SCNC: 149 MMOL/L — HIGH (ref 135–145)
SODIUM SERPL-SCNC: 150 MMOL/L — HIGH (ref 135–145)
SPECIMEN SOURCE: SIGNIFICANT CHANGE UP
WBC # BLD: 7.75 K/UL — SIGNIFICANT CHANGE UP (ref 3.8–10.5)
WBC # FLD AUTO: 7.75 K/UL — SIGNIFICANT CHANGE UP (ref 3.8–10.5)

## 2019-12-30 PROCEDURE — 99233 SBSQ HOSP IP/OBS HIGH 50: CPT | Mod: GC

## 2019-12-30 RX ORDER — SODIUM CHLORIDE 9 MG/ML
1000 INJECTION, SOLUTION INTRAVENOUS
Refills: 0 | Status: DISCONTINUED | OUTPATIENT
Start: 2019-12-30 | End: 2020-01-01

## 2019-12-30 RX ORDER — CHLORHEXIDINE GLUCONATE 213 G/1000ML
1 SOLUTION TOPICAL DAILY
Refills: 0 | Status: DISCONTINUED | OUTPATIENT
Start: 2019-12-30 | End: 2020-01-05

## 2019-12-30 RX ADMIN — Medication 25 MILLIGRAM(S): at 18:50

## 2019-12-30 RX ADMIN — MEROPENEM 100 MILLIGRAM(S): 1 INJECTION INTRAVENOUS at 08:42

## 2019-12-30 RX ADMIN — CHLORHEXIDINE GLUCONATE 1 APPLICATION(S): 213 SOLUTION TOPICAL at 13:34

## 2019-12-30 RX ADMIN — Medication 50 MICROGRAM(S): at 21:38

## 2019-12-30 RX ADMIN — LEVETIRACETAM 400 MILLIGRAM(S): 250 TABLET, FILM COATED ORAL at 06:36

## 2019-12-30 RX ADMIN — MEROPENEM 100 MILLIGRAM(S): 1 INJECTION INTRAVENOUS at 16:39

## 2019-12-30 RX ADMIN — SODIUM CHLORIDE 75 MILLILITER(S): 9 INJECTION, SOLUTION INTRAVENOUS at 13:34

## 2019-12-30 RX ADMIN — LEVETIRACETAM 400 MILLIGRAM(S): 250 TABLET, FILM COATED ORAL at 16:39

## 2019-12-30 NOTE — PROGRESS NOTE ADULT - PROBLEM SELECTOR PLAN 5
Baseline SCr ~2.2, presenting with SCr 2.73.  - decreased PO intake over past several days, possibly due to hypovolemia  - will continue to monitor SCr on AM labs  - f/u urine lytes Likely 2/2 hypovolemia now improving. Baseline SCr ~2.2  - c/w fluid  - will continue to monitor SCr on AM labs

## 2019-12-30 NOTE — DIETITIAN INITIAL EVALUATION ADULT. - SIGNS/SYMPTOMS
as evidenced by <50% of EER x >5 days, weight loss, severe muscle and fat losses as evidenced by pressure injury stage 1, severe malnutrition

## 2019-12-30 NOTE — DIETITIAN INITIAL EVALUATION ADULT. - PROBLEM SELECTOR PLAN 2
Suspected 2/2 dysphagia. CXR appears to show worsening RLL opacity, as compared to 12/20. Patient receiving pleasure feeds at home.  - Ertapenem does not cover aspiration PNA  - will start patient on Zosyn 3.375mg q12h, renally doesd

## 2019-12-30 NOTE — DIETITIAN INITIAL EVALUATION ADULT. - ADD RECOMMEND
1) Defer diet advancement to team; if/when diet advanced suggest Regular diet with no therapeutic restrictions + Ensure Enlive 2x/day (350 kcal, 20 g protein) 2) Please obtain current weight as feasible to trend 3) Malnutrition sticker placed, RD to follow-up per protocol 4) Monitor diet advancement/intake, weight, labs, skin, GI status

## 2019-12-30 NOTE — DIETITIAN INITIAL EVALUATION ADULT. - ETIOLOGY
related to advanced dementia, current AMS/aspiration PNA related to increased physiological demand + wound healing

## 2019-12-30 NOTE — DIETITIAN INITIAL EVALUATION ADULT. - PHYSICAL APPEARANCE
other (specify)/nutrition focused physical exam deferred at this time given pt's mental status; noted visually with severe fat loss to buccal region and severe muscle loss to temple region; pt covered by blanket, unable to assess extremities/clavicle at this time Ht: 71 inches (180.34 cm) Wt: 171.6 pounds (78 kg)  BMI: 24 kg/m2  IBW: 172 pounds +/-10% %IBW: 100%  Skin: stage 1 pressure injury to sacral spine, +skin tear as per flowsheets  Edema: +1 edema to b/l ankles as per flowsheets (12/29)

## 2019-12-30 NOTE — DIETITIAN INITIAL EVALUATION ADULT. - OTHER INFO
Pt with dementia (A&Ox1-2 baseline), confused as per chart, unable to participate in interview. HHA at bedside, provided information. Pt currently NPO. HHA denies pt with nausea/vomiting/diarrhea/constipation. Noted pt with fecal incontinence, last BM today (12/30) as per flowsheets.     HHA reports pt with no intake since 12/25 (x 6 days). Previously with good appetite and intake, consuming 3 meals/day (variety of foods) + 1 Ensure daily. Denies vitamin/mineral supplementation PTA. HHA endorses pt with recent weight loss, states weight at a doctor's appointment "2-3 weeks ago" 183 pounds. Weight currently in chart (171.6 pounds) indicates ~6% weight loss x "2-3 weeks," <1 month, clinically significant. HHA denies pt with difficulties chewing/swallowing, endorses pt "sometimes coughs when he puts too much food in his mouth." PTA tolerating regular-textured/consistency foods. Per H&P, pt with previous diagnosis of dysphagia, currently receiving pleasure feeds at home.     Discussed provision of supplemental calories, protein, and nutrients through oral nutrition supplement (Ensure Enlive) - HHA requesting (once diet is advanced) as pt drinks at home. Further diet education inappropriate at this time given pt's mental status and currently NPO. HHA with no nutrition-related questions or concerns at this time, made aware RD remains available.

## 2019-12-30 NOTE — SWALLOW BEDSIDE ASSESSMENT ADULT - SLP GENERAL OBSERVATIONS
Patient encountered awake and alert, upright in bed, on RA, confused - trying to stand.  Pt redirected by HHA and this clinician and remained in bed, calm.  A&Ox1 with cues; not oriented to time/place.  Inconsistently followed simple commands.  +Rampart -> uses headphones w/ microphone to hear speaker.

## 2019-12-30 NOTE — PROGRESS NOTE ADULT - PROBLEM SELECTOR PLAN 4
Has history of anemia requiring blood transfusions and IV iron supplementation. Presented to ED with hgb 6.8; now s/p 1 unit PRBC, was given 10mg IVP Lasix afterwards  - Hb with appropriate response to s/p 1u PRBCs  - monitor H/H daily, transfuse for Hb<7 Has history of anemia requiring blood transfusions and IV iron supplementation. Presented to ED with hgb 6.8; now s/p 1 unit PRBC, was given 10mg IVP Lasix afterwards  - monitor H/H daily, transfuse for Hb<7

## 2019-12-30 NOTE — DIETITIAN INITIAL EVALUATION ADULT. - PROBLEM SELECTOR PLAN 1
Outpatient UCx growing Klebsiella pneumoniae ESBL, sensitive to ertapenem. UCx from 12/20 showing contamination. Abnormal UA on presentation to ED, (+) mod leukocyte esterase. Patient with suprapubic catheter.  - c/w ertapenem 500mg q24h; today is day 4 of abx  - f/u UCx

## 2019-12-30 NOTE — PROGRESS NOTE ADULT - PROBLEM SELECTOR PLAN 1
Outpatient UCx growing Klebsiella pneumoniae ESBL, sensitive to ertapenem. UCx from 12/20 showing contamination. Abnormal UA on presentation to ED, (+) mod leukocyte esterase. Patient with suprapubic catheter.  - switched to Meropenem on 12/28 (day 2/3)  - UCx growing GNR Outpatient UCx growing Klebsiella pneumoniae ESBL, sensitive to ertapenem. UCx from 12/20 showing contamination. Abnormal UA on presentation to ED, (+) mod leukocyte esterase. Patient with suprapubic catheter.  - c/w Meropenem (day 3/3)  - UCx growing GNR

## 2019-12-30 NOTE — SWALLOW BEDSIDE ASSESSMENT ADULT - ASR SWALLOW RECOMMEND DIAG
VFSS/MBS/Recommend MBS to further assess swallow physiology/etiology of dysphagia - pt's daughter in agreement.

## 2019-12-30 NOTE — SWALLOW BEDSIDE ASSESSMENT ADULT - SLP PERTINENT HISTORY OF CURRENT PROBLEM
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 Patient was seen and evaluated in the ED on Friday, December 20th, when he had an abnormal UA suggestive of UTI and was subsequently discharged to home on a course of Keflex and Augmentin. Patient's visiting physician, Dr. Rocha switched patient to Ertapenem after cultures grew back positive for Klebsiella pneumoniae ESBL. Over the course of the next several days, patient has developed progressively worsening lethargy, somnolence, and decreased PO intake; presenting to the ED today (12/26) for further evaluation.

## 2019-12-30 NOTE — CHART NOTE - NSCHARTNOTEFT_GEN_A_CORE
Upon Nutritional Assessment by the Registered Dietitian your patient was determined to meet criteria / has evidence of the following diagnosis/diagnoses:          [ ]  Mild Protein Calorie Malnutrition        [ ]  Moderate Protein Calorie Malnutrition        [x] Severe Protein Calorie Malnutrition        [ ] Unspecified Protein Calorie Malnutrition        [ ] Underweight / BMI <19        [ ] Morbid Obesity / BMI > 40      Findings as based on:  [x] Comprehensive nutrition assessment: A reports pt with no intake since 12/25 (x 6 days). Previously with good appetite and intake, consuming 3 meals/day (variety of foods) + 1 Ensure daily. Denies vitamin/mineral supplementation PTA. A endorses pt with recent weight loss, states weight at a doctor's appointment "2-3 weeks ago" 183 pounds. Weight currently in chart (171.6 pounds) indicates ~6% weight loss x "2-3 weeks," <1 month, clinically significant. A denies pt with difficulties chewing/swallowing, endorses pt "sometimes coughs when he puts too much food in his mouth." PTA tolerating regular-textured/consistency foods. Per H&P, pt with previous diagnosis of dysphagia, currently receiving pleasure feeds at home.    [x] Nutrition Focused Physical Exam: nutrition focused physical exam deferred at this time given pt's mental status; noted visually with severe fat loss to buccal region and severe muscle loss to temple region; pt covered by blanket, unable to assess extremities/clavicle at this time  [ ] Other:       Nutrition Plan/Recommendations:    1) Defer diet advancement to team; if/when diet advanced suggest Regular diet with no therapeutic restrictions + Ensure Enlive 2x/day (350 kcal, 20 g protein)   2) Please obtain current weight as feasible to trend   3) RD to follow-up per protocol   4) Monitor diet advancement/intake, weight, labs, skin, GI status      PROVIDER Section:     By signing this assessment you are acknowledging and agree with the diagnosis/diagnoses assigned by the Registered Dietitian    Comments:

## 2019-12-30 NOTE — PROGRESS NOTE ADULT - PROBLEM SELECTOR PLAN 6
Patient normally takes 75mg Seroquel at home for agitation, but according to family, that has not been working. Patient's HCP and daughter, Rosas Parikh expressing concern over patient's agitation and risk of fall if trying to get OOB  - Given concern for agitation, psych was consulted for assistance and recommended IV Depacon 125mg IV q6hrs prn for agitation.  - avoid QTc prolonging psych meds

## 2019-12-30 NOTE — DIETITIAN INITIAL EVALUATION ADULT. - MALNUTRITION
Severe malnutrition in the context of acute on chronic disease severe protein-calorie malnutrition in the setting of acute on chronic disease

## 2019-12-30 NOTE — PROGRESS NOTE ADULT - PROBLEM SELECTOR PLAN 3
Suspected 2/2 dysphagia. CXR appears to show worsening RLL opacity, as compared to 12/20. Patient receiving pleasure feeds at home.  - Ertapenem does not cover aspiration PNA, will treat with meropenem as well Suspected 2/2 dysphagia. CXR appears to show worsening RLL opacity, as compared to 12/20. Patient receiving pleasure feeds at home.  - Ertapenem does not cover aspiration PNA, c/w meropenem  -failed s/s, planned for MBS on 12/31. NPO until then. Daughter agrees. Palliative consulted for feeding tube options if failed MBS

## 2019-12-30 NOTE — SWALLOW BEDSIDE ASSESSMENT ADULT - SWALLOW EVAL: DIAGNOSIS
Patient admitted with suspected aspiration PNA and UTI.  Patient presents with 1) oral and suspected pharyngeal dysphagia characterized by prolonged bolus formation with abnormal mastication pattern, delayed oral transit time, delayed trigger of the swallow, and wet/gurgly vocal quality with delayed cough post intake of purees and nectar thickened liquids. 2) cognitive linguistic deficits.

## 2019-12-30 NOTE — PROGRESS NOTE ADULT - ATTENDING COMMENTS
pt seen and examined and I agree with assessement of resident as above  Complicated UTI with suprapubic catheter- Klebsiella- now on meropenem  Aspiration PNA secondary to dysphagia- c/w meropenem. For MBS in the morning- NPO for now  Hypernatremia secondary to decreased po intake- will give D5W and monitor

## 2019-12-30 NOTE — PROGRESS NOTE ADULT - PROBLEM SELECTOR PLAN 2
Na 151-->146-->142 likely 2/2 decreased PO intake.   - start D5 @75cc/hr for 12 hours, recheck BMP at 3PM and then every 6 hours. likely 2/2 decreased PO intake.   - c/w D5 @75cc/hr for 12 hours, recheck BMP at q12.

## 2019-12-30 NOTE — SWALLOW BEDSIDE ASSESSMENT ADULT - MUCOSAL QUALITY
thick, dried secretions along palatal surface -> oral care provided and all secretions removed with toothette

## 2019-12-30 NOTE — PROGRESS NOTE ADULT - PROBLEM SELECTOR PLAN 10
Code Status: DNR, MOLST in chart  DVT ppx: SCDs  Diet: advance as tolerated and more awake    Transitions of Care Status:  1.  Name of PCP: Dr. Isaias Rocha  2.  PCP Contacted on Admission: [ ] Y    [X] N    3.  PCP contacted at Discharge: [ ] Y    [ ] N    [ ] N/A  4.  Post-Discharge Appointment Date and Location:  5.  Summary of Handoff given to PCP:

## 2019-12-30 NOTE — SWALLOW BEDSIDE ASSESSMENT ADULT - PHARYNGEAL PHASE
Delayed pharyngeal swallow/Wet vocal quality post oral intake/Delayed cough post oral intake Delayed pharyngeal swallow/Delayed cough post oral intake/Wet vocal quality post oral intake

## 2019-12-30 NOTE — SWALLOW BEDSIDE ASSESSMENT ADULT - COMMENTS
Per patient's family at bedside, patient is interactive and eats his meals; of note, patient diagnosed with dysphagia following a failed s/s evaluation approximately 2 years ago, currently on pleasure feeds. Family does not report history of coughing when eating, and no reports of URI or GI symptoms. However, per HHA, patient's last BM was dark appearing and extremely "smelly". Patient's last TTE was 01/2018, showing EF 65% with severe AS, moderate MR, and severely dilated LA. CXR showing right lower lobe opacity that appears to have worsened, as compared to study performed on the 20th. +abnormal UA. 12/28: pt agitated yelling, flinging legs OOB -> seen by psych who stated pt has delirium. Patient known to this service with hx of dysphagia. On 1/26/18 it was recommended pt be NPO, with non-oral nutrition/hydration/medications, but pt’s daughter declined and opted for pleasure feeds of dysphagia 1 with honey thick fluids.

## 2019-12-30 NOTE — SWALLOW BEDSIDE ASSESSMENT ADULT - SWALLOW EVAL: RECOMMENDED DIET
NPO, with non-oral nutrition/hydration/medications, however, pt's daughter requesting a conservative dysphagia diet pending MBS.

## 2019-12-30 NOTE — PROGRESS NOTE ADULT - SUBJECTIVE AND OBJECTIVE BOX
Romy (RomeAriella Arias  PGY1  NS: [phone]    INTERVAL HPI/OVERNIGHT EVENTS:    VITAL SIGNS:  T(F): 97.6 (12-30-19 @ 07:52)  HR: 66 (12-30-19 @ 07:52)  BP: 129/73 (12-30-19 @ 07:52)  RR: 18 (12-30-19 @ 07:52)  SpO2: 94% (12-30-19 @ 07:52)  Wt(kg): --    PHYSICAL EXAM:    Constitutional:  Eyes:  ENMT:  Neck:  Respiratory:  Cardiovascular:  Gastrointestinal:  Extremities:  Vascular:  Neurological:  Musculoskeletal:    MEDICATIONS  (STANDING):  dextrose 5%. 1000 milliLiter(s) (75 mL/Hr) IV Continuous <Continuous>  levETIRAcetam  IVPB 250 milliGRAM(s) IV Intermittent every 12 hours  levothyroxine Injectable 50 MICROGram(s) IV Push at bedtime  meropenem  IVPB 500 milliGRAM(s) IV Intermittent every 12 hours    MEDICATIONS  (PRN):  valproate sodium IVPB 125 milliGRAM(s) IV Intermittent every 6 hours PRN Agitation      Allergies    No Known Allergies    Intolerances        LABS:                        8.2    7.75  )-----------( 142      ( 30 Dec 2019 04:02 )             27.8     12-30    149<H>  |  113<H>  |  69<H>  ----------------------------<  108<H>  4.6   |  20<L>  |  2.87<H>    Ca    9.4      30 Dec 2019 04:02  Phos  4.2     12-30  Mg     2.6     12-30            RADIOLOGY & ADDITIONAL TESTS: Reviewed [place]omy ([place]  PGY1  NS: [phone]    INTERVAL HPI/OVERNIGHT EVENTS: KIRSTIE ON. Denies SOB, fever, chills, CP, abd pain, dysuria.    VITAL SIGNS:  T(F): 97.6 (12-30-19 @ 07:52)  HR: 66 (12-30-19 @ 07:52)  BP: 129/73 (12-30-19 @ 07:52)  RR: 18 (12-30-19 @ 07:52)  SpO2: 94% (12-30-19 @ 07:52)  Wt(kg): --    PHYSICAL EXAM:    Constitutional: NAD, awake and pleasant  ENMT: Dry oral mucosa  Respiratory: CTAB  Cardiovascular: systolic murmurs, rrr  Gastrointestinal: soft, NT/ND, +BM  Extremities: 1+peripheral edema  Vascular: wwp  Neurological: no focal neurologic deficit    MEDICATIONS  (STANDING):  dextrose 5%. 1000 milliLiter(s) (75 mL/Hr) IV Continuous <Continuous>  levETIRAcetam  IVPB 250 milliGRAM(s) IV Intermittent every 12 hours  levothyroxine Injectable 50 MICROGram(s) IV Push at bedtime  meropenem  IVPB 500 milliGRAM(s) IV Intermittent every 12 hours    MEDICATIONS  (PRN):  valproate sodium IVPB 125 milliGRAM(s) IV Intermittent every 6 hours PRN Agitation      Allergies    No Known Allergies    Intolerances        LABS:                        8.2    7.75  )-----------( 142      ( 30 Dec 2019 04:02 )             27.8     12-30    149<H>  |  113<H>  |  69<H>  ----------------------------<  108<H>  4.6   |  20<L>  |  2.87<H>    Ca    9.4      30 Dec 2019 04:02  Phos  4.2     12-30  Mg     2.6     12-30            RADIOLOGY & ADDITIONAL TESTS: Reviewed

## 2019-12-30 NOTE — SWALLOW BEDSIDE ASSESSMENT ADULT - SWALLOW EVAL: PATIENT/FAMILY GOALS STATEMENT
Pt's HHA at bedside - stated pt eats a regular diet at home and always sounds "wet/gurgly."  Denied hx of PNA prior to now.  Contacted pt's daughter, Rosas, via telephone who endorsed pt has trouble swallowing.  Daughter voiced concerns re: risks/complications of aspiration - all questions answered and extensive education provided by this clinician.  Daughter agreed to proceed with instrumental swallow study to determine etiology of dysphagia and assess for any possible intervention to reduced risk of aspiration.  Daughter unsure if she would ever consider PEG for pt - this clinician suggested speaking with Palliative care to learn more about pros/cons of feeding tube vs. careful had feeding. Pt's daughter agreed to Palliative consult if deemed necessary after MBS is done.  Daughter requested pt be placed on dysphagia diet tonight until MBS is done despite this service's recommendations.  MD Arias informed of daughter's wishes.

## 2019-12-31 DIAGNOSIS — R13.10 DYSPHAGIA, UNSPECIFIED: ICD-10-CM

## 2019-12-31 LAB
ALBUMIN SERPL ELPH-MCNC: 3.2 G/DL — LOW (ref 3.3–5)
ALP SERPL-CCNC: 67 U/L — SIGNIFICANT CHANGE UP (ref 40–120)
ALT FLD-CCNC: 8 U/L — LOW (ref 10–45)
ANION GAP SERPL CALC-SCNC: 13 MMOL/L — SIGNIFICANT CHANGE UP (ref 5–17)
ANION GAP SERPL CALC-SCNC: 14 MMOL/L — SIGNIFICANT CHANGE UP (ref 5–17)
AST SERPL-CCNC: 17 U/L — SIGNIFICANT CHANGE UP (ref 10–40)
BASOPHILS # BLD AUTO: 0.05 K/UL — SIGNIFICANT CHANGE UP (ref 0–0.2)
BASOPHILS NFR BLD AUTO: 0.8 % — SIGNIFICANT CHANGE UP (ref 0–2)
BILIRUB SERPL-MCNC: 1.3 MG/DL — HIGH (ref 0.2–1.2)
BUN SERPL-MCNC: 66 MG/DL — HIGH (ref 7–23)
BUN SERPL-MCNC: 66 MG/DL — HIGH (ref 7–23)
CALCIUM SERPL-MCNC: 9.3 MG/DL — SIGNIFICANT CHANGE UP (ref 8.4–10.5)
CALCIUM SERPL-MCNC: 9.5 MG/DL — SIGNIFICANT CHANGE UP (ref 8.4–10.5)
CHLORIDE SERPL-SCNC: 112 MMOL/L — HIGH (ref 96–108)
CHLORIDE SERPL-SCNC: 116 MMOL/L — HIGH (ref 96–108)
CO2 SERPL-SCNC: 18 MMOL/L — LOW (ref 22–31)
CO2 SERPL-SCNC: 20 MMOL/L — LOW (ref 22–31)
CREAT SERPL-MCNC: 2.71 MG/DL — HIGH (ref 0.5–1.3)
CREAT SERPL-MCNC: 2.74 MG/DL — HIGH (ref 0.5–1.3)
CULTURE RESULTS: SIGNIFICANT CHANGE UP
CULTURE RESULTS: SIGNIFICANT CHANGE UP
EOSINOPHIL # BLD AUTO: 0.16 K/UL — SIGNIFICANT CHANGE UP (ref 0–0.5)
EOSINOPHIL NFR BLD AUTO: 2.6 % — SIGNIFICANT CHANGE UP (ref 0–6)
GLUCOSE SERPL-MCNC: 104 MG/DL — HIGH (ref 70–99)
GLUCOSE SERPL-MCNC: 112 MG/DL — HIGH (ref 70–99)
HCT VFR BLD CALC: 27.1 % — LOW (ref 39–50)
HGB BLD-MCNC: 7.9 G/DL — LOW (ref 13–17)
IMM GRANULOCYTES NFR BLD AUTO: 0.8 % — SIGNIFICANT CHANGE UP (ref 0–1.5)
LYMPHOCYTES # BLD AUTO: 0.91 K/UL — LOW (ref 1–3.3)
LYMPHOCYTES # BLD AUTO: 14.8 % — SIGNIFICANT CHANGE UP (ref 13–44)
MAGNESIUM SERPL-MCNC: 2.4 MG/DL — SIGNIFICANT CHANGE UP (ref 1.6–2.6)
MCHC RBC-ENTMCNC: 25.8 PG — LOW (ref 27–34)
MCHC RBC-ENTMCNC: 29.2 GM/DL — LOW (ref 32–36)
MCV RBC AUTO: 88.6 FL — SIGNIFICANT CHANGE UP (ref 80–100)
MONOCYTES # BLD AUTO: 0.74 K/UL — SIGNIFICANT CHANGE UP (ref 0–0.9)
MONOCYTES NFR BLD AUTO: 12 % — SIGNIFICANT CHANGE UP (ref 2–14)
NEUTROPHILS # BLD AUTO: 4.25 K/UL — SIGNIFICANT CHANGE UP (ref 1.8–7.4)
NEUTROPHILS NFR BLD AUTO: 69 % — SIGNIFICANT CHANGE UP (ref 43–77)
PHOSPHATE SERPL-MCNC: 3.4 MG/DL — SIGNIFICANT CHANGE UP (ref 2.5–4.5)
PLATELET # BLD AUTO: 150 K/UL — SIGNIFICANT CHANGE UP (ref 150–400)
POTASSIUM SERPL-MCNC: 4 MMOL/L — SIGNIFICANT CHANGE UP (ref 3.5–5.3)
POTASSIUM SERPL-MCNC: 4.4 MMOL/L — SIGNIFICANT CHANGE UP (ref 3.5–5.3)
POTASSIUM SERPL-SCNC: 4 MMOL/L — SIGNIFICANT CHANGE UP (ref 3.5–5.3)
POTASSIUM SERPL-SCNC: 4.4 MMOL/L — SIGNIFICANT CHANGE UP (ref 3.5–5.3)
PROT SERPL-MCNC: 6.9 G/DL — SIGNIFICANT CHANGE UP (ref 6–8.3)
RBC # BLD: 3.06 M/UL — LOW (ref 4.2–5.8)
RBC # FLD: 15.4 % — HIGH (ref 10.3–14.5)
SODIUM SERPL-SCNC: 145 MMOL/L — SIGNIFICANT CHANGE UP (ref 135–145)
SODIUM SERPL-SCNC: 148 MMOL/L — HIGH (ref 135–145)
SPECIMEN SOURCE: SIGNIFICANT CHANGE UP
SPECIMEN SOURCE: SIGNIFICANT CHANGE UP
WBC # BLD: 6.16 K/UL — SIGNIFICANT CHANGE UP (ref 3.8–10.5)
WBC # FLD AUTO: 6.16 K/UL — SIGNIFICANT CHANGE UP (ref 3.8–10.5)

## 2019-12-31 PROCEDURE — 99497 ADVNCD CARE PLAN 30 MIN: CPT | Mod: 25

## 2019-12-31 PROCEDURE — 99223 1ST HOSP IP/OBS HIGH 75: CPT

## 2019-12-31 PROCEDURE — 74230 X-RAY XM SWLNG FUNCJ C+: CPT | Mod: 26

## 2019-12-31 PROCEDURE — 99233 SBSQ HOSP IP/OBS HIGH 50: CPT | Mod: GC

## 2019-12-31 RX ORDER — MEROPENEM 1 G/30ML
500 INJECTION INTRAVENOUS EVERY 12 HOURS
Refills: 0 | Status: DISCONTINUED | OUTPATIENT
Start: 2019-12-31 | End: 2019-12-31

## 2019-12-31 RX ORDER — MEROPENEM 1 G/30ML
500 INJECTION INTRAVENOUS EVERY 12 HOURS
Refills: 0 | Status: COMPLETED | OUTPATIENT
Start: 2019-12-31 | End: 2020-01-02

## 2019-12-31 RX ADMIN — MEROPENEM 100 MILLIGRAM(S): 1 INJECTION INTRAVENOUS at 05:11

## 2019-12-31 RX ADMIN — SODIUM CHLORIDE 30 MILLILITER(S): 9 INJECTION, SOLUTION INTRAVENOUS at 17:45

## 2019-12-31 RX ADMIN — Medication 25 MILLIGRAM(S): at 19:39

## 2019-12-31 RX ADMIN — LEVETIRACETAM 400 MILLIGRAM(S): 250 TABLET, FILM COATED ORAL at 05:11

## 2019-12-31 RX ADMIN — CHLORHEXIDINE GLUCONATE 1 APPLICATION(S): 213 SOLUTION TOPICAL at 12:01

## 2019-12-31 RX ADMIN — LEVETIRACETAM 400 MILLIGRAM(S): 250 TABLET, FILM COATED ORAL at 18:05

## 2019-12-31 RX ADMIN — MEROPENEM 100 MILLIGRAM(S): 1 INJECTION INTRAVENOUS at 17:03

## 2019-12-31 NOTE — PROGRESS NOTE ADULT - ASSESSMENT
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

## 2019-12-31 NOTE — SWALLOW VFSS/MBS ASSESSMENT ADULT - THE ABOVE FINDINGS WERE DISCUSSED WITH
MD Arias; MD Chen (Palliative); attempted to call pt's daughter - no answer - no answering machine/Patient

## 2019-12-31 NOTE — PROGRESS NOTE ADULT - SUBJECTIVE AND OBJECTIVE BOX
PROGRESS NOTE:     CONTACT INFO:   Hall (Xiao) Juana   PGY-1  NS: [phone]      Patient is a 89y old  Male who presents with a chief complaint of AMS and lethargy (30 Dec 2019 09:11)      SUBJECTIVE / OVERNIGHT EVENTS:    ADDITIONAL REVIEW OF SYSTEMS:    MEDICATIONS  (STANDING):  chlorhexidine 4% Liquid 1 Application(s) Topical daily  dextrose 5%. 1000 milliLiter(s) (75 mL/Hr) IV Continuous <Continuous>  levETIRAcetam  IVPB 250 milliGRAM(s) IV Intermittent every 12 hours  levothyroxine Injectable 50 MICROGram(s) IV Push at bedtime    MEDICATIONS  (PRN):  valproate sodium IVPB 125 milliGRAM(s) IV Intermittent every 6 hours PRN Agitation      CAPILLARY BLOOD GLUCOSE        I&O's Summary    30 Dec 2019 07:01  -  31 Dec 2019 07:00  --------------------------------------------------------  IN: 1800 mL / OUT: 800 mL / NET: 1000 mL        PHYSICAL EXAM:  Vital Signs Last 24 Hrs  T(C): 36.3 (31 Dec 2019 04:20), Max: 36.6 (30 Dec 2019 22:33)  T(F): 97.4 (31 Dec 2019 04:20), Max: 97.9 (31 Dec 2019 00:06)  HR: 62 (31 Dec 2019 04:20) (57 - 68)  BP: 125/55 (31 Dec 2019 04:20) (116/61 - 129/73)  BP(mean): --  RR: 18 (31 Dec 2019 04:20) (18 - 18)  SpO2: 94% (31 Dec 2019 04:20) (94% - 98%)    CONSTITUTIONAL: NAD, well-developed  RESPIRATORY: Normal respiratory effort; lungs are clear to auscultation bilaterally  CARDIOVASCULAR: Regular rate and rhythm, normal S1 and S2, no murmur/rub/gallop; No lower extremity edema; Peripheral pulses are 2+ bilaterally  ABDOMEN: Nontender to palpation, normoactive bowel sounds, no rebound/guarding; No hepatosplenomegaly  MUSCLOSKELETAL: no clubbing or cyanosis of digits; no joint swelling or tenderness to palpation  PSYCH: A+O to person, place, and time; affect appropriate    LABS:                        8.2    7.75  )-----------( 142      ( 30 Dec 2019 04:02 )             27.8     12-30    148<H>  |  116<H>  |  66<H>  ----------------------------<  112<H>  4.4   |  18<L>  |  2.74<H>    Ca    9.3      30 Dec 2019 23:56  Phos  4.2     12-30  Mg     2.6     12-30                  RADIOLOGY & ADDITIONAL TESTS:  Results Reviewed:   Imaging Personally Reviewed:  Electrocardiogram Personally Reviewed:    COORDINATION OF CARE:  Care Discussed with Consultants/Other Providers [Y/N]:  Prior or Outpatient Records Reviewed [Y/N]: PROGRESS NOTE:     CONTACT INFO:   Hall (Xiao) Juana   PGY-1  NS: [phone]      Patient is a 89y old  Male who presents with a chief complaint of AMS and lethargy (30 Dec 2019 09:11)      SUBJECTIVE / OVERNIGHT EVENTS: KIRSTIE ON. Failed MBS this morning. Denies chest pain, SOB, abd pain, dysuria, fever, chills.    ADDITIONAL REVIEW OF SYSTEMS:    MEDICATIONS  (STANDING):  chlorhexidine 4% Liquid 1 Application(s) Topical daily  dextrose 5%. 1000 milliLiter(s) (75 mL/Hr) IV Continuous <Continuous>  levETIRAcetam  IVPB 250 milliGRAM(s) IV Intermittent every 12 hours  levothyroxine Injectable 50 MICROGram(s) IV Push at bedtime    MEDICATIONS  (PRN):  valproate sodium IVPB 125 milliGRAM(s) IV Intermittent every 6 hours PRN Agitation      CAPILLARY BLOOD GLUCOSE        I&O's Summary    30 Dec 2019 07:01  -  31 Dec 2019 07:00  --------------------------------------------------------  IN: 1800 mL / OUT: 800 mL / NET: 1000 mL        PHYSICAL EXAM:  Vital Signs Last 24 Hrs  T(C): 36.3 (31 Dec 2019 04:20), Max: 36.6 (30 Dec 2019 22:33)  T(F): 97.4 (31 Dec 2019 04:20), Max: 97.9 (31 Dec 2019 00:06)  HR: 62 (31 Dec 2019 04:20) (57 - 68)  BP: 125/55 (31 Dec 2019 04:20) (116/61 - 129/73)  BP(mean): --  RR: 18 (31 Dec 2019 04:20) (18 - 18)  SpO2: 94% (31 Dec 2019 04:20) (94% - 98%)    CONSTITUTIONAL: NAD, resting in bed  RESPIRATORY: minimal respiratory effort; lungs are clear to auscultation bilaterally  CARDIOVASCULAR: Regular rate and rhythm, normal S1 and S2, systolic murmur; 1+ lower extremity edema; Peripheral pulses are 2+ bilaterally  ABDOMEN: Nontender to palpation, normoactive bowel sounds, no rebound/guarding  MUSCLOSKELETAL: no clubbing or cyanosis of digits; no joint swelling or tenderness to palpation  PSYCH: A+O to person, place, and time    LABS:                        8.2    7.75  )-----------( 142      ( 30 Dec 2019 04:02 )             27.8     12-30    148<H>  |  116<H>  |  66<H>  ----------------------------<  112<H>  4.4   |  18<L>  |  2.74<H>    Ca    9.3      30 Dec 2019 23:56  Phos  4.2     12-30  Mg     2.6     12-30                  RADIOLOGY & ADDITIONAL TESTS:  Results Reviewed:   Imaging Personally Reviewed:  Electrocardiogram Personally Reviewed:    COORDINATION OF CARE:  Care Discussed with Consultants/Other Providers [Y/N]:  Prior or Outpatient Records Reviewed [Y/N]: PROGRESS NOTE:     CONTACT INFO:   Hall (Xiao) Juana   PGY-1  NS: [phone]      Patient is a 89y old  Male who presents with a chief complaint of AMS and lethargy (30 Dec 2019 09:11)      SUBJECTIVE / OVERNIGHT EVENTS: KIRSTIE ON. Failed MBS this morning.     ADDITIONAL REVIEW OF SYSTEMS: Denies chest pain, SOB, abd pain, dysuria, fever, chills.    MEDICATIONS  (STANDING):  chlorhexidine 4% Liquid 1 Application(s) Topical daily  dextrose 5%. 1000 milliLiter(s) (75 mL/Hr) IV Continuous <Continuous>  levETIRAcetam  IVPB 250 milliGRAM(s) IV Intermittent every 12 hours  levothyroxine Injectable 50 MICROGram(s) IV Push at bedtime    MEDICATIONS  (PRN):  valproate sodium IVPB 125 milliGRAM(s) IV Intermittent every 6 hours PRN Agitation      CAPILLARY BLOOD GLUCOSE        I&O's Summary    30 Dec 2019 07:01  -  31 Dec 2019 07:00  --------------------------------------------------------  IN: 1800 mL / OUT: 800 mL / NET: 1000 mL        PHYSICAL EXAM:  Vital Signs Last 24 Hrs  T(C): 36.3 (31 Dec 2019 04:20), Max: 36.6 (30 Dec 2019 22:33)  T(F): 97.4 (31 Dec 2019 04:20), Max: 97.9 (31 Dec 2019 00:06)  HR: 62 (31 Dec 2019 04:20) (57 - 68)  BP: 125/55 (31 Dec 2019 04:20) (116/61 - 129/73)  BP(mean): --  RR: 18 (31 Dec 2019 04:20) (18 - 18)  SpO2: 94% (31 Dec 2019 04:20) (94% - 98%)    CONSTITUTIONAL: NAD, resting in bed  RESPIRATORY: minimal respiratory effort; lungs are clear to auscultation bilaterally  CARDIOVASCULAR: Regular rate and rhythm, normal S1 and S2, systolic murmur; 1+ lower extremity edema  ABDOMEN: Nontender to palpation, normoactive bowel sounds, no rebound/guarding  MUSCLOSKELETAL: no clubbing or cyanosis of digits; no joint swelling or tenderness to palpation      LABS:                        8.2    7.75  )-----------( 142      ( 30 Dec 2019 04:02 )             27.8     12-30    148<H>  |  116<H>  |  66<H>  ----------------------------<  112<H>  4.4   |  18<L>  |  2.74<H>    Ca    9.3      30 Dec 2019 23:56  Phos  4.2     12-30  Mg     2.6     12-30                  RADIOLOGY & ADDITIONAL TESTS:  Results Reviewed:   Imaging Personally Reviewed:  Electrocardiogram Personally Reviewed:    COORDINATION OF CARE:  Care Discussed with Consultants/Other Providers [Y/N]:  Prior or Outpatient Records Reviewed [Y/N]: PROGRESS NOTE:     CONTACT INFO:   Hall (Xiao) Juana   PGY-1  NS: [phone]      Patient is a 89y old  Male who presents with a chief complaint of AMS and lethargy (30 Dec 2019 09:11)      SUBJECTIVE / OVERNIGHT EVENTS: No acute events overnight. Failed MBS this morning.     ADDITIONAL REVIEW OF SYSTEMS: Denies chest pain, SOB, abd pain, dysuria, fever, chills.    MEDICATIONS  (STANDING):  chlorhexidine 4% Liquid 1 Application(s) Topical daily  dextrose 5%. 1000 milliLiter(s) (75 mL/Hr) IV Continuous <Continuous>  levETIRAcetam  IVPB 250 milliGRAM(s) IV Intermittent every 12 hours  levothyroxine Injectable 50 MICROGram(s) IV Push at bedtime    MEDICATIONS  (PRN):  valproate sodium IVPB 125 milliGRAM(s) IV Intermittent every 6 hours PRN Agitation      CAPILLARY BLOOD GLUCOSE        I&O's Summary    30 Dec 2019 07:01  -  31 Dec 2019 07:00  --------------------------------------------------------  IN: 1800 mL / OUT: 800 mL / NET: 1000 mL        PHYSICAL EXAM:  Vital Signs Last 24 Hrs  T(C): 36.3 (31 Dec 2019 04:20), Max: 36.6 (30 Dec 2019 22:33)  T(F): 97.4 (31 Dec 2019 04:20), Max: 97.9 (31 Dec 2019 00:06)  HR: 62 (31 Dec 2019 04:20) (57 - 68)  BP: 125/55 (31 Dec 2019 04:20) (116/61 - 129/73)  BP(mean): --  RR: 18 (31 Dec 2019 04:20) (18 - 18)  SpO2: 94% (31 Dec 2019 04:20) (94% - 98%)    CONSTITUTIONAL: NAD, resting in bed  RESPIRATORY: minimal respiratory effort; lungs are clear to auscultation bilaterally  CARDIOVASCULAR: Regular rate and rhythm, normal S1 and S2, systolic murmur; 1+ lower extremity edema  ABDOMEN: Nontender to palpation, normoactive bowel sounds, no rebound/guarding  MUSCLOSKELETAL: no clubbing or cyanosis of digits; no joint swelling or tenderness to palpation  Psych: Awake, alert, not oriented      LABS:                        8.2    7.75  )-----------( 142      ( 30 Dec 2019 04:02 )             27.8     12-30    148<H>  |  116<H>  |  66<H>  ----------------------------<  112<H>  4.4   |  18<L>  |  2.74<H>    Ca    9.3      30 Dec 2019 23:56  Phos  4.2     12-30  Mg     2.6     12-30                  RADIOLOGY & ADDITIONAL TESTS:  Results Reviewed:   Imaging Personally Reviewed:  Electrocardiogram Personally Reviewed:    COORDINATION OF CARE:  Care Discussed with Consultants/Other Providers [Y/N]:  Prior or Outpatient Records Reviewed [Y/N]:

## 2019-12-31 NOTE — CHART NOTE - NSCHARTNOTEFT_GEN_A_CORE
Palliative care was called for nutritional GOC but MBS is still pending. Will help with GOC after MBS is done, likely on 1/2/2020. Patient is already DNR. Agitation is currently treated by Psych. Please call us before 1/2/20 if having issues with symptoms Rx.     Cody Chen MD.   [phone]

## 2019-12-31 NOTE — CONSULT NOTE ADULT - PROBLEM SELECTOR RECOMMENDATION 4
No role for Acetylcholinesterase Inhibitor  Patient is hospice eligible and a hospice referral was done.

## 2019-12-31 NOTE — PHYSICAL THERAPY INITIAL EVALUATION ADULT - BALANCE DISTURBANCE, IDENTIFIED IMPAIRMENT CONTRIBUTE, REHAB EVAL
Report received from Elissa MONTANEZ. Assumed care of patient. Patient's concerns addressed. Fall precautions in place. Call light within reach. Will continue to monitor.   pain/decreased strength

## 2019-12-31 NOTE — CONSULT NOTE ADULT - SUBJECTIVE AND OBJECTIVE BOX
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 Patient was seen and evaluated in the ED on Friday, December 20th, when he had an abnormal UA suggestive of UTI and was subsequently discharged to home on a course of Keflex and Augmentin. Patient's visiting physician, Dr. Rocha switched patient to Ertapenem after cultures grew back positive for Klebsiella pneumoniae ESBL. Over the course of the next several days, patient has developed progressively worsening lethargy, somnolence, and decreased PO intake; presenting to the ED today for further evaluation.    Per patient's family at bedside, patient is interactive and eats his meals; of note, patient diagnosed with dysphagia following a failed s/s evaluation approximately 2 years ago, currently on pleasure feeds. Family does not report history of coughing when eating, and no reports of URI or GI symptoms. However, per A, patient's last BM was dark appearing and extremely "smelly". Patient's last TTE was 01/2018, showing EF 65% with severe AS, moderate MR, and severely dilated LA.     In the ED, patient was hypothermic to 95.4 on rectal temp., bradycardic to 47, and had BP readings WNL. He did not have an elevated WBC and had an abnormal UA with moderate leukocyte esterase. Patient was acidotic on VBG 7.31/48/24/24 with lactate of 1.5 and anemic with hgb 6.8. Patient's SCr was elevated from baseline to 2.73 and had CXR showing right lower lobe opacity that appears to have worsened, as compared to study performed on the 20th. (26 Dec 2019 19:49)    PERTINENT PM/SXH:   Anemia  Neurogenic bladder  Hyperlipidemia  Dementia  Hypothyroid  CKD (chronic kidney disease)    S/P knee replacement  Status post right knee replacement  S/P total knee replacement, right  H/O hernia repair  Suprapubic catheter  No significant past surgical history    FAMILY HISTORY:  No pertinent family history in first degree relatives    ITEMS NOT CHECKED ARE NOT PRESENT    SOCIAL HISTORY:   Significant other/partner[ ]  Children[ ]  Rastafari/Spirituality:  Substance hx:  [ ]   Tobacco hx:  [ ]   Alcohol hx: [ ]   Home Opioid hx:  [ ] I-Stop Reference No:  Living Situation: [ ]Home  [ ]Long term care  [ ]Rehab [ ]Other    ADVANCE DIRECTIVES:    DNR  Yes  MOLST  [ ]  Living Will  [ ]   DECISION MAKER(s):  [ ] Health Care Proxy(s)  [ ] Surrogate(s)  [ ] Guardian           Name(s): Phone Number(s):    BASELINE (I)ADL(s) (prior to admission):  [place]: [ ]Total  [ ] Moderate [ ]Dependent    Allergies    No Known Allergies    Intolerances    MEDICATIONS  (STANDING):  chlorhexidine 4% Liquid 1 Application(s) Topical daily  dextrose 5%. 1000 milliLiter(s) (75 mL/Hr) IV Continuous <Continuous>  levETIRAcetam  IVPB 250 milliGRAM(s) IV Intermittent every 12 hours  levothyroxine Injectable 50 MICROGram(s) IV Push at bedtime  meropenem  IVPB 500 milliGRAM(s) IV Intermittent every 12 hours    MEDICATIONS  (PRN):  valproate sodium IVPB 125 milliGRAM(s) IV Intermittent every 6 hours PRN Agitation    PRESENT SYMPTOMS: [ ]Unable to obtain due to poor mentation   Source if other than patient:  [ ]Family   [ ]Team     Pain: [ ]yes [ ]no  QOL impact -   Location -                    Aggravating factors -  Quality -  Radiation -  Timing-  Severity (0-10 scale):  Minimal acceptable level (0-10 scale):     PAIN AD Score:     [email or web address] (press ctrl +  left click to view)    Dyspnea:                           [ ]Mild [ ]Moderate [ ]Severe  Anxiety:                             [ ]Mild [ ]Moderate [ ]Severe  Fatigue:                             [ ]Mild [ ]Moderate [ ]Severe  Nausea:                             [ ]Mild [ ]Moderate [ ]Severe  Loss of appetite:              [ ]Mild [ ]Moderate [ ]Severe  Constipation:                    [ ]Mild [ ]Moderate [ ]Severe    Other Symptoms:  [ ]All other review of systems negative     Palliative Performance Status Version 2:         %    http://npcrc.org/files/news/palliative_performance_scale_ppsv2.pdf  PHYSICAL EXAM:  Vital Signs Last 24 Hrs  T(C): 36.2 (31 Dec 2019 08:04), Max: 36.6 (30 Dec 2019 22:33)  T(F): 97.2 (31 Dec 2019 08:04), Max: 97.9 (31 Dec 2019 00:06)  HR: 50 (31 Dec 2019 13:26) (50 - 66)  BP: 125/70 (31 Dec 2019 13:26) (116/61 - 128/66)  BP(mean): --  RR: 18 (31 Dec 2019 13:26) (18 - 18)  SpO2: 95% (31 Dec 2019 13:26) (93% - 98%) I&O's Summary    30 Dec 2019 07:01  -  31 Dec 2019 07:00  --------------------------------------------------------  IN: 1800 mL / OUT: 800 mL / NET: 1000 mL    31 Dec 2019 07:01  -  31 Dec 2019 15:42  --------------------------------------------------------  IN: 0 mL / OUT: 300 mL / NET: -300 mL      GENERAL:  [ ]Alert  [ ]Oriented x   [ ]Lethargic  [ ]Cachexia  [ ]Unarousable  [ ]Verbal  [ ]Non-Verbal  Behavioral:   [ ] Anxiety  [ ] Delirium [ ] Agitation [ ] Other  HEENT:  [ ]Normal   [ ]Dry mouth   [ ]ET Tube/Trach  [ ]Oral lesions  PULMONARY:   [ ]Clear [ ]Tachypnea  [ ]Audible excessive secretions   [ ]Rhonchi        [ ]Right [ ]Left [ ]Bilateral  [ ]Crackles        [ ]Right [ ]Left [ ]Bilateral  [ ]Wheezing     [ ]Right [ ]Left [ ]Bilatera  [ ]Diminished breath sounds [ ]right [ ]left [ ]bilateral  CARDIOVASCULAR:    [ ]Regular [ ]Irregular [ ]Tachy  [ ]Woody [ ]Murmur [ ]Other  GASTROINTESTINAL:  [ ]Soft  [ ]Distended   [ ]+BS  [ ]Non tender [ ]Tender  [ ]PEG [ ]OGT/ NGT  Last BM:     GENITOURINARY:  [ ]Normal [ ] Incontinent   [ ]Oliguria/Anuria   [ ]Donis  MUSCULOSKELETAL:   [ ]Normal   [ ]Weakness  [ ]Bed/Wheelchair bound [ ]Edema  NEUROLOGIC:   [ ]No focal deficits  [ ]Cognitive impairment  [ ]Dysphagia [ ]Dysarthria [ ]Paresis [ ]Other   SKIN:   [ ]Normal    [ ]Rash  [ ]Pressure ulcer(s)       Present on admission [ ]y [ ]n    CRITICAL CARE:  [ ] Shock Present  [ ]Septic [ ]Cardiogenic [ ]Neurologic [ ]Hypovolemic  [ ]  Vasopressors [ ]  Inotropes   [ ]Respiratory failure present [ ]Mechanical ventilation [ ]Non-invasive ventilatory support [ ]High flow  [ ]Acute  [ ]Chronic [ ]Hypoxic  [ ]Hypercarbic [ ]Other  [ ]Other organ failure     LABS:                        7.9    6.16  )-----------( 150      ( 31 Dec 2019 09:26 )             27.1   12-31    145  |  112<H>  |  66<H>  ----------------------------<  104<H>  4.0   |  20<L>  |  2.71<H>    Ca    9.5      31 Dec 2019 07:11  Phos  3.4     12-31  Mg     2.4     12-31    TPro  6.9  /  Alb  3.2<L>  /  TBili  1.3<H>  /  DBili  x   /  AST  17  /  ALT  8<L>  /  AlkPhos  67  12-31        RADIOLOGY & ADDITIONAL STUDIES:    PROTEIN CALORIE MALNUTRITION PRESENT: [ ]mild [ ]moderate [ ]severe [ ]underweight [ ]morbid obesity  https://www.andeal.org/vault/2440/web/files/ONC/Table_Clinical%20Characteristics%20to%20Document%20Malnutrition-White%20JV%20et%20al%202012.pdf    Height (cm): 180.34 (12-26-19 @ 13:44), 180.34 (12-20-19 @ 17:59)  Weight (kg): 78 (12-26-19 @ 13:44), 81.6 (12-20-19 @ 17:59), 68 (09-13-19 @ 16:50)  BMI (kg/m2): 24 (12-26-19 @ 13:44), 25.1 (12-20-19 @ 17:59)    [ ]PPSV2 < or = to 30% [ ]significant weight loss  [ ]poor nutritional intake  [ ]anasarca     Albumin, Serum: 3.2 g/dL (12-31-19 @ 07:11)   [ ]Artificial Nutrition      REFERRALS:   [ ]Chaplaincy  [ ]Hospice  [ ]Child Life  [ ]Social Work  [ ]Case management [ ]Holistic Therapy     Goals of Care Document: HPI:  Patient is a 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 Patient was seen and evaluated in the ED on Friday, December 20th, when he had an abnormal UA suggestive of UTI and was subsequently discharged to home on a course of Keflex and Augmentin. Patient's visiting physician, Dr. Rocha switched patient to Ertapenem after cultures grew back positive for Klebsiella pneumoniae ESBL. Over the course of the next several days, patient has developed progressively worsening lethargy, somnolence, and decreased PO intake; presenting to the ED today for further evaluation.    Per patient's family at bedside, patient is interactive and eats his meals; of note, patient diagnosed with dysphagia following a failed s/s evaluation approximately 2 years ago, currently on pleasure feeds. Family does not report history of coughing when eating, and no reports of URI or GI symptoms. However, per A, patient's last BM was dark appearing and extremely "smelly". Patient's last TTE was 01/2018, showing EF 65% with severe AS, moderate MR, and severely dilated LA.     In the ED, patient was hypothermic to 95.4 on rectal temp., bradycardic to 47, and had BP readings WNL. He did not have an elevated WBC and had an abnormal UA with moderate leukocyte esterase. Patient was acidotic on VBG 7.31/48/24/24 with lactate of 1.5 and anemic with hgb 6.8. Patient's SCr was elevated from baseline to 2.73 and had CXR showing right lower lobe opacity that appears to have worsened, as compared to study performed on the 20th. (26 Dec 2019 19:49)    Subjective/Objective: The patient was seen and examined with his daughter and son in law by his bedside. He was not on any acute distress.     PERTINENT PM/SXH:   Anemia  Neurogenic bladder  Hyperlipidemia  Dementia  Hypothyroid  CKD (chronic kidney disease)    S/P knee replacement  Status post right knee replacement  S/P total knee replacement, right  H/O hernia repair  Suprapubic catheter  No significant past surgical history    FAMILY HISTORY:  No pertinent family history in first degree relatives of cognitive impairment.     ITEMS NOT CHECKED ARE NOT PRESENT    SOCIAL HISTORY:   Significant other/partner[ ]  Children[ ]  Jehovah's witness/Spirituality:  Substance hx:  [ ]   Tobacco hx:  [ ]   Alcohol hx: [ ]   Home Opioid hx:  [ ] I-Stop Reference No:  Living Situation: [ ]Home  [ ]Long term care  [ ]Rehab [ ]Other  As per care coordination: "Spoke with pt's dtr,  Caregiver, HCP Rosas Parikh . Brian is a 89 yo male with Hx of  Dementia, neurogenic bladder, chronic edwards, Anemia, HLD, Hypothyroid, ZACKERY,  Klebsiella UTI admitted with Sepsis 2ndary to UTI vs Aspiration PNA. Pt lives  alone in first floor apt. Dtr, Rosas lives upstairs apt and works 12hrs/day. Pt  is known to [facility], House calls and has Medicaid HHAs [id number] days through  [facility]. Pt is total care at home, agiated and unable to be left alone.   Rosas has requested increased hours from [facility]."    ADVANCE DIRECTIVES:    DNR  Yes  MOLST  [x ]  Living Will  [ ]   DECISION MAKER(s):  [x ] Health Care Proxy(s)  [ ] Surrogate(s)  [ ] Guardian           Name(s): Phone Number(s): HCP Rosas Parikh     BASELINE (I)ADL(s) (prior to admission):  [place]: [ ]Total  [ ] Moderate [ x]Dependent    Allergies    No Known Allergies    Intolerances    MEDICATIONS  (STANDING):  chlorhexidine 4% Liquid 1 Application(s) Topical daily  dextrose 5%. 1000 milliLiter(s) (75 mL/Hr) IV Continuous <Continuous>  levETIRAcetam  IVPB 250 milliGRAM(s) IV Intermittent every 12 hours  levothyroxine Injectable 50 MICROGram(s) IV Push at bedtime  meropenem  IVPB 500 milliGRAM(s) IV Intermittent every 12 hours    MEDICATIONS  (PRN):  valproate sodium IVPB 125 milliGRAM(s) IV Intermittent every 6 hours PRN Agitation    PRESENT SYMPTOMS: [x ]Unable to obtain due to poor mentation   Source if other than patient:  [ ]Family   [ ]Team     Pain: [ ]yes [x ]no  QOL impact -   Location -                    Aggravating factors -  Quality -  Radiation -  Timing-  Severity (0-10 scale):  Minimal acceptable level (0-10 scale):     PAIN AD Score: 0    [email or web address] (press ctrl +  left click to view)    Dyspnea:                           [ ]Mild [ ]Moderate [ ]Severe  Anxiety:                             [ ]Mild [ ]Moderate [ ]Severe  Fatigue:                             [ ]Mild [ ]Moderate [ ]Severe  Nausea:                             [ ]Mild [ ]Moderate [ ]Severe  Loss of appetite:              [ ]Mild [ ]Moderate [ ]Severe  Constipation:                    [ ]Mild [ ]Moderate [ ]Severe    Other Symptoms:  [ ]All other review of systems negative     Palliative Performance Status Version 2:   20      %    http://npcrc.org/files/news/palliative_performance_scale_ppsv2.pdf  PHYSICAL EXAM:  Vital Signs Last 24 Hrs  T(C): 36.2 (31 Dec 2019 08:04), Max: 36.6 (30 Dec 2019 22:33)  T(F): 97.2 (31 Dec 2019 08:04), Max: 97.9 (31 Dec 2019 00:06)  HR: 50 (31 Dec 2019 13:26) (50 - 66)  BP: 125/70 (31 Dec 2019 13:26) (116/61 - 128/66)  BP(mean): --  RR: 18 (31 Dec 2019 13:26) (18 - 18)  SpO2: 95% (31 Dec 2019 13:26) (93% - 98%) I&O's Summary    30 Dec 2019 07:01  -  31 Dec 2019 07:00  --------------------------------------------------------  IN: 1800 mL / OUT: 800 mL / NET: 1000 mL    31 Dec 2019 07:01  -  31 Dec 2019 15:42  --------------------------------------------------------  IN: 0 mL / OUT: 300 mL / NET: -300 mL      GENERAL:  [ x]Alert  [ ]Oriented x   [ ]Lethargic  [ ]Cachexia  [ ]Unarousable  [x ]Verbal  [ ]Non-Verbal  Behavioral:   [ ] Anxiety  [ ] Delirium [ ] Agitation [ ] Other  HEENT:  [x ]Normal   [ ]Dry mouth   [ ]ET Tube/Trach  [ ]Oral lesions [x] Hearing impairment.   PULMONARY:   [x ]Clear [ ]Tachypnea  [ ]Audible excessive secretions   [ ]Rhonchi        [ ]Right [ ]Left [ ]Bilateral  [ ]Crackles        [ ]Right [ ]Left [ ]Bilateral  [ ]Wheezing     [ ]Right [ ]Left [ ]Bilatera  [ ]Diminished breath sounds [ ]right [ ]left [ ]bilateral  CARDIOVASCULAR:    [ ]Regular [x ]Irregular [ ]Tachy  [ ]Woody [x ]Murmur: Systolic over Aortic area.  [ ]Other  GASTROINTESTINAL:  [x ]Soft  [ ]Distended   [ x]+BS  [ x]Non tender [ ]Tender  [ ]PEG [ ]OGT/ NGT  Last BM:     GENITOURINARY:  [ ]Normal [x ] Incontinent   [ ]Oliguria/Anuria   [ ]Edwards  MUSCULOSKELETAL:   [ ]Normal   [x ]Weakness  [ ]Bed/Wheelchair bound [ ]Edema  NEUROLOGIC:   [ ]No focal deficits  [x ]Advanced Cognitive impairment  [ ]Dysphagia [ ]Dysarthria [ ]Paresis [ ]Other   SKIN:   [ ]Normal    [ ]Rash  [ ]Pressure ulcer(s)       Present on admission [x ]y [ ]n Stage I Sacrum     CRITICAL CARE:  [ ] Shock Present  [ ]Septic [ ]Cardiogenic [ ]Neurologic [ ]Hypovolemic  [ ]  Vasopressors [ ]  Inotropes   [ ]Respiratory failure present [ ]Mechanical ventilation [ ]Non-invasive ventilatory support [ ]High flow  [ ]Acute  [ ]Chronic [ ]Hypoxic  [ ]Hypercarbic [ ]Other  [ ]Other organ failure     LABS:                        7.9    6.16  )-----------( 150      ( 31 Dec 2019 09:26 )             27.1   12-31    145  |  112<H>  |  66<H>  ----------------------------<  104<H>  4.0   |  20<L>  |  2.71<H>    Ca    9.5      31 Dec 2019 07:11  Phos  3.4     12-31  Mg     2.4     12-31    TPro  6.9  /  Alb  3.2<L>  /  TBili  1.3<H>  /  DBili  x   /  AST  17  /  ALT  8<L>  /  AlkPhos  67  12-31        RADIOLOGY & ADDITIONAL STUDIES:  Reviewed.   PROTEIN CALORIE MALNUTRITION PRESENT: [ ]mild [ ]moderate [ ]severe [ ]underweight [ ]morbid obesity  https://www.andeal.org/vault/2440/web/files/ONC/Table_Clinical%20Characteristics%20to%20Document%20Malnutrition-White%20JV%20et%20al%202012.pdf    Height (cm): 180.34 (12-26-19 @ 13:44), 180.34 (12-20-19 @ 17:59)  Weight (kg): 78 (12-26-19 @ 13:44), 81.6 (12-20-19 @ 17:59), 68 (09-13-19 @ 16:50)  BMI (kg/m2): 24 (12-26-19 @ 13:44), 25.1 (12-20-19 @ 17:59)    [ ]PPSV2 < or = to 30% [ ]significant weight loss  [ ]poor nutritional intake  [ ]anasarca     Albumin, Serum: 3.2 g/dL (12-31-19 @ 07:11)   [ ]Artificial Nutrition      REFERRALS:   [ ]Chaplaincy  [ ]Hospice  [ ]Child Life  [ ]Social Work  [ ]Case management [ ]Holistic Therapy     Goals of Care Document:

## 2019-12-31 NOTE — SWALLOW VFSS/MBS ASSESSMENT ADULT - ORAL PHASE
Residue in oral cavity/Uncontrolled bolus / spillover in sole-pharynx/Delayed oral transit time/Reduced anterior - posterior transport/Incomplete tongue to palate contact

## 2019-12-31 NOTE — SWALLOW VFSS/MBS ASSESSMENT ADULT - RECOMMENDED CONSISTENCY
NPO, with non-oral nutrition, hydration, medications is recommended based upon the results of this examination; however, would suggest Palliative Care consult to determine the GOC with re: to provision of nutrition in this patient with advanced dementia.

## 2019-12-31 NOTE — SWALLOW VFSS/MBS ASSESSMENT ADULT - DIAGNOSTIC IMPRESSIONS
Patient presents with a severe oropharyngeal dysphagia characterized by poor bolus formation, decreased AP transport resulting in diffuse oral residue, delayed oral transit time, spillage into the pharynx prior to airway closure, delayed trigger of the swallow, severe post swallow pharyngeal residue (100% of bolus remains in pharynx after primary swallow), and laryngeal penetration after the swallow with subsequent aspiration during repeat swallows with most conservative PO texture (thin puree).    Disorders: reduced lingual strength/ROM/Rate of motion, reduced BOT to posterior pharyngeal wall contact, reduced linguapalatal contact, delay in trigger of the swallow reflex, reduced hyo-laryngeal excursion, reduced laryngeal closure, reduced supraglottic sensation, reduced subglottic sensation.

## 2019-12-31 NOTE — PROGRESS NOTE ADULT - PROBLEM SELECTOR PLAN 1
Outpatient UCx growing Klebsiella pneumoniae ESBL, sensitive to ertapenem. UCx from 12/20 showing contamination. Abnormal UA on presentation to ED, (+) mod leukocyte esterase. Patient with suprapubic catheter.  - c/w Meropenem (day 3/3)  - UCx growing GNR Outpatient UCx growing Klebsiella pneumoniae ESBL. Patient with suprapubic catheter.  - s/p 3 day course Meropenem   - UCx 12/26: acinetobacter iwoffii and stenotrophomonas maltophilia, sensitive to carbapenem

## 2019-12-31 NOTE — PROGRESS NOTE ADULT - PROBLEM SELECTOR PLAN 6
Patient normally takes 75mg Seroquel at home for agitation, but according to family, that has not been working. Patient's HCP and daughter, Rosas Parikh expressing concern over patient's agitation and risk of fall if trying to get OOB  - Given concern for agitation, psych was consulted for assistance and recommended IV Depacon 125mg IV q6hrs prn for agitation.  - avoid QTc prolonging psych meds Statement Selected

## 2019-12-31 NOTE — PHYSICAL THERAPY INITIAL EVALUATION ADULT - ADDITIONAL COMMENTS
Pt lives alone in a two family house w/ 10 steps to enter. DTR lives with family on the second level. as per family, Pt has WC and walker and hospital bed, requires assistance with ADL's as needed as he has home health aide for that, however was able to ambulate with a RW.

## 2019-12-31 NOTE — PROGRESS NOTE ADULT - PROBLEM SELECTOR PLAN 5
Likely 2/2 hypovolemia now improving. Baseline SCr ~2.2  - c/w fluid  - will continue to monitor SCr on AM labs

## 2019-12-31 NOTE — PHYSICAL THERAPY INITIAL EVALUATION ADULT - PERTINENT HX OF CURRENT PROBLEM, REHAB EVAL
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

## 2019-12-31 NOTE — SWALLOW VFSS/MBS ASSESSMENT ADULT - SLP GENERAL OBSERVATIONS
Patient received awake and alert, upright and secure in FARAZ chair, on RA, +[place] - able to hear clinician with increased vocal volume.  Unable to follow simple commands consistently for evaluation purposes.

## 2019-12-31 NOTE — PROGRESS NOTE ADULT - ATTENDING COMMENTS
Pt seen and examined at bedside- agree with above assessment and plan by resident/intern  Pt being treated for aspiration pna on meropenem  Pt failed MBS- overt aspiration of bolus food- discussed with dtr at length  awaiting palliative eval  NPO/IVF  for now

## 2019-12-31 NOTE — SWALLOW VFSS/MBS ASSESSMENT ADULT - PHARYNGEAL PHASE COMMENTS
100% of bolus retained in the pharynx after the primary swallow. Upon multiple repeat swallow attempts, there was trace propulsion thru the UES into the cervical esophagus. Laryngeal penetration noted after the swallow with subsequent aspiration of moderate amount of material during repeat swallow.

## 2019-12-31 NOTE — CONSULT NOTE ADULT - PROBLEM SELECTOR RECOMMENDATION 9
D/W patient's decisions maker in regards to risks vs benefits of tube feeds and careful hand feeding. Furthermore, I d/w her about the AGS position statement in regards to feeding tubes in patient's with advanced Dementia (https://onlinelibrary.[place]on[place].com/doi/full/10.1111/jgs.51407). Based on my inputs and based on substituted judgement, his HCP decided for pleasure feeds. D/W patient's decisions maker in regards to risks vs benefits of tube feeds and careful hand feeding. Furthermore, I d/w her about the AGS position statement in regards to feeding tubes in patient's with advanced Dementia.   (https://onlinelibrary.[place].com/doi/full/10.1111/jgs.17085). Based on my inputs and based on substituted judgement, his HCP decided for pleasure feeds.  Although a MOLST form was not completed, 16' were spent in ACP discussing artificial nutrition.   Aspiration precautions

## 2019-12-31 NOTE — PROGRESS NOTE ADULT - PROBLEM SELECTOR PLAN 2
likely 2/2 decreased PO intake.   - c/w D5 @75cc/hr for 12 hours, recheck BMP at q12. likely 2/2 decreased PO intake.   - c/w D5 @75cc/hr, recheck BMP at q12.

## 2019-12-31 NOTE — SWALLOW VFSS/MBS ASSESSMENT ADULT - ROSENBEK'S PENETRATION ASPIRATION SCALE
(8) contrast passes glottis, visible subglottic residue remains, absent patient response (aspiration)/pt with latent cough response, however, ineffective in eliminating PO from trachea

## 2019-12-31 NOTE — CONSULT NOTE ADULT - ASSESSMENT
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 At the time of this assesment he was treated for UTI, Aspiration PNA, Dysphagia, and ZACKERY. Palliative care was consulted for nutritional GOC.

## 2019-12-31 NOTE — PROGRESS NOTE ADULT - PROBLEM SELECTOR PLAN 3
Suspected 2/2 dysphagia. CXR appears to show worsening RLL opacity, as compared to 12/20. Patient receiving pleasure feeds at home.  - Ertapenem does not cover aspiration PNA, c/w meropenem  -failed s/s, planned for MBS on 12/31. NPO until then. Daughter agrees. Palliative consulted for feeding tube options if failed MBS Suspected 2/2 dysphagia. CXR appears to show worsening RLL opacity, as compared to 12/20. Patient receiving pleasure feeds at home.  - c/w meropenem (day 4)  -failed s/s and MBS. Recommended NPO. Palliative consulted for feeding tube options. F/u recs and discussion with daughter regarding GOC

## 2019-12-31 NOTE — PHYSICAL THERAPY INITIAL EVALUATION ADULT - GENERAL OBSERVATIONS, REHAB EVAL
Pt encountered supine in bed, AO x1-2, + PIV, + suprapubic catheter, family and home health aide at bedside

## 2019-12-31 NOTE — PROGRESS NOTE ADULT - PROBLEM SELECTOR PLAN 10
Code Status: DNR, MOLST in chart  DVT ppx: SCDs  Diet: advance as tolerated and more awake    Transitions of Care Status:  1.  Name of PCP: Dr. Isaias Rocha  2.  PCP Contacted on Admission: [ ] Y    [X] N    3.  PCP contacted at Discharge: [ ] Y    [ ] N    [ ] N/A  4.  Post-Discharge Appointment Date and Location:  5.  Summary of Handoff given to PCP: Code Status: DNR, MOLST in chart  DVT ppx: SCDs  Diet: NPO pending GOC conversation with daugher and palliative care recs    Transitions of Care Status:  1.  Name of PCP: Dr. Isaias Rocha  2.  PCP Contacted on Admission: [ ] Y    [X] N    3.  PCP contacted at Discharge: [ ] Y    [ ] N    [ ] N/A  4.  Post-Discharge Appointment Date and Location:  5.  Summary of Handoff given to PCP:

## 2019-12-31 NOTE — PROGRESS NOTE ADULT - PROBLEM SELECTOR PLAN 4
Has history of anemia requiring blood transfusions and IV iron supplementation. Presented to ED with hgb 6.8; now s/p 1 unit PRBC, was given 10mg IVP Lasix afterwards  - monitor H/H daily, transfuse for Hb<7 hx of anemia requiring 1 unit of prbc during this hospital stay  - monitor H/H daily, transfuse for Hb<7

## 2020-01-01 LAB
ALBUMIN SERPL ELPH-MCNC: 3.1 G/DL — LOW (ref 3.3–5)
ALP SERPL-CCNC: 68 U/L — SIGNIFICANT CHANGE UP (ref 40–120)
ALT FLD-CCNC: 7 U/L — LOW (ref 10–45)
ANION GAP SERPL CALC-SCNC: 14 MMOL/L — SIGNIFICANT CHANGE UP (ref 5–17)
AST SERPL-CCNC: 13 U/L — SIGNIFICANT CHANGE UP (ref 10–40)
BASOPHILS # BLD AUTO: 0.06 K/UL — SIGNIFICANT CHANGE UP (ref 0–0.2)
BASOPHILS NFR BLD AUTO: 0.9 % — SIGNIFICANT CHANGE UP (ref 0–2)
BILIRUB SERPL-MCNC: 1.4 MG/DL — HIGH (ref 0.2–1.2)
BUN SERPL-MCNC: 59 MG/DL — HIGH (ref 7–23)
CALCIUM SERPL-MCNC: 9.2 MG/DL — SIGNIFICANT CHANGE UP (ref 8.4–10.5)
CHLORIDE SERPL-SCNC: 114 MMOL/L — HIGH (ref 96–108)
CO2 SERPL-SCNC: 18 MMOL/L — LOW (ref 22–31)
CREAT SERPL-MCNC: 2.56 MG/DL — HIGH (ref 0.5–1.3)
EOSINOPHIL # BLD AUTO: 0.17 K/UL — SIGNIFICANT CHANGE UP (ref 0–0.5)
EOSINOPHIL NFR BLD AUTO: 2.6 % — SIGNIFICANT CHANGE UP (ref 0–6)
GLUCOSE SERPL-MCNC: 86 MG/DL — SIGNIFICANT CHANGE UP (ref 70–99)
HCT VFR BLD CALC: 28.9 % — LOW (ref 39–50)
HGB BLD-MCNC: 8.3 G/DL — LOW (ref 13–17)
IMM GRANULOCYTES NFR BLD AUTO: 0.6 % — SIGNIFICANT CHANGE UP (ref 0–1.5)
LYMPHOCYTES # BLD AUTO: 1.04 K/UL — SIGNIFICANT CHANGE UP (ref 1–3.3)
LYMPHOCYTES # BLD AUTO: 16.1 % — SIGNIFICANT CHANGE UP (ref 13–44)
MAGNESIUM SERPL-MCNC: 2.3 MG/DL — SIGNIFICANT CHANGE UP (ref 1.6–2.6)
MCHC RBC-ENTMCNC: 25.9 PG — LOW (ref 27–34)
MCHC RBC-ENTMCNC: 28.7 GM/DL — LOW (ref 32–36)
MCV RBC AUTO: 90.3 FL — SIGNIFICANT CHANGE UP (ref 80–100)
MONOCYTES # BLD AUTO: 0.73 K/UL — SIGNIFICANT CHANGE UP (ref 0–0.9)
MONOCYTES NFR BLD AUTO: 11.3 % — SIGNIFICANT CHANGE UP (ref 2–14)
NEUTROPHILS # BLD AUTO: 4.41 K/UL — SIGNIFICANT CHANGE UP (ref 1.8–7.4)
NEUTROPHILS NFR BLD AUTO: 68.5 % — SIGNIFICANT CHANGE UP (ref 43–77)
PHOSPHATE SERPL-MCNC: 3.3 MG/DL — SIGNIFICANT CHANGE UP (ref 2.5–4.5)
PLATELET # BLD AUTO: 150 K/UL — SIGNIFICANT CHANGE UP (ref 150–400)
POTASSIUM SERPL-MCNC: 4 MMOL/L — SIGNIFICANT CHANGE UP (ref 3.5–5.3)
POTASSIUM SERPL-SCNC: 4 MMOL/L — SIGNIFICANT CHANGE UP (ref 3.5–5.3)
PROT SERPL-MCNC: 6.8 G/DL — SIGNIFICANT CHANGE UP (ref 6–8.3)
RBC # BLD: 3.2 M/UL — LOW (ref 4.2–5.8)
RBC # FLD: 15.7 % — HIGH (ref 10.3–14.5)
SODIUM SERPL-SCNC: 146 MMOL/L — HIGH (ref 135–145)
WBC # BLD: 6.45 K/UL — SIGNIFICANT CHANGE UP (ref 3.8–10.5)
WBC # FLD AUTO: 6.45 K/UL — SIGNIFICANT CHANGE UP (ref 3.8–10.5)

## 2020-01-01 PROCEDURE — 99233 SBSQ HOSP IP/OBS HIGH 50: CPT | Mod: GC

## 2020-01-01 PROCEDURE — 93010 ELECTROCARDIOGRAM REPORT: CPT

## 2020-01-01 RX ORDER — HEPARIN SODIUM 5000 [USP'U]/ML
5000 INJECTION INTRAVENOUS; SUBCUTANEOUS EVERY 12 HOURS
Refills: 0 | Status: DISCONTINUED | OUTPATIENT
Start: 2020-01-01 | End: 2020-01-05

## 2020-01-01 RX ORDER — SODIUM CHLORIDE 9 MG/ML
1000 INJECTION, SOLUTION INTRAVENOUS
Refills: 0 | Status: DISCONTINUED | OUTPATIENT
Start: 2020-01-01 | End: 2020-01-05

## 2020-01-01 RX ADMIN — Medication 50 MICROGRAM(S): at 00:00

## 2020-01-01 RX ADMIN — LEVETIRACETAM 400 MILLIGRAM(S): 250 TABLET, FILM COATED ORAL at 05:02

## 2020-01-01 RX ADMIN — Medication 50 MICROGRAM(S): at 22:14

## 2020-01-01 RX ADMIN — MEROPENEM 100 MILLIGRAM(S): 1 INJECTION INTRAVENOUS at 05:02

## 2020-01-01 RX ADMIN — CHLORHEXIDINE GLUCONATE 1 APPLICATION(S): 213 SOLUTION TOPICAL at 11:42

## 2020-01-01 RX ADMIN — LEVETIRACETAM 400 MILLIGRAM(S): 250 TABLET, FILM COATED ORAL at 18:15

## 2020-01-01 RX ADMIN — SODIUM CHLORIDE 50 MILLILITER(S): 9 INJECTION, SOLUTION INTRAVENOUS at 08:53

## 2020-01-01 RX ADMIN — HEPARIN SODIUM 5000 UNIT(S): 5000 INJECTION INTRAVENOUS; SUBCUTANEOUS at 17:29

## 2020-01-01 RX ADMIN — MEROPENEM 100 MILLIGRAM(S): 1 INJECTION INTRAVENOUS at 17:27

## 2020-01-01 NOTE — PROGRESS NOTE ADULT - PROBLEM SELECTOR PLAN 1
Outpatient UCx growing Klebsiella pneumoniae ESBL. Patient with suprapubic catheter.  - s/p 3 day course Meropenem   - UCx 12/26: acinetobacter iwoffii and stenotrophomonas maltophilia, sensitive to carbapenem

## 2020-01-01 NOTE — PROGRESS NOTE ADULT - PROBLEM SELECTOR PLAN 10
Code Status: DNR, MOLST in chart  DVT ppx: SCDs  Diet: NPO pending GOC conversation with daughter and palliative care recs    Transitions of Care Status:  1.  Name of PCP: Dr. Isaias Rocha  2.  PCP Contacted on Admission: [ ] Y    [X] N    3.  PCP contacted at Discharge: [ ] Y    [ ] N    [ ] N/A  4.  Post-Discharge Appointment Date and Location:  5.  Summary of Handoff given to PCP: Code Status: DNR, MOLST in chart  DVT ppx: restart heparin subq q12 today  Diet: dysphagia diet w/ honey consistency for pleasure feed    Transitions of Care Status:  1.  Name of PCP: Dr. Isaias Rocha  2.  PCP Contacted on Admission: [ ] Y    [X] N    3.  PCP contacted at Discharge: [ ] Y    [ ] N    [ ] N/A  4.  Post-Discharge Appointment Date and Location:  5.  Summary of Handoff given to PCP: Code Status: DNR, MOLST in chart  DVT ppx: restart heparin subq q12 today as hgb stable  Diet: dysphagia diet w/ honey consistency for pleasure feed    Transitions of Care Status:  1.  Name of PCP: Dr. Isaias Rocha  2.  PCP Contacted on Admission: [ ] Y    [X] N    3.  PCP contacted at Discharge: [ ] Y    [ ] N    [ ] N/A  4.  Post-Discharge Appointment Date and Location:  5.  Summary of Handoff given to PCP:

## 2020-01-01 NOTE — PROGRESS NOTE ADULT - ASSESSMENT
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

## 2020-01-01 NOTE — PROGRESS NOTE ADULT - PROBLEM SELECTOR PLAN 3
Suspected 2/2 dysphagia. CXR appears to show worsening RLL opacity, as compared to 12/20. Patient receiving pleasure feeds at home and now on pleasure feeds again (12/31)  - c/w meropenem (day 5/5)  -failed s/s and MBS. Recommended NPO. Palliative consulted: family decided on pleasure feed Suspected 2/2 dysphagia. CXR appears to show worsening RLL opacity, as compared to 12/20. Patient receiving pleasure feeds at home and now on pleasure feeds again (12/31)  - c/w meropenem (day 5/5)  -failed s/s and MBS. Recommended NPO. Palliative consulted: family decided on pleasure feed. Will touch base with CM regarding home hospice Suspected 2/2 dysphagia. CXR appears to show worsening RLL opacity, as compared to 12/20. Patient receiving pleasure feeds at home and now on pleasure feeds again (12/31)  - c/w meropenem (day 5/5)  -failed s/s and MBS. Recommended NPO. Palliative consulted: family decided on pleasure feed. Will touch base with CM/hospice team regarding home hospice

## 2020-01-01 NOTE — PROGRESS NOTE ADULT - PROBLEM SELECTOR PLAN 2
likely 2/2 decreased PO intake.   - c/w D5 @50cc/hr for now. Given patient is now on pleasure feed, will assess whether or not fluid is needed tomorrow

## 2020-01-01 NOTE — PROGRESS NOTE ADULT - ATTENDING COMMENTS
Pt seen and examined. Care discussed with resident.  Continue with pleasure feeds, IVF for now, and hospice eval for home hospice. Complete abx for aspiration pna today day 5.

## 2020-01-01 NOTE — PROGRESS NOTE ADULT - PROBLEM SELECTOR PLAN 4
hx of anemia requiring 1 unit of prbc during this hospital stay  - monitor H/H daily, transfuse for Hb<7

## 2020-01-01 NOTE — PROGRESS NOTE ADULT - SUBJECTIVE AND OBJECTIVE BOX
PROGRESS NOTE:     CONTACT INFO:   Rafael Arias (Xiao)   NS: [phone]    Patient is a 89y old  Male who presents with a chief complaint of AMS and lethargy (31 Dec 2019 15:41)      SUBJECTIVE / OVERNIGHT EVENTS: no acute event overnight. Started on dysphagia 1 pureed-honey consistency fluid, tolerated it fine per nursing.     ADDITIONAL REVIEW OF SYSTEMS: This am, denies CP, SOB, abd pain.     MEDICATIONS  (STANDING):  chlorhexidine 4% Liquid 1 Application(s) Topical daily  dextrose 5%. 1000 milliLiter(s) (30 mL/Hr) IV Continuous <Continuous>  levETIRAcetam  IVPB 250 milliGRAM(s) IV Intermittent every 12 hours  levothyroxine Injectable 50 MICROGram(s) IV Push at bedtime  meropenem  IVPB 500 milliGRAM(s) IV Intermittent every 12 hours    MEDICATIONS  (PRN):  valproate sodium IVPB 125 milliGRAM(s) IV Intermittent every 6 hours PRN Agitation      CAPILLARY BLOOD GLUCOSE        I&O's Summary    31 Dec 2019 07:01  -  01 Jan 2020 07:00  --------------------------------------------------------  IN: 770 mL / OUT: 750 mL / NET: 20 mL        PHYSICAL EXAM:  Vital Signs Last 24 Hrs  T(C): 36.3 (01 Jan 2020 00:12), Max: 36.3 (01 Jan 2020 00:12)  T(F): 97.3 (01 Jan 2020 00:12), Max: 97.3 (01 Jan 2020 00:12)  HR: 56 (01 Jan 2020 00:12) (48 - 56)  BP: 104/73 (01 Jan 2020 00:12) (104/73 - 130/61)  BP(mean): --  RR: 18 (01 Jan 2020 00:12) (18 - 18)  SpO2: 97% (01 Jan 2020 00:12) (94% - 97%)    CONSTITUTIONAL: NAD, resting in bed  RESPIRATORY: minimal respiratory effort; lungs are clear to auscultation bilaterally  CARDIOVASCULAR: Regular rate and rhythm, normal S1 and S2, systolic murmur; 1+ lower extremity edema  ABDOMEN: Nontender to palpation, normoactive bowel sounds, no rebound/guarding  MUSCLOSKELETAL: no clubbing or cyanosis of digits; no joint swelling or tenderness to palpation  Psych: Arousable to voice, not oriented    LABS:                        7.9    6.16  )-----------( 150      ( 31 Dec 2019 09:26 )             27.1     01-01    146<H>  |  114<H>  |  59<H>  ----------------------------<  86  4.0   |  18<L>  |  2.56<H>    Ca    9.2      01 Jan 2020 06:09  Phos  3.3     01-01  Mg     2.3     01-01    TPro  6.8  /  Alb  3.1<L>  /  TBili  1.4<H>  /  DBili  x   /  AST  13  /  ALT  7<L>  /  AlkPhos  68  01-01                RADIOLOGY & ADDITIONAL TESTS:  Results Reviewed:   Imaging Personally Reviewed:  Electrocardiogram Personally Reviewed:    COORDINATION OF CARE:  Care Discussed with Consultants/Other Providers [Y/N]:  Prior or Outpatient Records Reviewed [Y/N]: PROGRESS NOTE:     CONTACT INFO:   Rafael Arias (Xiao)   NS: [phone]    Patient is a 89y old  Male who presents with a chief complaint of AMS and lethargy (31 Dec 2019 15:41)      SUBJECTIVE / OVERNIGHT EVENTS: no acute event overnight. Started on dysphagia 1 pureed-honey consistency fluid, tolerated it fine per nursing.     ADDITIONAL REVIEW OF SYSTEMS: This am, denies CP, SOB, abd pain.     MEDICATIONS  (STANDING):  chlorhexidine 4% Liquid 1 Application(s) Topical daily  dextrose 5%. 1000 milliLiter(s) (30 mL/Hr) IV Continuous <Continuous>  levETIRAcetam  IVPB 250 milliGRAM(s) IV Intermittent every 12 hours  levothyroxine Injectable 50 MICROGram(s) IV Push at bedtime  meropenem  IVPB 500 milliGRAM(s) IV Intermittent every 12 hours    MEDICATIONS  (PRN):  valproate sodium IVPB 125 milliGRAM(s) IV Intermittent every 6 hours PRN Agitation      CAPILLARY BLOOD GLUCOSE        I&O's Summary    31 Dec 2019 07:01  -  01 Jan 2020 07:00  --------------------------------------------------------  IN: 770 mL / OUT: 750 mL / NET: 20 mL        PHYSICAL EXAM:  Vital Signs Last 24 Hrs  T(C): 36.3 (01 Jan 2020 00:12), Max: 36.3 (01 Jan 2020 00:12)  T(F): 97.3 (01 Jan 2020 00:12), Max: 97.3 (01 Jan 2020 00:12)  HR: 56 (01 Jan 2020 00:12) (48 - 56)  BP: 104/73 (01 Jan 2020 00:12) (104/73 - 130/61)  BP(mean): --  RR: 18 (01 Jan 2020 00:12) (18 - 18)  SpO2: 97% (01 Jan 2020 00:12) (94% - 97%)    CONSTITUTIONAL: NAD, resting in bed  RESPIRATORY: minimal respiratory effort; lungs are clear to auscultation bilaterally  CARDIOVASCULAR: Regular rate and rhythm, normal S1 and S2, systolic murmur; 1+ lower extremity edema  ABDOMEN: Nontender to palpation, normoactive bowel sounds, no rebound/guarding  MUSCLOSKELETAL: no clubbing or cyanosis of digits; no joint swelling or tenderness to palpation  Psych: Arousable to voice, not oriented    LABS:                        7.9    6.16  )-----------( 150      ( 31 Dec 2019 09:26 )             27.1     01-01    146<H>  |  114<H>  |  59<H>  ----------------------------<  86  4.0   |  18<L>  |  2.56<H>    Ca    9.2      01 Jan 2020 06:09  Phos  3.3     01-01  Mg     2.3     01-01    TPro  6.8  /  Alb  3.1<L>  /  TBili  1.4<H>  /  DBili  x   /  AST  13  /  ALT  7<L>  /  AlkPhos  68  01-01                RADIOLOGY & ADDITIONAL TESTS:  Results Reviewed:   Imaging Personally Reviewed:  Electrocardiogram Personally Reviewed: repeat EKG today unchanged from prior.     COORDINATION OF CARE:  Care Discussed with Consultants/Other Providers [Y/N]:  Prior or Outpatient Records Reviewed [Y/N]: PROGRESS NOTE:     CONTACT INFO:   Rafael Arias (Xiao)   NS: [phone]    Patient is a 89y old  Male who presents with a chief complaint of AMS and lethargy (31 Dec 2019 15:41)      SUBJECTIVE / OVERNIGHT EVENTS: no acute event overnight. Started on dysphagia 1 pureed-honey consistency fluid, tolerated it fine per nursing.     ADDITIONAL REVIEW OF SYSTEMS: This am, denies CP, SOB, abd pain.     MEDICATIONS  (STANDING):  chlorhexidine 4% Liquid 1 Application(s) Topical daily  dextrose 5%. 1000 milliLiter(s) (30 mL/Hr) IV Continuous <Continuous>  levETIRAcetam  IVPB 250 milliGRAM(s) IV Intermittent every 12 hours  levothyroxine Injectable 50 MICROGram(s) IV Push at bedtime  meropenem  IVPB 500 milliGRAM(s) IV Intermittent every 12 hours    MEDICATIONS  (PRN):  valproate sodium IVPB 125 milliGRAM(s) IV Intermittent every 6 hours PRN Agitation      CAPILLARY BLOOD GLUCOSE        I&O's Summary    31 Dec 2019 07:01  -  01 Jan 2020 07:00  --------------------------------------------------------  IN: 770 mL / OUT: 750 mL / NET: 20 mL        PHYSICAL EXAM:  Vital Signs Last 24 Hrs  T(C): 36.3 (01 Jan 2020 00:12), Max: 36.3 (01 Jan 2020 00:12)  T(F): 97.3 (01 Jan 2020 00:12), Max: 97.3 (01 Jan 2020 00:12)  HR: 56 (01 Jan 2020 00:12) (48 - 56)  BP: 104/73 (01 Jan 2020 00:12) (104/73 - 130/61)  BP(mean): --  RR: 18 (01 Jan 2020 00:12) (18 - 18)  SpO2: 97% (01 Jan 2020 00:12) (94% - 97%)    CONSTITUTIONAL: NAD, resting in bed  RESPIRATORY: minimal respiratory effort; lungs are clear to auscultation bilaterally  CARDIOVASCULAR: Regular rate and rhythm, normal S1 and S2, systolic murmur; 1+ lower extremity edema  ABDOMEN: Nontender to palpation, normoactive bowel sounds, no rebound/guarding  MUSCLOSKELETAL: no clubbing or cyanosis of digits; no joint swelling or tenderness to palpation  Psych: Arousable to voice, not oriented    LABS:                        7.9    6.16  )-----------( 150      ( 31 Dec 2019 09:26 )             27.1     01-01    146<H>  |  114<H>  |  59<H>  ----------------------------<  86  4.0   |  18<L>  |  2.56<H>    Ca    9.2      01 Jan 2020 06:09  Phos  3.3     01-01  Mg     2.3     01-01    TPro  6.8  /  Alb  3.1<L>  /  TBili  1.4<H>  /  DBili  x   /  AST  13  /  ALT  7<L>  /  AlkPhos  68  01-01                RADIOLOGY & ADDITIONAL TESTS:  Electrocardiogram Personally Reviewed: repeat EKG today unchanged from prior.     COORDINATION OF CARE:  Care Discussed with Consultants/Other Providers [Y/N]:  Prior or Outpatient Records Reviewed [Y/N]:

## 2020-01-02 DIAGNOSIS — R53.2 FUNCTIONAL QUADRIPLEGIA: ICD-10-CM

## 2020-01-02 DIAGNOSIS — J69.0 PNEUMONITIS DUE TO INHALATION OF FOOD AND VOMIT: ICD-10-CM

## 2020-01-02 DIAGNOSIS — F03.90 UNSPECIFIED DEMENTIA WITHOUT BEHAVIORAL DISTURBANCE: ICD-10-CM

## 2020-01-02 DIAGNOSIS — Z51.5 ENCOUNTER FOR PALLIATIVE CARE: ICD-10-CM

## 2020-01-02 LAB
ALBUMIN SERPL ELPH-MCNC: 3.1 G/DL — LOW (ref 3.3–5)
ALP SERPL-CCNC: 70 U/L — SIGNIFICANT CHANGE UP (ref 40–120)
ALT FLD-CCNC: <5 U/L — LOW (ref 10–45)
ANION GAP SERPL CALC-SCNC: 11 MMOL/L — SIGNIFICANT CHANGE UP (ref 5–17)
AST SERPL-CCNC: 14 U/L — SIGNIFICANT CHANGE UP (ref 10–40)
BASOPHILS # BLD AUTO: 0.06 K/UL — SIGNIFICANT CHANGE UP (ref 0–0.2)
BASOPHILS NFR BLD AUTO: 0.9 % — SIGNIFICANT CHANGE UP (ref 0–2)
BILIRUB SERPL-MCNC: 1.4 MG/DL — HIGH (ref 0.2–1.2)
BUN SERPL-MCNC: 55 MG/DL — HIGH (ref 7–23)
CALCIUM SERPL-MCNC: 9.2 MG/DL — SIGNIFICANT CHANGE UP (ref 8.4–10.5)
CHLORIDE SERPL-SCNC: 114 MMOL/L — HIGH (ref 96–108)
CO2 SERPL-SCNC: 21 MMOL/L — LOW (ref 22–31)
CREAT SERPL-MCNC: 2.37 MG/DL — HIGH (ref 0.5–1.3)
EOSINOPHIL # BLD AUTO: 0.24 K/UL — SIGNIFICANT CHANGE UP (ref 0–0.5)
EOSINOPHIL NFR BLD AUTO: 3.5 % — SIGNIFICANT CHANGE UP (ref 0–6)
GLUCOSE SERPL-MCNC: 89 MG/DL — SIGNIFICANT CHANGE UP (ref 70–99)
HCT VFR BLD CALC: 26.8 % — LOW (ref 39–50)
HGB BLD-MCNC: 7.9 G/DL — LOW (ref 13–17)
IMM GRANULOCYTES NFR BLD AUTO: 0.4 % — SIGNIFICANT CHANGE UP (ref 0–1.5)
LYMPHOCYTES # BLD AUTO: 1.31 K/UL — SIGNIFICANT CHANGE UP (ref 1–3.3)
LYMPHOCYTES # BLD AUTO: 19.3 % — SIGNIFICANT CHANGE UP (ref 13–44)
MAGNESIUM SERPL-MCNC: 2.3 MG/DL — SIGNIFICANT CHANGE UP (ref 1.6–2.6)
MCHC RBC-ENTMCNC: 26.2 PG — LOW (ref 27–34)
MCHC RBC-ENTMCNC: 29.5 GM/DL — LOW (ref 32–36)
MCV RBC AUTO: 88.7 FL — SIGNIFICANT CHANGE UP (ref 80–100)
MONOCYTES # BLD AUTO: 0.75 K/UL — SIGNIFICANT CHANGE UP (ref 0–0.9)
MONOCYTES NFR BLD AUTO: 11 % — SIGNIFICANT CHANGE UP (ref 2–14)
NEUTROPHILS # BLD AUTO: 4.41 K/UL — SIGNIFICANT CHANGE UP (ref 1.8–7.4)
NEUTROPHILS NFR BLD AUTO: 64.9 % — SIGNIFICANT CHANGE UP (ref 43–77)
PHOSPHATE SERPL-MCNC: 3 MG/DL — SIGNIFICANT CHANGE UP (ref 2.5–4.5)
PLATELET # BLD AUTO: 151 K/UL — SIGNIFICANT CHANGE UP (ref 150–400)
POTASSIUM SERPL-MCNC: 4.3 MMOL/L — SIGNIFICANT CHANGE UP (ref 3.5–5.3)
POTASSIUM SERPL-SCNC: 4.3 MMOL/L — SIGNIFICANT CHANGE UP (ref 3.5–5.3)
PROT SERPL-MCNC: 7 G/DL — SIGNIFICANT CHANGE UP (ref 6–8.3)
RBC # BLD: 3.02 M/UL — LOW (ref 4.2–5.8)
RBC # FLD: 15.8 % — HIGH (ref 10.3–14.5)
SODIUM SERPL-SCNC: 146 MMOL/L — HIGH (ref 135–145)
WBC # BLD: 6.8 K/UL — SIGNIFICANT CHANGE UP (ref 3.8–10.5)
WBC # FLD AUTO: 6.8 K/UL — SIGNIFICANT CHANGE UP (ref 3.8–10.5)

## 2020-01-02 PROCEDURE — 99232 SBSQ HOSP IP/OBS MODERATE 35: CPT | Mod: GC

## 2020-01-02 RX ADMIN — LEVETIRACETAM 400 MILLIGRAM(S): 250 TABLET, FILM COATED ORAL at 05:19

## 2020-01-02 RX ADMIN — LEVETIRACETAM 400 MILLIGRAM(S): 250 TABLET, FILM COATED ORAL at 18:11

## 2020-01-02 RX ADMIN — HEPARIN SODIUM 5000 UNIT(S): 5000 INJECTION INTRAVENOUS; SUBCUTANEOUS at 05:22

## 2020-01-02 RX ADMIN — HEPARIN SODIUM 5000 UNIT(S): 5000 INJECTION INTRAVENOUS; SUBCUTANEOUS at 18:11

## 2020-01-02 RX ADMIN — MEROPENEM 100 MILLIGRAM(S): 1 INJECTION INTRAVENOUS at 05:19

## 2020-01-02 RX ADMIN — CHLORHEXIDINE GLUCONATE 1 APPLICATION(S): 213 SOLUTION TOPICAL at 11:26

## 2020-01-02 RX ADMIN — Medication 50 MICROGRAM(S): at 22:09

## 2020-01-02 RX ADMIN — MEROPENEM 100 MILLIGRAM(S): 1 INJECTION INTRAVENOUS at 18:10

## 2020-01-02 RX ADMIN — SODIUM CHLORIDE 50 MILLILITER(S): 9 INJECTION, SOLUTION INTRAVENOUS at 00:51

## 2020-01-02 NOTE — CHART NOTE - NSCHARTNOTEFT_GEN_A_CORE
Nutrition Follow Up Note  Patient seen for: Malnutrition follow up. Pt is a 89 year old M with PMH including advanced dementia, dysphagia admitted with increased lethargy, somnolence, and decreased appetite found to have UTI and possible aspiration pneumonia, S/P MBS  recommended NPO, family wishes to pursue pleasure feeds, no tube feeding at this time-palliative following for GOC.     Source: Aide at bedside    Diet : Dysphagia 1 diet with honey consistency liquids    Patient reports: Per aide no N+V or signs of coughing with meals thus far, last BM noted      PO intake : Pt had previously been NPO for ~6 days, diet advanced to pleasure feeds dysphagia 1 with honey consistency liquids , per aide pt has been tolerating diet texture well thus far and has been enjoying thickened juices. Pt ate 50% pureed french toast today for breakfast.           Daily Weight in k.8 (-) no new wt to trend  % Weight Change    Pertinent Medications: MEDICATIONS  (STANDING):  chlorhexidine 4% Liquid 1 Application(s) Topical daily  dextrose 5%. 1000 milliLiter(s) (50 mL/Hr) IV Continuous <Continuous>  heparin  Injectable 5000 Unit(s) SubCutaneous every 12 hours  levETIRAcetam  IVPB 250 milliGRAM(s) IV Intermittent every 12 hours  levothyroxine Injectable 50 MICROGram(s) IV Push at bedtime  meropenem  IVPB 500 milliGRAM(s) IV Intermittent every 12 hours    MEDICATIONS  (PRN):  valproate sodium IVPB 125 milliGRAM(s) IV Intermittent every 6 hours PRN Agitation    Pertinent Labs:  @ 07:07: Na 146<H>, BUN 55<H>, Cr 2.37<H>, BG 89, K+ 4.3, Phos 3.0, Mg 2.3, Alk Phos 70, ALT/SGPT <5<L>, AST/SGOT 14, HbA1c --    Finger Sticks: none to address      Skin per nursing documentation: noted with stage 1 pressure injury to sacral spine  Edema:1+ partha ankle edema    Estimated Needs:   [ x] no change since previous assessment  [ ] recalculated:     Previous Nutrition Diagnosis: Severe malnutrition   Nutrition Diagnosis is: ongoing, nutrition care plan in progress    New Nutrition Diagnosis:  Related to:    As evidenced by:      Interventions:     Recommend  1) Diet texture deferred to medical team and pt/family wishes-currently dysphagia 1 diet with honey consistency liquids, consider adding ensure enlive 2 daily (350 Kcal, 20g protein per 8 oz serving) to further optimize po intake and wound healing-stage 1 Pressure injury noted-discussed with medical team  2) Continue to encourage po intake, provide feeding assistance as needed and obtain/honor food preferences as able  Monitoring and Evaluation:     Continue to monitor Nutritional intake, Tolerance to diet prescription, weights, labs, skin integrity    RD remains available upon request and will follow up per protocol

## 2020-01-02 NOTE — PROGRESS NOTE ADULT - ASSESSMENT
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

## 2020-01-02 NOTE — PROGRESS NOTE ADULT - PROBLEM SELECTOR PLAN 4
hx of anemia requiring 1 unit of prbc during this hospital stay  - monitor H/H daily, transfuse for Hb<7 hx of anemia requiring 1 unit of prbc during this hospital stay--stable  - monitor H/H daily, transfuse for Hb<7

## 2020-01-02 NOTE — PROGRESS NOTE ADULT - PROBLEM SELECTOR PLAN 8
c/w home synthroid, but IV formulation c/w home synthroid, but IV formulation  -TSH 2.0 wnl this admission

## 2020-01-02 NOTE — PROGRESS NOTE ADULT - SUBJECTIVE AND OBJECTIVE BOX
PROGRESS NOTE:     CONTACT INFO:   Rafael Arias (Xiao)   NS: [phone]    Patient is a 89y old  Male who presents with a chief complaint of AMS and lethargy (01 Jan 2020 08:26)      SUBJECTIVE / OVERNIGHT EVENTS: no acute event ON.    ADDITIONAL REVIEW OF SYSTEMS: denies CP, abd pain, SOB, fever, chills.    MEDICATIONS  (STANDING):  chlorhexidine 4% Liquid 1 Application(s) Topical daily  dextrose 5%. 1000 milliLiter(s) (50 mL/Hr) IV Continuous <Continuous>  heparin  Injectable 5000 Unit(s) SubCutaneous every 12 hours  levETIRAcetam  IVPB 250 milliGRAM(s) IV Intermittent every 12 hours  levothyroxine Injectable 50 MICROGram(s) IV Push at bedtime  meropenem  IVPB 500 milliGRAM(s) IV Intermittent every 12 hours    MEDICATIONS  (PRN):  valproate sodium IVPB 125 milliGRAM(s) IV Intermittent every 6 hours PRN Agitation      CAPILLARY BLOOD GLUCOSE        I&O's Summary    01 Jan 2020 07:01  -  02 Jan 2020 07:00  --------------------------------------------------------  IN: 660 mL / OUT: 800 mL / NET: -140 mL        PHYSICAL EXAM:  Vital Signs Last 24 Hrs  T(C): 36.4 (02 Jan 2020 00:20), Max: 36.4 (02 Jan 2020 00:20)  T(F): 97.5 (02 Jan 2020 00:20), Max: 97.5 (02 Jan 2020 00:20)  HR: 88 (02 Jan 2020 00:20) (49 - 88)  BP: 133/85 (02 Jan 2020 00:20) (124/60 - 145/55)  BP(mean): --  RR: 18 (02 Jan 2020 00:20) (18 - 18)  SpO2: 100% (02 Jan 2020 00:20) (92% - 100%)    CONSTITUTIONAL: NAD, well-developed  RESPIRATORY: Normal respiratory effort; lungs are clear to auscultation bilaterally  CARDIOVASCULAR: Regular rate and rhythm, normal S1 and S2, no murmur/rub/gallop; No lower extremity edema; Peripheral pulses are 2+ bilaterally  ABDOMEN: Nontender to palpation, normoactive bowel sounds, no rebound/guarding; No hepatosplenomegaly  MUSCLOSKELETAL: no clubbing or cyanosis of digits; no joint swelling or tenderness to palpation  PSYCH: A+O to person, place, and time; affect appropriate    LABS:                        8.3    6.45  )-----------( 150      ( 01 Jan 2020 08:22 )             28.9     01-02    146<H>  |  114<H>  |  55<H>  ----------------------------<  89  4.3   |  21<L>  |  2.37<H>    Ca    9.2      02 Jan 2020 07:07  Phos  3.0     01-02  Mg     2.3     01-02    TPro  7.0  /  Alb  3.1<L>  /  TBili  1.4<H>  /  DBili  x   /  AST  14  /  ALT  <5<L>  /  AlkPhos  70  01-02                RADIOLOGY & ADDITIONAL TESTS:  Results Reviewed:   Imaging Personally Reviewed:  Electrocardiogram Personally Reviewed:    COORDINATION OF CARE:  Care Discussed with Consultants/Other Providers [Y/N]:  Prior or Outpatient Records Reviewed [Y/N]: PROGRESS NOTE:     CONTACT INFO:   Rafael Arias (Xiao)   NS: [phone]    Patient is a 89y old  Male who presents with a chief complaint of AMS and lethargy (01 Jan 2020 08:26)      SUBJECTIVE / OVERNIGHT EVENTS: no acute event ON.    ADDITIONAL REVIEW OF SYSTEMS: denies CP, abd pain, SOB, fever, chills. Tolerating pleasure feed okay.    MEDICATIONS  (STANDING):  chlorhexidine 4% Liquid 1 Application(s) Topical daily  dextrose 5%. 1000 milliLiter(s) (50 mL/Hr) IV Continuous <Continuous>  heparin  Injectable 5000 Unit(s) SubCutaneous every 12 hours  levETIRAcetam  IVPB 250 milliGRAM(s) IV Intermittent every 12 hours  levothyroxine Injectable 50 MICROGram(s) IV Push at bedtime  meropenem  IVPB 500 milliGRAM(s) IV Intermittent every 12 hours    MEDICATIONS  (PRN):  valproate sodium IVPB 125 milliGRAM(s) IV Intermittent every 6 hours PRN Agitation      CAPILLARY BLOOD GLUCOSE        I&O's Summary    01 Jan 2020 07:01  -  02 Jan 2020 07:00  --------------------------------------------------------  IN: 660 mL / OUT: 800 mL / NET: -140 mL        PHYSICAL EXAM:  Vital Signs Last 24 Hrs  T(C): 36.4 (02 Jan 2020 00:20), Max: 36.4 (02 Jan 2020 00:20)  T(F): 97.5 (02 Jan 2020 00:20), Max: 97.5 (02 Jan 2020 00:20)  HR: 88 (02 Jan 2020 00:20) (49 - 88)  BP: 133/85 (02 Jan 2020 00:20) (124/60 - 145/55)  BP(mean): --  RR: 18 (02 Jan 2020 00:20) (18 - 18)  SpO2: 100% (02 Jan 2020 00:20) (92% - 100%)    CONSTITUTIONAL: NAD, resting in bed  RESPIRATORY: minimal respiratory effort; lungs are clear to auscultation bilaterally  CARDIOVASCULAR: Regular rate and rhythm, normal S1 and S2, systolic murmur; 1+ lower extremity edema  ABDOMEN: Nontender to palpation, normoactive bowel sounds, no rebound/guarding  MUSCLOSKELETAL: no clubbing or cyanosis of digits; no joint swelling or tenderness to palpation  Psych: Arousable to voice, not oriented    LABS:                        8.3    6.45  )-----------( 150      ( 01 Jan 2020 08:22 )             28.9     01-02    146<H>  |  114<H>  |  55<H>  ----------------------------<  89  4.3   |  21<L>  |  2.37<H>    Ca    9.2      02 Jan 2020 07:07  Phos  3.0     01-02  Mg     2.3     01-02    TPro  7.0  /  Alb  3.1<L>  /  TBili  1.4<H>  /  DBili  x   /  AST  14  /  ALT  <5<L>  /  AlkPhos  70  01-02                RADIOLOGY & ADDITIONAL TESTS:  Results Reviewed:   Imaging Personally Reviewed:  Electrocardiogram Personally Reviewed:    COORDINATION OF CARE:  Care Discussed with Consultants/Other Providers [Y/N]:  Prior or Outpatient Records Reviewed [Y/N]:

## 2020-01-02 NOTE — PROGRESS NOTE ADULT - PROBLEM SELECTOR PLAN 3
Suspected 2/2 dysphagia. CXR appears to show worsening RLL opacity, as compared to 12/20. Patient receiving pleasure feeds at home and now on pleasure feeds again (12/31)  - c/w meropenem (day 5/5)  -failed s/s and MBS. Recommended NPO. Palliative consulted: family decided on pleasure feed. Will touch base with CM/hospice team regarding home hospice Suspected 2/2 dysphagia. CXR appears to show worsening RLL opacity, as compared to 12/20. Patient receiving pleasure feeds at home and now on pleasure feeds again (12/31)  - s/p 5-day course meropenem   -failed s/s and MBS. Recommended NPO. Palliative consulted: family decided on pleasure feed. Hospice team will see patient tomorrow - s/p 5-day course meropenem   -failed s/s and MBS. Recommended NPO. Palliative consulted: family decided on pleasure feed. Hospice team will see patient tomorrow

## 2020-01-02 NOTE — PROGRESS NOTE ADULT - PROBLEM SELECTOR PLAN 5
Likely 2/2 hypovolemia now improving. Baseline SCr ~2.2  - c/w fluid  - will continue to monitor SCr on AM labs Likely 2/2 hypovolemia now improving. Baseline SCr ~2.2--improving  - c/w fluid  - will continue to monitor SCr on AM labs

## 2020-01-02 NOTE — PROGRESS NOTE ADULT - PROBLEM SELECTOR PLAN 2
likely 2/2 decreased PO intake.   - c/w D5 @50cc/hr for now. Given patient is now on pleasure feed, will assess whether or not fluid is needed tomorrow likely 2/2 decreased PO intake--improving  - c/w D5 @50cc/hr for now. Added ensure enlive x2 daily per nutrition. Will assess for IVF tomorrow likely 2/2 decreased PO intake--improving  - c/w D5 @50cc/hr for now. Added ensure enlive x2 daily per nutrition.

## 2020-01-02 NOTE — PROGRESS NOTE ADULT - PROBLEM SELECTOR PLAN 10
Code Status: DNR, MOLST in chart  DVT ppx: restart heparin subq q12 today as hgb stable  Diet: dysphagia diet w/ honey consistency for pleasure feed    Transitions of Care Status:  1.  Name of PCP: Dr. Isaias Rocha  2.  PCP Contacted on Admission: [ ] Y    [X] N    3.  PCP contacted at Discharge: [ ] Y    [ ] N    [ ] N/A  4.  Post-Discharge Appointment Date and Location:  5.  Summary of Handoff given to PCP: Code Status: DNR, MOLST in chart  DVT ppx: heparin subq q12  Diet: dysphagia diet w/ honey consistency for pleasure feed and ensure enlive x2 daily    Transitions of Care Status:  1.  Name of PCP: Dr. Isaias Rocha  2.  PCP Contacted on Admission: [ ] Y    [X] N    3.  PCP contacted at Discharge: [ ] Y    [ ] N    [ ] N/A  4.  Post-Discharge Appointment Date and Location:  5.  Summary of Handoff given to PCP:

## 2020-01-03 LAB
ALBUMIN SERPL ELPH-MCNC: 2.9 G/DL — LOW (ref 3.3–5)
ALP SERPL-CCNC: 72 U/L — SIGNIFICANT CHANGE UP (ref 40–120)
ALT FLD-CCNC: 6 U/L — LOW (ref 10–45)
ANION GAP SERPL CALC-SCNC: 11 MMOL/L — SIGNIFICANT CHANGE UP (ref 5–17)
AST SERPL-CCNC: 17 U/L — SIGNIFICANT CHANGE UP (ref 10–40)
BASOPHILS # BLD AUTO: 0.07 K/UL — SIGNIFICANT CHANGE UP (ref 0–0.2)
BASOPHILS NFR BLD AUTO: 1 % — SIGNIFICANT CHANGE UP (ref 0–2)
BILIRUB SERPL-MCNC: 1.2 MG/DL — SIGNIFICANT CHANGE UP (ref 0.2–1.2)
BUN SERPL-MCNC: 51 MG/DL — HIGH (ref 7–23)
CALCIUM SERPL-MCNC: 9.1 MG/DL — SIGNIFICANT CHANGE UP (ref 8.4–10.5)
CHLORIDE SERPL-SCNC: 112 MMOL/L — HIGH (ref 96–108)
CO2 SERPL-SCNC: 19 MMOL/L — LOW (ref 22–31)
CREAT SERPL-MCNC: 2.32 MG/DL — HIGH (ref 0.5–1.3)
EOSINOPHIL # BLD AUTO: 0.24 K/UL — SIGNIFICANT CHANGE UP (ref 0–0.5)
EOSINOPHIL NFR BLD AUTO: 3.5 % — SIGNIFICANT CHANGE UP (ref 0–6)
GLUCOSE SERPL-MCNC: 91 MG/DL — SIGNIFICANT CHANGE UP (ref 70–99)
HCT VFR BLD CALC: 27.2 % — LOW (ref 39–50)
HGB BLD-MCNC: 8.1 G/DL — LOW (ref 13–17)
IMM GRANULOCYTES NFR BLD AUTO: 0.6 % — SIGNIFICANT CHANGE UP (ref 0–1.5)
LYMPHOCYTES # BLD AUTO: 1.1 K/UL — SIGNIFICANT CHANGE UP (ref 1–3.3)
LYMPHOCYTES # BLD AUTO: 16 % — SIGNIFICANT CHANGE UP (ref 13–44)
MAGNESIUM SERPL-MCNC: 2.2 MG/DL — SIGNIFICANT CHANGE UP (ref 1.6–2.6)
MCHC RBC-ENTMCNC: 26.2 PG — LOW (ref 27–34)
MCHC RBC-ENTMCNC: 29.8 GM/DL — LOW (ref 32–36)
MCV RBC AUTO: 88 FL — SIGNIFICANT CHANGE UP (ref 80–100)
MONOCYTES # BLD AUTO: 0.56 K/UL — SIGNIFICANT CHANGE UP (ref 0–0.9)
MONOCYTES NFR BLD AUTO: 8.2 % — SIGNIFICANT CHANGE UP (ref 2–14)
NEUTROPHILS # BLD AUTO: 4.86 K/UL — SIGNIFICANT CHANGE UP (ref 1.8–7.4)
NEUTROPHILS NFR BLD AUTO: 70.7 % — SIGNIFICANT CHANGE UP (ref 43–77)
PHOSPHATE SERPL-MCNC: 3.1 MG/DL — SIGNIFICANT CHANGE UP (ref 2.5–4.5)
PLATELET # BLD AUTO: 145 K/UL — LOW (ref 150–400)
POTASSIUM SERPL-MCNC: 4.3 MMOL/L — SIGNIFICANT CHANGE UP (ref 3.5–5.3)
POTASSIUM SERPL-SCNC: 4.3 MMOL/L — SIGNIFICANT CHANGE UP (ref 3.5–5.3)
PROT SERPL-MCNC: 6.6 G/DL — SIGNIFICANT CHANGE UP (ref 6–8.3)
RBC # BLD: 3.09 M/UL — LOW (ref 4.2–5.8)
RBC # FLD: 15.9 % — HIGH (ref 10.3–14.5)
SODIUM SERPL-SCNC: 142 MMOL/L — SIGNIFICANT CHANGE UP (ref 135–145)
WBC # BLD: 6.87 K/UL — SIGNIFICANT CHANGE UP (ref 3.8–10.5)
WBC # FLD AUTO: 6.87 K/UL — SIGNIFICANT CHANGE UP (ref 3.8–10.5)

## 2020-01-03 PROCEDURE — 99232 SBSQ HOSP IP/OBS MODERATE 35: CPT | Mod: GC

## 2020-01-03 RX ADMIN — LEVETIRACETAM 400 MILLIGRAM(S): 250 TABLET, FILM COATED ORAL at 05:42

## 2020-01-03 RX ADMIN — CHLORHEXIDINE GLUCONATE 1 APPLICATION(S): 213 SOLUTION TOPICAL at 14:06

## 2020-01-03 RX ADMIN — HEPARIN SODIUM 5000 UNIT(S): 5000 INJECTION INTRAVENOUS; SUBCUTANEOUS at 05:41

## 2020-01-03 RX ADMIN — HEPARIN SODIUM 5000 UNIT(S): 5000 INJECTION INTRAVENOUS; SUBCUTANEOUS at 18:26

## 2020-01-03 RX ADMIN — LEVETIRACETAM 400 MILLIGRAM(S): 250 TABLET, FILM COATED ORAL at 18:26

## 2020-01-03 RX ADMIN — Medication 50 MICROGRAM(S): at 23:41

## 2020-01-03 NOTE — PROGRESS NOTE ADULT - ATTENDING COMMENTS
Pt seen and examined and I agree with above assessment by the resident  Aspiration pna s/p antibiotics- resolved  Significant dysphagia- GOC by family for pleasure feeds with understanding of risk of repeat aspiration/pna/death  hospice evaluation today for dispo planning

## 2020-01-03 NOTE — PROGRESS NOTE ADULT - PROBLEM SELECTOR PLAN 10
Code Status: DNR, MOLST in chart  DVT ppx: heparin subq q12  Diet: dysphagia diet w/ honey consistency for pleasure feed and ensure enlive x2 daily    Transitions of Care Status:  1.  Name of PCP: Dr. Isaias Rocha  2.  PCP Contacted on Admission: [ ] Y    [X] N    3.  PCP contacted at Discharge: [ ] Y    [ ] N    [ ] N/A  4.  Post-Discharge Appointment Date and Location:  5.  Summary of Handoff given to PCP:

## 2020-01-03 NOTE — PROGRESS NOTE ADULT - ASSESSMENT
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

## 2020-01-03 NOTE — PROGRESS NOTE ADULT - PROBLEM SELECTOR PLAN 5
Likely 2/2 hypovolemia now improving. Baseline SCr ~2.2--improving  - c/w fluid  - will continue to monitor SCr on AM labs

## 2020-01-03 NOTE — GOALS OF CARE CONVERSATION - ADVANCED CARE PLANNING - CONVERSATION DETAILS
Met with [facility] patient's daughter. Hospice services presented. Receptive to services. Consents signed. Daughter requesting DC home on Sunday 1/5/20. DME ordered. Approval obtained. Discussed same with CM who inform writer that Ambulance pickup will be at 2pm on Sunday 1/5/20.

## 2020-01-03 NOTE — PROGRESS NOTE ADULT - SUBJECTIVE AND OBJECTIVE BOX
PROGRESS NOTE:     CONTACT INFO:   Rafael Arias (Xiao)   NS: [phone]    Patient is a 89y old  Male who presents with a chief complaint of AMS and lethargy (02 Jan 2020 08:22)      SUBJECTIVE / OVERNIGHT EVENTS:    ADDITIONAL REVIEW OF SYSTEMS:    MEDICATIONS  (STANDING):  chlorhexidine 4% Liquid 1 Application(s) Topical daily  dextrose 5%. 1000 milliLiter(s) (50 mL/Hr) IV Continuous <Continuous>  heparin  Injectable 5000 Unit(s) SubCutaneous every 12 hours  levETIRAcetam  IVPB 250 milliGRAM(s) IV Intermittent every 12 hours  levothyroxine Injectable 50 MICROGram(s) IV Push at bedtime    MEDICATIONS  (PRN):  valproate sodium IVPB 125 milliGRAM(s) IV Intermittent every 6 hours PRN Agitation      CAPILLARY BLOOD GLUCOSE        I&O's Summary    02 Jan 2020 07:01  -  03 Jan 2020 07:00  --------------------------------------------------------  IN: 2340 mL / OUT: 1000 mL / NET: 1340 mL        PHYSICAL EXAM:  Vital Signs Last 24 Hrs  T(C): 36.6 (02 Jan 2020 22:42), Max: 36.6 (02 Jan 2020 22:42)  T(F): 97.8 (02 Jan 2020 22:42), Max: 97.8 (02 Jan 2020 22:42)  HR: 72 (02 Jan 2020 22:42) (40 - 72)  BP: 129/66 (02 Jan 2020 22:42) (108/52 - 129/66)  BP(mean): --  RR: 18 (02 Jan 2020 22:42) (18 - 18)  SpO2: 98% (02 Jan 2020 22:42) (97% - 98%)    CONSTITUTIONAL: NAD, well-developed  RESPIRATORY: Normal respiratory effort; lungs are clear to auscultation bilaterally  CARDIOVASCULAR: Regular rate and rhythm, normal S1 and S2, no murmur/rub/gallop; No lower extremity edema; Peripheral pulses are 2+ bilaterally  ABDOMEN: Nontender to palpation, normoactive bowel sounds, no rebound/guarding; No hepatosplenomegaly  MUSCLOSKELETAL: no clubbing or cyanosis of digits; no joint swelling or tenderness to palpation  PSYCH: A+O to person, place, and time; affect appropriate    LABS:                        7.9    6.80  )-----------( 151      ( 02 Jan 2020 08:44 )             26.8     01-02    146<H>  |  114<H>  |  55<H>  ----------------------------<  89  4.3   |  21<L>  |  2.37<H>    Ca    9.2      02 Jan 2020 07:07  Phos  3.0     01-02  Mg     2.3     01-02    TPro  7.0  /  Alb  3.1<L>  /  TBili  1.4<H>  /  DBili  x   /  AST  14  /  ALT  <5<L>  /  AlkPhos  70  01-02                RADIOLOGY & ADDITIONAL TESTS:  Results Reviewed:   Imaging Personally Reviewed:  Electrocardiogram Personally Reviewed:    COORDINATION OF CARE:  Care Discussed with Consultants/Other Providers [Y/N]:  Prior or Outpatient Records Reviewed [Y/N]: PROGRESS NOTE:     CONTACT INFO:   Rafael Arias (Xiao)   NS: [phone]    Patient is a 89y old  Male who presents with a chief complaint of AMS and lethargy (02 Jan 2020 08:22)      SUBJECTIVE / OVERNIGHT EVENTS: no acute event overnight. Tolerated pleasure feed okay.     ADDITIONAL REVIEW OF SYSTEMS: Denies CP, SOB, abd pain, dysuria.     MEDICATIONS  (STANDING):  chlorhexidine 4% Liquid 1 Application(s) Topical daily  dextrose 5%. 1000 milliLiter(s) (50 mL/Hr) IV Continuous <Continuous>  heparin  Injectable 5000 Unit(s) SubCutaneous every 12 hours  levETIRAcetam  IVPB 250 milliGRAM(s) IV Intermittent every 12 hours  levothyroxine Injectable 50 MICROGram(s) IV Push at bedtime    MEDICATIONS  (PRN):  valproate sodium IVPB 125 milliGRAM(s) IV Intermittent every 6 hours PRN Agitation      CAPILLARY BLOOD GLUCOSE        I&O's Summary    02 Jan 2020 07:01  -  03 Jan 2020 07:00  --------------------------------------------------------  IN: 2340 mL / OUT: 1000 mL / NET: 1340 mL        PHYSICAL EXAM:  Vital Signs Last 24 Hrs  T(C): 36.6 (02 Jan 2020 22:42), Max: 36.6 (02 Jan 2020 22:42)  T(F): 97.8 (02 Jan 2020 22:42), Max: 97.8 (02 Jan 2020 22:42)  HR: 72 (02 Jan 2020 22:42) (40 - 72)  BP: 129/66 (02 Jan 2020 22:42) (108/52 - 129/66)  BP(mean): --  RR: 18 (02 Jan 2020 22:42) (18 - 18)  SpO2: 98% (02 Jan 2020 22:42) (97% - 98%)    CONSTITUTIONAL: NAD, sleeping  RESPIRATORY: Normal respiratory effort; lungs are clear to auscultation bilaterally  CARDIOVASCULAR: Regular rate and rhythm, normal S1 and S2, no murmur/rub/gallop; No lower extremity edema  ABDOMEN: Nontender to palpation, normoactive bowel sounds, no rebound/guarding  MUSCLOSKELETAL: no clubbing or cyanosis of digits; no joint swelling or tenderness to palpation  PSYCH: A+O x0    LABS:                        7.9    6.80  )-----------( 151      ( 02 Jan 2020 08:44 )             26.8     01-02    146<H>  |  114<H>  |  55<H>  ----------------------------<  89  4.3   |  21<L>  |  2.37<H>    Ca    9.2      02 Jan 2020 07:07  Phos  3.0     01-02  Mg     2.3     01-02    TPro  7.0  /  Alb  3.1<L>  /  TBili  1.4<H>  /  DBili  x   /  AST  14  /  ALT  <5<L>  /  AlkPhos  70  01-02                RADIOLOGY & ADDITIONAL TESTS:  Results Reviewed:   Imaging Personally Reviewed:  Electrocardiogram Personally Reviewed:    COORDINATION OF CARE:  Care Discussed with Consultants/Other Providers [Y/N]:  Prior or Outpatient Records Reviewed [Y/N]:

## 2020-01-03 NOTE — PROGRESS NOTE ADULT - PROBLEM SELECTOR PLAN 1
Outpatient UCx growing Klebsiella pneumoniae ESBL. Patient with suprapubic catheter.  - s/p 3 day course Meropenem   - UCx 12/26: acinetobacter iwoffii and stenotrophomonas maltophilia, sensitive to carbapenem -s/p antibiotics

## 2020-01-03 NOTE — PROGRESS NOTE ADULT - PROBLEM SELECTOR PLAN 2
likely 2/2 decreased PO intake--improving  - c/w D5 @50cc/hr for now. Added ensure enlive x2 daily per nutrition. likely 2/2 decreased PO intake--resolved  - c/w D5 @50cc/hr for now

## 2020-01-03 NOTE — PROGRESS NOTE ADULT - PROBLEM SELECTOR PLAN 3
- s/p 5-day course meropenem   -failed s/s and MBS. Recommended NPO. Palliative consulted: family decided on pleasure feed. Hospice team will see patient tomorrow - s/p 5-day course meropenem   -failed s/s and MBS. Recommended NPO. Palliative consulted: family decided on pleasure feed. Hospice team will see patient today

## 2020-01-03 NOTE — PROGRESS NOTE ADULT - PROBLEM SELECTOR PLAN 4
hx of anemia requiring 1 unit of prbc during this hospital stay--stable  - monitor H/H daily, transfuse for Hb<7

## 2020-01-04 PROCEDURE — 99233 SBSQ HOSP IP/OBS HIGH 50: CPT | Mod: GC

## 2020-01-04 RX ORDER — QUETIAPINE FUMARATE 200 MG/1
1 TABLET, FILM COATED ORAL
Qty: 0 | Refills: 0 | DISCHARGE

## 2020-01-04 RX ORDER — LEVOTHYROXINE SODIUM 125 MCG
100 TABLET ORAL AT BEDTIME
Refills: 0 | Status: DISCONTINUED | OUTPATIENT
Start: 2020-01-04 | End: 2020-01-05

## 2020-01-04 RX ORDER — LEVETIRACETAM 250 MG/1
1 TABLET, FILM COATED ORAL
Qty: 60 | Refills: 2
Start: 2020-01-04 | End: 2020-04-02

## 2020-01-04 RX ORDER — QUETIAPINE FUMARATE 200 MG/1
1 TABLET, FILM COATED ORAL
Qty: 30 | Refills: 2
Start: 2020-01-04 | End: 2020-04-02

## 2020-01-04 RX ORDER — FUROSEMIDE 40 MG
1 TABLET ORAL
Qty: 30 | Refills: 2
Start: 2020-01-04 | End: 2020-04-02

## 2020-01-04 RX ORDER — LEVETIRACETAM 250 MG/1
250 TABLET, FILM COATED ORAL
Refills: 0 | Status: DISCONTINUED | OUTPATIENT
Start: 2020-01-04 | End: 2020-01-05

## 2020-01-04 RX ADMIN — Medication 25 MILLIGRAM(S): at 21:32

## 2020-01-04 RX ADMIN — LEVETIRACETAM 250 MILLIGRAM(S): 250 TABLET, FILM COATED ORAL at 17:55

## 2020-01-04 RX ADMIN — HEPARIN SODIUM 5000 UNIT(S): 5000 INJECTION INTRAVENOUS; SUBCUTANEOUS at 05:26

## 2020-01-04 RX ADMIN — CHLORHEXIDINE GLUCONATE 1 APPLICATION(S): 213 SOLUTION TOPICAL at 13:12

## 2020-01-04 RX ADMIN — SODIUM CHLORIDE 50 MILLILITER(S): 9 INJECTION, SOLUTION INTRAVENOUS at 16:36

## 2020-01-04 RX ADMIN — HEPARIN SODIUM 5000 UNIT(S): 5000 INJECTION INTRAVENOUS; SUBCUTANEOUS at 17:55

## 2020-01-04 RX ADMIN — Medication 100 MICROGRAM(S): at 21:35

## 2020-01-04 RX ADMIN — LEVETIRACETAM 400 MILLIGRAM(S): 250 TABLET, FILM COATED ORAL at 05:27

## 2020-01-04 NOTE — PROGRESS NOTE ADULT - PROBLEM SELECTOR PLAN 5
Likely 2/2 hypovolemia now improving. Baseline SCr ~2.2--improving  - c/w fluid  - will continue to monitor SCr on AM labs Likely 2/2 hypovolemia now improving. Baseline SCr ~2.2--improving  - c/w fluid

## 2020-01-04 NOTE — PROGRESS NOTE ADULT - ATTENDING COMMENTS
Pt seen and examined. Care discussed with resident.    GOC clarified per palliative, appreciate recs. Pt to go to home hospice, for dc sunday 1/5 per daughter's preference

## 2020-01-04 NOTE — PROGRESS NOTE ADULT - PROBLEM SELECTOR PLAN 4
hx of anemia requiring 1 unit of prbc during this hospital stay--stable  - monitor H/H daily, transfuse for Hb<7 hx of anemia requiring 1 unit of prbc during this hospital stay--stable

## 2020-01-04 NOTE — PROGRESS NOTE ADULT - SUBJECTIVE AND OBJECTIVE BOX
PROGRESS NOTE:     CONTACT INFO:   Rafael Arias (Xiao)   NS: [phone]    Patient is a 89y old  Male who presents with a chief complaint of AMS and lethargy (03 Jan 2020 07:02)      SUBJECTIVE / OVERNIGHT EVENTS:    ADDITIONAL REVIEW OF SYSTEMS:    MEDICATIONS  (STANDING):  chlorhexidine 4% Liquid 1 Application(s) Topical daily  dextrose 5%. 1000 milliLiter(s) (50 mL/Hr) IV Continuous <Continuous>  heparin  Injectable 5000 Unit(s) SubCutaneous every 12 hours  levETIRAcetam  IVPB 250 milliGRAM(s) IV Intermittent every 12 hours  levothyroxine Injectable 50 MICROGram(s) IV Push at bedtime    MEDICATIONS  (PRN):  valproate sodium IVPB 125 milliGRAM(s) IV Intermittent every 6 hours PRN Agitation      CAPILLARY BLOOD GLUCOSE        I&O's Summary    02 Jan 2020 07:01  -  03 Jan 2020 07:00  --------------------------------------------------------  IN: 2340 mL / OUT: 1000 mL / NET: 1340 mL    03 Jan 2020 07:01  -  04 Jan 2020 06:56  --------------------------------------------------------  IN: 1740 mL / OUT: 1000 mL / NET: 740 mL        PHYSICAL EXAM:  Vital Signs Last 24 Hrs  T(C): 36.7 (03 Jan 2020 21:53), Max: 36.9 (03 Jan 2020 16:45)  T(F): 98.1 (03 Jan 2020 21:53), Max: 98.4 (03 Jan 2020 16:45)  HR: 51 (03 Jan 2020 21:53) (51 - 58)  BP: 112/57 (03 Jan 2020 21:53) (110/55 - 133/72)  BP(mean): --  RR: 18 (03 Jan 2020 21:53) (17 - 18)  SpO2: 100% (03 Jan 2020 21:53) (94% - 100%)    CONSTITUTIONAL: NAD, well-developed  RESPIRATORY: Normal respiratory effort; lungs are clear to auscultation bilaterally  CARDIOVASCULAR: Regular rate and rhythm, normal S1 and S2, no murmur/rub/gallop; No lower extremity edema; Peripheral pulses are 2+ bilaterally  ABDOMEN: Nontender to palpation, normoactive bowel sounds, no rebound/guarding; No hepatosplenomegaly  MUSCLOSKELETAL: no clubbing or cyanosis of digits; no joint swelling or tenderness to palpation  PSYCH: A+O to person, place, and time; affect appropriate    LABS:                        8.1    6.87  )-----------( 145      ( 03 Jan 2020 08:38 )             27.2     01-03    142  |  112<H>  |  51<H>  ----------------------------<  91  4.3   |  19<L>  |  2.32<H>    Ca    9.1      03 Jan 2020 07:00  Phos  3.1     01-03  Mg     2.2     01-03    TPro  6.6  /  Alb  2.9<L>  /  TBili  1.2  /  DBili  x   /  AST  17  /  ALT  6<L>  /  AlkPhos  72  01-03                RADIOLOGY & ADDITIONAL TESTS:  Results Reviewed:   Imaging Personally Reviewed:  Electrocardiogram Personally Reviewed:    COORDINATION OF CARE:  Care Discussed with Consultants/Other Providers [Y/N]:  Prior or Outpatient Records Reviewed [Y/N]: PROGRESS NOTE:     CONTACT INFO:   Rafael Arias (Xiao)   NS: [phone]    Patient is a 89y old  Male who presents with a chief complaint of AMS and lethargy (03 Jan 2020 07:02)      SUBJECTIVE / OVERNIGHT EVENTS: no acute event ON. Reports feeling well. Tolerating pleasure feed.    ADDITIONAL REVIEW OF SYSTEMS: Denies CP, SOB, abd pain, dysuria, fever, chills.       MEDICATIONS  (STANDING):  chlorhexidine 4% Liquid 1 Application(s) Topical daily  dextrose 5%. 1000 milliLiter(s) (50 mL/Hr) IV Continuous <Continuous>  heparin  Injectable 5000 Unit(s) SubCutaneous every 12 hours  levETIRAcetam  IVPB 250 milliGRAM(s) IV Intermittent every 12 hours  levothyroxine Injectable 50 MICROGram(s) IV Push at bedtime    MEDICATIONS  (PRN):  valproate sodium IVPB 125 milliGRAM(s) IV Intermittent every 6 hours PRN Agitation      CAPILLARY BLOOD GLUCOSE        I&O's Summary    02 Jan 2020 07:01  -  03 Jan 2020 07:00  --------------------------------------------------------  IN: 2340 mL / OUT: 1000 mL / NET: 1340 mL    03 Jan 2020 07:01  -  04 Jan 2020 06:56  --------------------------------------------------------  IN: 1740 mL / OUT: 1000 mL / NET: 740 mL        PHYSICAL EXAM:  Vital Signs Last 24 Hrs  T(C): 36.7 (03 Jan 2020 21:53), Max: 36.9 (03 Jan 2020 16:45)  T(F): 98.1 (03 Jan 2020 21:53), Max: 98.4 (03 Jan 2020 16:45)  HR: 51 (03 Jan 2020 21:53) (51 - 58)  BP: 112/57 (03 Jan 2020 21:53) (110/55 - 133/72)  BP(mean): --  RR: 18 (03 Jan 2020 21:53) (17 - 18)  SpO2: 100% (03 Jan 2020 21:53) (94% - 100%)    CONSTITUTIONAL: NAD, sleeping  RESPIRATORY: Normal respiratory effort; lungs are clear to auscultation bilaterally  CARDIOVASCULAR: Regular rate and rhythm, normal S1 and S2, no murmur/rub/gallop; No lower extremity edema  ABDOMEN: Nontender to palpation, normoactive bowel sounds, no rebound/guarding  MUSCLOSKELETAL: no clubbing or cyanosis of digits; no joint swelling or tenderness to palpation  PSYCH: A+O x0    LABS:                        8.1    6.87  )-----------( 145      ( 03 Jan 2020 08:38 )             27.2     01-03    142  |  112<H>  |  51<H>  ----------------------------<  91  4.3   |  19<L>  |  2.32<H>    Ca    9.1      03 Jan 2020 07:00  Phos  3.1     01-03  Mg     2.2     01-03    TPro  6.6  /  Alb  2.9<L>  /  TBili  1.2  /  DBili  x   /  AST  17  /  ALT  6<L>  /  AlkPhos  72  01-03                RADIOLOGY & ADDITIONAL TESTS:  Results Reviewed:   Imaging Personally Reviewed:  Electrocardiogram Personally Reviewed:    COORDINATION OF CARE:  Care Discussed with Consultants/Other Providers [Y/N]:  Prior or Outpatient Records Reviewed [Y/N]: PROGRESS NOTE:     CONTACT INFO:   Rafael Arias (Xiao)   NS: [phone]    Patient is a 89y old  Male who presents with a chief complaint of AMS and lethargy (03 Jan 2020 07:02)      SUBJECTIVE / OVERNIGHT EVENTS: no acute event ON. Reports feeling well. Tolerating pleasure feed.    ADDITIONAL REVIEW OF SYSTEMS: Denies CP, SOB, abd pain, dysuria, fever, chills.       MEDICATIONS  (STANDING):  chlorhexidine 4% Liquid 1 Application(s) Topical daily  dextrose 5%. 1000 milliLiter(s) (50 mL/Hr) IV Continuous <Continuous>  heparin  Injectable 5000 Unit(s) SubCutaneous every 12 hours  levETIRAcetam  IVPB 250 milliGRAM(s) IV Intermittent every 12 hours  levothyroxine Injectable 50 MICROGram(s) IV Push at bedtime    MEDICATIONS  (PRN):  valproate sodium IVPB 125 milliGRAM(s) IV Intermittent every 6 hours PRN Agitation      CAPILLARY BLOOD GLUCOSE        I&O's Summary    02 Jan 2020 07:01  -  03 Jan 2020 07:00  --------------------------------------------------------  IN: 2340 mL / OUT: 1000 mL / NET: 1340 mL    03 Jan 2020 07:01  -  04 Jan 2020 06:56  --------------------------------------------------------  IN: 1740 mL / OUT: 1000 mL / NET: 740 mL        PHYSICAL EXAM:  Vital Signs Last 24 Hrs  T(C): 36.7 (03 Jan 2020 21:53), Max: 36.9 (03 Jan 2020 16:45)  T(F): 98.1 (03 Jan 2020 21:53), Max: 98.4 (03 Jan 2020 16:45)  HR: 51 (03 Jan 2020 21:53) (51 - 58)  BP: 112/57 (03 Jan 2020 21:53) (110/55 - 133/72)  BP(mean): --  RR: 18 (03 Jan 2020 21:53) (17 - 18)  SpO2: 100% (03 Jan 2020 21:53) (94% - 100%)    CONSTITUTIONAL: NAD, sleeping  RESPIRATORY: Normal respiratory effort; lungs are clear to auscultation bilaterally  CARDIOVASCULAR: Regular rhythm, normal S1 and S2, no murmur/rub/gallop; No lower extremity edema  ABDOMEN: Nontender to palpation, normoactive bowel sounds, no rebound/guarding  MUSCLOSKELETAL: no clubbing or cyanosis of digits; no joint swelling or tenderness to palpation  PSYCH: A+O x0    LABS:                        8.1    6.87  )-----------( 145      ( 03 Jan 2020 08:38 )             27.2     01-03    142  |  112<H>  |  51<H>  ----------------------------<  91  4.3   |  19<L>  |  2.32<H>    Ca    9.1      03 Jan 2020 07:00  Phos  3.1     01-03  Mg     2.2     01-03    TPro  6.6  /  Alb  2.9<L>  /  TBili  1.2  /  DBili  x   /  AST  17  /  ALT  6<L>  /  AlkPhos  72  01-03

## 2020-01-04 NOTE — PROGRESS NOTE ADULT - ASSESSMENT
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 Pending hospice setup and schedule dc tomorrow at 2pm

## 2020-01-04 NOTE — PROGRESS NOTE ADULT - PROBLEM SELECTOR PLAN 9
History of seizures, on Keppra at home  - c/w home dose Keppra, but IV formulation History of seizures, on Keppra at home  -switch back to PO home med

## 2020-01-04 NOTE — PROGRESS NOTE ADULT - PROBLEM SELECTOR PLAN 3
- s/p 5-day course meropenem   -failed s/s and MBS. Recommended NPO. Palliative consulted: family decided on pleasure feed. Hospice team will see patient today - s/p 5-day course meropenem   -failed s/s and MBS. Recommended NPO. Palliative consulted: family decided on pleasure feed. Home with hospice tomorrow at 2pm - s/p 5-day course meropenem   -failed s/s and MBS. Recommended NPO. Palliative consulted: family decided on pleasure feed. Home with hospice tomorrow at 2pm  - pleasure feeds per GOC discussion, understand risks for aspiration and possibility of death

## 2020-01-04 NOTE — PROGRESS NOTE ADULT - PROBLEM SELECTOR PLAN 8
c/w home synthroid, but IV formulation  -TSH 2.0 wnl this admission -TSH 2.0 wnl this admission  -switch back to PO home med

## 2020-01-05 ENCOUNTER — TRANSCRIPTION ENCOUNTER (OUTPATIENT)
Age: 85
End: 2020-01-05

## 2020-01-05 VITALS
DIASTOLIC BLOOD PRESSURE: 54 MMHG | RESPIRATION RATE: 18 BRPM | HEART RATE: 43 BPM | TEMPERATURE: 97 F | SYSTOLIC BLOOD PRESSURE: 105 MMHG | OXYGEN SATURATION: 99 %

## 2020-01-05 PROCEDURE — 74230 X-RAY XM SWLNG FUNCJ C+: CPT

## 2020-01-05 PROCEDURE — 83605 ASSAY OF LACTIC ACID: CPT

## 2020-01-05 PROCEDURE — 82435 ASSAY OF BLOOD CHLORIDE: CPT

## 2020-01-05 PROCEDURE — 85610 PROTHROMBIN TIME: CPT

## 2020-01-05 PROCEDURE — 99285 EMERGENCY DEPT VISIT HI MDM: CPT | Mod: 25

## 2020-01-05 PROCEDURE — 71045 X-RAY EXAM CHEST 1 VIEW: CPT

## 2020-01-05 PROCEDURE — 86900 BLOOD TYPING SEROLOGIC ABO: CPT

## 2020-01-05 PROCEDURE — 92611 MOTION FLUOROSCOPY/SWALLOW: CPT

## 2020-01-05 PROCEDURE — 80053 COMPREHEN METABOLIC PANEL: CPT

## 2020-01-05 PROCEDURE — 87184 SC STD DISK METHOD PER PLATE: CPT

## 2020-01-05 PROCEDURE — 97162 PT EVAL MOD COMPLEX 30 MIN: CPT

## 2020-01-05 PROCEDURE — 80048 BASIC METABOLIC PNL TOTAL CA: CPT

## 2020-01-05 PROCEDURE — 84132 ASSAY OF SERUM POTASSIUM: CPT

## 2020-01-05 PROCEDURE — 82565 ASSAY OF CREATININE: CPT

## 2020-01-05 PROCEDURE — 36430 TRANSFUSION BLD/BLD COMPNT: CPT

## 2020-01-05 PROCEDURE — 85027 COMPLETE CBC AUTOMATED: CPT

## 2020-01-05 PROCEDURE — 83735 ASSAY OF MAGNESIUM: CPT

## 2020-01-05 PROCEDURE — 87631 RESP VIRUS 3-5 TARGETS: CPT

## 2020-01-05 PROCEDURE — 81001 URINALYSIS AUTO W/SCOPE: CPT

## 2020-01-05 PROCEDURE — 85014 HEMATOCRIT: CPT

## 2020-01-05 PROCEDURE — 86850 RBC ANTIBODY SCREEN: CPT

## 2020-01-05 PROCEDURE — 84295 ASSAY OF SERUM SODIUM: CPT

## 2020-01-05 PROCEDURE — 84100 ASSAY OF PHOSPHORUS: CPT

## 2020-01-05 PROCEDURE — P9016: CPT

## 2020-01-05 PROCEDURE — 82803 BLOOD GASES ANY COMBINATION: CPT

## 2020-01-05 PROCEDURE — 87186 SC STD MICRODIL/AGAR DIL: CPT

## 2020-01-05 PROCEDURE — 87040 BLOOD CULTURE FOR BACTERIA: CPT

## 2020-01-05 PROCEDURE — 93005 ELECTROCARDIOGRAM TRACING: CPT

## 2020-01-05 PROCEDURE — 87086 URINE CULTURE/COLONY COUNT: CPT

## 2020-01-05 PROCEDURE — 82330 ASSAY OF CALCIUM: CPT

## 2020-01-05 PROCEDURE — 86923 COMPATIBILITY TEST ELECTRIC: CPT

## 2020-01-05 PROCEDURE — 99239 HOSP IP/OBS DSCHRG MGMT >30: CPT | Mod: GC

## 2020-01-05 PROCEDURE — 82947 ASSAY GLUCOSE BLOOD QUANT: CPT

## 2020-01-05 PROCEDURE — 86901 BLOOD TYPING SEROLOGIC RH(D): CPT

## 2020-01-05 PROCEDURE — 84443 ASSAY THYROID STIM HORMONE: CPT

## 2020-01-05 PROCEDURE — 85730 THROMBOPLASTIN TIME PARTIAL: CPT

## 2020-01-05 RX ADMIN — CHLORHEXIDINE GLUCONATE 1 APPLICATION(S): 213 SOLUTION TOPICAL at 11:32

## 2020-01-05 RX ADMIN — HEPARIN SODIUM 5000 UNIT(S): 5000 INJECTION INTRAVENOUS; SUBCUTANEOUS at 18:07

## 2020-01-05 RX ADMIN — LEVETIRACETAM 250 MILLIGRAM(S): 250 TABLET, FILM COATED ORAL at 18:07

## 2020-01-05 RX ADMIN — LEVETIRACETAM 250 MILLIGRAM(S): 250 TABLET, FILM COATED ORAL at 06:00

## 2020-01-05 RX ADMIN — HEPARIN SODIUM 5000 UNIT(S): 5000 INJECTION INTRAVENOUS; SUBCUTANEOUS at 06:00

## 2020-01-05 NOTE — PROGRESS NOTE ADULT - REASON FOR ADMISSION
AMS and lethargy

## 2020-01-05 NOTE — DISCHARGE NOTE NURSING/CASE MANAGEMENT/SOCIAL WORK - PATIENT PORTAL LINK FT
You can access the FollowMyHealth Patient Portal offered by [facility] by registering at the following website: [email or web address]. By joining [employer]’s FollowMyHealth portal, you will also be able to view your health information using other applications (apps) compatible with our system.

## 2020-01-05 NOTE — PROGRESS NOTE ADULT - ASSESSMENT
89 yo M with PMHx of advanced dementia (A&Ox1-2 baseline), severe AS, moderate MR, neurogenic bladder s/p suprapubic catheter, CECY, Afib not on a/c 2/2 GIB, CAD, CKD (baseline Cr 2.2), GTC seizure on Keppra, and dysphagia on pleasure feeds, who presented with 4 days of worsening lethargy, somnolence, decreased PO intake, and recent diagnosis of ESBL UTI on ertapenem, admitted for UTI and suspected aspiration PNA now s/p antibiotics. 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 Pending hospice setup and schedule dc today at 2pm 89 yo M with PMHx of advanced dementia (A&Ox1-2 baseline), severe AS, moderate MR, neurogenic bladder s/p suprapubic catheter, CECY, Afib not on a/c 2/2 GIB, CAD, CKD (baseline Cr 2.2), GTC seizure on Keppra, and dysphagia on pleasure feeds, who presented with 4 days of worsening lethargy, somnolence, decreased PO intake, and recent diagnosis of ESBL UTI on ertapenem, admitted for UTI and suspected aspiration PNA now s/p antibiotics. Pending hospice setup and schedule dc today.

## 2020-01-05 NOTE — PROGRESS NOTE ADULT - SUBJECTIVE AND OBJECTIVE BOX
PROGRESS NOTE:     Patient is a 89y old  Male who presents with a chief complaint of AMS and lethargy (04 Jan 2020 06:56)      SUBJECTIVE / OVERNIGHT EVENTS: No acute events overnight.    ADDITIONAL REVIEW OF SYSTEMS:    MEDICATIONS  (STANDING):  chlorhexidine 4% Liquid 1 Application(s) Topical daily  dextrose 5%. 1000 milliLiter(s) (50 mL/Hr) IV Continuous <Continuous>  heparin  Injectable 5000 Unit(s) SubCutaneous every 12 hours  levETIRAcetam 250 milliGRAM(s) Oral two times a day  levothyroxine 100 MICROGram(s) Oral at bedtime    MEDICATIONS  (PRN):  valproate sodium IVPB 125 milliGRAM(s) IV Intermittent every 6 hours PRN Agitation      CAPILLARY BLOOD GLUCOSE        I&O's Summary    04 Jan 2020 07:01  -  05 Jan 2020 07:00  --------------------------------------------------------  IN: 970 mL / OUT: 650 mL / NET: 320 mL        PHYSICAL EXAM:  Vital Signs Last 24 Hrs  T(C): 36.4 (05 Jan 2020 07:19), Max: 36.7 (04 Jan 2020 15:42)  T(F): 97.5 (05 Jan 2020 07:19), Max: 98.1 (04 Jan 2020 15:42)  HR: 60 (05 Jan 2020 07:19) (50 - 63)  BP: 127/61 (05 Jan 2020 07:19) (112/57 - 127/61)  BP(mean): --  RR: 18 (05 Jan 2020 07:19) (18 - 18)  SpO2: 97% (05 Jan 2020 07:19) (97% - 100%)    CONSTITUTIONAL: NAD, well-developed  RESPIRATORY: Normal respiratory effort; lungs are clear to auscultation bilaterally  CARDIOVASCULAR: Regular rate and rhythm, normal S1 and S2, no murmur/rub/gallop; No lower extremity edema; Peripheral pulses are 2+ bilaterally  ABDOMEN: Nontender to palpation, normoactive bowel sounds, no rebound/guarding; No hepatosplenomegaly  MUSCLOSKELETAL: no clubbing or cyanosis of digits; no joint swelling or tenderness to palpation  PSYCH: A+O to person, place, and time; affect appropriate    LABS:                        8.1    6.87  )-----------( 145      ( 03 Jan 2020 08:38 )             27.2                       RADIOLOGY & ADDITIONAL TESTS:  Results Reviewed:   Imaging Personally Reviewed:  Electrocardiogram Personally Reviewed:    COORDINATION OF CARE:  Care Discussed with Consultants/Other Providers [Y/N]:  Prior or Outpatient Records Reviewed [Y/N]: PROGRESS NOTE:     Patient is a 89y old  Male who presents with a chief complaint of AMS and lethargy (04 Jan 2020 06:56)      SUBJECTIVE / OVERNIGHT EVENTS: No acute events overnight. Patient seen this morning - headphones used for patient to hear. Responsive to verbal stimuli, ROS limited due to dementia, states  he has no pain.       MEDICATIONS  (STANDING):  chlorhexidine 4% Liquid 1 Application(s) Topical daily  dextrose 5%. 1000 milliLiter(s) (50 mL/Hr) IV Continuous <Continuous>  heparin  Injectable 5000 Unit(s) SubCutaneous every 12 hours  levETIRAcetam 250 milliGRAM(s) Oral two times a day  levothyroxine 100 MICROGram(s) Oral at bedtime    MEDICATIONS  (PRN):  valproate sodium IVPB 125 milliGRAM(s) IV Intermittent every 6 hours PRN Agitation      CAPILLARY BLOOD GLUCOSE        I&O's Summary    04 Jan 2020 07:01  -  05 Jan 2020 07:00  --------------------------------------------------------  IN: 970 mL / OUT: 650 mL / NET: 320 mL        PHYSICAL EXAM:  Vital Signs Last 24 Hrs  T(C): 36.4 (05 Jan 2020 07:19), Max: 36.7 (04 Jan 2020 15:42)  T(F): 97.5 (05 Jan 2020 07:19), Max: 98.1 (04 Jan 2020 15:42)  HR: 60 (05 Jan 2020 07:19) (50 - 63)  BP: 127/61 (05 Jan 2020 07:19) (112/57 - 127/61)  BP(mean): --  RR: 18 (05 Jan 2020 07:19) (18 - 18)  SpO2: 97% (05 Jan 2020 07:19) (97% - 100%)    CONSTITUTIONAL: NAD, sleeping  RESPIRATORY: Normal respiratory effort; lungs are clear to auscultation bilaterally  CARDIOVASCULAR: Regular rhythm, normal S1 and S2, no murmur/rub/gallop; No lower extremity edema  ABDOMEN: Nontender to palpation, normoactive bowel sounds, no rebound/guarding  MUSCLOSKELETAL: no clubbing or cyanosis of digits; no joint swelling or tenderness to palpation  PSYCH: A+O x0    LABS:                        8.1    6.87  )-----------( 145      ( 03 Jan 2020 08:38 )             27.2                       RADIOLOGY & ADDITIONAL TESTS:  Results Reviewed:   Imaging Personally Reviewed:  Electrocardiogram Personally Reviewed:    COORDINATION OF CARE:  Care Discussed with Consultants/Other Providers [Y/N]:  Prior or Outpatient Records Reviewed [Y/N]: PROGRESS NOTE:     Patient is a 89y old  Male who presents with a chief complaint of AMS and lethargy (04 Jan 2020 06:56)      SUBJECTIVE / OVERNIGHT EVENTS: No acute events overnight. Patient seen this morning - headphones used for patient to hear. Responsive to verbal stimuli, ROS limited due to dementia, states  he has no pain.       MEDICATIONS  (STANDING):  chlorhexidine 4% Liquid 1 Application(s) Topical daily  dextrose 5%. 1000 milliLiter(s) (50 mL/Hr) IV Continuous <Continuous>  heparin  Injectable 5000 Unit(s) SubCutaneous every 12 hours  levETIRAcetam 250 milliGRAM(s) Oral two times a day  levothyroxine 100 MICROGram(s) Oral at bedtime    MEDICATIONS  (PRN):  valproate sodium IVPB 125 milliGRAM(s) IV Intermittent every 6 hours PRN Agitation      CAPILLARY BLOOD GLUCOSE        I&O's Summary    04 Jan 2020 07:01  -  05 Jan 2020 07:00  --------------------------------------------------------  IN: 970 mL / OUT: 650 mL / NET: 320 mL        PHYSICAL EXAM:  Vital Signs Last 24 Hrs  T(C): 36.4 (05 Jan 2020 07:19), Max: 36.7 (04 Jan 2020 15:42)  T(F): 97.5 (05 Jan 2020 07:19), Max: 98.1 (04 Jan 2020 15:42)  HR: 60 (05 Jan 2020 07:19) (50 - 63)  BP: 127/61 (05 Jan 2020 07:19) (112/57 - 127/61)  BP(mean): --  RR: 18 (05 Jan 2020 07:19) (18 - 18)  SpO2: 97% (05 Jan 2020 07:19) (97% - 100%)    CONSTITUTIONAL: NAD, sleeping, arousable  RESPIRATORY: Normal respiratory effort; lungs are clear to auscultation bilaterally  CARDIOVASCULAR: Regular rhythm, normal S1 and S2, no murmur/rub/gallop; No lower extremity edema  ABDOMEN: Nontender to palpation, normoactive bowel sounds, no rebound/guarding  MUSCLOSKELETAL: no clubbing or cyanosis of digits; no joint swelling or tenderness to palpation  PSYCH: A+O x0    LABS:                        8.1    6.87  )-----------( 145      ( 03 Jan 2020 08:38 )             27.2

## 2020-01-05 NOTE — PROGRESS NOTE ADULT - PROBLEM SELECTOR PLAN 6
Patient normally takes 75mg Seroquel at home for agitation, but according to family, that has not been working. Patient's HCP and daughter, Rosas Parikh expressing concern over patient's agitation and risk of fall if trying to get OOB  - Given concern for agitation, psych was consulted for assistance and recommended IV Depacon 125mg q6hrs prn for agitation.  - avoid QTc prolonging psych meds

## 2020-01-05 NOTE — PROGRESS NOTE ADULT - PROBLEM SELECTOR PLAN 3
- s/p 5-day course meropenem   -failed s/s and MBS. Recommended NPO. Palliative consulted: family decided on pleasure feed. Home with hospice today at 2pm  - pleasure feeds per GOC discussion, understand risks for aspiration and possibility of death

## 2020-01-05 NOTE — PROGRESS NOTE ADULT - PROBLEM SELECTOR PROBLEM 1
Urinary tract infection without hematuria, site unspecified

## 2020-01-06 ENCOUNTER — APPOINTMENT (OUTPATIENT)
Dept: HOME HEALTH SERVICES | Facility: HOME HEALTH | Age: 85
End: 2020-01-06

## 2020-01-06 VITALS
HEART RATE: 68 BPM | TEMPERATURE: 96.7 F | SYSTOLIC BLOOD PRESSURE: 108 MMHG | RESPIRATION RATE: 16 BRPM | DIASTOLIC BLOOD PRESSURE: 72 MMHG | OXYGEN SATURATION: 98 %

## 2020-01-06 DIAGNOSIS — Z51.5 ENCOUNTER FOR PALLIATIVE CARE: ICD-10-CM

## 2020-01-06 PROBLEM — N31.9 NEUROMUSCULAR DYSFUNCTION OF BLADDER, UNSPECIFIED: Chronic | Status: ACTIVE | Noted: 2018-01-16

## 2020-01-06 RX ORDER — RANITIDINE 150 MG/1
150 TABLET ORAL
Qty: 60 | Refills: 0 | Status: DISCONTINUED | COMMUNITY
Start: 2018-05-28 | End: 2020-01-06

## 2020-01-06 RX ORDER — LEVOTHYROXINE SODIUM 0.1 MG/1
100 TABLET ORAL DAILY
Qty: 30 | Refills: 3 | Status: ACTIVE | COMMUNITY
Start: 2017-08-23

## 2020-01-06 RX ORDER — ERTAPENEM SODIUM 1 G/1
1 INJECTION, POWDER, LYOPHILIZED, FOR SOLUTION INTRAMUSCULAR; INTRAVENOUS
Refills: 0 | Status: DISCONTINUED | COMMUNITY
Start: 2019-12-23 | End: 2020-01-06

## 2020-01-06 RX ORDER — ASPIRIN 81 MG/1
81 TABLET ORAL DAILY
Qty: 1 | Refills: 0 | Status: DISCONTINUED | COMMUNITY
Start: 2017-08-23 | End: 2020-01-06

## 2020-01-06 RX ORDER — LEVETIRACETAM 250 MG/1
250 TABLET, FILM COATED ORAL TWICE DAILY
Qty: 180 | Refills: 0 | Status: ACTIVE | COMMUNITY
Start: 2018-12-18

## 2020-01-08 ENCOUNTER — APPOINTMENT (OUTPATIENT)
Dept: HOME HEALTH SERVICES | Facility: HOME HEALTH | Age: 85
End: 2020-01-08

## 2020-01-08 ENCOUNTER — TRANSCRIPTION ENCOUNTER (OUTPATIENT)
Age: 85
End: 2020-01-08

## 2020-01-08 VITALS
OXYGEN SATURATION: 98 % | RESPIRATION RATE: 17 BRPM | SYSTOLIC BLOOD PRESSURE: 110 MMHG | DIASTOLIC BLOOD PRESSURE: 80 MMHG | HEART RATE: 67 BPM | TEMPERATURE: 93.5 F

## 2020-01-08 DIAGNOSIS — D64.9 ANEMIA, UNSPECIFIED: ICD-10-CM

## 2020-01-08 DIAGNOSIS — Z93.59 OTHER CYSTOSTOMY STATUS: ICD-10-CM

## 2020-01-08 DIAGNOSIS — E03.9 HYPOTHYROIDISM, UNSPECIFIED: ICD-10-CM

## 2020-01-08 DIAGNOSIS — Z78.9 OTHER SPECIFIED HEALTH STATUS: ICD-10-CM

## 2020-01-08 DIAGNOSIS — G30.9 ALZHEIMER'S DISEASE, UNSPECIFIED: ICD-10-CM

## 2020-01-08 DIAGNOSIS — F02.80 ALZHEIMER'S DISEASE, UNSPECIFIED: ICD-10-CM

## 2020-01-08 DIAGNOSIS — N18.3 CHRONIC KIDNEY DISEASE, STAGE 3 (MODERATE): ICD-10-CM

## 2020-01-08 DIAGNOSIS — J69.0 PNEUMONITIS DUE TO INHALATION OF FOOD AND VOMIT: ICD-10-CM

## 2020-01-08 DIAGNOSIS — G40.909 EPILEPSY, UNSPECIFIED, NOT INTRACTABLE, W/OUT STATUS EPILEPTICUS: ICD-10-CM

## 2020-01-08 DIAGNOSIS — R41.82 ALTERED MENTAL STATUS, UNSPECIFIED: ICD-10-CM

## 2020-01-08 DIAGNOSIS — I48.91 UNSPECIFIED ATRIAL FIBRILLATION: ICD-10-CM

## 2020-01-08 RX ORDER — QUETIAPINE FUMARATE 25 MG/1
25 TABLET ORAL
Qty: 60 | Refills: 0 | Status: COMPLETED | COMMUNITY
Start: 2019-12-30 | End: 2020-01-08

## 2020-01-08 RX ORDER — FUROSEMIDE 20 MG/1
20 TABLET ORAL
Qty: 90 | Refills: 3 | Status: COMPLETED | COMMUNITY
Start: 2018-02-20 | End: 2020-01-08

## 2020-01-08 RX ORDER — MORPHINE SULFATE 100 MG/5ML
20 SOLUTION ORAL
Qty: 1 | Refills: 0 | Status: ACTIVE | COMMUNITY
Start: 2020-01-08 | End: 1900-01-01

## 2020-01-08 RX ORDER — BACITRACIN ZINC 500 [USP'U]/G
500 OINTMENT TOPICAL 3 TIMES DAILY
Qty: 3 | Refills: 3 | Status: COMPLETED | COMMUNITY
Start: 2019-09-24 | End: 2020-01-08

## 2020-01-08 RX ORDER — QUETIAPINE FUMARATE 50 MG/1
50 TABLET ORAL
Qty: 180 | Refills: 3 | Status: COMPLETED | COMMUNITY
Start: 2018-10-30 | End: 2020-01-08

## 2020-01-08 RX ORDER — LEVETIRACETAM 100 MG/ML
100 SOLUTION ORAL
Qty: 473 | Refills: 3 | Status: ACTIVE | COMMUNITY
Start: 2020-01-08 | End: 1900-01-01

## 2020-01-08 NOTE — HISTORY OF PRESENT ILLNESS
[Family Member] : family member [Formal Caregiver] : formal caregiver [FreeTextEntry1] : gait instability [FreeTextEntry2] : Pt is 89 yo M with Hx dementia, anemia hx GI bleed 2/2 Hpylori gastritis s/p admission for blood and iron transfusion 6/2017, urinary retention with suprapubic catheter, HLD, Insomnia, CKD stage 3, hypothyroid, hearing loss B/L,last hospitalization visit 2/2018 for tonic clonic seizures s/p intubation, c/b hypothermia, bradycardia and treatment for sepsis of unknown origin and dysphagia, re-hospitalization 3/2018 at [facility] for transfusion for hb 5.5 currently on procrit, seen for follow-up today after hospitalized for AMS and sepsis 2/2 multifocal aspiration pneumonia and ESBL UTI. he was admitted on 12/26/19 and discharged home on 1/5/20.\par \par hospital course complicated by lethargy, anemia s/p transfusion. CXR showed worsening RLL opacity, and bilateral moderate pleural effusions, patient was discharged on home hospice.\par \par patient has been lethargic, mostly unresponsive. unable to eat or drink since yesterday. urine output has been only 800 cc since yesterday. he has gurgling which has improved with atropine drops. he is getting lorazepam every 6 hrs, has not been able to get any meds orally. daughter is wondering if IVF shall be ordered.\par denies fever, chills, nausea, vomiting, cough, SOB, urinary problems or pain.\par has suprapubic catheter 2/2 neurogenic bladder, changed monthly by home care  \par ate little yesterday while still was a bit awake and responsive. nothing PO today\par BM last one yesterday\par bed bound, and dependent on all ADLs since hospital discharge, has been sleeping all day, not using seroquel as patient is lethargic.\par has advanced dementia, needs 24/7 assistance due to advanced dementia\par \par iron deficiency anemia 2/2 GI bleed- Had Upper endoscopy that did not show source of bleeding, was positive for H Pylori infection, and received complete treatment. on PPI. currently getting iron transfusions and procrit from hematologist office with good response. follows hematologist every 6 weeks.\par \par Hypothyroid - on Synthroid 150 mcg.\par ----------\par lives with spouse and HHA- 24/7\par daughter and grand son lives upstairs\par daughter is HCP

## 2020-01-08 NOTE — REASON FOR VISIT
[Post Hospitalization] : a post hospitalization visit [Formal Caregiver] : formal caregiver [Pre-Visit Preparation] : pre-visit preparation was done [Intercurrent Specialty/Sub-specialty Visits] : the patient has intercurrent specialty/sub-specialty visits [FreeTextEntry3] : home care RN, hospice RN

## 2020-01-08 NOTE — COUNSELING
[Improve exercise tolerance] : improve exercise tolerance [Improve mobility] : improve mobility [Improve weight] : improve weight [Decrease stress] : decrease stress [Improve pain control] : improve pain control [Decrease hospital use] : decrease hospital use [Comfort Care] : comfort care [Minimize unnecessary interventions] : minimize unnecessary interventions [Maintain functional ability] : maintain functional ability [Discussed disease trajectory with patient/caregiver] : discussed disease trajectory with patient/caregiver [Likely to achieve goals/desired outcomes] : likely to achieve goals/desired outcomes [Patient/Caregiver has ___ understanding of disease process] : patient/caregiver has [unfilled] understanding of disease process [Completed Medical Orders for Life-Sustaining Treatment] : completed medical orders for life-sustaining treatment [DNR] : Code Status: DNR [Limited] : Treatment Guidelines: Limited [Trial of Intubation] : Intubation: Trial of Intubation [Normal Weight (BMI <25)] : normal weight - BMI <25 [Non - Smoker] : non-smoker [Mediterranean diet recommended] : Mediterranean diet recommended [Remove clutter and unsafe carpeting to avoid falls] : remove clutter and unsafe carpeting to avoid falls [Use grab bars] : use grab bars [Use assistive device to avoid falls] : use assistive device to avoid falls [Smoke/CO Detectors] : smoke/CO detectors [Not Indicated] : not indicated

## 2020-01-08 NOTE — PHYSICAL EXAM
[No Accessory Muscle Use] : no accessory muscle use [Normal Rate] : heart rate was normal  [Regular Rhythm] : with a regular rhythm [Normal S1, S2] : normal S1 and S2 [No Edema] : there was no peripheral edema [Normal Bowel Sounds] : normal bowel sounds [Non Tender] : non-tender [Soft] : abdomen soft [Not Distended] : not distended [Kyphosis] :  kyphosis present [No Joint Swelling] : no joint swelling seen [No Rash] : no rash [de-identified] : eyes closed [de-identified] : unresponsive with mouth open, saliva pooling , audible gurgling, no distress [de-identified] : coarse crackles all lung fields b/l [de-identified] : 4/6 holo sys murmur- due to AS [de-identified] : suprapubic catheter in place, urine clear [de-identified] : suprapubic catheter draining clear concentrated urine [de-identified] : unrespnsive

## 2020-01-08 NOTE — CHRONIC CARE ASSESSMENT
[PPS Score: ____] : Palliative Performance Scale (PPS) Score: [unfilled] [Oriented To Person] : ~L oriented to person [Oriented To Place] : ~L oriented to place [Alert] : ~L alert [Oriented To Situation] : ~L oriented to situation [Oriented To Time] : ~L oriented to time [Reviewed depression screen from comprehensive assessment] : Reviewed depression screen from comprehensive assessment [Reviewed Mini-Cog Outcomes from Comprehensive Assessment] : Reviewed Mini-Cog outcomes from comprehensive assessment [iADL Assessment Completed] : iADL assessment completed [ADL Assessment Completed] : ADL assessment completed [Relies on Family/Friends] : relies on family/friends [Reviewed Fall Risk from Comprehensive Assessment] : Reviewed fall risk from comprehensive assessment [Relies on Community Resources] : relies on community resources [No Action Needed] : No action needed [Reviewed Home Safety Evaluation] : Reviewed home safety evaluation [Safety elements used in home] : Safety elements used in home [Deaf] : deaf [Uses hearing aid] : uses hearing aid [Uses glasses] : uses glasses [Recent decline in ADLs or iADLs] : No recent decline in ADLs or IADLs? No

## 2020-01-09 ENCOUNTER — TRANSCRIPTION ENCOUNTER (OUTPATIENT)
Age: 85
End: 2020-01-09

## 2020-04-02 PROBLEM — Z51.5 HOSPICE CARE PATIENT: Status: ACTIVE | Noted: 2020-01-06

## 2020-11-02 NOTE — PATIENT PROFILE ADULT - HOW PATIENT ADDRESSED, PROFILE
Remi Returned call    Decided to keep video visit with DARIUS Tavera tomorrow as scheduled    Writer assisted with rescheduling Viki Rosales's appointment from 1230 to 130 pm on 11/4/20 to make sure pt has enough time as she has PCP visit scheduled the same day    Forwarded to DARIUS Tavera Tricia Kerker and PCP as JALYN

## 2020-11-18 NOTE — ED ADULT TRIAGE NOTE - PAIN RATING/NUMBER SCALE (0-10): REST
0 Terbinafine Counseling: Patient counseling regarding adverse effects of terbinafine including but not limited to headache, diarrhea, rash, upset stomach, liver function test abnormalities, itching, taste/smell disturbance, nausea, abdominal pain, and flatulence.  There is a rare possibility of liver failure that can occur when taking terbinafine.  The patient understands that a baseline LFT and kidney function test may be required. The patient verbalized understanding of the proper use and possible adverse effects of terbinafine.  All of the patient's questions and concerns were addressed.

## 2020-12-21 PROBLEM — N39.0 ACUTE UTI: Status: RESOLVED | Noted: 2018-03-17 | Resolved: 2020-12-21

## 2020-12-31 PROBLEM — G30.9 ALZHEIMER'S DEMENTIA: Status: ACTIVE | Noted: 2017-08-23

## 2021-02-23 NOTE — H&P ADULT - PROBLEM/PLAN-3
TeleMedicine Patient Consent    This visit was performed as a virtual video visit using a synchronous, two-way, audio-video telehealth technology platform. Patient identification was verified at the start of the visit, including the patient's telephone number and physical location. I discussed with the patient the nature of our telehealth visits, that:     1. Due to the nature of an audio- video modality, the only components of a physical exam that could be done are the elements supported by direct observation. 2. The provider will evaluate the patient and recommend diagnostics and treatments based on their assessment. 3. If it was felt that the patient should be evaluated in clinic or an emergency room setting, then they would be directed there. 4. Our sessions are not being recorded and that personal health information is protected. 5. Our team would provide follow up care in person if/when the patient needs it. Patient does agree to proceed with telemedicine consultation. Patient location: home address in [facility]    Physician location: regular office location    This visit was completed virtually using Doxy. me    This visit was performed during the 9220 public health crisis and COVID-19 pandemic.   *Add GT modifier to all Video Visits* DISPLAY PLAN FREE TEXT

## 2021-03-26 NOTE — PROVIDER CONTACT NOTE (OTHER) - SITUATION
"    3/26/2021        RE: Lesvia Barney  [facility]  [address]  [facility] [address]        [facility]  [facility] Record Number:  [id number]  Place of Service where encounter took place:  [facility] () [[id number]]  Chief Complaint   Patient presents with     Nursing Home Acute       HPI:    Lesvia Barney  is a 89 year old (10/29/1928), who is being seen today for an episodic care visit.  HPI information obtained from: facility chart records, facility staff and patient report. Today's concern is:    ICD-10-CM    1. Anxiety  F41.9    2. Late onset Alzheimer's disease with behavioral disturbance (H)  G30.1     F02.81    3. 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 Falls frequently  R29.6      Resident with frequent falls. Dementia has progressed and now is an assist of 2-3 with cares. Having behaviors which include intentionally falling backwards with cares. Was originally started on seroquel daily but had no affect on resident. Now taking zyprexa 2.5 mg/BID due to increased anxiety and agitation with cares. Has had several falls recently without injury.     Past Medical and Surgical History reviewed in Epic today.    MEDICATIONS:    Current Outpatient Medications   Medication Sig Dispense Refill     acetaminophen (TYLENOL) 500 MG tablet Take 2 tablets (1,000 mg) by mouth daily as needed for mild pain       aspirin 81 MG chewable tablet Take 1 tablet (81 mg) by mouth daily 108 tablet 3     OLANZapine (ZYPREXA) 2.5 MG tablet Take 1 tablet (2.5 mg) by mouth 2 times daily 60 tablet 0         REVIEW OF SYSTEMS:  Unobtainable secondary to cognitive impairment.     Objective:  BP (!) 143/89   Pulse 53   Temp 98.4  F (36.9  C)   Resp 17   Ht 1.702 m (5' 7\")   Wt 66.4 kg (146 lb 6.4 oz)   SpO2 94%   BMI 22.93 kg/m    Exam:  GENERAL APPEARANCE:  Alert  ENT:  Mouth and posterior oropharynx normal, " moist mucous membranes, [place]  RESP:  respiratory effort and palpation of chest normal, lungs clear to auscultation   CV:  Palpation and auscultation of heart done , regular rate and rhythm, no murmur, rub, or gallop  M/S:   Gait and station normal  Digits and nails normal  SKIN:  Inspection of skin and subcutaneous tissue baseline, Palpation of skin and subcutaneous tissue baseline  NEURO:   Cranial nerves 2-12 are normal tested and grossly at patient's baseline  PSYCH:  insight and judgement impaired, memory impaired , affect and mood normal    Labs:   Labs done in SNF are in [facility] EPIC. Please refer to them using [facility] Everywhere.    ASSESSMENT/PLAN:    ICD-10-CM    1. Anxiety  F41.9    2. Late onset Alzheimer's disease with behavioral disturbance (H)  G30.1     F02.81    3. 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 Falls frequently  R29.6        Increased fall likely 2/2 progression of functional decline possibly exacerbated by initiation of Zyprexa. Resident requiring increased support with cares, now appropriate for LTC for safety.      PLAN:  transcribed by : Ana Harris MA  1. Decrease Zyprexa to 2.5 mg po daily.       Electronically signed by:  [facility]               Sincerely,        Lexii Reid NP     HR = 40 bpm on electronic device, HR = m52 bpm taken manually, pt arousable to name and gentle touch, but lethargic

## 2021-08-31 NOTE — PROVIDER CONTACT NOTE (CRITICAL VALUE NOTIFICATION) - NAME OF MD/NP/PA/DO NOTIFIED:
Post-Operative Knee surgery Instructions    Anesthesia:  - No Alcoholic beverages, including beer and wine, for 24 hours or while on prescribed pain medications  - Do not make any important decisions or sign any legal documents  - Do not drive or operate machinery for 24 hours  - You are required upon discharge to leave the surgical center with a responsible adult who will drive you home    Surgery:  - Stay indoors for 2 days  - Continue weight bearing as tolerated - only use crutches for severe pain  - Stairs can be done one step at a time  - Keep clean and dry   - If knee is swollen, ice for 10 minutes, 3 times daily while awake  - Ankle range of motion 10 times every hour when awake to both lower extremities for 3 days.  This increases circulation and decreases swelling and decreases the chance for clots  - Take pain medication as prescribed  - Resume regular diet  - Follow-up in approximately 1 week    FOR QUESTIONS OR CONCERNS, CALL [phone]    Notify your doctor if you develop: fever, chills, excessive sweating, drainage, pain not controlled by pain medication      IF AN EMERGENCY ARISES CALL 911 AND/OR GO TO THE EMERGENCY ROOM    DR. BLISS  [phone] Maria Luisa Ardon

## 2021-11-01 NOTE — ED ADULT TRIAGE NOTE - SPO2 (%)
Pt arrives from waiting room with difficulty breathing. Pt is breathing near 40 breaths/min in tripod position, 84% on RA. Pt assisted to bed, code 44 initiated.   99

## 2021-12-01 NOTE — ED PROCEDURE NOTE - CPROC ED TIME OUT STATEMENT1
Patient presents with:  Testing      HPI:     Evelia Loyd is a 3year old female who presents for a Covid test.  She had an exposure 6 days ago. No symptoms or complaints. She is up-to-date with her childhood immunizations.   She appears well and nont bilaterally; no rales, rhonchi, or wheezes    MDM/Assessment/Plan:   Orders for this encounter:  Orders Placed This Encounter      Rapid SARS-CoV-2 by PCR          Order Specific Question: Release to patient          Answer: Immediate      Labs performed t “Patient's name, , procedure and correct site were confirmed during the [facility] Timeout.”

## 2022-02-03 NOTE — PROGRESS NOTE ADULT - PROBLEM/PLAN-3
Final Anesthesia Post-op Assessment    Patient: Arvind Berumen  Procedure(s) Performed: LEFT CAROTID ENDARTERECTOMY - LEFT  Anesthesia type: General    Vitals Value Taken Time   Temp 36.8 °C (98.2 °F) 02/03/22 1530   Pulse 90 02/03/22 1700   Resp 22 02/03/22 1700   SpO2 98 % 02/03/22 1700   /69 02/03/22 1515         Patient Location: PACU Phase 1  Post-op Vital Signs:stable  Level of Consciousness: awake and alert  Respiratory Status: spontaneous ventilation  Cardiovascular stable  Hydration: euvolemic  Pain Management: adequately controlled  Handoff: Handoff to receiving nurse was performed and questions were answered  Vomiting: none  Nausea: None  Airway Patency:patent  Post-op Assessment: no complications and patient tolerated procedure well with no complications      No complications documented.   
DISPLAY PLAN FREE TEXT

## 2022-03-18 NOTE — PROGRESS NOTE ADULT - PROBLEM SELECTOR PROBLEM 2
2022       Em Luther DO  [address]  [address]  [facility] [address]  Via Fax: [phone]      Patient: Dilan Cantu   YOB: 2022   Date of Visit: 2022       Dear Dr. Luther:    Thank you for referring Dilan Cantu to me for evaluation. Below are my notes for this visit with him.    If you have questions, please do not hesitate to call me. I look forward to following your patient along with you.      Sincerely,        Anastasiya Miranda PA-C        CC: No Recipients  Anastasiya Miranda PA-C  2022  3:09 PM  Signed  Visit Type:  Initial Consultation: Em Luther DO    Chief Complaint   Patient presents with   • Office Visit     circ consult     Subjective   Dilan is a 5 week old male who presents for an initial consultation for circumcision consultation. Mom states that the family intended to have him circumcised in the  period. The pediatrician that saw him in the hospital after he was born said that he had a \"natural circumcision\" and that there was a contraindication to  circumcision. His regular PCP reportedly thought the penis looked normal and that it would be okay to have a routine circumcision. Mom would prefer for him to be circumcised ASAP.     He was born full term at [facility] and has no known medical problems. He received a vitamin K injection. There is no known FHx of problems with bleeding or anesthesia.      Birth History:   Gestational age: Full-term  Prenatal Ultrasound Findings: Normal   Course: Normal    History reviewed. No pertinent past medical history.  Past Surgical History:   Procedure Laterality Date   • No past surgeries       No current outpatient medications on file.     No current facility-administered medications for this visit.     No Known Allergies    History reviewed. No pertinent family history.    Social History     Tobacco Use   • Smoking status: Not on file   • Smokeless tobacco: Not on file   Substance Use  Seizure disorder Topics   • Alcohol use: Not on file     Patient's medications, allergies, past medical, surgical, social and family histories were reviewed and updated as appropriate.    Review Of Systems:  Constitutional: Normal  Allergies: Normal   Neurologic: Normal   Eyes: Normal  ENT: Normal  Respiratory: Normal    Cardiovascular: Normal   Gastrointestinal: Normal   Genitourinary: As per HPI  Endocrine: Normal  Skin: Normal   Musculoskeletal: Normal   Hematologic/Lymphatic: Normal   Psychiatric: Normal      Physical Exam  Vitals:    03/18/22 0924   Weight: 4.5 kg (9 lb 14.7 oz)   Height: 22.24\" (56.5 cm)     Constitutional: well developed, in no acute distress.   Psychiatric: behavior/orientation age appropriate, mood and affect normal.   HEENT: head normocephalic and atraumatic, ears normal and symmetrical.   Neck: normal.   Respiratory: unlabored respiratory effort.   Cardiovascular: extremities warm and well perfused, no peripheral edema.   Abdomen: no scars, nondistended, no masses/hernias. soft, nontender, no hepatosplenomegaly, no masses.   Skin: no rashes, lesions, ulcers.   Male Genitourinary:  - Penis: uncircumcised phallus, normal length, no curvature, penile adhesions with part of glans visible, twisting of midline raphe                - Meatus: visible,  normal location  - Scrotum: Right: normal Left: normal  - Testicles: Right: well-descended, normal in size and no masses Left: well-descended, normal in size and no masses  - Epididymis: left epididymis normal, right epididymis normal.   - Anus: orthotopic.   Musculoskeletal:   - Spine: normal.   - Sacral Dimple: none   - Muscle Strength/Tone: normal      Assessment / Plan  Diagnoses and all orders for this visit:  Penile adhesions     -Discussed that Dilan is close to the weight / age limit for in office circumcision and penis / foreskin is mildly abnormal (not complete, twisted raphe)  -Will review with Dr Fraser to see if he would be comfortable performing an  in office procedure; mom is aware that if he is not available or if he does not think Dilan would be a good candidate due to the appearance of the penis, then he will require a surgical circumcision    Informed mom that we will call her next week (and told her to contact our office if she has not heard from anyone by next Friday)        I reviewed the above recommendations with Dilan's mother who expressed understanding and agreed with this plan.    Anastasiya Miranda PA-C  Pediatric Urology  [facility]

## 2022-07-18 NOTE — PATIENT PROFILE ADULT - NSPROMEDSPATCH_GEN_A_NUR
Patient is calling to schedule an appointment. Patient was seen at the [facility] on 7/16/22 for a Closed fracture of distal end of left radius, unspecified fracture morphology, initial encounter. Images and referral are in the system. Patient stated she lives closer to [place] if possible.     Please advise on appointment.       [phone]     medication patch(es) used

## 2022-07-25 NOTE — PROGRESS NOTE ADULT - PROBLEM SELECTOR PLAN 2
Baseline Dementia with new onset ?seizures described as b/l UE tonic clonic lasting 20 to 30 seconds total 4 to 5 episodes. CTH showed extensive microvascular disease but no acute pathology. On Trazadone at home for sleep.   - Follow up MRI/MRA Head to evaluate for ischemic focus   - vEEG negative for seizure activity.  - Keppra DC'd, continue to monitor off anti-epileptic.  - Continue 1:1 to monitor for seizures as well as agitation. yes

## 2023-03-28 NOTE — PATIENT PROFILE ADULT. - PRESSURE ULCER(S)
" Transgender Diagnostic and Assessment    [facility].G.I.  Patient's Preferred First Name:  Lc  Patient's pronouns:  they/them/theirs       Patient's gender identity:  Masculine androgynous  Patient's sex assigned at birth:  female        PAST GENDER THERAPY: none  PAST HORMONE USE:  none  GENDER RELATED SURGERIES: none    INTRODUCTION AND GOALS  23 year old masculine androgynous person presents for GAHT to decrease voice pitch, develop a more masculine body shape, and eliminate menses. They wish to avoid balding.     GENDER DEVELOPMENT    Early Childhood:  During childhood, they were mistaken for boy often.  They did not feel this was wrong but not correct either, and did not correct people.  They played boys hockey but as a girl, and had a preferred name.  Their family was fine with this, and their dress and behavior tended to be more masculine leading.    Around age 10-14 (puberty, early adolescence):  They began focusing on their sexual orientation but their gender identity and exploration was the same as during childhood They noticed some decreased mood around hip development and breast development but this was not severe      Ages 15-18 (High School, later adolescence):  There experience of gender diversity continued as before.  During this time they learned from the Internet and online friends about gender diversity and concepts of transgender identities and labels.  They thought about if the male label fit for them but felt not quite, and ignored this issue for them until college.      College/Immediate post High School:   During college, they began using they/ them pronouns.  They did not come out to parents about their gender exploration as they are not \"a shearing family\".  They started using a binder.    Later:   After college, they played women's hockey where testosterone use was not allowed.  They learned about hormone therapy mostly online and from online friends.  Recognized they had already thought about " gender affirming hormones but now had as an adult and no longer playing hockey were now able to consider it.  They also now have health insurance.      BODY,  ANATOMIC CONCERNS    Puberty experiences and response: mildly depressed, down, +SIB--cutting. Also,  staying up late, some food restriction but also due to athlete      Top 3 physical gender related physical changes:   -Broader shoulders, narrower hips  -Flat chest  -Voice drop    DISCLOSURE and RELATIONSHIP, SOCIAL IMPACTS  They live by themselves.  They are out to their significant other about their gender and plans for hormone therapy.  Significant other is supportive  Parents: They have not specifically told parents about their gender but do not hide their gender.  They have not talked to them about either their pronouns or plans for hormone use.  Their parents live in the [place] and they see them every 1 to 2 years.  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 and work environment is supportive  They have no children      CURRENT SOCIAL, LEGAL OR PHYSICAL TRANSITIONS    Legal Transitions: (Name, Gender Markers, other) none  Social Transitions:   --Present as affirmed gender in private: yes  --Present as affirmed gender with immediate family/household: yes  --present as affirmed gender work/school: yes  --Use name/pronouns: yes  --Other:    Physical transitions: (hair removal, binder, other) binder      CURRENT SOCIAL, ECONOMIC AND HEALTH SYSTEMS SUPPORTS    Emotional support--who: girlfriend, therapist, psychiatristr  Logistical  "support--who: friends at work, girlfriend  Health insurance:Yes:   Afford  life essentials and associated costs gender care: Yes:   Reliable transportation: Yes  Primary Care provider: Yes:   Housing yes        TRANSGENDER SUPPORTS/ TRANSPHOBIA    Knows other transgender and gender diverse people:Yes, in person and online  Familiar with transgender resources in their area: Yes        PSYCHIATRIC HISTORY  Borderline personality disorder, Schizoaffective disorder, Anxiety  History of head trauma, PTSD?  Autism  Now has  therapist and psychiatrist  Current medications: recently started Abilify  Past: sertraline (not helpful), Seroquel (not helpful, side effects)  No psychiatric hospitalizations, no suicide attempts  No eating disorders diagnosed, tendency to restrict  Alcohol use--none now, in past 1/2 weeks, 4-5/sitting with friends  Substances--CBD for pain, past cannabis  No SIB now          Mental Status Assessment:  Appearance:  No apparent distress and Casually dressed  Behavior/relationship to examiner/demeanor:  Cooperative, Engaged and Pleasant  Orientation: Oriented to person, place, time and situation  Speech Rate:  Normal  Speech Spontaneity:  Normal  Mood:  \"fine\" and comfortable  Affect:  Appropriate/mood-congruent  Thought Process (Associations):  Logical, Linear and Goal directed  Thought Content:  no evidence of suicidal or homicidal ideation  Abnormal Perception:  None  Attention/Concentration:  Normal  Language:  Intact  Insight:  Good  Judgment:  Good    Motor activity/EPS:  Normal  Fund of Knowledge/Intelligence:  Above average    PHQ 9=20, no SI  GAD7=18, +panic attacks    A/P  1. 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 Shizoaffective disorder  3. BPD  4. PTSD    Patient counseled on the following issues:    Patient meets the guideline criteria for gender dysphoria as outlined in DSM-5 and WPATH SOC 8, and may benefit from from hormone therapy.     They do appear to have strong and stable understanding of " their gender identity, and are able to communicate that understanding with sufficient clarity to guide hormone therapy.     As per WPATH SOC 8 guidelines, mental health issues that could negatively impact the outcome of gender-affirming medical treatments are assessed, with risks and benefits discussed as below  --Counseled that they continue to have significant symptoms and are  just starting treatment with medication  ---Recommend that continue with psyhciatry and therapy, that symptoms trending to improvement and medications become stable regimien rather than in process of changing/adjustment much before hormone start. Pt. Agrees.    They have engaged in or have a plan to manage appropriate social transitions and any associated disclosures of their gender status, reducing risk of psychosocial harms.  --Recommend plan patient has plan and timeline for birth family disclosures prior to next seeing them,   If any concerns or difficulties, recommend gender therapist to assist     They have identified an emotional and logistical support system to assist them with challenges commonly associated with medical and social transition.    They have knowledge of transgender peer/community resources and some transgender peer support.     --Recommend  continue to fill in emotional support system, instructed on resilience evidence for TG community       There are no significant financial, insurance, transportation or housing barriers to safe, effective hormone therapy at this time.    Patient to schedule medical evauation and informed consent appointment when ready    40 minutes spent by me on the date of the encounter doing chart review, patient visit and documentation      Reviewed therapist and psych diagnositc notes      Dickson Owens MD, PhD  [facility]     no

## 2024-03-11 NOTE — ED ADULT NURSE NOTE - NSFALLRSKASSESSTYPE_ED_ALL_ED
3/11/2024         RE: Odette Escobar  [address] [facility] [address]        Dear Colleague,    Thank you for referring your patient, Odette Escobar, to the [facility]. Please see a copy of my visit note below.    ASSESSMENT: Odette Escobar is a 66 year old female who presents for consultation with a past medical history significant for hyperlipidemia, GERD, asthma, hypertension, diabetes mellitus, anxiety, history of vertigo who presents today for new patient evaluation of:    -Chronic mild neck pain    -Chronic mild thoracic pain    -Progressive worsening chronic bilateral low back pain rating to bilateral hips and right sciatica/lumbar radiculopathy type symptoms in an L5 dermatomal pattern today.    Patient is neurologically intact on exam. No myelopathic or red flag symptoms.          No data to display                     Diagnoses and all orders for this visit:  Chronic bilateral low back pain with right-sided sciatica  -     MR Lumbar Spine w/o Contrast; Future  -     Physical Therapy  Referral; Future  Lumbar radiculopathy  -     MR Lumbar Spine w/o Contrast; Future  -     Physical Therapy  Referral; Future  Lumbar facet arthropathy  -     MR Lumbar Spine w/o Contrast; Future  -     Physical Therapy  Referral; Future  DDD (degenerative disc disease), lumbar  -     MR Lumbar Spine w/o Contrast; Future  -     Physical Therapy  Referral; Future  Fibromyalgia  -     Physical Therapy  Referral; Future  Chronic neck pain  -     Physical Therapy  Referral; Future    PLAN:  Reviewed spine anatomy and disease process. Discussed diagnosis and treatment options with the patient today. A shared decision making model was used.  The patient's values and choices were respected. The following represents what was discussed and decided upon by the provider and the patient.      -DIAGNOSTIC TESTS:  Images were personally  reviewed and interpreted and explained to patient today using spine model.   -- Ordered lumbar spine MRI to further evaluate chronic progressive bilateral low back pain with lumbar radiculopathy.  --CT abdomen pelvis 3/7/2024 with normal alignment, mild disc height loss at L5-S1 with disc bulging noted at L4-5 as well as upper lumbar spine.    -PHYSICAL THERAPY: Referral to physical therapy placed to address home exercises for core strengthening and nerve glides.  Discussed the importance of core strengthening, ROM, stretching exercises with the patient and how each of these entities is important in decreasing pain.  Explained to the patient that the purpose of physical therapy is to teach the patient a home exercise program.  These exercises need to be performed every day in order to decrease pain and prevent future occurrences of pain.        -MEDICATIONS: No changes in medications advised patient to continue with gabapentin and acetaminophen, she does take CBD Gummies and Percocet as needed for severe breakthrough pain.  Discussed with patient that we will not take over prescribing Percocet going forward.    Discussed multiple medication options today with patient. Discussed risks, side effects, and proper use of medications. Patient verbalized understanding.    -INTERVENTIONS:  Could consider potential RIGHT lumbar CHRISSIE pending imaging review which she would be open to.  Discussed risks and benefits of injections with patient today.    -PATIENT EDUCATION:  Total time of 46 minutes, on the day of service, spent with the patient, reviewing the chart, placing orders, and documenting.     -FOLLOW-UP:   Follow-up either nurse call if no appointment scheduled otherwise in person visit for MRI review.    Advised patient to call the [facility] if symptoms worsen or you have problems controlling bladder and bowel function.    ______________________________________________________________________    SUBJECTIVE:  HPI:  Odette Escobar  Is a 66 year old female who presents today for new patient evaluation of low back pain bilateral lower back that radiates to bilateral hips with pain rating into the lateral thigh lateral shin and bilateral feet.  She reports that she has had this pain for a long time but it has been worse since she had a fall on ice 2/15/2024, denies any new pain since fall just chronic worsening pain.  Today she reports that her pain is a 4/10, 10 at its worst, 2 at its best.  Denies any lower extremity weakness.  Denies bowel or bladder loss control, denies saddle anesthesia.    She does not endorse chronic neck pain as well as chronic thoracic pain however her back pain is her biggest concern today.    Patient reports a history of fibromyalgia.    -Treatment to Date: No prior spinal surgery.  Physical therapy [facility] in the past, nothing recent  Chiropractic treatments are not held in the past    Lumbar spine injection at age 24  Cervical spine injection 2020 [facility]    -Medications:  Acetaminophen  CBD gabapentin  300 mg    *Percocet prescribed 3/7/2024, 12 tablets given - ED for acute enteritis    Current Outpatient Medications   Medication     acetaminophen (TYLENOL) 500 MG tablet     albuterol (PROVENTIL HFA;VENTOLIN HFA) 90 mcg/actuation inhaler     CANNABIDIOL, CBD, EXTRACT ORAL     famotidine (PEPCID) 20 MG tablet     fluticasone (FLOVENT HFA) 110 mcg/actuation inhaler     gabapentin (NEURONTIN) 300 MG capsule     loperamide (IMODIUM) 2 mg capsule     magnesium oxide 400 mg cap     metFORMIN (GLUCOPHAGE) 500 MG tablet     omeprazole (PRILOSEC) 20 MG capsule     ondansetron (ZOFRAN) 4 MG tablet     propranolol (INDERAL LA) 60 mg 24 hr capsule     sertraline (ZOLOFT) 50 MG tablet     sucralfate (CARAFATE) 1 gram tablet     TRAZODONE HCL (TRAZODONE ORAL)     No current facility-administered medications for this  visit.       Allergies   Allergen Reactions     Aspirin Hives     Diflunisal Nausea and Vomiting     (Dolobid) Occurred approximately 20 years ago    Occurred approximately 20 years ago   Occurred approximately 20 years ago   (Dolobid) Occurred approximately 20 years ago     Fd&C Yellow #5 (Tartrazine) Nausea and Vomiting       No past medical history on file.     Patient Active Problem List   Diagnosis     Low back pain     Anxiety     Asthma     Diabetes mellitus (H)     Essential hypertension     Gastroesophageal reflux disease with esophagitis     Hyperlipidemia       Past Surgical History:   Procedure Laterality Date     ENDOMETRIAL ABLATION         Family History   Problem Relation Age of Onset     Heart Failure Mother      Heart Failure Father      Heart Failure Sister      Coronary Artery Disease Brother      Coronary Artery Disease Brother      Coronary Artery Disease Brother      Cerebrovascular Disease Sister        Reviewed past medical, surgical, and family history with patient found on new patient intake packet located in EMR Media tab.     SOCIAL HX: Patient does not smoke tobacco, currently denies alcohol use.    ROS: Joint pain, muscle pain, sciatica, imbalance, falls, dizziness, easy bruising, insomnia, excessive tiredness, anxiety, abdominal pain, diarrhea/constipation, nausea/vomiting, shortness of breath, cough, wheezing, palpitations, color changes in hands and feet, vision changes, eye pain, headache, hoarseness, ringing in ears, difficulty swallowing, weight gain and weight loss.  Specifically negative for bowel/bladder dysfunction, foot drop, fevers, chills. Otherwise 13 systems reviewed are negative. Please see the patient's intake questionnaire from today for details.    OBJECTIVE:    PHYSICAL EXAMINATION:    --CONSTITUTIONAL:  Vital signs as above.  No acute distress.  The patient is well nourished and well groomed.  --PSYCHIATRIC:  Appropriate mood and affect. The patient is awake,  alert, oriented to person, place, time and answering questions appropriately with clear speech.    --SKIN:  Skin over the face, bilateral lower extremities, and posterior torso is clean, dry, intact without rashes.    --RESPIRATORY: Normal rhythm and effort. No abnormal accessory muscle breathing patterns noted.   --STANDING EXAMINATION:  Normal lumbar lordosis noted, no lateral shift.  --MUSCULOSKELETAL: Range of motion is not limited in lumbar flexion, extension, lateral rotation.  Multilevel tenderness to palpation bilateral thoracic and lumbar spine greatest at the lumbosacral junction bilaterally.  Straight leg raising is negative to radicular pain. Sciatic notch non-tender.  --GROSS MOTOR: Gait is non-antalgic. Easily arises from a seated position.   --LOWER EXTREMITY MOTOR TESTING:  Plantar flexion left 5/5, right 5/5   Dorsiflexion left 5/5, right 5/5   Great toe MTP extension left 5/5, right 5/5  Knee flexion left 5/5, right 5/5  Knee extension left 5/5, right 5/5   Hip flexion left 5/5, right 5/5  Hip abduction left 5/5, right 5/5  Hip adduction left 5/5, right 5/5   --HIPS: Full range of motion bilaterally. Negative FABERs on the involved lower extremity.   --NEUROLOGICAL:  2/4 patellar, medial hamstring, and achilles reflexes bilaterally.  Sensation to light touch is intact in the bilateral L4, L5, and S1 dermatomes. Babinski is negative. No clonus.  Negative Evelyn reflex bilaterally.  --VASCULAR:  Bilateral lower extremities are warm.  There is no pitting edema of the bilateral lower extremities.    RESULTS: Prior medical records from [facility]where were reviewed today.    Imaging: Spine imaging was personally reviewed and interpreted today. The images were shown to the patient and the findings were explained using a spine model.      Abd/pelvis CT no contrast - Stone Protocol    Result Date: 3/7/2024  EXAM: CT ABDOMEN PELVIS W/O CONTRAST LOCATION: [facility]  HOSPITAL DATE: 3/7/2024 INDICATION: abd pain COMPARISON: MRI abdomen 11/14/2022 TECHNIQUE: CT scan of the abdomen and pelvis was performed without IV contrast. Multiplanar reformats were obtained. Dose reduction techniques were used. CONTRAST: None. FINDINGS: LOWER CHEST: Normal. HEPATOBILIARY: Normal unenhanced liver contours. Possible tiny layering gallstones within the gallbladder neck. No pericholecystic inflammation. PANCREAS: Normal. SPLEEN: Normal. ADRENAL GLANDS: Normal. KIDNEYS/BLADDER: Normal unenhanced renal contours. Punctate nonobstructing stone in the lower pole of the right kidney. No hydronephrosis. Normal bladder contours. BOWEL: No bowel obstruction. A few prominent fluid-filled loops of small bowel throughout the abdomen are seen. No evidence of acute diverticulitis. Normal appendix. No free fluid or free air. LYMPH NODES: Abdominal aorta is nonaneurysmal with moderate atheromatous disease. VASCULATURE: No lymphadenopathy. PELVIC ORGANS: No pelvic mass. MUSCULOSKELETAL: No destructive osseous lesions. Healed left rib fractures. Small fat-containing paraumbilical hernia.     IMPRESSION: 1.  A few prominent fluid-filled loops of small bowel in the abdomen are nonspecific but can be seen in the setting of enteritis. 2.  Otherwise, no acute abnormality in the abdomen and pelvis. 3.  Additional details in the findings.     XR Foot Left G/E 3 Views    Result Date: 3/5/2024  For Patients: As a result of the 21st Century Cures Act, medical imaging exams and procedure reports are released immediately into your electronic medical record. You may view this report before your referring provider. If you have questions, please contact your health care provider. EXAM: XR FOOT 3 VIEWS LEFT LOCATION: [facility] DATE: 3/5/2024 INDICATION: Trauma Pain COMPARISON: None.    No fracture or dislocation. There is osteoarthritic change at the proximal interphalangeal joint of the fourth digit. A  plantar calcaneal enthesophyte is present.               Again, thank you for allowing me to participate in the care of your patient.        Sincerely,        Olive Ghosh, CNP   Initial (On Arrival)

## 2024-04-22 NOTE — DISCHARGE NOTE PROVIDER - NSDCHHPTASSISTHOME_GEN_ALL_CORE
Patient Needs Assistance to Leave Residence... f/u behavioral disturbance, intellectual and developmental disability

## 2024-04-30 NOTE — H&P ADULT - PROBLEM SELECTOR PLAN 9
Left message for pt to call back to schedule ofv as a follow-up on medications.   
Medication: PANTOPRAZOLE 40MG TABLETS  passed protocol.   Last office visit date: 10/5/23  Next appointment scheduled?: Yes   Number of refills given: N/A sent to pcp for approval  
Medication: Pantoprazole  passed protocol.   Last office visit date: 10/05/2023  Next appointment scheduled?: No;    Number of refills given: N/A     Patient is due for an appointment.   
Pt scheduled appointment 5=7-24  
Transitions of Care Status:  1.  Name of PCP: Dr. Isaias Rocha  2.  PCP Contacted on Admission: [ ] Y    [X] N    3.  PCP contacted at Discharge: [ ] Y    [ ] N    [ ] N/A  4.  Post-Discharge Appointment Date and Location:  5.  Summary of Handoff given to PCP:

## 2024-07-27 NOTE — BEHAVIORAL HEALTH ASSESSMENT NOTE - NS ED BHA HOMICIDALITY PRESENT AGGRESSION OTHERS LIFETIME
Patient presents with cough since Wednesday night, worsening during the week.   Patient comes in today with concern on dizziness and bilateral hands and feet tingling.     Pt states has chills.   
Yes

## 2025-06-27 NOTE — DISCHARGE NOTE ADULT - PHYSICIAN SECTION COMPLETE
Spoke with mom as a follow up from ED, no further concerns except for a belly ache, no further events. Confirmed upcoming appt. With Dr Strauss 7/7/25. Reviewed common triggers for SZ breakthrough of missed meds, lack of sleep, signs of illness, increased stressors. Mom acknowledged, will continue to monitor, will call with any further events.    Yes
